# Patient Record
Sex: FEMALE | Race: WHITE | Employment: OTHER | URBAN - METROPOLITAN AREA
[De-identification: names, ages, dates, MRNs, and addresses within clinical notes are randomized per-mention and may not be internally consistent; named-entity substitution may affect disease eponyms.]

---

## 2017-01-03 ENCOUNTER — TELEPHONE (OUTPATIENT)
Dept: FAMILY MEDICINE CLINIC | Age: 62
End: 2017-01-03

## 2017-01-03 NOTE — TELEPHONE ENCOUNTER
Can we please transfer pt rx for Singlar to the [facility] on [address].

## 2017-02-02 ENCOUNTER — TELEPHONE (OUTPATIENT)
Dept: FAMILY MEDICINE CLINIC | Age: 62
End: 2017-02-02

## 2017-02-02 NOTE — TELEPHONE ENCOUNTER
Medication: Advair 250-50, dose: 1 inhalation, how often: twice daily, current number of medication days provided: 90, refill per application. Lot #: [id number], EXP 02/2018  . This medication was received and verified for the following 1. Correct Patient, 2. Correct Diagnosis, 3. Correct Drug, 4. Correct route, and no current allergy to medication    Please contact patient to come  their medications.      Maximino Regan MSN, RN, P-[facility]

## 2017-02-27 ENCOUNTER — HOSPITAL ENCOUNTER (OUTPATIENT)
Dept: LAB | Age: 62
Discharge: HOME | End: 2017-02-27

## 2017-02-27 ENCOUNTER — LAB ONLY (OUTPATIENT)
Dept: FAMILY MEDICINE CLINIC | Age: 62
End: 2017-02-27

## 2017-02-27 DIAGNOSIS — Z78.0 POSTMENOPAUSAL: ICD-10-CM

## 2017-02-27 DIAGNOSIS — I10 ESSENTIAL HYPERTENSION: ICD-10-CM

## 2017-02-27 DIAGNOSIS — R25.2 CRAMP OF BOTH LOWER EXTREMITIES: ICD-10-CM

## 2017-02-27 DIAGNOSIS — E78.5 HYPERLIPIDEMIA WITH TARGET LDL LESS THAN 100: Primary | ICD-10-CM

## 2017-02-27 LAB
25(OH)D3 SERPL-MCNC: 37.1 NG/ML (ref 30–100)
ALBUMIN SERPL BCP-MCNC: 4 G/DL (ref 3.4–5)
ALBUMIN/GLOB SERPL: 1.5 {RATIO} (ref 0.8–1.7)
ALP SERPL-CCNC: 73 U/L (ref 45–117)
ALT SERPL-CCNC: 25 U/L (ref 13–56)
ANION GAP BLD CALC-SCNC: 7 MMOL/L (ref 3–18)
AST SERPL W P-5'-P-CCNC: 21 U/L (ref 15–37)
BASOPHILS # BLD AUTO: 0.1 K/UL (ref 0–0.06)
BASOPHILS # BLD: 1 % (ref 0–2)
BILIRUB SERPL-MCNC: 0.5 MG/DL (ref 0.2–1)
BUN SERPL-MCNC: 17 MG/DL (ref 7–18)
BUN/CREAT SERPL: 22 (ref 12–20)
CALCIUM SERPL-MCNC: 10.6 MG/DL (ref 8.5–10.1)
CHLORIDE SERPL-SCNC: 103 MMOL/L (ref 100–108)
CHOLEST SERPL-MCNC: 263 MG/DL
CO2 SERPL-SCNC: 29 MMOL/L (ref 21–32)
CREAT SERPL-MCNC: 0.77 MG/DL (ref 0.6–1.3)
DIFFERENTIAL METHOD BLD: ABNORMAL
EOSINOPHIL # BLD: 0.2 K/UL (ref 0–0.4)
EOSINOPHIL NFR BLD: 3 % (ref 0–5)
ERYTHROCYTE [DISTWIDTH] IN BLOOD BY AUTOMATED COUNT: 13.7 % (ref 11.6–14.5)
GLOBULIN SER CALC-MCNC: 2.6 G/DL (ref 2–4)
GLUCOSE SERPL-MCNC: 105 MG/DL (ref 74–99)
HCT VFR BLD AUTO: 38.7 % (ref 35–45)
HDLC SERPL-MCNC: 82 MG/DL (ref 40–60)
HDLC SERPL: 3.2 {RATIO} (ref 0–5)
HGB BLD-MCNC: 12.8 G/DL (ref 12–16)
LDLC SERPL CALC-MCNC: 165.2 MG/DL (ref 0–100)
LIPID PROFILE,FLP: ABNORMAL
LYMPHOCYTES # BLD AUTO: 27 % (ref 21–52)
LYMPHOCYTES # BLD: 1.9 K/UL (ref 0.9–3.6)
MAGNESIUM SERPL-MCNC: 2.2 MG/DL (ref 1.8–2.4)
MCH RBC QN AUTO: 30.5 PG (ref 24–34)
MCHC RBC AUTO-ENTMCNC: 33.1 G/DL (ref 31–37)
MCV RBC AUTO: 92.4 FL (ref 74–97)
MONOCYTES # BLD: 0.7 K/UL (ref 0.05–1.2)
MONOCYTES NFR BLD AUTO: 9 % (ref 3–10)
NEUTS SEG # BLD: 4.2 K/UL (ref 1.8–8)
NEUTS SEG NFR BLD AUTO: 60 % (ref 40–73)
PLATELET # BLD AUTO: 363 K/UL (ref 135–420)
PMV BLD AUTO: 10.1 FL (ref 9.2–11.8)
POTASSIUM SERPL-SCNC: 4 MMOL/L (ref 3.5–5.5)
PROT SERPL-MCNC: 6.6 G/DL (ref 6.4–8.2)
RBC # BLD AUTO: 4.19 M/UL (ref 4.2–5.3)
SODIUM SERPL-SCNC: 139 MMOL/L (ref 136–145)
TRIGL SERPL-MCNC: 79 MG/DL (ref ?–150)
VLDLC SERPL CALC-MCNC: 15.8 MG/DL
WBC # BLD AUTO: 7.1 K/UL (ref 4.6–13.2)

## 2017-02-27 PROCEDURE — 85025 COMPLETE CBC W/AUTO DIFF WBC: CPT | Performed by: NURSE PRACTITIONER

## 2017-02-27 PROCEDURE — 80053 COMPREHEN METABOLIC PANEL: CPT | Performed by: NURSE PRACTITIONER

## 2017-02-27 PROCEDURE — 82306 VITAMIN D 25 HYDROXY: CPT | Performed by: NURSE PRACTITIONER

## 2017-02-27 PROCEDURE — 83735 ASSAY OF MAGNESIUM: CPT | Performed by: NURSE PRACTITIONER

## 2017-02-27 PROCEDURE — 80061 LIPID PANEL: CPT | Performed by: NURSE PRACTITIONER

## 2017-02-27 NOTE — PROGRESS NOTES
Will review results with pt at 3/6/17 appt  1. CBC ok   2. Mg ok   3. Vitamin D 37.1   4.  Lipid panel , HDL 82, .2 ASCVD RIsk < 5 % No statin therapy needed

## 2017-03-01 ENCOUNTER — TELEPHONE (OUTPATIENT)
Dept: FAMILY MEDICINE CLINIC | Age: 62
End: 2017-03-01

## 2017-03-02 NOTE — TELEPHONE ENCOUNTER
Medication: Proventil HFA, dose: 2 puffs, how often: every 6 hours as needed for wheezing, current number of medication days provided: 90, refill per application. Lot [id number], EXP 03/2018. This medication was received and verified for the following 1. Correct Patient, 2. Correct Diagnosis, 3. Correct Drug, 4. Correct route, and no current allergy to medication. Please contact patient to come  their medications.      Marylouise Duane, MSN, RN, FNP-C     [facility]

## 2017-03-06 ENCOUNTER — OFFICE VISIT (OUTPATIENT)
Dept: FAMILY MEDICINE CLINIC | Age: 62
End: 2017-03-06

## 2017-03-06 VITALS
BODY MASS INDEX: 25.41 KG/M2 | SYSTOLIC BLOOD PRESSURE: 139 MMHG | HEART RATE: 87 BPM | DIASTOLIC BLOOD PRESSURE: 86 MMHG | TEMPERATURE: 97.3 F | RESPIRATION RATE: 20 BRPM | HEIGHT: 61 IN | WEIGHT: 134.6 LBS | OXYGEN SATURATION: 97 %

## 2017-03-06 DIAGNOSIS — R39.89 SUSPECTED UTI: ICD-10-CM

## 2017-03-06 DIAGNOSIS — H73.93 TYMPANIC MEMBRANE DISORDER, BILATERAL: ICD-10-CM

## 2017-03-06 DIAGNOSIS — R09.81 SINUS CONGESTION: ICD-10-CM

## 2017-03-06 DIAGNOSIS — H91.93 HARD OF HEARING, BILATERAL: ICD-10-CM

## 2017-03-06 DIAGNOSIS — R25.2 CRAMP OF BOTH LOWER EXTREMITIES: ICD-10-CM

## 2017-03-06 DIAGNOSIS — I10 ESSENTIAL HYPERTENSION: Primary | ICD-10-CM

## 2017-03-06 DIAGNOSIS — M48.061 SPINAL STENOSIS OF LUMBAR REGION WITH RADICULOPATHY: Chronic | ICD-10-CM

## 2017-03-06 DIAGNOSIS — Z13.39 ADHD (ATTENTION DEFICIT HYPERACTIVITY DISORDER) EVALUATION: ICD-10-CM

## 2017-03-06 DIAGNOSIS — M54.16 SPINAL STENOSIS OF LUMBAR REGION WITH RADICULOPATHY: Chronic | ICD-10-CM

## 2017-03-06 DIAGNOSIS — J44.9 COPD, MILD (HCC): ICD-10-CM

## 2017-03-06 DIAGNOSIS — E78.5 HYPERLIPIDEMIA WITH TARGET LDL LESS THAN 100: ICD-10-CM

## 2017-03-06 RX ORDER — MOMETASONE FUROATE 50 UG/1
2 SPRAY, METERED NASAL DAILY
Qty: 3 CONTAINER | Refills: 3 | Status: SHIPPED | COMMUNITY
Start: 2017-03-06 | End: 2020-04-16

## 2017-03-06 RX ORDER — OLMESARTAN MEDOXOMIL AND HYDROCHLOROTHIAZIDE 20/12.5 20; 12.5 MG/1; MG/1
1 TABLET ORAL DAILY
Qty: 90 TAB | Refills: 3 | Status: SHIPPED | COMMUNITY
Start: 2017-03-06 | End: 2018-02-26 | Stop reason: ALTCHOICE

## 2017-03-06 RX ORDER — LANOLIN ALCOHOL/MO/W.PET/CERES
400 CREAM (GRAM) TOPICAL DAILY
Qty: 30 TAB | Refills: 11 | Status: SHIPPED | OUTPATIENT
Start: 2017-03-06 | End: 2018-09-06 | Stop reason: ALTCHOICE

## 2017-03-06 RX ORDER — SULFAMETHOXAZOLE AND TRIMETHOPRIM 800; 160 MG/1; MG/1
1 TABLET ORAL 2 TIMES DAILY
Qty: 6 TAB | Refills: 0 | Status: SHIPPED | OUTPATIENT
Start: 2017-03-06 | End: 2017-03-09

## 2017-03-06 RX ORDER — NAPROXEN 500 MG/1
500 TABLET ORAL 2 TIMES DAILY WITH MEALS
Qty: 90 TAB | Refills: 2 | Status: SHIPPED | OUTPATIENT
Start: 2017-03-06 | End: 2017-06-05 | Stop reason: SDUPTHER

## 2017-03-06 NOTE — PROGRESS NOTES
[facility]  [id number] [facility], [address] [place] [address]  [phone] [facility]/[phone] fax      3/6/2017    Reason for visit:   Chief Complaint   Patient presents with    Results    Spasms     leg cramps       Patient: Roopa Richard, 1955, [id number]       Primary MD: Mandy Gramajo NP    Subjective:   Roopa Richard, a 59 y.o. female, who presents for Results and Spasms (leg cramps)      Spasms   This is a recurrent problem. The problem occurs every several days. Pertinent negatives include no chest pain, no abdominal pain, no headaches and no shortness of breath. Nothing aggravates the symptoms. Nothing relieves the symptoms. Treatments tried: OTC magnesium. The treatment provided mild relief. Urinary Pain    The history is provided by the patient. This is a new problem. The current episode started more than 1 week ago (1 month ). The problem occurs every urination. The problem has been gradually improving. The quality of the pain is described as burning (Pressure and uncomfortable. ). The pain is at a severity of 4/10. The pain is mild. There has been no fever. Associated symptoms include chills and frequency. Pertinent negatives include no sweats, no nausea, no vomiting, no discharge, no hematuria, no hesitancy, no urgency, no flank pain, no vaginal discharge (Abdominal pressure), no abdominal pain and no back pain. The patient is not pregnant. Her past medical history does not include kidney stones, single kidney, urological procedure, recurrent UTIs, urinary stasis, catheterization or urinary catheter problem. Past medical history comments: Ear surgery/graft. Hearing Problem    The history is provided by the patient. This is a chronic problem. The current episode started more than 1 week ago (since 1980's). The problem occurs constantly. The problem has not changed since onset. Patient complains that both ears are affected. There has been no fever.  The pain is at a severity of 0/10. The patient is experiencing no pain. Associated symptoms include hearing loss ([place] in bilateral ears. History of chronic ear infections as a child, h/o ear surgery. [place] ). Pertinent negatives include no ear discharge, no headaches, no rhinorrhea, no sore throat, no abdominal pain, no diarrhea, no vomiting, no neck pain, no cough and no rash. Risk factors: History of recurrent ear infections. Her past medical history is significant for chronic ear infection and hearing loss. Her past medical history does not include tympanostomy tube. Past medical history comments: Ear surgery/graft. ~ The patient states that she thinks she has ADHD, Has extreme highs at time, difficulty concentrating, and has difficulty completing tasks. Reports in school she was dyslexic and had a difficult time concentrating. Past Medical History:   Diagnosis Date    Asthma 2012    +PFT for mild obstructive disease/COPD    Back pain, chronic 2012    Dr. J Carlos Kwok referred to Dr. Kellie Nelson Hard of hearing     chronic ruptured TM    HTN (hypertension) 2012    Hx of screening mammography 2016    [facility], normal, routine recs    Hyperlipidemia     Sinus congestion 2012    Spinal stenosis of lumbar region with radiculopathy 2015    Dr. Elder Gary       Past Surgical History:   Procedure Laterality Date    HX  SECTION      HX GYN         Social History     Social History    Marital status: SINGLE     Spouse name: N/A    Number of children: N/A    Years of education: N/A     Occupational History    Not on file.      Social History Main Topics    Smoking status: Current Every Day Smoker     Packs/day: 1.00     Types: Cigarettes    Smokeless tobacco: Never Used    Alcohol use No      Comment: h/o alcohol abuse in remission    Drug use: No    Sexual activity: Not on file     Other Topics Concern     Service No    Blood Transfusions No    Caffeine Concern Yes    Occupational Exposure No    Hobby Hazards No    Sleep Concern No    Stress Concern No    Weight Concern No    Special Diet No    Back Care No    Exercise Yes     walking    Bike Helmet No    Seat Belt Yes    Self-Exams No     Social History Narrative       Allergies   Allergen Reactions    Mold Extracts Runny Nose       Current Outpatient Prescriptions on File Prior to Visit   Medication Sig Dispense Refill    fluticasone-salmeterol (ADVAIR) 250-50 mcg/dose diskus inhaler Take 1 Puff by inhalation every twelve (12) hours. 3 Inhaler 3    albuterol (PROVENTIL HFA) 90 mcg/actuation inhaler Take 2 Puffs by inhalation every six (6) hours as needed. 1 Inhaler 3    montelukast (SINGULAIR) 10 mg tablet TAKE ONE TABLET BY MOUTH ONCE DAILY FOR ALLERGIC RHINITIS 30 Tab 6    aspirin delayed-release 81 mg tablet Take 81 mg by mouth daily.  Cholecalciferol, Vitamin D3, (VITAMIN D3) 2,000 unit cap capsule Take 2,000 Units by mouth daily. 30 Cap 11     No current facility-administered medications on file prior to visit. Review of Systems   Constitutional: Positive for chills. HENT: Positive for hearing loss ([place] in bilateral ears. History of chronic ear infections as a child, h/o ear surgery. Mariia Estrada ). Negative for ear discharge, ear pain, rhinorrhea, sore throat and tinnitus. Eyes: Negative. Respiratory: Negative. Negative for cough and shortness of breath. Cardiovascular: Negative. Negative for chest pain. Gastrointestinal: Negative. Negative for abdominal pain, diarrhea, nausea and vomiting. Genitourinary: Positive for frequency. Negative for flank pain, hematuria, hesitancy, urgency and vaginal discharge (Abdominal pressure). Musculoskeletal: Positive for muscle spasms. Negative for back pain and neck pain. Back Pain has been controlled since having the shot. Controlled on Naprosyn. Bilateral leg cramping. Skin: Negative for rash. Neurological: Negative. Negative for headaches. Endo/Heme/Allergies: Positive for environmental allergies. Psychiatric/Behavioral: Negative for depression, hallucinations, memory loss, substance abuse and suicidal ideas. The patient is nervous/anxious. The patient does not have insomnia. Objective:   Visit Vitals    /86    Pulse 87    Temp 97.3 °F (36.3 °C)    Resp 20    Ht 5' 1\" (1.549 m)    Wt 134 lb 9.6 oz (61.1 kg)    SpO2 97%    BMI 25.43 kg/m2      Wt Readings from Last 3 Encounters:   03/06/17 134 lb 9.6 oz (61.1 kg)   11/07/16 127 lb (57.6 kg)   02/23/16 144 lb (65.3 kg)     Lab Results   Component Value Date/Time    Glucose 105 02/27/2017 09:20 AM         Physical Exam   Constitutional: She is oriented to person, place, and time. She appears well-developed and well-nourished. HENT:   Head: Normocephalic. Right Ear: External ear and ear canal normal. No lacerations. No drainage, swelling or tenderness. No foreign bodies. No mastoid tenderness. Tympanic membrane is scarred. Tympanic membrane is not injected, not perforated, not erythematous, not retracted and not bulging. Tympanic membrane mobility is normal. No middle ear effusion. No hemotympanum. Decreased hearing is noted. Left Ear: External ear and ear canal normal. No lacerations. No drainage, swelling or tenderness. No foreign bodies. No mastoid tenderness. Tympanic membrane is perforated. Tympanic membrane is not injected, not scarred, not erythematous, not retracted and not bulging. Tympanic membrane mobility is normal.  No middle ear effusion. No hemotympanum. Decreased hearing is noted. Eyes: Pupils are equal, round, and reactive to light. Neck: Normal range of motion. Neck supple. No JVD present. Carotid bruit is not present. Cardiovascular: Normal rate, regular rhythm, normal heart sounds and intact distal pulses. No murmur heard. Pulmonary/Chest: Effort normal and breath sounds normal. No respiratory distress. Abdominal: Soft.  Bowel sounds are normal.   Musculoskeletal: Normal range of motion. Neurological: She is alert and oriented to person, place, and time. She has normal reflexes. Skin: Skin is warm and dry. Psychiatric: She has a normal mood and affect. Her behavior is normal.   Vitals reviewed. Assessment:    Michael Quiroz who has risk factors including (see above previous medical hx) and:       ICD-10-CM ICD-9-CM    1. Essential hypertension I10 401.9 olmesartan-hydroCHLOROthiazide (BENICAR HCT) 20-12.5 mg per tablet   2. Hyperlipidemia with target LDL less than 100 E78.5 272.4    3. Suspected UTI N39.0 599.0 trimethoprim-sulfamethoxazole (BACTRIM DS, SEPTRA DS) 160-800 mg per tablet   4. COPD, mild ([address] J44.9 496    5. Sinus congestion R09.81 478.19 mometasone (NASONEX) 50 mcg/actuation nasal spray   6. Spinal stenosis of lumbar region with radiculopathy, L4/5 M48.06 724.02 naproxen (NAPROSYN) 500 mg tablet    M54.16 724.4    7. Hard of hearing, bilateral H91.93 389.9 REFERRAL TO ENT-OTOLARYNGOLOGY   8. Tympanic membrane disorder, bilateral H73.93 384.9 REFERRAL TO ENT-OTOLARYNGOLOGY   9. ADHD (attention deficit hyperactivity disorder) evaluation Z13.4 V79.8    10. Cramp of both lower extremities R25.2 729.82 magnesium oxide (MAG-OX) 400 mg tablet   1. Essential hypertension  -The current medical regimen is effective;  continue present plan and medications. - olmesartan-hydroCHLOROthiazide (BENICAR HCT) 20-12.5 mg per tablet; Take 1 Tab by mouth daily. Dispense: 90 Tab; Refill: 3    2. Hyperlipidemia with target LDL less than 100  -The current medical regimen is effective;  continue present plan and medications. 3. Suspected UTI  -An infection in any part of the urinary system, the kidneys, bladder or urethra. Although a urinary tract infection can occur in any part of the urinary system, it often occurs in the bladder or urethra. Women are at greater risk.   A bladder infection can cause pain, a strong urge to urinate, a burning sensation, and blood in the urine. A kidney infection can cause back pain and fever. Common treatment is with antibiotics. - trimethoprim-sulfamethoxazole (BACTRIM DS, SEPTRA DS) 160-800 mg per tablet; Take 1 Tab by mouth two (2) times a day for 3 days. Dispense: 6 Tab; Refill: 0  -RTO if treatment not effective for further evaluation. 4. COPD, mild ([address]  -The current medical regimen is effective;  continue present plan and medications. 5. Sinus congestion  -The current medical regimen is effective;  continue present plan and medications. - mometasone (NASONEX) 50 mcg/actuation nasal spray; 2 Sprays by Both Nostrils route daily. Dispense: 3 Container; Refill: 3    6. Spinal stenosis of lumbar region with radiculopathy, L4/5  -The current medical regimen is effective;  continue present plan and medications. - naproxen (NAPROSYN) 500 mg tablet; Take 1 Tab by mouth two (2) times daily (with meals). Dispense: 90 Tab; Refill: 2    7. Hard of hearing, bilateral  - REFERRAL TO ENT-OTOLARYNGOLOGY    8. Tympanic membrane disorder, bilateral  -Spoke with Medical assistant/FSR Sheron Lunsford about referring patient to see Dr. Leslie Song and she states he is out of town this week and will return call to office   - REFERRAL TO ENT-OTOLARYNGOLOGY    9. ADHD (attention deficit hyperactivity disorder) evaluation  -The patient reports that she thinks she has ADHD. -The patient given number to [facility] to get testing for an official diagnosis. 10. Cramp of both lower extremities  - magnesium oxide (MAG-OX) 400 mg tablet; Take 1 Tab by mouth daily. Dispense: 30 Tab; Refill: 11      Written instructions followed our verbal discussion of all information discussed above, pending tests ordered and future goals/plans. Patient expressed understanding of current diagnosis, planned testing, follow up and if needed to contact the office for any questions or concerns prior to the next visit.      Plan:   Med reconciliation completed with patient. Reviewed side effects of medications with the patient. Questions were answered and patient verb understanding. Discussed lab results with patient  Will review results with pt at 3/6/17 appt  1. CBC ok   2. Mg ok   3. Vitamin D 37.1   4. Lipid panel , HDL 82, .2 ASCVD RIsk < 5 % No statin therapy needed    Orders Placed This Encounter    REFERRAL TO ENT-OTOLARYNGOLOGY     Referral Priority:   Routine     Referral Type:   Consultation     Referral Reason:   Specialty Services Required     Referred to Provider:   Aida Fregoso MD    olmesartan-hydroCHLOROthiazide (BENICAR HCT) 20-12.5 mg per tablet     Sig: Take 1 Tab by mouth daily. Dispense:  90 Tab     Refill:  3    mometasone (NASONEX) 50 mcg/actuation nasal spray     Si Sprays by Both Nostrils route daily. Dispense:  3 Container     Refill:  3    naproxen (NAPROSYN) 500 mg tablet     Sig: Take 1 Tab by mouth two (2) times daily (with meals). Dispense:  90 Tab     Refill:  2    trimethoprim-sulfamethoxazole (BACTRIM DS, SEPTRA DS) 160-800 mg per tablet     Sig: Take 1 Tab by mouth two (2) times a day for 3 days. Dispense:  6 Tab     Refill:  0    magnesium oxide (MAG-OX) 400 mg tablet     Sig: Take 1 Tab by mouth daily. Dispense:  30 Tab     Refill:  11     Current Outpatient Prescriptions   Medication Sig Dispense Refill    olmesartan-hydroCHLOROthiazide (BENICAR HCT) 20-12.5 mg per tablet Take 1 Tab by mouth daily. 90 Tab 3    mometasone (NASONEX) 50 mcg/actuation nasal spray 2 Sprays by Both Nostrils route daily. 3 Container 3    naproxen (NAPROSYN) 500 mg tablet Take 1 Tab by mouth two (2) times daily (with meals). 90 Tab 2    trimethoprim-sulfamethoxazole (BACTRIM DS, SEPTRA DS) 160-800 mg per tablet Take 1 Tab by mouth two (2) times a day for 3 days. 6 Tab 0    magnesium oxide (MAG-OX) 400 mg tablet Take 1 Tab by mouth daily.  30 Tab 11    fluticasone-salmeterol (ADVAIR) 250-50 mcg/dose diskus inhaler Take 1 Puff by inhalation every twelve (12) hours. 3 Inhaler 3    albuterol (PROVENTIL HFA) 90 mcg/actuation inhaler Take 2 Puffs by inhalation every six (6) hours as needed. 1 Inhaler 3    montelukast (SINGULAIR) 10 mg tablet TAKE ONE TABLET BY MOUTH ONCE DAILY FOR ALLERGIC RHINITIS 30 Tab 6    aspirin delayed-release 81 mg tablet Take 81 mg by mouth daily.  Cholecalciferol, Vitamin D3, (VITAMIN D3) 2,000 unit cap capsule Take 2,000 Units by mouth daily. 30 Cap 11     Medications Discontinued During This Encounter   Medication Reason    traMADol (ULTRAM) 50 mg tablet Not A Current Medication    olmesartan-hydrochlorothiazide (BENICAR HCT) 20-12.5 mg per tablet Reorder    mometasone (NASONEX) 50 mcg/actuation nasal spray Reorder    naproxen (NAPROSYN) 500 mg tablet Reorder    magnesium 250 mg tab Dose Adjustment       Follow-up Disposition:  Return in about 3 months (around 6/6/2017), or if symptoms worsen or fail to improve. Labs needed for follow-up appt    \"No Show policy was reviewed with the patient. The services affected are the nurse navigator and the provider. No show appointments include missing labs for a future scheduled appointment, Pap/pelvics, arriving to appointment more than 10 minutes late, and calling to cancel appointment less than 24 hours in advance. After the 6th No Show, the patient will be removed from the Foundation to include medications for 6 months. The patient will be referred to the [place] [id number] for their primary care needs. \"     Beulah Funk, [address] I spent 35 minutes with the patient in face-to-face consultation, of which greater than 50% was spent in counseling and coordination of care as described above.

## 2017-03-06 NOTE — MR AVS SNAPSHOT
Visit Information Date & Time Provider Department Dept. Phone Encounter #  
 3/6/2017  2:30 PM Ashkan Jarrett NP [address] [facility] [address] [id number] Follow-up Instructions Return in about 3 months (around 6/6/2017), or if symptoms worsen or fail to improve. Your Appointments 3/6/2017  2:30 PM  
Follow Up with Ashkan Jarrett NP [address] [facility] ([address]) Appt Note: 3-4 month follow up  
 [address] [facility] [id number]  
[address] [facility] [address] Upcoming Health Maintenance Date Due DTaP/Tdap/Td series (1 - Tdap) 9/8/1974 FOBT Q 1 YEAR AGE 50-75 9/8/2003 ZOSTER VACCINE AGE 60> 9/8/2013 INFLUENZA AGE 9 TO ADULT 8/1/2016 Pneumococcal 19-64 Medium Risk (1 of 1 - PPSV23) 3/6/2018* PAP AKA CERVICAL CYTOLOGY 9/2/2017 BREAST CANCER SCRN MAMMOGRAM 9/6/2018 *Topic was postponed. The date shown is not the original due date. Allergies as of 3/6/2017  Review Complete On: 3/6/2017 By: Missy Banks Severity Noted Reaction Type Reactions Mold Extracts  11/11/2014   Systemic Runny Nose Current Immunizations  Reviewed on 10/29/2015 Name Date Influenza Vaccine 10/6/2016 Influenza Vaccine Александр Mayes) 10/29/2015 Influenza Vaccine (Quad) PF 10/31/2013 Influenza Vaccine (Trivalent) 11/11/2014  3:00 PM  
  
 Not reviewed this visit You Were Diagnosed With   
  
 Codes Comments Essential hypertension    -  Primary ICD-10-CM: I10 
ICD-9-CM: 401.9 Hyperlipidemia with target LDL less than 100     ICD-10-CM: E78.5 ICD-9-CM: 272.4 Suspected UTI     ICD-10-CM: N39.0 ICD-9-CM: 599.0   
 COPD, mild ([address]     ICD-10-CM: J44.9 ICD-9-CM: 876 Sinus congestion     ICD-10-CM: R09.81 ICD-9-CM: 478.19  Spinal stenosis of lumbar region with radiculopathy     ICD-10-CM: M48.06, M54.16 
ICD-9-CM: 724.02, 724.4 Hard of hearing, bilateral     ICD-10-CM: H91.93 
ICD-9-CM: 389. 9 Tympanic membrane disorder, bilateral     ICD-10-CM: H73.93 
ICD-9-CM: 384.9 ADHD (attention deficit hyperactivity disorder) evaluation     ICD-10-CM: Z13.4 ICD-9-CM: V79.8 Vitals BP Pulse Temp Resp Height(growth percentile) Weight(growth percentile) 139/86 87 97.3 °F (36.3 °C) 20 5' 1\" (1.549 m) 134 lb 9.6 oz (61.1 kg) SpO2 BMI OB Status Smoking Status 97% 25.43 kg/m2 Hysterectomy Current Every Day Smoker BMI and BSA Data Body Mass Index Body Surface Area  
 25.43 kg/m 2 1.62 m 2 Preferred Pharmacy Pharmacy Name Phone [address], [address] [facility] [phone] Your Updated Medication List  
  
   
This list is accurate as of: 3/6/17  2:22 PM.  Always use your most recent med list.  
  
  
  
  
 albuterol 90 mcg/actuation inhaler Commonly known as:  PROVENTIL HFA Take 2 Puffs by inhalation every six (6) hours as needed. aspirin delayed-release 81 mg tablet Take 81 mg by mouth daily. Cholecalciferol (Vitamin D3) 2,000 unit Cap capsule Commonly known as:  VITAMIN D3 Take 2,000 Units by mouth daily. fluticasone-salmeterol 250-50 mcg/dose diskus inhaler Commonly known as:  ADVAIR Take 1 Puff by inhalation every twelve (12) hours. magnesium 250 mg Tab Take 1 Tab by mouth.  
  
 mometasone 50 mcg/actuation nasal spray Commonly known as:  NASONEX  
2 Sprays by Both Nostrils route daily. montelukast 10 mg tablet Commonly known as:  SINGULAIR  
TAKE ONE TABLET BY MOUTH ONCE DAILY FOR ALLERGIC RHINITIS  
  
 naproxen 500 mg tablet Commonly known as:  NAPROSYN Take 1 Tab by mouth two (2) times daily (with meals). olmesartan-hydroCHLOROthiazide 20-12.5 mg per tablet Commonly known as:  BENICAR HCT Take 1 Tab by mouth daily. trimethoprim-sulfamethoxazole 160-800 mg per tablet Commonly known as:  BACTRIM DS, SEPTRA DS Take 1 Tab by mouth two (2) times a day for 3 days. Prescriptions Printed Refills  
 olmesartan-hydroCHLOROthiazide (BENICAR HCT) 20-12.5 mg per tablet 3 Sig: Take 1 Tab by mouth daily. Class: Program  
 Route: Oral  
 mometasone (NASONEX) 50 mcg/actuation nasal spray 3 Si Sprays by Both Nostrils route daily. Class: Program  
 Route: Both Nostrils Prescriptions Sent to Mail Order Refills  
 olmesartan-hydroCHLOROthiazide (BENICAR HCT) 20-12.5 mg per tablet 3 Sig: Take 1 Tab by mouth daily. Class: Program  
 Pharmacy: [address] #: [phone] Route: Oral  
 mometasone (NASONEX) 50 mcg/actuation nasal spray 3 Si Sprays by Both Nostrils route daily. Class: Program  
 Pharmacy: [address] #: [phone] Route: Both Nostrils Prescriptions Sent to Pharmacy Refills  
 olmesartan-hydroCHLOROthiazide (BENICAR HCT) 20-12.5 mg per tablet 3 Sig: Take 1 Tab by mouth daily. Class: Program  
 Pharmacy: [address] #: [phone] Route: Oral  
 mometasone (NASONEX) 50 mcg/actuation nasal spray 3 Si Sprays by Both Nostrils route daily. Class: Program  
 Pharmacy: [address] Ph #: [phone] Route: Both Nostrils  
 naproxen (NAPROSYN) 500 mg tablet 2 Sig: Take 1 Tab by mouth two (2) times daily (with meals). Class: Normal  
 Pharmacy: [address] Ph #: [phone] Route: Oral  
 trimethoprim-sulfamethoxazole (BACTRIM DS, SEPTRA DS) 160-800 mg per tablet 0 Sig: Take 1 Tab by mouth two (2) times a day for 3 days.   
 Class: Normal  
 Pharmacy: Tasia Siegel [address] [place] [address], 550 [facility] #: [phone] Route: Oral  
  
We Performed the Following REFERRAL TO ENT-OTOLARYNGOLOGY [MDK4Dian Custom] Comments:  
 Please evaluate patient for abnormal tympanic membrane and [place]. Follow-up Instructions Return in about 3 months (around 6/6/2017), or if symptoms worsen or fail to improve. Referral Information Referral ID Referred By Referred To  
  
 [id number] Ann HUIZAR, [facility] [address] [place], [address] Phone: [phone] Fax: [phone] Visits Status Start Date End Date 1 New Request 3/6/17 3/6/18 If your referral has a status of pending review or denied, additional information will be sent to support the outcome of this decision. Patient Instructions [employer] Address: [place] [address], [address]  [phone] Urinary Tract Infection in Women: Care Instructions Your Care Instructions A urinary tract infection, or UTI, is a general term for an infection anywhere between the kidneys and the urethra (where urine comes out). Most UTIs are bladder infections. They often cause pain or burning when you urinate. UTIs are caused by bacteria and can be cured with antibiotics. Be sure to complete your treatment so that the infection goes away. Follow-up care is a key part of your treatment and safety. Be sure to make and go to all appointments, and call your doctor if you are having problems. It's also a good idea to know your test results and keep a list of the medicines you take. How can you care for yourself at home? · Take your antibiotics as directed. Do not stop taking them just because you feel better. You need to take the full course of antibiotics. · Drink extra water and other fluids for the next day or two.  This may help wash out the bacteria that are causing the infection. (If you have kidney, heart, or liver disease and have to limit fluids, talk with your doctor before you increase your fluid intake.) · Avoid drinks that are carbonated or have caffeine. They can irritate the bladder. · Urinate often. Try to empty your bladder each time. · To relieve pain, take a hot bath or lay a heating pad set on low over your lower belly or genital area. Never go to sleep with a heating pad in place. To prevent UTIs · Drink plenty of water each day. This helps you urinate often, which clears bacteria from your system. (If you have kidney, heart, or liver disease and have to limit fluids, talk with your doctor before you increase your fluid intake.) · Consider adding cranberry juice to your diet. · Urinate when you need to. · Urinate right after you have sex. · Change sanitary pads often. · Avoid douches, bubble baths, feminine hygiene sprays, and other feminine hygiene products that have deodorants. · After going to the bathroom, wipe from front to back. When should you call for help? Call your doctor now or seek immediate medical care if: · Symptoms such as fever, chills, nausea, or vomiting get worse or appear for the first time. · You have new pain in your back just below your rib cage. This is called flank pain. · There is new blood or pus in your urine. · You have any problems with your antibiotic medicine. Watch closely for changes in your health, and be sure to contact your doctor if: 
· You are not getting better after taking an antibiotic for 2 days. · Your symptoms go away but then come back. Where can you learn more? Go to http://giselle-erika.info/. Enter C469 in the search box to learn more about \"Urinary Tract Infection in Women: Care Instructions. \" Current as of: August 12, 2016 Content Version: 11.1 © 3036-2319 [employer].  Care instructions adapted under license by Leann Carson (which disclaims liability or warranty for this information). If you have questions about a medical condition or this instruction, always ask your healthcare professional. [address] any warranty or liability for your use of this information. [facility] reminders! Foundation Operating Hours: These may change without notice. Mon- Wed 7am to 5pm. Closed for lunch 12-1pm 
Thurs 7am to 12pm 
Fridays closed NO SHOW POLICY ~ If a patient has 6 no shows for an appointment with the Provider, Mental Health Provider, or the Nurse Navigator in 6 months, they will be discharged from the practice for 6 months. Medication ordering will also be suspended. If the patient is discharged from the [place], they can go to the [place] [address] where they can be seen for the primary needs plus obtain the same types of medications as they receive at the [place]. To avoid being discharged, the patient must call the office at [phone] 24 hours prior to their appointment if they need to cancel, arrive to their appointments on time and come to all scheduled appointments. If the patient is discharged from the [facility], they can apply to be re-established after 12 months. Lab work:  Unless you are instructed differently, please return to the office between the hours of 7 am and 10:30 am Monday through Thursday to have your labs drawn one week before your next scheduled PROVIDER visit. If you do not have an appointment to follow up on these results, please make one or plan to call the office if you do not hear from us to get the results. No news does not mean good news. Medications: If your medications are new or have changed, and you get your medications from the clinic pharmacy ([facility]), you MUST talk to the pharmacy staff to sign the new prescription applications. If you don't sign the applications we cannot get the medications for you.  It usually takes 6-8 weeks for your medications to arrive. The Pharmacy staff will call you when your medications are available. You will have 30 days to come in and  your medications. If you don't  your medicines within those 30 days, those medicines will be placed on the self as samples and you will have to start all over again by completing the applications and waiting the 6-8 weeks for your medicines to arrive. This is firm and there will be no exceptions! ! The [facility] or [facility] will assist you with your medications if available but not all medications are available so some may be obtained through local pharmacies such as [facility] that has a large $4 list and Yellow Pages that has many drugs for free or also for $4.  We will work hard to get the best medications for you that you can also afford. Feet Care: Local [facility] through [facility] Every second Tuesday of the month (except for holidays and election days) from 9am to 1 pm. The services provided by these ministry volunteers are free of charge with the option to donate. They will inspect your feet thoroughly, soak them for 10 minutes, cut and file your nails. They care for diabetics as well. Keep in mind this service is free and will be on a first come first serve basis. Bad teeth? Ask about the Dental Bus to get you in front of a local dentist. The bus leaves every other Wednesday for those on the list. (Ask about availability as these appointments are limited) Eye exams for Diabetics. Please let us know so we can add you to the list to see the eye doctor at [facility]. You will receive a free eye exam and free glasses if needed. Unfortunately, if you are not a diabetic, we do not have a free service for eye exams for you (yet!). We do have information on where to go to get a huge discount on eye exams and glasses. Sick visits:   If you are sick and it is not an emergency call the office to see if you can schedule an appointment. Charges and cost items from the clinic:  Most of our orders are covered by [address] but there ARE SOME CHARGES for items such as radiology interpretations and anesthesiology during procedures and surgeries. Please make sure you have contacted the Advanced Patient Advocacy (APA) group to check on your payment options: www. [email or web address]. or come in and talk to them in person: On 
Tuesdays, we have Advanced Patient Advocacy is available Mon - Fri 8-4pm at [facility] on the first floor by the information desk. Their number is [phone]. It is important that you are screened in order to qualify for assistance and to avoid huge medical charges. [facility] is not responsible for ANY charges you may accrue regardless of who ordered the medication, procedure, treatment or test. If you go to the Emergency Room, you WILL be charged! Behavior and emotional issues! It is stressful to be sick, have an illness, take medications, not have a job, not have medical insurance, have family issues or just getting older! Schedule an appointment with our mental health provider. She is in the office Mondays and Wednesdays from 8am to [facility] 7053 can also contact the following: The national suicide hotline (4-518-102-JOHF or 9-534.856.2104) [address] 
[phone] [facility] [place] [address] [facility]  
[phone] Immunizations/Vaccination Routine Immunizations are provided for infants, children, teens, and adults at the [facility]. Please bring all immunization records with you and present them at the time of registration. Phone [phone] Hours (Walk in) Monday, Tuesday, Wednesday and Friday 8:00 AM to 11:00 AM 
12:30 PM to 3:00 PM 
 
 
 Drug and Alcohol Addiction Issues! It is hard to stop a poor habit but there is help out there. Please feel free to attend any other the following support groups to help you kick the habit or go to [facility] Emergency Department to be evaluated by the psychiatric team. Never give up!! Ree meetings: Valentino kraus, [facility] MONDAY 7:30 PM JUST FOR TODAY [employer] [facility] [address] [facility] WEDNESDAY 10:30 AM [employer] [facility], ROOM [address] [place] [address]  
 
[place] WEDNESDAY 8:00  Guido Porter [facility] [address] [place] [place] THURSDAY 8:00 PM KEEP IT SIMPLE [employer] Al[employer] [facility] [address] 8:00 PM LET IT BEGIN WITH ME [employer] Al[employer]on [facility] [address] [facility] FRIDAY 6;30 PM [place] PARENTS [employer] Parents [facility] [address] [place] MONDAY 10:30  [place] [address] [facility] SATURDAY 8:00 PM [facility] SATURDAY NIGHT [employer] [place] [address] [place] MONDAY 7:00 PM MONDAY NIGHT [employer]on [place] [facility] [address]  
 
[place] WEDNESDAY 8:00 PM [employer]EN[employer] SEEK[employer] [facility] [address] [facility]! [employer] introduces [employer] patient portal. Now you can access parts of your medical record, email your doctor's office, and request medication refills online. 1. In your internet browser, go to [email or web address] 2. Click on the First Time User? Click Here link in the Sign In box. You will see the New Member Sign Up page. 3. Enter your [employer]Tac[employer] Access Code exactly as it appears below. You will not need to use this code after youve completed the sign-up process.  If you do not sign up before the expiration date, you must request a new code. · Door 6 Access Code: [email or web address] Expires: 6/4/2017  1:49 PM 
 
4. Enter the last four digits of your Social Security Number (xxxx) and Date of Birth (mm/dd/yyyy) as indicated and click Submit. You will be taken to the next sign-up page. 5. Create a Door 6 ID. This will be your Door 6 login ID and cannot be changed, so think of one that is secure and easy to remember. 6. Create a Door 6 password. You can change your password at any time. 7. Enter your Password Reset Question and Answer. This can be used at a later time if you forget your password. 8. Enter your e-mail address. You will receive e-mail notification when new information is available in [address] 9. Click Sign Up. You can now view and download portions of your medical record. 10. Click the Download Summary menu link to download a portable copy of your medical information. If you have questions, please visit the Frequently Asked Questions section of the Door 6 website. Remember, Door 6 is NOT to be used for urgent needs. For medical emergencies, dial 911. Now available from your iPhone and Android! Please provide this summary of care documentation to your next provider. Your primary care clinician is listed as Pallavi Hough. If you have any questions after today's visit, please call [phone].

## 2017-03-06 NOTE — PATIENT INSTRUCTIONS
[facility]    Address: [address], [place], [facility] [place]   [phone]     Urinary Tract Infection in Women: Care Instructions  Your Care Instructions    A urinary tract infection, or UTI, is a general term for an infection anywhere between the kidneys and the urethra (where urine comes out). Most UTIs are bladder infections. They often cause pain or burning when you urinate. UTIs are caused by bacteria and can be cured with antibiotics. Be sure to complete your treatment so that the infection goes away. Follow-up care is a key part of your treatment and safety. Be sure to make and go to all appointments, and call your doctor if you are having problems. It's also a good idea to know your test results and keep a list of the medicines you take. How can you care for yourself at home? · Take your antibiotics as directed. Do not stop taking them just because you feel better. You need to take the full course of antibiotics. · Drink extra water and other fluids for the next day or two. This may help wash out the bacteria that are causing the infection. (If you have kidney, heart, or liver disease and have to limit fluids, talk with your doctor before you increase your fluid intake.)  · Avoid drinks that are carbonated or have caffeine. They can irritate the bladder. · Urinate often. Try to empty your bladder each time. · To relieve pain, take a hot bath or lay a heating pad set on low over your lower belly or genital area. Never go to sleep with a heating pad in place. To prevent UTIs  · Drink plenty of water each day. This helps you urinate often, which clears bacteria from your system. (If you have kidney, heart, or liver disease and have to limit fluids, talk with your doctor before you increase your fluid intake.)  · Consider adding cranberry juice to your diet. · Urinate when you need to. · Urinate right after you have sex. · Change sanitary pads often.   · Avoid douches, bubble baths, feminine hygiene sprays, and other feminine hygiene products that have deodorants. · After going to the bathroom, wipe from front to back. When should you call for help? Call your doctor now or seek immediate medical care if:  · Symptoms such as fever, chills, nausea, or vomiting get worse or appear for the first time. · You have new pain in your back just below your rib cage. This is called flank pain. · There is new blood or pus in your urine. · You have any problems with your antibiotic medicine. Watch closely for changes in your health, and be sure to contact your doctor if:  · You are not getting better after taking an antibiotic for 2 days. · Your symptoms go away but then come back. Where can you learn more? Go to http://giselle-erika.info/. Enter G433 in the search box to learn more about \"Urinary Tract Infection in Women: Care Instructions. \"  Current as of: August 12, 2016  Content Version: 11.1  © 7265-2853 [employer]. Care instructions adapted under license by [employer] (which disclaims liability or warranty for this information). If you have questions about a medical condition or this instruction, always ask your healthcare professional. [place] [address] any warranty or liability for your use of this information. The [facility] [place] reminders! Foundation Operating Hours: These may change without notice. Mon- Wed 7am to 5pm. Closed for lunch 12-1pm  Thurs 7am to 12pm  Fridays closed     NO SHOW POLICY ~ If a patient has 6 no shows for an appointment with the Provider, Mental Health Provider, or the Nurse Navigator in 6 months, they will be discharged from the practice for 6 months. Medication ordering will also be suspended. If the patient is discharged from the [facility], they can go to the [place] [address] where they can be seen for the primary needs plus obtain the same types of medications as they receive at the [facility]. To avoid being discharged, the patient must call the office at [phone] 24 hours prior to their appointment if they need to cancel, arrive to their appointments on time and come to all scheduled appointments. If the patient is discharged from the practice, they can apply to be re-established after 12 months. Lab work:  Unless you are instructed differently, please return to the office between the hours of 7 am and 10:30 am Monday through Thursday to have your labs drawn one week before your next scheduled PROVIDER visit. If you do not have an appointment to follow up on these results, please make one or plan to call the office if you do not hear from us to get the results. No news does not mean good news. Medications: If your medications are new or have changed, and you get your medications from the clinic pharmacy ([facility]), you MUST talk to the pharmacy staff to sign the new prescription applications. If you don't sign the applications we cannot get the medications for you. It usually takes 6-8 weeks for your medications to arrive. The Pharmacy staff will call you when your medications are available. You will have 30 days to come in and  your medications. If you don't  your medicines within those 30 days, those medicines will be placed on the self as samples and you will have to start all over again by completing the applications and waiting the 6-8 weeks for your medicines to arrive. This is firm and there will be no exceptions! ! The [facility] Connection or [facility] will assist you with your medications if available but not all medications are available so some may be obtained through local pharmacies such as [employer] that has a large $4 list and [employer] that has many drugs for free or also for $4.  We will work hard to get the best medications for you that you can also afford.         Feet Care: Local [facility] through [facility]  Every second Tuesday of the month (except for holidays and election days) from 9am to 1 pm. The services provided by these ministry volunteers are free of charge with the option to donate. They will inspect your feet thoroughly, soak them for 10 minutes, cut and file your nails. They care for diabetics as well. Keep in mind this service is free and will be on a first come first serve basis. Bad teeth? Ask about the Dental Bus to get you in front of a local dentist. The bus leaves every other Wednesday for those on the list. (Ask about availability as these appointments are limited)    Eye exams for Diabetics. Please let us know so we can add you to the list to see the eye doctor at [facility]. You will receive a free eye exam and free glasses if needed. Unfortunately, if you are not a diabetic, we do not have a free service for eye exams for you (yet!). We do have information on where to go to get a huge discount on eye exams and glasses. Sick visits: If you are sick and it is not an emergency call the office to see if you can schedule an appointment. Charges and cost items from the clinic:  Most of our orders are covered by [employer] Tiffanie Derek but there ARE SOME CHARGES for items such as radiology interpretations and anesthesiology during procedures and surgeries. Please make sure you have contacted the Advanced Patient Advocacy (APA) group to check on your payment options: [email or web address]. or come in and talk to them in person: On  Tuesdays, we have Advanced Patient Advocacy is available Mon - Fri 8-4pm at DR. [facility] hospitals on the first floor by the information desk. Their number is [phone]. It is important that you are screened in order to qualify for assistance and to avoid huge medical charges. The [facility] is not responsible for ANY charges you may accrue regardless of who ordered the medication, procedure, treatment or test. If you go to the Emergency Room, you WILL be charged! Behavior and emotional issues!   It is stressful to be sick, have an illness, take medications, not have a job, not have medical insurance, have family issues or just getting older! Schedule an appointment with our mental health provider. She is in the office Mondays and Wednesdays from 8am to [address] [facility] can also contact the following: The national suicide hotline (4-679-366-HYIS or 9-104.979.8086)    Joaquim [address] [facility], [id number] [place] [address]  [phone]    [facility] - [address], [address] [place]  [phone]            Immunizations/Vaccination  Routine Immunizations are provided for infants, children, teens, and adults at the [facility] FOR PHYSICAL REHABILITATION. Please bring all immunization records with you and present them at the time of registration. Phone [phone] extension [phone]  Hours (Walk in)  Monday, Tuesday, Wednesday and Friday  8:00 AM to 11:00 AM  12:30 PM to 3:00 PM      Drug and Alcohol Addiction Issues! It is hard to stop a poor habit but there is help out there. Please feel free to attend any other the following support groups to help you kick the habit or go to [facility] to be evaluated by the psychiatric team. Never give up!! AlAnon meetings: Valentino kraus, [place], [place]    [place] MONDAY 7:30 PM JUST FOR TODAY [employer] Leigh [facility] [address] [facility]     [place] WEDNESDAY 10:30 AM NEW BEGINNINGS [employer] Leigh [place] [employer], ROOM [address] [place], [place] [address] WEDNES[facility] 8:00  [address] 8:00 PM KEEP IT SIMPLE AFG Leigh ARMENDARIZ [facility] 1 PERCY [place]     [facility] THURSDAY 8:00 PM LET IT BEGIN WITH ME AFG Leigh SMITHCar[facility] CenterJASMYN [facility] Tim [address] 17 6;30 PM [place] PARENTS [employer] Parents [place] 2720 [facility] [address]  [facility] 236     [place] MONDAY 10:30 AM LIFELINE [employer] Leigh [facility] 96 [facility] SATURDAY 8:00 PM [facility] SATURDAY NIGHT AFG Leigh [facility] 96 [facility] MONDAY 7:00 PM MONDAY NIGHT AFG Leigh LEACH [facility] 1589 [facility] WEDNESDAY 8:00 PM SERENITY SEEKERS [employer] Leigh [facility] 202 [facility]

## 2017-03-13 ENCOUNTER — TELEPHONE (OUTPATIENT)
Dept: FAMILY MEDICINE CLINIC | Age: 62
End: 2017-03-13

## 2017-03-22 ENCOUNTER — TELEPHONE (OUTPATIENT)
Dept: FAMILY MEDICINE CLINIC | Age: 62
End: 2017-03-22

## 2017-03-22 NOTE — TELEPHONE ENCOUNTER
Medication: Nasonex, dose: 2 sprays both nostrils, how often: qd, current number of medication days provided: 90, refill per application. Lot #: [id number], EXP 03/2018. This medication was received and verified for the following 1. Correct Patient, 2. Correct Diagnosis, 3. Correct Drug, 4. Correct route, and no current allergy to medication. Please contact patient to come  their medications.      Aleksandr Sen MSN, RN, [facility]

## 2017-05-09 ENCOUNTER — TELEPHONE (OUTPATIENT)
Dept: FAMILY MEDICINE CLINIC | Age: 62
End: 2017-05-09

## 2017-05-09 NOTE — TELEPHONE ENCOUNTER
Medication: Saslxs799/50 dose: 1 inhalation, how often: twice daily, current number of medication days provided: 90, refill per application. Lot #[id number], EXP 05/2018 . This medication was received and verified for the following 1. Correct Patient, 2. Correct Diagnosis, 3. Correct Drug, 4. Correct route, and no current allergy to medication. Medication: Benicar HCT 20/12.5mg mg, dose: 1tab, how often: daily, current number of medication days provided: 90, refill per application. Lot #[id number], EXP 05/2018    This medication was received and verified for the following 1. Correct Patient, 2. Correct Diagnosis, 3. Correct Drug, 4. Correct route, and no current allergy to medication.      Libby Medrano PharmD

## 2017-05-23 DIAGNOSIS — I10 ESSENTIAL HYPERTENSION: Primary | ICD-10-CM

## 2017-05-30 ENCOUNTER — LAB ONLY (OUTPATIENT)
Dept: FAMILY MEDICINE CLINIC | Age: 62
End: 2017-05-30

## 2017-05-30 ENCOUNTER — HOSPITAL ENCOUNTER (OUTPATIENT)
Dept: LAB | Age: 62
Discharge: HOME | End: 2017-05-30

## 2017-05-30 DIAGNOSIS — I10 ESSENTIAL HYPERTENSION: ICD-10-CM

## 2017-05-30 DIAGNOSIS — E78.5 HYPERLIPIDEMIA WITH TARGET LDL LESS THAN 100: Primary | ICD-10-CM

## 2017-05-30 LAB
ALBUMIN SERPL BCP-MCNC: 4.1 G/DL (ref 3.4–5)
ALBUMIN/GLOB SERPL: 1.5 {RATIO} (ref 0.8–1.7)
ALP SERPL-CCNC: 79 U/L (ref 45–117)
ALT SERPL-CCNC: 20 U/L (ref 13–56)
ANION GAP BLD CALC-SCNC: 6 MMOL/L (ref 3–18)
AST SERPL W P-5'-P-CCNC: 18 U/L (ref 15–37)
BILIRUB SERPL-MCNC: 0.4 MG/DL (ref 0.2–1)
BUN SERPL-MCNC: 20 MG/DL (ref 7–18)
BUN/CREAT SERPL: 25 (ref 12–20)
CALCIUM SERPL-MCNC: 10.8 MG/DL (ref 8.5–10.1)
CHLORIDE SERPL-SCNC: 106 MMOL/L (ref 100–108)
CO2 SERPL-SCNC: 27 MMOL/L (ref 21–32)
CREAT SERPL-MCNC: 0.79 MG/DL (ref 0.6–1.3)
GLOBULIN SER CALC-MCNC: 2.7 G/DL (ref 2–4)
GLUCOSE SERPL-MCNC: 87 MG/DL (ref 74–99)
MAGNESIUM SERPL-MCNC: 2.2 MG/DL (ref 1.6–2.6)
POTASSIUM SERPL-SCNC: 4.5 MMOL/L (ref 3.5–5.5)
PROT SERPL-MCNC: 6.8 G/DL (ref 6.4–8.2)
SODIUM SERPL-SCNC: 139 MMOL/L (ref 136–145)

## 2017-05-30 PROCEDURE — 80053 COMPREHEN METABOLIC PANEL: CPT | Performed by: NURSE PRACTITIONER

## 2017-05-30 PROCEDURE — 83735 ASSAY OF MAGNESIUM: CPT | Performed by: NURSE PRACTITIONER

## 2017-06-05 ENCOUNTER — OFFICE VISIT (OUTPATIENT)
Dept: FAMILY MEDICINE CLINIC | Age: 62
End: 2017-06-05

## 2017-06-05 VITALS
SYSTOLIC BLOOD PRESSURE: 114 MMHG | RESPIRATION RATE: 20 BRPM | WEIGHT: 135 LBS | HEIGHT: 61 IN | TEMPERATURE: 98.4 F | DIASTOLIC BLOOD PRESSURE: 74 MMHG | BODY MASS INDEX: 25.49 KG/M2 | OXYGEN SATURATION: 98 % | HEART RATE: 94 BPM

## 2017-06-05 DIAGNOSIS — J30.2 SEASONAL ALLERGIC RHINITIS, UNSPECIFIED ALLERGIC RHINITIS TRIGGER: ICD-10-CM

## 2017-06-05 DIAGNOSIS — Z78.9 STATIN INTOLERANCE: ICD-10-CM

## 2017-06-05 DIAGNOSIS — M48.061 SPINAL STENOSIS OF LUMBAR REGION WITH RADICULOPATHY: Chronic | ICD-10-CM

## 2017-06-05 DIAGNOSIS — Z00.00 ROUTINE HEALTH MAINTENANCE: ICD-10-CM

## 2017-06-05 DIAGNOSIS — I10 ESSENTIAL HYPERTENSION: Primary | ICD-10-CM

## 2017-06-05 DIAGNOSIS — E78.5 HYPERLIPIDEMIA WITH TARGET LDL LESS THAN 100: ICD-10-CM

## 2017-06-05 DIAGNOSIS — J44.9 COPD, MILD (HCC): ICD-10-CM

## 2017-06-05 DIAGNOSIS — M54.16 SPINAL STENOSIS OF LUMBAR REGION WITH RADICULOPATHY: Chronic | ICD-10-CM

## 2017-06-05 DIAGNOSIS — H91.93 BILATERAL HEARING LOSS, UNSPECIFIED HEARING LOSS TYPE: ICD-10-CM

## 2017-06-05 DIAGNOSIS — Z71.6 TOBACCO ABUSE COUNSELING: ICD-10-CM

## 2017-06-05 RX ORDER — MONTELUKAST SODIUM 10 MG/1
TABLET ORAL
Qty: 30 TAB | Refills: 6 | Status: ON HOLD | OUTPATIENT
Start: 2017-06-05 | End: 2017-10-24

## 2017-06-05 RX ORDER — NAPROXEN 500 MG/1
500 TABLET ORAL 2 TIMES DAILY WITH MEALS
Qty: 90 TAB | Refills: 2 | Status: SHIPPED | OUTPATIENT
Start: 2017-06-05 | End: 2017-12-04 | Stop reason: SDUPTHER

## 2017-06-05 RX ORDER — EZETIMIBE 10 MG/1
10 TABLET ORAL DAILY
Qty: 90 TAB | Refills: 0 | Status: SHIPPED | COMMUNITY
Start: 2017-06-05 | End: 2017-06-29 | Stop reason: SDUPTHER

## 2017-06-05 RX ORDER — EZETIMIBE 10 MG/1
10 TABLET ORAL DAILY
Qty: 90 TAB | Refills: 0 | Status: SHIPPED | COMMUNITY
Start: 2017-06-05 | End: 2017-09-03

## 2017-06-05 NOTE — PATIENT INSTRUCTIONS
The [facility] reminders! Foundation Operating Hours: These may change without notice. Mon- Wed 7am to 5pm. Closed for lunch 12-1pm  Thurs 7am to 12pm  Fridays closed     NO SHOW POLICY ~ If a patient has 3 no shows for an appointment with the Provider, Mental Health Provider, or the Nurse Navigator in 6 months, they will be discharged from the practice for 6 months. Medication ordering will also be suspended. If the patient is discharged from the [employer], they can go to the [place] [address] where they can be seen for the primary needs plus obtain the same types of medications as they receive at the [employer]. To avoid being discharged, the patient must call the office at [phone] 24 hours prior to their appointment if they need to cancel, arrive to their appointments on time and come to all scheduled appointments. If the patient is discharged from the practice, they can apply to be re-established after 12 months. Lab work:  Unless you are instructed differently, please return to the office between the hours of 7 am and 10:30 am Monday through Thursday to have your labs drawn one week before your next scheduled PROVIDER visit. If you do not have an appointment to follow up on these results, please make one or plan to call the office if you do not hear from us to get the results. No news does not mean good news. Medications: If your medications are new or have changed, and you get your medications from the clinic pharmacy ([facility]), you MUST talk to the pharmacy staff to sign the new prescription applications. If you don't sign the applications we cannot get the medications for you. It usually takes 6-8 weeks for your medications to arrive. The Pharmacy staff will call you when your medications are available. You will have 30 days to come in and  your medications.  If you don't  your medicines within those 30 days, those medicines will be placed on the self as samples and you will have to start all over again by completing the applications and waiting the 6-8 weeks for your medicines to arrive. This is firm and there will be no exceptions! ! The [facility] Connection or [facility] will assist you with your medications if available but not all medications are available so some may be obtained through local pharmacies such as Wal-Mart that has a large $4 list and [employer] that has many drugs for free or also for $4.  We will work hard to get the best medications for you that you can also afford. [facility] Medication pick-up times:  Monday-Tuesday 1:00 -5:00 PM  Wednesday- Thursday 8:00 -11:30 AM      Feet Care: Local [facility] through [facility]  Every second Tuesday of the month (except for holidays and election days) from 9am to 1 pm. The services provided by these ministry volunteers are free of charge with the option to donate. They will inspect your feet thoroughly, soak them for 10 minutes, cut and file your nails. They care for diabetics as well. Keep in mind this service is free and will be on a first come first serve basis. Bad teeth? Ask about the Dental Bus to get you in front of a local dentist. The bus leaves every other Wednesday for those on the list. (Ask about availability as these appointments are limited)    Eye exams for Diabetics. Please let us know so we can add you to the list to see the eye doctor at [facility] You will receive a free eye exam and free glasses if needed. Unfortunately, if you are not a diabetic, we do not have a free service for eye exams for you (yet!). We do have information on where to go to get a huge discount on eye exams and glasses. Sick visits: If you are sick and it is not an emergency call the office to see if you can schedule an appointment.      Charges and cost items from the clinic:  Most of our orders are covered by [address] Tiffanie Derek but there ARE SOME CHARGES for items such as radiology interpretations and anesthesiology during procedures and surgeries. Please make sure you have contacted the Advanced Patient Advocacy (APA) group to check on your payment options: [email or web address]. [email or web address]. or come in and talk to them in person: On  Tuesdays, we have Advanced Patient Advocacy is available Mon - Fri 8-4pm at [facility] on the first floor by the information desk. Their number is [phone]. It is important that you are screened in order to qualify for assistance and to avoid huge medical charges. The [facility] is not responsible for ANY charges you may accrue regardless of who ordered the medication, procedure, treatment or test. If you go to the Emergency Room, you WILL be charged! Behavior and emotional issues! It is stressful to be sick, have an illness, take medications, not have a job, not have medical insurance, have family issues or just getting older! Schedule an appointment with our mental health provider. She is in the office Mondays and Wednesdays from 8am to [address] [facility] can also contact the following: The national suicide hotline (0-394-528-AYYD or 9-978.641.7074)    [address] [facility], [address] [place] [address]    Walk- in hours Monday -Wednesday   8:30 am -11:15 AM  2:15pm -4:15 pm    [employer] (CSB) - Elías Harrington  [address], [address] [place]  [phone]        The [place] Department for Aging and The [facility], in collaboration with community partners, provides and advocates for resources and services to improve the employment, quality of life, security, and independence of older [address], [address] with disabilities, and their families. Department for Aging and Rehabilitative Services  [address] Location: [address]   Voice: [address] [place]: [phone]  E-mail: [email or web address]      Immunizations/Vaccination  Routine Immunizations are provided for infants, children, teens, and adults at the [facility] FOR PHYSICAL REHABILITATION. Please bring all immunization records with you and present them at the time of registration. Phone [phone] extension [phone]  Hours (Walk in)  Monday, Tuesday, Wednesday and Friday  8:00 AM to 11:00 AM  12:30 PM to 3:00 PM      Drug and Alcohol Addiction Issues! It is hard to stop a poor habit but there is help out there. Please feel free to attend any other the following support groups to help you kick the habit or go to [facility] to be evaluated by the psychiatric team. Never give up!! AlAnon meetings: [place], [place]    [facility] MONDAY 7:30 PM JUST FOR TODAY AFG Leigh [facility] [address] [facility] WEDNESDAY 10:30 AM NEW BEGINNINGS [employer] Leigh [facility], ROOM [address] [place] [address]     [facility] WEDNESDAY 8:00  Gu[address] 8:00 PM KEEP IT SIMPLE AFG Leigh [facility] 1 PERCY [place]     [facility] THURSDAY 8:00 PM LET IT BEGIN WITH ME AFG Leigh SM[facility]Gu[facility] Hospital[facility] 17 6;30 PM [place] PARENTS [employer] Parents [place] 2720 [facility] [address] [facility] 236     [place] MONDAY 10:30 AM LIFELINE AFG Leigh [facility] 96 [facility] SATURDAY 8:00 PM [facility] SATURDAY NIGHT AFG Leigh [facility] 96 [facility] MONDAY 7:00 PM MONDAY NIGHT AFG Leigh ROQUEENEZER [facility] [address]     [place] WEDNESDAY 8:00 PM SERENITY SEEKERS [employer] Leigh [facility] [address]  Ezetimibe (By mouth)   Ezetimibe (a-JMW-t-mibe)  Lowers high cholesterol levels. Brand Name(s): Zetia   There may be other brand names for this medicine. When This Medicine Should Not Be Used:    This medicine is not right for everyone. Do not use it if you had an allergic reaction to ezetimibe. How to Use This Medicine:   Tablet  · Take your medicine as directed. Your dose may need to be changed several times to find what works best for you. · If you also use cholestyramine, take it at least 2 hours after or 4 hours before you take ezetimibe. · Missed dose: Take a dose as soon as you remember. If it is almost time for your next dose, wait until then and take a regular dose. Do not take extra medicine to make up for a missed dose. · Store the medicine in a closed container at room temperature, away from heat, moisture, and direct light. Drugs and Foods to Avoid:   Ask your doctor or pharmacist before using any other medicine, including over-the-counter medicines, vitamins, and herbal products. · Do not use this medicine together with a statin medicine if you are pregnant or breastfeeding, or if you have liver disease. · Some medicines can affect how ezetimibe works. Tell your doctor if you are using any of the following:   ¨ Cyclosporine  ¨ Cholestyramine  ¨ Fenofibrate  ¨ Gemfibrozil  ¨ A blood thinner, such as warfarin  Warnings While Using This Medicine:   · Tell your doctor if you are pregnant or breastfeeding, or if you have liver disease, kidney disease, or thyroid problems. · This medicine may cause myopathy or rhabdomyolysis, which are serious muscle problems. · Your doctor will check your progress and the effects of this medicine at regular visits. Keep all appointments. · Keep all medicine out of the reach of children. Never share your medicine with anyone.   Possible Side Effects While Using This Medicine:   Call your doctor right away if you notice any of these side effects:  · Allergic reaction: Itching or hives, swelling in your face or hands, swelling or tingling in your mouth or throat, chest tightness, trouble breathing  · Muscle pain, tenderness, or weakness  · Nausea, vomiting, loss of appetite, stomach pain, yellow skin or eyes  If you notice other side effects that you think are caused by this medicine, tell your doctor. Call your doctor for medical advice about side effects. You may report side effects to FDA at 0-675-UUI-0406  © 2017 [address] Information is for End User's use only and may not be sold, redistributed or otherwise used for commercial purposes. The above information is an  only. It is not intended as medical advice for individual conditions or treatments. Talk to your doctor, nurse or pharmacist before following any medical regimen to see if it is safe and effective for you.

## 2017-06-05 NOTE — LETTER
6/5/2017 [facility] [address] [place], [address] Dinesh Current, 1955, is picking up the following medications ordered from the [facility] Program: STOCK:  ZETIA 10 MG #90 Sara Wright Patient's Signature: _____________________________ Today's Date: 6/5/2017

## 2017-06-05 NOTE — PROGRESS NOTES
[address]  [facility], [address] [place] [address]  [phone] office/[phone] fax      6/5/2017    Reason for visit:   Chief Complaint   Patient presents with    Results       Patient: Skyla Nino, 1955, [id number]       Primary MD: Adrián Guadarrama NP    Subjective:   Skyla Nino, a 59 y.o. female, who presents for Results      HPI Comments: Patient does not tolerate statins. Breathing Problem   The history is provided by the patient. This is a chronic problem. The problem occurs rarely. The problem has been gradually improving. Pertinent negatives include no fever, no headaches, no coryza, no rhinorrhea, no sore throat, no swollen glands, no ear pain, no neck pain, no cough, no sputum production, no hemoptysis, no wheezing, no PND, no orthopnea, no chest pain, no syncope and no vomiting. She has tried inhaled steroids, beta-agonist inhalers and leukotriene antagonists for the symptoms. The treatment provided significant relief. She has had no prior hospitalizations. She has had no prior ED visits. She has had no prior ICU admissions. Associated medical issues include COPD. Associated medical issues do not include asthma, pneumonia, chronic lung disease, PE, CAD, heart failure, past MI, DVT or recent surgery. Hypertension    The history is provided by the patient. This is a chronic problem. The problem has been gradually improving. Pertinent negatives include no chest pain, no orthopnea, no PND, no anxiety, no confusion, no headaches, no tinnitus, no neck pain, no peripheral edema, no dizziness, no shortness of breath, no nausea and no vomiting. Agents associated with hypertension include NSAIDs. Risk factors include smoking/tobacco exposure, dyslipidemia and postmenopause. Cholesterol Problem   The history is provided by the patient. This is a chronic problem. The problem has not changed since onset. Pertinent negatives include no chest pain, no headaches and no shortness of breath. Nothing aggravates the symptoms. Nothing relieves the symptoms. Past Medical History:   Diagnosis Date    Asthma 2012    +PFT for mild obstructive disease/COPD    Back pain, chronic 2012    Dr. Keira Montalvo referred to Dr. Raj Sharp Hard of hearing     chronic ruptured TM    HTN (hypertension) 2012    Hx of screening mammography 2016    [facility], normal, routine recs    Hyperlipidemia     Sinus congestion 2012    Spinal stenosis of lumbar region with radiculopathy 2015    Dr. Ashley Herrmann       Past Surgical History:   Procedure Laterality Date    HX  SECTION      HX GYN         Social History     Social History    Marital status: SINGLE     Spouse name: N/A    Number of children: N/A    Years of education: N/A     Occupational History    Not on file. Social History Main Topics    Smoking status: Current Every Day Smoker     Packs/day: 1.00     Types: Cigarettes    Smokeless tobacco: Never Used    Alcohol use No      Comment: h/o alcohol abuse in remission    Drug use: No    Sexual activity: Not on file     Other Topics Concern     Service No    Blood Transfusions No    Caffeine Concern Yes    Occupational Exposure No    Hobby Hazards No    Sleep Concern No    Stress Concern No    Weight Concern No    Special Diet No    Back Care No    Exercise Yes     walking    Bike Helmet No    Seat Belt Yes    Self-Exams No     Social History Narrative       Allergies   Allergen Reactions    Mold Extracts Runny Nose       Current Outpatient Prescriptions on File Prior to Visit   Medication Sig Dispense Refill    olmesartan-hydroCHLOROthiazide (BENICAR HCT) 20-12.5 mg per tablet Take 1 Tab by mouth daily. 90 Tab 3    mometasone (NASONEX) 50 mcg/actuation nasal spray 2 Sprays by Both Nostrils route daily. 3 Container 3    magnesium oxide (MAG-OX) 400 mg tablet Take 1 Tab by mouth daily.  30 Tab 11    fluticasone-salmeterol (ADVAIR) 250-50 mcg/dose diskus inhaler Take 1 Puff by inhalation every twelve (12) hours. 3 Inhaler 3    albuterol (PROVENTIL HFA) 90 mcg/actuation inhaler Take 2 Puffs by inhalation every six (6) hours as needed. 1 Inhaler 3    aspirin delayed-release 81 mg tablet Take 81 mg by mouth daily.  Cholecalciferol, Vitamin D3, (VITAMIN D3) 2,000 unit cap capsule Take 2,000 Units by mouth daily. 30 Cap 11     No current facility-administered medications on file prior to visit. Review of Systems   Constitutional: Negative. Negative for fever. HENT: Positive for hearing loss (Since childhood per patient. ). Negative for ear pain, rhinorrhea, sore throat and tinnitus. Eyes: Negative. Respiratory: Negative. Negative for cough, hemoptysis, sputum production, shortness of breath and wheezing. Cardiovascular: Negative. Negative for chest pain, orthopnea, syncope and PND. Gastrointestinal: Negative. Negative for nausea and vomiting. Genitourinary: Negative. Musculoskeletal: Positive for myalgias (Mulscle cramps to legs ). Negative for neck pain. Skin: Negative. Neurological: Negative. Negative for dizziness and headaches. Endo/Heme/Allergies: Negative. Psychiatric/Behavioral: Negative. Negative for confusion. Objective:   Visit Vitals    /74    Pulse 94    Temp 98.4 °F (36.9 °C)    Resp 20    Ht 5' 1\" (1.549 m)    Wt 135 lb (61.2 kg)    SpO2 98%    BMI 25.51 kg/m2      Wt Readings from Last 3 Encounters:   06/05/17 135 lb (61.2 kg)   03/06/17 134 lb 9.6 oz (61.1 kg)   11/07/16 127 lb (57.6 kg)     Lab Results   Component Value Date/Time    Glucose 87 05/30/2017 08:44 AM         Physical Exam   Constitutional: She is oriented to person, place, and time. She appears well-developed and well-nourished. HENT:   Head: Normocephalic. Right Ear: Tympanic membrane is perforated. Decreased hearing is noted. Left Ear: Tympanic membrane is perforated.  Decreased hearing is noted. Eyes: Pupils are equal, round, and reactive to light. Neck: Normal range of motion. Neck supple. No JVD present. Carotid bruit is not present. Cardiovascular: Normal rate, regular rhythm, normal heart sounds and intact distal pulses. No murmur heard. Pulmonary/Chest: Effort normal and breath sounds normal. No respiratory distress. Abdominal: Soft. Bowel sounds are normal.   Musculoskeletal: Normal range of motion. Neurological: She is alert and oriented to person, place, and time. She has normal reflexes. Skin: Skin is warm and dry. Psychiatric: She has a normal mood and affect. Her behavior is normal.   Vitals reviewed. Assessment:    Yael Puentes who has risk factors including (see above previous medical hx) and:       ICD-10-CM ICD-9-CM    1. Essential hypertension I10 401.9    2. COPD, mild ([address] J44.9 496    3. Tobacco abuse counseling Z71.6 V65.42      305.1    4. Routine health maintenance Z00.00 V70.0    5. Seasonal allergic rhinitis, unspecified allergic rhinitis trigger J30.2 477.9 montelukast (SINGULAIR) 10 mg tablet   6. Spinal stenosis of lumbar region with radiculopathy, L4/5 M48.06 724.02 naproxen (NAPROSYN) 500 mg tablet    M54.16 724.4    7. Sinus congestion R09.81 478.19    8. Bilateral hearing loss, unspecified hearing loss type H91.93 389.9    9. Hyperlipidemia with target LDL less than 100 E78.5 272.4 ezetimibe (ZETIA) 10 mg tablet      ezetimibe (ZETIA) 10 mg tablet   10. Statin intolerance Z78.9 995.27 ezetimibe (ZETIA) 10 mg tablet      ezetimibe (ZETIA) 10 mg tablet     1. Essential hypertension  -The current medical regimen is effective;  continue present plan and medications. 2. COPD, mild ([address]  -The current medical regimen is effective;  continue present plan and medications. 3. Tobacco abuse counseling  -Counseled patient on the dangers of tobacco use, and was advised to quit.   Reviewed strategies to maximize success, including the use of Chantix. Discussed the risks of continued tobacco use such as elevated blood pressure, vascular irritation with increased incidence of CVD with stroke or MI and PVD causing claudication, lung damage that could lead to COPD, cancer and death. Encouraged an approach to find a few healthy habits, write them down then plan to decrease their cigarette use by one each week till they are gone all together and to plan for stress that may cause them to want to restart and how to prevent it by having new coping mechanisms in place. 1-800-QUIT-NOW provided for counseling     4. Routine health maintenance   -Patient notified to schedule pap/pelvic exam    5. Seasonal allergic rhinitis, unspecified allergic rhinitis trigger  - montelukast (SINGULAIR) 10 mg tablet; TAKE ONE TABLET BY MOUTH ONCE DAILY FOR ALLERGIC RHINITIS  Dispense: 30 Tab; Refill: 6    6. Spinal stenosis of lumbar region with radiculopathy, L4/5  -the patient can follow up with spine specialist PRN   - naproxen (NAPROSYN) 500 mg tablet; Take 1 Tab by mouth two (2) times daily (with meals). Indications: Pain  Dispense: 90 Tab; Refill: 2      7. Bilateral hearing loss, unspecified hearing loss type  -The patient has not followed with ENT yet     8. Hyperlipidemia with target LDL less than 100  -Will order Zetia via [facility] as sample and as a program medication. Pt needsto sign [facility] paperwork and to  samples if available. - ezetimibe (ZETIA) 10 mg tablet; Take 1 Tab by mouth daily for 90 days. Dispense: 90 Tab; Refill: 0  - ezetimibe (ZETIA) 10 mg tablet; Take 1 Tab by mouth daily for 90 days. Dispense: 90 Tab; Refill: 0    9. Statin intolerance  -Pt does not tolerate statin   - ezetimibe (ZETIA) 10 mg tablet; Take 1 Tab by mouth daily for 90 days. Dispense: 90 Tab; Refill: 0  - ezetimibe (ZETIA) 10 mg tablet; Take 1 Tab by mouth daily for 90 days. Dispense: 90 Tab;  Refill: 0      Written instructions followed our verbal discussion of all information discussed above, pending tests ordered and future goals/plans. Patient expressed understanding of current diagnosis, planned testing, follow up and if needed to contact the office for any questions or concerns prior to the next visit. Plan:   Med reconciliation completed with patient. Reviewed side effects of medications with the patient. Questions were answered and patient verb understanding. Discussed lab results with patient      Orders Placed This Encounter    montelukast (SINGULAIR) 10 mg tablet     Sig: TAKE ONE TABLET BY MOUTH ONCE DAILY FOR ALLERGIC RHINITIS     Dispense:  30 Tab     Refill:  6    naproxen (NAPROSYN) 500 mg tablet     Sig: Take 1 Tab by mouth two (2) times daily (with meals). Indications: Pain     Dispense:  90 Tab     Refill:  2    ezetimibe (ZETIA) 10 mg tablet     Sig: Take 1 Tab by mouth daily for 90 days. Dispense:  90 Tab     Refill:  0    ezetimibe (ZETIA) 10 mg tablet     Sig: Take 1 Tab by mouth daily for 90 days. Dispense:  90 Tab     Refill:  0     Order Specific Question:   Expiration Date     Answer:   4/7/2018     Order Specific Question:   Lot#     Answer:   [id number]     Order Specific Question:        Answer:   MERCK     Current Outpatient Prescriptions   Medication Sig Dispense Refill    montelukast (SINGULAIR) 10 mg tablet TAKE ONE TABLET BY MOUTH ONCE DAILY FOR ALLERGIC RHINITIS 30 Tab 6    naproxen (NAPROSYN) 500 mg tablet Take 1 Tab by mouth two (2) times daily (with meals). Indications: Pain 90 Tab 2    ezetimibe (ZETIA) 10 mg tablet Take 1 Tab by mouth daily for 90 days. 90 Tab 0    ezetimibe (ZETIA) 10 mg tablet Take 1 Tab by mouth daily for 90 days. 90 Tab 0    olmesartan-hydroCHLOROthiazide (BENICAR HCT) 20-12.5 mg per tablet Take 1 Tab by mouth daily. 90 Tab 3    mometasone (NASONEX) 50 mcg/actuation nasal spray 2 Sprays by Both Nostrils route daily.  3 Container 3    magnesium oxide (MAG-OX) 400 mg tablet Take 1 Tab by mouth daily. 30 Tab 11    fluticasone-salmeterol (ADVAIR) 250-50 mcg/dose diskus inhaler Take 1 Puff by inhalation every twelve (12) hours. 3 Inhaler 3    albuterol (PROVENTIL HFA) 90 mcg/actuation inhaler Take 2 Puffs by inhalation every six (6) hours as needed. 1 Inhaler 3    aspirin delayed-release 81 mg tablet Take 81 mg by mouth daily.  Cholecalciferol, Vitamin D3, (VITAMIN D3) 2,000 unit cap capsule Take 2,000 Units by mouth daily. 30 Cap 11     Medications Discontinued During This Encounter   Medication Reason    montelukast (SINGULAIR) 10 mg tablet Reorder    naproxen (NAPROSYN) 500 mg tablet Reorder       Follow-up Disposition:  Return in about 6 months (around 12/5/2017), or if symptoms worsen or fail to improve. Labs needed for follow-up appt    \"No Show policy was reviewed with the patient. The services affected are the nurse navigator and the provider. No show appointments include missing labs for a future scheduled appointment, Pap/pelvics, arriving to appointment more than 10 minutes late, and calling to cancel appointment less than 24 hours in advance. After the 3rd No Show, the patient will be removed from the [facility] to include medications for 6 months. The patient will be referred to the [place] [id number] for their primary care needs. \"     Maretta Coe D. Hulen Cockayne, [address] I spent 35 minutes with the patient in face-to-face consultation, of which greater than 50% was spent in counseling and coordination of care as described above.

## 2017-06-05 NOTE — MR AVS SNAPSHOT
Visit Information Date & Time Provider Department Dept. Phone Encounter #  
 6/5/2017  1:30 PM Mike Layton NP [address] [facility] [id number] Follow-up Instructions Return in about 6 months (around 12/5/2017), or if symptoms worsen or fail to improve. Upcoming Health Maintenance Date Due DTaP/Tdap/Td series (1 - Tdap) 9/8/1974 FOBT Q 1 YEAR AGE 50-75 9/8/2003 ZOSTER VACCINE AGE 60> 9/8/2013 PAP AKA CERVICAL CYTOLOGY 9/2/2017 Pneumococcal 19-64 Medium Risk (1 of 1 - PPSV23) 3/6/2018* INFLUENZA AGE 9 TO ADULT 8/1/2017 BREAST CANCER SCRN MAMMOGRAM 9/6/2018 *Topic was postponed. The date shown is not the original due date. Allergies as of 6/5/2017  Review Complete On: 6/5/2017 By: Kenyon Beard Severity Noted Reaction Type Reactions Mold Extracts  11/11/2014   Systemic Runny Nose Current Immunizations  Reviewed on 10/29/2015 Name Date Influenza Vaccine 10/6/2016 Influenza Vaccine Meza Mathews) 10/29/2015 Influenza Vaccine (Quad) PF 10/31/2013 Influenza Vaccine (Trivalent) 11/11/2014  3:00 PM  
  
 Not reviewed this visit You Were Diagnosed With   
  
 Codes Comments Essential hypertension    -  Primary ICD-10-CM: I10 
ICD-9-CM: 401.9 COPD, mild ([facility] [address]     ICD-10-CM: J44.9 ICD-9-CM: 966 Tobacco abuse counseling     ICD-10-CM: Z71.6 ICD-9-CM: V65.42, 305.1 Routine health maintenance     ICD-10-CM: Z00.00 ICD-9-CM: V70.0 Seasonal allergic rhinitis, unspecified allergic rhinitis trigger     ICD-10-CM: J30.2 ICD-9-CM: 477.9 Spinal stenosis of lumbar region with radiculopathy     ICD-10-CM: M48.06, M54.16 
ICD-9-CM: 724.02, 724.4 Sinus congestion     ICD-10-CM: R09.81 ICD-9-CM: 478.19 Bilateral hearing loss, unspecified hearing loss type     ICD-10-CM: H91.93 
ICD-9-CM: 389.9 Hyperlipidemia with target LDL less than 100     ICD-10-CM: E78.5 ICD-9-CM: 272.4 Statin intolerance     ICD-10-CM: Z78.9 ICD-9-CM: 995.27 Vitals BP Pulse Temp Resp Height(growth percentile) Weight(growth percentile) 114/74 94 98.4 °F (36.9 °C) 20 5' 1\" (1.549 m) 135 lb (61.2 kg) SpO2 BMI OB Status Smoking Status 98% 25.51 kg/m2 Hysterectomy Current Every Day Smoker BMI and BSA Data Body Mass Index Body Surface Area 25.51 kg/m 2 1.62 m 2 Preferred Pharmacy Pharmacy Name Phone [facility] [address] [phone] Your Updated Medication List  
  
   
This list is accurate as of: 6/5/17  2:10 PM.  Always use your most recent med list.  
  
  
  
  
 albuterol 90 mcg/actuation inhaler Commonly known as:  PROVENTIL HFA Take 2 Puffs by inhalation every six (6) hours as needed. aspirin delayed-release 81 mg tablet Take 81 mg by mouth daily. Cholecalciferol (Vitamin D3) 2,000 unit Cap capsule Commonly known as:  VITAMIN D3 Take 2,000 Units by mouth daily. * ezetimibe 10 mg tablet Commonly known as:  Jackiedall Sandra Take 1 Tab by mouth daily for 90 days. * ezetimibe 10 mg tablet Commonly known as:  Lyndall Sandra Take 1 Tab by mouth daily for 90 days. fluticasone-salmeterol 250-50 mcg/dose diskus inhaler Commonly known as:  ADVAIR Take 1 Puff by inhalation every twelve (12) hours. magnesium oxide 400 mg tablet Commonly known as:  MAG-OX Take 1 Tab by mouth daily. mometasone 50 mcg/actuation nasal spray Commonly known as:  NASONEX  
2 Sprays by Both Nostrils route daily. montelukast 10 mg tablet Commonly known as:  SINGULAIR  
TAKE ONE TABLET BY MOUTH ONCE DAILY FOR ALLERGIC RHINITIS  
  
 naproxen 500 mg tablet Commonly known as:  NAPROSYN Take 1 Tab by mouth two (2) times daily (with meals). Indications: Pain  
  
 olmesartan-hydroCHLOROthiazide 20-12.5 mg per tablet Commonly known as:  BENICAR HCT  
 Take 1 Tab by mouth daily. * Notice: This list has 2 medication(s) that are the same as other medications prescribed for you. Read the directions carefully, and ask your doctor or other care provider to review them with you. Prescriptions Printed Refills  
 ezetimibe (ZETIA) 10 mg tablet 0 Sig: Take 1 Tab by mouth daily for 90 days. Class: Program  
 Route: Oral  
  
Prescriptions Sent to Mail Order Refills  
 ezetimibe (ZETIA) 10 mg tablet 0 Sig: Take 1 Tab by mouth daily for 90 days. Class: Program  
 Pharmacy: [facility] [address], [address] [facility]. Ph #: [phone] Route: Oral  
  
Prescriptions Sent to Pharmacy Refills  
 montelukast (SINGULAIR) 10 mg tablet 6 Sig: TAKE ONE TABLET BY MOUTH ONCE DAILY FOR ALLERGIC RHINITIS Class: Normal  
 Pharmacy: [facility] [address] [place] [address] Ph #: [phone]  
 naproxen (NAPROSYN) 500 mg tablet 2 Sig: Take 1 Tab by mouth two (2) times daily (with meals). Indications: Pain Class: Normal  
 Pharmacy: [id number] [facility] [address], [address]. Ph #: [phone] Route: Oral  
 ezetimibe (ZETIA) 10 mg tablet 0 Sig: Take 1 Tab by mouth daily for 90 days. Class: Program  
 Pharmacy: [id number] [facility] [address], [address] [facility]. Ph #: [phone] Route: Oral  
  
Follow-up Instructions Return in about 6 months (around 12/5/2017), or if symptoms worsen or fail to improve. Patient Instructions [facility] reminders! Foundation Operating Hours: These may change without notice. Mon- Wed 7am to 5pm. Closed for lunch 12-1pm 
Thurs 7am to 12pm 
Fridays closed NO SHOW POLICY ~ If a patient has 3 no shows for an appointment with the Provider, Mental Health Provider, or the Nurse Navigator in 6 months, they will be discharged from the practice for 6 months.  Medication ordering will also be suspended. If the patient is discharged from the [place], they can go to the [place] [address] where they can be seen for the primary needs plus obtain the same types of medications as they receive at the [place]. To avoid being discharged, the patient must call the office at [phone] 24 hours prior to their appointment if they need to cancel, arrive to their appointments on time and come to all scheduled appointments. If the patient is discharged from the practice, they can apply to be re-established after 12 months. Lab work:  Unless you are instructed differently, please return to the office between the hours of 7 am and 10:30 am Monday through Thursday to have your labs drawn one week before your next scheduled PROVIDER visit. If you do not have an appointment to follow up on these results, please make one or plan to call the office if you do not hear from us to get the results. No news does not mean good news. Medications: If your medications are new or have changed, and you get your medications from the clinic pharmacy ([facility]), you MUST talk to the pharmacy staff to sign the new prescription applications. If you don't sign the applications we cannot get the medications for you. It usually takes 6-8 weeks for your medications to arrive. The Pharmacy staff will call you when your medications are available. You will have 30 days to come in and  your medications. If you don't  your medicines within those 30 days, those medicines will be placed on the self as samples and you will have to start all over again by completing the applications and waiting the 6-8 weeks for your medicines to arrive. This is firm and there will be no exceptions! ! The [facility] or [facility] will assist you with your medications if available but not all medications are available so some may be obtained through local pharmacies such as [facility] that has a large $4 list and [facility] that has many drugs for free or also for $4.  We will work hard to get the best medications for you that you can also afford. [facility] Medication pick-up times: 
Monday-Tuesday 1:00 -5:00 PM 
Wednesday- Thursday 8:00 -11:30 AM 
 
 
Feet Care: Local [facility] through [facility] Every second Tuesday of the month (except for holidays and election days) from 9am to 1 pm. The services provided by these ministry volunteers are free of charge with the option to donate. They will inspect your feet thoroughly, soak them for 10 minutes, cut and file your nails. They care for diabetics as well. Keep in mind this service is free and will be on a first come first serve basis. Bad teeth? Ask about the Dental Bus to get you in front of a local dentist. The bus leaves every other Wednesday for those on the list. (Ask about availability as these appointments are limited) Eye exams for Diabetics. Please let us know so we can add you to the list to see the eye doctor at [facility]. You will receive a free eye exam and free glasses if needed. Unfortunately, if you are not a diabetic, we do not have a free service for eye exams for you (yet!). We do have information on where to go to get a huge discount on eye exams and glasses. Sick visits: If you are sick and it is not an emergency call the office to see if you can schedule an appointment. Charges and cost items from the clinic:  Most of our orders are covered by [address] but there ARE SOME CHARGES for items such as radiology interpretations and anesthesiology during procedures and surgeries. Please make sure you have contacted the Advanced Patient Advocacy (APA) group to check on your payment options: [email or web address]. or come in and talk to them in person: On 
Tuesdays, we have Advanced Patient Advocacy is available Mon - Fri 8-4pm at DR. [facility] on the first floor by the information desk. Their number is [phone]. It is important that you are screened in order to qualify for assistance and to avoid huge medical charges. The [facility] is not responsible for ANY charges you may accrue regardless of who ordered the medication, procedure, treatment or test. If you go to the Emergency Room, you WILL be charged! Behavior and emotional issues! It is stressful to be sick, have an illness, take medications, not have a job, not have medical insurance, have family issues or just getting older! Schedule an appointment with our mental health provider. She is in the office Mondays and Wednesdays from 8am to [place] [address]S[place] can also contact the following: The national suicide hotline (6-974-109-YMIZ or 9-326.132.7007) [facility] [address] [facility], [address] [facility] [address]- in hours Monday -Wednesday 8:30 am -11:15 AM 
2:15pm -4:15 pm 
 
[employer] ([employer]) - [facility] [address] [facility], [address] [facility]  
[phone] The [address] [facility] Aging and The [facility], in collaboration with community partners, provides and advocates for resources and services to improve the employment, quality of life, security, and independence of older [address], [address] with disabilities, and their families. Department for Aging and Rehabilitative Services Street Location: [address] [place] [address] [place] Voice: [phone] Toll Free Number:[phone] Toll Free TTY: [phone] E-mail: [email or web address] Immunizations/Vaccination Routine Immunizations are provided for infants, children, teens, and adults at the [facility]. Please bring all immunization records with you and present them at the time of registration. Phone [phone] Hours (Walk in) Monday, Tuesday, Wednesday and Friday 8:00 AM to 11:00 AM 
12:30 PM to 3:00 PM 
 
 
 Drug and Alcohol Addiction Issues! It is hard to stop a poor habit but there is help out there. Please feel free to attend any other the following support groups to help you kick the habit or go to [facility] Emergency Department to be evaluated by the psychiatric team. Never give up!! Ree meetings: [facility], [place] [facility] MONDAY 7:30 PM JUST FOR TODAY [employer] [facility] [address] [facility] WEDNESDAY 10:30 AM NEW BEGINNINGS [employer] [facility], ROOM [address] [place] [address]  
 
[place] WEDNESDAY 8:00  Guido Porter [place] [address] [facility] THURSDAY 8:00 PM KEEP IT SIMPLE [employer] Leigh [facility] [address] 8:00 PM LET IT BEGIN WITH ME [employer] Leigh [facility] [address] [facility] FRIDAY 6;30 PM Alex PARENTS [employer] Parents [facility] [address]  [place] MONDAY 10:30  [address] [facility] SATURDAY 8:00 PM [facility] SATURDAY NIGHT [employer] [place] [address] [place] MONDAY 7:00 PM MONDAY NIGHT [employer] Leigh [facility] [address]  
 
[place] WEDNESDAY 8:00 PM SERENITY SEEKERS [employer] Al[employer] [facility] [address] [facility] 
Ezetimibe (By mouth) Ezetimibe (i-UFN-q-mibe) Lowers high cholesterol levels. Brand Name(s): Zetia There may be other brand names for this medicine. When This Medicine Should Not Be Used: This medicine is not right for everyone. Do not use it if you had an allergic reaction to ezetimibe. How to Use This Medicine:  
Tablet · Take your medicine as directed. Your dose may need to be changed several times to find what works best for you.  
· If you also use cholestyramine, take it at least 2 hours after or 4 hours before you take ezetimibe. · Missed dose: Take a dose as soon as you remember. If it is almost time for your next dose, wait until then and take a regular dose. Do not take extra medicine to make up for a missed dose. · Store the medicine in a closed container at room temperature, away from heat, moisture, and direct light. Drugs and Foods to Avoid: Ask your doctor or pharmacist before using any other medicine, including over-the-counter medicines, vitamins, and herbal products. · Do not use this medicine together with a statin medicine if you are pregnant or breastfeeding, or if you have liver disease. · Some medicines can affect how ezetimibe works. Tell your doctor if you are using any of the following: ¨ Cyclosporine ¨ Cholestyramine ¨ Fenofibrate ¨ Gemfibrozil ¨ A blood thinner, such as warfarin Warnings While Using This Medicine: · Tell your doctor if you are pregnant or breastfeeding, or if you have liver disease, kidney disease, or thyroid problems. · This medicine may cause myopathy or rhabdomyolysis, which are serious muscle problems. · Your doctor will check your progress and the effects of this medicine at regular visits. Keep all appointments. · Keep all medicine out of the reach of children. Never share your medicine with anyone. Possible Side Effects While Using This Medicine:  
Call your doctor right away if you notice any of these side effects: · Allergic reaction: Itching or hives, swelling in your face or hands, swelling or tingling in your mouth or throat, chest tightness, trouble breathing · Muscle pain, tenderness, or weakness · Nausea, vomiting, loss of appetite, stomach pain, yellow skin or eyes If you notice other side effects that you think are caused by this medicine, tell your doctor. Call your doctor for medical advice about side effects. You may report side effects to FDA at 7-052-FDA-6019 © 2017 [address]  Information is for End User's use only and may not be sold, redistributed or otherwise used for commercial purposes. The above information is an  only. It is not intended as medical advice for individual conditions or treatments. Talk to your doctor, nurse or pharmacist before following any medical regimen to see if it is safe and effective for you. Introducing [facility]! Miladis Cao introduces D-Share patient portal. Now you can access parts of your medical record, email your doctor's office, and request medication refills online. 1. In your internet browser, go to [email or web address] 2. Click on the First Time User? Click Here link in the Sign In box. You will see the New Member Sign Up page. 3. Enter your D-Share Access Code exactly as it appears below. You will not need to use this code after youve completed the sign-up process. If you do not sign up before the expiration date, you must request a new code. · D-Share Access Code: [email or web address] Expires: 9/3/2017  1:24 PM 
 
4. Enter the last four digits of your Social Security Number (xxxx) and Date of Birth (mm/dd/yyyy) as indicated and click Submit. You will be taken to the next sign-up page. 5. Create a D-Share ID. This will be your D-Share login ID and cannot be changed, so think of one that is secure and easy to remember. 6. Create a D-Share password. You can change your password at any time. 7. Enter your Password Reset Question and Answer. This can be used at a later time if you forget your password. 8. Enter your e-mail address. You will receive e-mail notification when new information is available in [address] 9. Click Sign Up. You can now view and download portions of your medical record. 10. Click the Download Summary menu link to download a portable copy of your medical information. If you have questions, please visit the Frequently Asked Questions section of the [email or web address] website. Remember, [employer] is NOT to be used for urgent needs. For medical emergencies, dial 911. Now available from your iPhone and Android! Please provide this summary of care documentation to your next provider. Your primary care clinician is listed as Kelley Lama. If you have any questions after today's visit, please call [phone].

## 2017-06-28 ENCOUNTER — TELEPHONE (OUTPATIENT)
Dept: FAMILY MEDICINE CLINIC | Age: 62
End: 2017-06-28

## 2017-06-29 NOTE — TELEPHONE ENCOUNTER
Medication: Zetia 10mg , dose: 1 tab, how often: qd , current number of medication days provided: 90, refill per application. Lot #: [id number], EXP 06/2018. This medication was received and verified for the following 1. Correct Patient, 2. Correct Diagnosis, 3. Correct Drug, 4. Correct route, and no current allergy to medication    Medication: Nasonex, dose: 2 sprays both nostrils, how often: qd, current number of medication days provided: 90, refill per application. Lot #: [id number], EXP 06/2018. This medication was received and verified for the following 1. Correct Patient, 2. Correct Diagnosis, 3. Correct Drug, 4. Correct route, and no current allergy to medication. Please contact patient to come  their medications.      Poncho Toledo, MSN, RN, FNP-C     [facility]

## 2017-08-01 ENCOUNTER — TELEPHONE (OUTPATIENT)
Dept: FAMILY MEDICINE CLINIC | Age: 62
End: 2017-08-01

## 2017-08-02 NOTE — TELEPHONE ENCOUNTER
Medication: Advair 250/50, dose: 1 inhalation, how often: twice daily, current number of medication days provided: 90, refill per application. Lot #: [id number], EXP 08/2018  . This medication was received and verified for the following 1. Correct Patient, 2. Correct Diagnosis, 3. Correct Drug, 4. Correct route, and no current allergy to medication    Please contact patient to come  their medications.      Hilton Felix MSN, RN, [facility]

## 2017-08-09 ENCOUNTER — TELEPHONE (OUTPATIENT)
Dept: FAMILY MEDICINE CLINIC | Age: 62
End: 2017-08-09

## 2017-08-10 NOTE — TELEPHONE ENCOUNTER
Medication: Benicar HCT 20-12.5 mg, dose: 1 tab, how often: daily, current number of medication days provided: 90, refill per application. Lot # [id number], EXP 05/2019  . This medication was received and verified for the following 1. Correct Patient, 2. Correct Diagnosis, 3. Correct Drug, 4. Correct route, and no current allergy to medication. Please contact patient to come  their medications.      Maria Elena Yo MSN, RN, [facility]

## 2017-10-04 ENCOUNTER — OFFICE VISIT (OUTPATIENT)
Dept: ORTHOPEDIC SURGERY | Age: 62
End: 2017-10-04

## 2017-10-04 VITALS
DIASTOLIC BLOOD PRESSURE: 70 MMHG | WEIGHT: 133.4 LBS | TEMPERATURE: 98.2 F | HEIGHT: 61 IN | OXYGEN SATURATION: 96 % | HEART RATE: 89 BPM | RESPIRATION RATE: 18 BRPM | BODY MASS INDEX: 25.19 KG/M2 | SYSTOLIC BLOOD PRESSURE: 126 MMHG

## 2017-10-04 DIAGNOSIS — M48.061 SPINAL STENOSIS AT L4-L5 LEVEL: Primary | Chronic | ICD-10-CM

## 2017-10-04 DIAGNOSIS — R29.2 GENERALIZED HYPERREFLEXIA: ICD-10-CM

## 2017-10-04 DIAGNOSIS — M51.36 DDD (DEGENERATIVE DISC DISEASE), LUMBAR: Chronic | ICD-10-CM

## 2017-10-04 NOTE — PATIENT INSTRUCTIONS
Lumbar Spinal Stenosis: Care Instructions  Your Care Instructions    Stenosis in the spine is a narrowing of the canal that is around the spinal cord and nerve roots in your back. It can happen as part of aging. Sometimes bone and other tissue grow into this canal and press on the nerves that branch out from the spinal cord. This can cause pain, numbness, and weakness. When it happens in the lower part of your back, it is called lumbar spinal stenosis. It can cause problems in the legs, feet, and rear end (buttocks). You may be able to get relief from the symptoms of spinal stenosis by taking pain medicine. Your doctor may suggest physical therapy and exercises to keep your spine strong and flexible. Some people try steroid shots to reduce swelling. If pain and numbness in your legs are still so bad that you cannot do your normal activities, you may need surgery. Follow-up care is a key part of your treatment and safety. Be sure to make and go to all appointments, and call your doctor if you are having problems. It's also a good idea to know your test results and keep a list of the medicines you take. How can you care for yourself at home? · Take an over-the-counter pain medicine, such as acetaminophen (Tylenol), ibuprofen (Advil, Motrin), or naproxen (Aleve). Be safe with medicines. Read and follow all instructions on the label. · Do not take two or more pain medicines at the same time unless the doctor told you to. Many pain medicines have acetaminophen, which is Tylenol. Too much acetaminophen (Tylenol) can be harmful. · Stay at a healthy weight. Being overweight puts extra strain on your spine. · Change positions often when you sit or stand. This can ease pain. It may also reduce pressure on the spinal cord and its nerves. · Avoid doing things that make your symptoms worse. Walking downhill and standing for a long time may cause pain.   · Stretch and strengthen your back muscles as your doctor or physical therapist recommends. If your doctor says it is okay to do them, these exercises may help. ¨ Lie on your back with your knees bent. Gently pull one bent knee to your chest. Put that foot back on the floor, and then pull the other knee to your chest.  ¨ Do pelvic tilts. Lie on your back with your knees bent. Tighten your stomach muscles. Pull your belly button (navel) in and up toward your ribs. You should feel like your back is pressing to the floor and your hips and pelvis are slightly lifting off the floor. Hold for 6 seconds while breathing smoothly. ¨ Stand with your back flat against a wall. Slowly slide down until your knees are slightly bent. Hold for 10 seconds, then slide back up the wall. · Remove or change anything in your house that may cause you to fall. Keep walkways clear of clutter, electrical cords, and throw rugs. When should you call for help? Call 911 anytime you think you may need emergency care. For example, call if:  · You are unable to move a leg at all. Call your doctor now or seek immediate medical care if:  · You have new or worse symptoms in your legs, belly, or buttocks. Symptoms may include:  ¨ Numbness or tingling. ¨ Weakness. ¨ Pain. · You lose bladder or bowel control. Watch closely for changes in your health, and be sure to contact your doctor if:  · You are not getting better as expected. Where can you learn more? Go to http://giselle-erika.info/. Osmany Lin in the search box to learn more about \"Lumbar Spinal Stenosis: Care Instructions. \"  Current as of: March 21, 2017  Content Version: 11.3  © 4451-6358 L3. Care instructions adapted under license by [employer] (which disclaims liability or warranty for this information).  If you have questions about a medical condition or this instruction, always ask your healthcare professional. [address] any warranty or liability for your use of this information.

## 2017-10-04 NOTE — MR AVS SNAPSHOT
Visit Information Date & Time Provider Department Dept. Phone Encounter #  
 10/4/2017  2:15 PM Andrew Cooper MD [facility] MAST [phone] [id number] Follow-up Instructions Return if symptoms worsen or fail to improve. Your Appointments 11/13/2017 10:45 AM  
Follow Up with Andrew Cooper MD  
[facility] ([address]) Appt Note: block fu  
 [address] [facility] [address]  
[address]  
  
    
 12/4/2017  1:00 PM  
Follow Up with Hilda Garcia NP [facility] ([address]) Appt Note: 6 mth follow up  
 [address] [facility] [id number]  
[address] [facility] [id number] Upcoming Health Maintenance Date Due DTaP/Tdap/Td series (1 - Tdap) 9/8/1974 FOBT Q 1 YEAR AGE 50-75 9/8/2003 ZOSTER VACCINE AGE 60> 7/8/2013 INFLUENZA AGE 9 TO ADULT 8/1/2017 PAP AKA CERVICAL CYTOLOGY 9/2/2017 Pneumococcal 19-64 Medium Risk (1 of 1 - PPSV23) 3/6/2018* BREAST CANCER SCRN MAMMOGRAM 9/6/2018 *Topic was postponed. The date shown is not the original due date. Allergies as of 10/4/2017  Review Complete On: 10/4/2017 By: Andrew Cooper MD  
  
 Severity Noted Reaction Type Reactions Lyrica [Pregabalin]  10/04/2017    Other (comments) Medication causes dry eyes, unstable balance, and lack of concentration. Mold Extracts  11/11/2014   Systemic Runny Nose Statins-hmg-coa Reductase Inhibitors  06/05/2017   Intolerance Myalgia Current Immunizations  Reviewed on 10/29/2015 Name Date Influenza Vaccine 10/6/2016 Influenza Vaccine Katelynn [place]) 10/29/2015 Influenza Vaccine (Quad) PF 10/31/2013 Influenza Vaccine (Trivalent) 11/11/2014  3:00 PM  
  
 Not reviewed this visit You Were Diagnosed With   
  
 Codes Comments Spinal stenosis at L4-L5 level    -  Primary ICD-10-CM: M48.061 
ICD-9-CM: 724.02   
 DDD (degenerative disc disease), lumbar     ICD-10-CM: M51.36 
ICD-9-CM: 722.52 Vitals BP Pulse Temp Resp Height(growth percentile) Weight(growth percentile) 126/70 89 98.2 °F (36.8 °C) (Oral) 18 5' 1\" (1.549 m) 133 lb 6.4 oz (60.5 kg) SpO2 BMI OB Status Smoking Status 96% 25.21 kg/m2 Hysterectomy Current Every Day Smoker BMI and BSA Data Body Mass Index Body Surface Area  
 25.21 kg/m 2 1.61 m 2 Preferred Pharmacy Pharmacy Name Phone [facility] [address] [phone] Your Updated Medication List  
  
   
This list is accurate as of: 10/4/17  3:22 PM.  Always use your most recent med list.  
  
  
  
  
 albuterol 90 mcg/actuation inhaler Commonly known as:  PROVENTIL HFA Take 2 Puffs by inhalation every six (6) hours as needed. aspirin delayed-release 81 mg tablet Take 81 mg by mouth daily. Cholecalciferol (Vitamin D3) 2,000 unit Cap capsule Commonly known as:  VITAMIN D3 Take 2,000 Units by mouth daily. fluticasone-salmeterol 250-50 mcg/dose diskus inhaler Commonly known as:  ADVAIR Take 1 Puff by inhalation every twelve (12) hours. magnesium oxide 400 mg tablet Commonly known as:  MAG-OX Take 1 Tab by mouth daily. mometasone 50 mcg/actuation nasal spray Commonly known as:  NASONEX  
2 Sprays by Both Nostrils route daily. montelukast 10 mg tablet Commonly known as:  SINGULAIR  
TAKE ONE TABLET BY MOUTH ONCE DAILY FOR ALLERGIC RHINITIS  
  
 naproxen 500 mg tablet Commonly known as:  NAPROSYN Take 1 Tab by mouth two (2) times daily (with meals). Indications: Pain  
  
 olmesartan-hydroCHLOROthiazide 20-12.5 mg per tablet Commonly known as:  BENICAR HCT Take 1 Tab by mouth daily. We Performed the Following AMB POC XRAY, SPINE, LUMBOSACRAL; 4+ [id number] CPT(R)] Follow-up Instructions Return if symptoms worsen or fail to improve. Patient Instructions Lumbar Spinal Stenosis: Care Instructions Your Care Instructions Stenosis in the spine is a narrowing of the canal that is around the spinal cord and nerve roots in your back. It can happen as part of aging. Sometimes bone and other tissue grow into this canal and press on the nerves that branch out from the spinal cord. This can cause pain, numbness, and weakness. When it happens in the lower part of your back, it is called lumbar spinal stenosis. It can cause problems in the legs, feet, and rear end (buttocks). You may be able to get relief from the symptoms of spinal stenosis by taking pain medicine. Your doctor may suggest physical therapy and exercises to keep your spine strong and flexible. Some people try steroid shots to reduce swelling. If pain and numbness in your legs are still so bad that you cannot do your normal activities, you may need surgery. Follow-up care is a key part of your treatment and safety. Be sure to make and go to all appointments, and call your doctor if you are having problems. It's also a good idea to know your test results and keep a list of the medicines you take. How can you care for yourself at home? · Take an over-the-counter pain medicine, such as acetaminophen (Tylenol), ibuprofen (Advil, Motrin), or naproxen (Aleve). Be safe with medicines. Read and follow all instructions on the label. · Do not take two or more pain medicines at the same time unless the doctor told you to. Many pain medicines have acetaminophen, which is Tylenol. Too much acetaminophen (Tylenol) can be harmful. · Stay at a healthy weight. Being overweight puts extra strain on your spine. · Change positions often when you sit or stand. This can ease pain.  It may license by [address] (which disclaims liability or warranty for this information). If you have questions about a medical condition or this instruction, always ask your healthcare professional. [address] any warranty or liability for your use of this information. Introducing [facility]! [facility] introduces [employer] patient portal. Now you can access parts of your medical record, email your doctor's office, and request medication refills online. 1. In your internet browser, go to [email or web address] 2. Click on the First Time User? Click Here link in the Sign In box. You will see the New Member Sign Up page. 3. Enter your [employer] Access Code exactly as it appears below. You will not need to use this code after youve completed the sign-up process. If you do not sign up before the expiration date, you must request a new code. · [employer] Access Code: [email or web address] 
Expires: 1/2/2018  3:22 PM 
 
4. Enter the last four digits of your Social Security Number (xxxx) and Date of Birth (mm/dd/yyyy) as indicated and click Submit. You will be taken to the next sign-up page. 5. Create a [employer] ID. This will be your [employer] login ID and cannot be changed, so think of one that is secure and easy to remember. 6. Create a [employer] password. You can change your password at any time. 7. Enter your Password Reset Question and Answer. This can be used at a later time if you forget your password. 8. Enter your e-mail address. You will receive e-mail notification when new information is available in [address] 9. Click Sign Up. You can now view and download portions of your medical record. 10. Click the Download Summary menu link to download a portable copy of your medical information. If you have questions, please visit the Frequently Asked Questions section of the [employer] website.  Remember, [employer] is NOT to be used for urgent needs. For medical emergencies, dial 911. Now available from your iPhone and Android! Please provide this summary of care documentation to your next provider. Your primary care clinician is listed as Doyle Salazar. If you have any questions after today's visit, please call [phone].

## 2017-10-04 NOTE — PROGRESS NOTES
Jonathanangelicaalli Willettmounika [facility] [address].  [address], [address]  [place], [facility] [address]  Phone: [phone]  Fax: [phone]        Buell Jeans  : 1955  PCP: Keshav Hernandez, TRINH    PROGRESS NOTE      ASSESSMENT AND PLAN    Diagnoses and all orders for this visit:    1. Spinal stenosis at L4-L5 level, severe by MRI '15  -     [[id number]] LS Spine 4V    2. DDD (degenerative disc disease), lumbar    Other orders  -     SCHEDULE SURGERY    1. Advised to continue HEP. 2. Urgent symptoms with pt. Cervical spine MRI  if she notices UE symptoms. She has some UMN signs but no symptoms  3. Continue PRN Naprosyn. 4. Set up for GRETCHEN at L4-5  5. Given information on stenosis. Follow-up Disposition:  Return if symptoms worsen or fail to improve. HISTORY OF PRESENT ILLNESS  Kathryn Lino is a 57 y.o. female. RHD. Pt last evaluated in 2016 and is a pt with known lumbar stenosis. She had an GRETCHEN at that time. Pt reports that she has had great benefit from her last GRETCHEN. Pt has been noticing her pain has started to return and increase in severity. Pt is unable to vacuum, yard work, and shampoo the carpet. Pt states that she has gotten an new puppy which causes her to have to vacuum and shampoo the carpet more often. She states that this puppy is 7 months old and 6 lb. She has been walking her puppy which is good exercise. She states that she has been trying to teach her puppy how to walk with a leash but pulling the leash even slightly has started to cause her back pain to increase. Pt is usually able to stand all day without increasing pain. There are other days when she has to sit down as her pain will start to increase after prolonged standing. Pt has paresthesias in her feet and legs. Pt also admits to muscle spasms in her BLE at night. She denies any neck pain. Pt states that her balance has been getting worse. She has nocturnal paresthesias that will wake her from sleep.  She states that she will sleep with her arms above her head and will have paresthesias that she is able to shake out. She has been dropping objects in her hands more often. Pt states that she will have a cold sensation in her RT hand at times. She does get a mild ache in her neck, does not bother her much. She is mainly bothered by her back pain. Pt reports pain does radiate into her LLE. Pt denies weakness in her BLE. Pt denies saddle paresthesias. Pt states she has been using Naprosyn 500 mg BID PRN with relief. She only takes this when she needs it. Pt has come off of Lyrica due to mental changes and blurred vision. Pt has been taking her old Tramadol Rx half tabs PRN (last took this over a year ago). Pt denies any dizziness, confusion, uncontrolled constipation, and cravings due to controlled substances. Denies persistent fevers, chills, weight changes, neurogenic bowel or bladder symptoms. Pt denies recent ED visits or hospitalizations. Wants another injection. Pain Assessment  10/4/2017   Location of Pain Back   Pain Location Comment -   Location Modifiers -   Severity of Pain 4   Quality of Pain Aching   Quality of Pain Comment -   Duration of Pain -   Frequency of Pain Constant   Aggravating Factors Standing   Limiting Behavior -   Relieving Factors (No Data)   Relieving Factors Comment Hot Bath, Tramadol   Result of Injury -       PAST MEDICAL HISTORY   Past Medical History:   Diagnosis Date    Asthma 2012    +PFT for mild obstructive disease/COPD    Back pain, chronic 2012    Dr. Logan Williamson referred to Dr. Sydnee Dick    Hard of hearing     chronic ruptured TM    HTN (hypertension) 2012    Hx of screening mammography 2016    EWL, normal, routine recs    Hyperlipidemia     Sinus congestion 2012    Spinal stenosis of lumbar region with radiculopathy 2015    Dr. Odell Washburn       Past Surgical History:   Procedure Laterality Date    HX  SECTION      HX GYN     . MEDICATIONS    Current Outpatient Prescriptions   Medication Sig Dispense Refill    montelukast (SINGULAIR) 10 mg tablet TAKE ONE TABLET BY MOUTH ONCE DAILY FOR ALLERGIC RHINITIS 30 Tab 6    naproxen (NAPROSYN) 500 mg tablet Take 1 Tab by mouth two (2) times daily (with meals). Indications: Pain 90 Tab 2    olmesartan-hydroCHLOROthiazide (BENICAR HCT) 20-12.5 mg per tablet Take 1 Tab by mouth daily. 90 Tab 3    mometasone (NASONEX) 50 mcg/actuation nasal spray 2 Sprays by Both Nostrils route daily. 3 Container 3    magnesium oxide (MAG-OX) 400 mg tablet Take 1 Tab by mouth daily. 30 Tab 11    fluticasone-salmeterol (ADVAIR) 250-50 mcg/dose diskus inhaler Take 1 Puff by inhalation every twelve (12) hours. 3 Inhaler 3    albuterol (PROVENTIL HFA) 90 mcg/actuation inhaler Take 2 Puffs by inhalation every six (6) hours as needed. 1 Inhaler 3    aspirin delayed-release 81 mg tablet Take 81 mg by mouth daily.  Cholecalciferol, Vitamin D3, (VITAMIN D3) 2,000 unit cap capsule Take 2,000 Units by mouth daily. 30 Cap 11        ALLERGIES  Allergies   Allergen Reactions    Lyrica [Pregabalin] Other (comments)     Medication causes dry eyes, unstable balance, and lack of concentration.  Mold Extracts Runny Nose    Statins-Hmg-Coa Reductase Inhibitors Myalgia          SOCIAL HISTORY    Social History     Social History    Marital status: SINGLE     Spouse name: N/A    Number of children: N/A    Years of education: N/A     Occupational History    Not on file.      Social History Main Topics    Smoking status: Current Every Day Smoker     Packs/day: 1.00     Types: Cigarettes    Smokeless tobacco: Never Used    Alcohol use No      Comment: h/o alcohol abuse in remission    Drug use: No    Sexual activity: Not on file     Other Topics Concern     Service No    Blood Transfusions No    Caffeine Concern Yes    Occupational Exposure No    Hobby Hazards No    Sleep Concern No    Stress Concern No    Weight Concern No    Special Diet No    Back Care No    Exercise Yes     walking    Bike Helmet No    Seat Belt Yes    Self-Exams No     Social History Narrative       FAMILY HISTORY  Family History   Problem Relation Age of Onset    Heart Disease Mother     Hypertension Mother     Cancer Mother     Coronary Artery Disease Mother 67    Heart Surgery Mother 68    Heart Disease Father     Hypertension Father     Stroke Father     Heart Surgery Father 46    Lung Disease Father     Heart Disease Brother     Heart Disease Brother        REVIEW OF SYSTEMS  Review of Systems   Constitutional: Negative for chills, fever and weight loss. Respiratory: Negative for shortness of breath. Cardiovascular: Negative for chest pain. Gastrointestinal: Negative for constipation. Negative for fecal incontinence   Genitourinary: Negative for dysuria. Negative for urinary incontinence   Musculoskeletal:        Per HPI   Skin: Negative for rash. Neurological: Positive for tingling and focal weakness. Negative for dizziness, tremors and headaches. Endo/Heme/Allergies: Does not bruise/bleed easily. Psychiatric/Behavioral: The patient does not have insomnia. PHYSICAL EXAMINATION  Visit Vitals    /70    Pulse 89    Temp 98.2 °F (36.8 °C) (Oral)    Resp 18    Ht 5' 1\" (1.549 m)    Wt 133 lb 6.4 oz (60.5 kg)    SpO2 96%    BMI 25.21 kg/m2         Accompanied by self. Constitutional:  Well developed, well nourished, in no acute distress. Psychiatric: Affect and mood are appropriate. Integumentary: No rashes or abrasions noted on exposed areas. Cardiovascular/Peripheral Vascular: Intact l pulses. No peripheral edema is noted. Lymphatic:  No evidence of lymphedema. No cervical lymphadenopathy. SPINE/MUSCULOSKELETAL EXAM    Cervical spine:  Neck is midline. Normal muscle tone. No focal atrophy is noted. Shoulder ROM intact. Negative Spurling's sign. Positive Tinel's sign RT elbow. Negative Hernandez's sign. Sensation grossly intact to light touch. Lumbar spine:  No rash, ecchymosis, or gross obliquity. No fasciculations. No focal atrophy is noted. No tenderness to palpation at the sciatic notch. SI joints non-tender. Trochanters non tender. Sensation grossly intact to light touch. MOTOR:      Biceps  Triceps Deltoids Wrist Ext Wrist Flex Hand Intrin   Right +4/5 +4/5 +4/5 +4/5 +4/5 4/5   Left +4/5 +4/5 +4/5 +4/5 +4/5 +4/5        Hip Flex  Quads Hamstrings Ankle DF EHL Ankle PF   Right +4/5 +4/5 +4/5 +4/5 +4/5 +4/5   Left +4/5 +4/5 +4/5 +4/5 +4/5 +4/5       DTRs are 3+ biceps, triceps, brachioradialis, patella, and Achilles. Straight Leg raise negative. Unable to do tandem gait. Ambulation without assistive device. FWB. RADIOGRAPHS  Lumbar spine xray films reviewed:  1) Mild scoliosis. 2) Advanced disc space narrowing at L4-5 and L5-S1  3) Grade 1 listhesis at L4-5 No instability      Written by Christen Martinez, as dictated by Sofia Perez MD.    I, Dr. Sofia Perez MD, confirm that all documentation is accurate. Ms. Jeananne Landau may have a reminder for a \"due or due soon\" health maintenance. I have asked that she contact her primary care provider for follow-up on this health maintenance.

## 2017-10-20 RX ORDER — EZETIMIBE 10 MG/1
10 TABLET ORAL
COMMUNITY
End: 2017-11-16 | Stop reason: SDUPTHER

## 2017-10-24 ENCOUNTER — TELEPHONE (OUTPATIENT)
Dept: FAMILY MEDICINE CLINIC | Age: 62
End: 2017-10-24

## 2017-10-24 ENCOUNTER — HOSPITAL ENCOUNTER (OUTPATIENT)
Age: 62
Setting detail: OUTPATIENT SURGERY
Discharge: HOME | End: 2017-10-24
Attending: PHYSICAL MEDICINE & REHABILITATION | Admitting: PHYSICAL MEDICINE & REHABILITATION
Payer: SUBSIDIZED

## 2017-10-24 ENCOUNTER — APPOINTMENT (OUTPATIENT)
Dept: GENERAL RADIOLOGY | Age: 62
End: 2017-10-24
Attending: PHYSICAL MEDICINE & REHABILITATION
Payer: SUBSIDIZED

## 2017-10-24 VITALS
HEART RATE: 82 BPM | HEIGHT: 61 IN | WEIGHT: 133 LBS | RESPIRATION RATE: 18 BRPM | TEMPERATURE: 98 F | DIASTOLIC BLOOD PRESSURE: 69 MMHG | BODY MASS INDEX: 25.11 KG/M2 | OXYGEN SATURATION: 97 % | SYSTOLIC BLOOD PRESSURE: 105 MMHG

## 2017-10-24 PROCEDURE — 74011250637 HC RX REV CODE- 250/637: Performed by: PHYSICAL MEDICINE & REHABILITATION

## 2017-10-24 PROCEDURE — 74011636320 HC RX REV CODE- 636/320: Performed by: PHYSICAL MEDICINE & REHABILITATION

## 2017-10-24 PROCEDURE — 74011636320 HC RX REV CODE- 636/320

## 2017-10-24 PROCEDURE — 77030014124 HC TY EPDRL BBMI -A: Performed by: PHYSICAL MEDICINE & REHABILITATION

## 2017-10-24 PROCEDURE — 74011000250 HC RX REV CODE- 250

## 2017-10-24 PROCEDURE — 74011000250 HC RX REV CODE- 250: Performed by: PHYSICAL MEDICINE & REHABILITATION

## 2017-10-24 PROCEDURE — 74011250636 HC RX REV CODE- 250/636

## 2017-10-24 PROCEDURE — 74011250636 HC RX REV CODE- 250/636: Performed by: PHYSICAL MEDICINE & REHABILITATION

## 2017-10-24 PROCEDURE — 76010000009 HC PAIN MGT 0 TO 30 MIN PROC: Performed by: PHYSICAL MEDICINE & REHABILITATION

## 2017-10-24 RX ORDER — LIDOCAINE HYDROCHLORIDE 10 MG/ML
INJECTION, SOLUTION EPIDURAL; INFILTRATION; INTRACAUDAL; PERINEURAL AS NEEDED
Status: DISCONTINUED | OUTPATIENT
Start: 2017-10-24 | End: 2017-10-24 | Stop reason: HOSPADM

## 2017-10-24 RX ORDER — DEXAMETHASONE SODIUM PHOSPHATE 100 MG/10ML
INJECTION INTRAMUSCULAR; INTRAVENOUS AS NEEDED
Status: DISCONTINUED | OUTPATIENT
Start: 2017-10-24 | End: 2017-10-24 | Stop reason: HOSPADM

## 2017-10-24 RX ORDER — DIAZEPAM 5 MG/1
5-20 TABLET ORAL ONCE
Status: COMPLETED | OUTPATIENT
Start: 2017-10-24 | End: 2017-10-24

## 2017-10-24 RX ADMIN — DIAZEPAM 5 MG: 5 TABLET ORAL at 12:42

## 2017-10-24 NOTE — DISCHARGE INSTRUCTIONS
[facility]   ([place])  Dr. Mateo Tinoco, Dr. Eric Rae, Dr. Loya  not drive a car, operate heavy machinery or dangerous equipment for 24 hours. * Activity as tolerated; rest for the remainder of the day. * Resume pre-block medications including those for your family doctor. * Do not drink alcoholic beverages for 24 hours. Alcohol and the medications you have received may interact and cause an adverse reaction. * You may feel better this evening and worse tomorrow, as the numbing medications wears off and the steroid has yet to begin to work. After 48 hrs the steroid should begin to release bringing you relief. * You may shower this evening and remove any bandages. * Avoid hot tubs and heating pads for 24 hours. You may use cold packs on the procedure site as tolerated for the first 24 hours. * If a headache develops, drink plenty of fluids and rest.  Take over the counter medications for headache if needed. If the headache continues longer than 24 hours, call MD at the [address] [place] [address]. [phone]    * Continue taking pain medications as needed. * You may resume your regular diet if tolerated. Otherwise, start with sips of water and advance slowly. * If Diabetic: check your blood sugar three times a day for the next 3 days. If your sugar is greater than 300 call your family doctor. If your sugar is greater than 400, have someone transport you to the nearest Emergency Room. * If you experience any of the following problems, Please Call the [address] [place] [address] at [phone].         * Shortness of Breath    * Fever of 101 or higher    * Nausea / Vomiting    * Severe Headache    * Weakness or numbness in arms or legs that is not      resolving    * Prolonged increase in pain greater than 4 days      DISCHARGE SUMMARY from Nurse      PATIENT INSTRUCTIONS:    After oral sedation, for 24 hours or while taking prescription Narcotics:  · Limit your activities  · Do not drive and operate hazardous machinery  · Do not make important personal or business decisions  · Do  not drink alcoholic beverages  · If you have not urinated within 8 hours after discharge, please contact your surgeon on call. Report the following to your surgeon:  · Excessive pain, swelling, redness or odor of or around the surgical area  · Temperature over 101  · Nausea and vomiting lasting longer than 4 hours or if unable to take medications  · Any signs of decreased circulation or nerve impairment to extremity: change in color, persistent  numbness, tingling, coldness or increase pain  · Any questions            What to do at Home:  Recommended activity: Activity as tolerated, NO DRIVING FOR 12 Hours post injection          *  Please give a list of your current medications to your Primary Care Provider. *  Please update this list whenever your medications are discontinued, doses are      changed, or new medications (including over-the-counter products) are added. *  Please carry medication information at all times in case of emergency situations. These are general instructions for a healthy lifestyle:    No smoking/ No tobacco products/ Avoid exposure to second hand smoke    Surgeon General's Warning:  Quitting smoking now greatly reduces serious risk to your health. Obesity, smoking, and sedentary lifestyle greatly increases your risk for illness    A healthy diet, regular physical exercise & weight monitoring are important for maintaining a healthy lifestyle    You may be retaining fluid if you have a history of heart failure or if you experience any of the following symptoms:  Weight gain of 3 pounds or more overnight or 5 pounds in a week, increased swelling in our hands or feet or shortness of breath while lying flat in bed.   Please call your doctor as soon as you notice any of these symptoms; do not wait until your next office visit. Recognize signs and symptoms of STROKE:    F-face looks uneven    A-arms unable to move or move unevenly    S-speech slurred or non-existent    T-time-call 911 as soon as signs and symptoms begin-DO NOT go       Back to bed or wait to see if you get better-TIME IS BRAIN.

## 2017-10-24 NOTE — INTERVAL H&P NOTE
H&P Update:  Roopa Richard was seen and briefly examined. History and physical has been reviewed.  There have been no significant clinical changes since the completion of the originally dated History and Physical.    Signed By: Yasmin Méndez MD     October 24, 2017 1:27 PM

## 2017-10-24 NOTE — H&P (VIEW-ONLY)
[facility] [address].  [address], [address]  [place], [address] [place] [address] Street  Phone: [phone]  Fax: [phone]        Ashanti Cabello  : 1955  PCP: Mary Lou Toro NP    PROGRESS NOTE      ASSESSMENT AND PLAN    Diagnoses and all orders for this visit:    1. Spinal stenosis at L4-L5 level, severe by MRI '15  -     [81042] LS Spine 4V    2. DDD (degenerative disc disease), lumbar    Other orders  -     SCHEDULE SURGERY    1. Advised to continue HEP. 2. Urgent symptoms with pt. Cervical spine MRI  if she notices UE symptoms. She has some UMN signs but no symptoms  3. Continue PRN Naprosyn. 4. Set up for GRETCHEN at L4-5  5. Given information on stenosis. Follow-up Disposition:  Return if symptoms worsen or fail to improve. HISTORY OF PRESENT ILLNESS  Carroll Huber is a 57 y.o. female. RHD. Pt last evaluated in 2016 and is a pt with known lumbar stenosis. She had an GRETCHEN at that time. Pt reports that she has had great benefit from her last GRETCHEN. Pt has been noticing her pain has started to return and increase in severity. Pt is unable to vacuum, yard work, and shampoo the carpet. Pt states that she has gotten an new puppy which causes her to have to vacuum and shampoo the carpet more often. She states that this puppy is 7 months old and 6 lb. She has been walking her puppy which is good exercise. She states that she has been trying to teach her puppy how to walk with a leash but pulling the leash even slightly has started to cause her back pain to increase. Pt is usually able to stand all day without increasing pain. There are other days when she has to sit down as her pain will start to increase after prolonged standing. Pt has paresthesias in her feet and legs. Pt also admits to muscle spasms in her BLE at night. She denies any neck pain. Pt states that her balance has been getting worse. She has nocturnal paresthesias that will wake her from sleep.  She states that she will sleep with her arms above her head and will have paresthesias that she is able to shake out. She has been dropping objects in her hands more often. Pt states that she will have a cold sensation in her RT hand at times. She does get a mild ache in her neck, does not bother her much. She is mainly bothered by her back pain. Pt reports pain does radiate into her LLE. Pt denies weakness in her BLE. Pt denies saddle paresthesias. Pt states she has been using Naprosyn 500 mg BID PRN with relief. She only takes this when she needs it. Pt has come off of Lyrica due to mental changes and blurred vision. Pt has been taking her old Tramadol Rx half tabs PRN (last took this over a year ago). Pt denies any dizziness, confusion, uncontrolled constipation, and cravings due to controlled substances. Denies persistent fevers, chills, weight changes, neurogenic bowel or bladder symptoms. Pt denies recent ED visits or hospitalizations. Wants another injection. Pain Assessment  10/4/2017   Location of Pain Back   Pain Location Comment -   Location Modifiers -   Severity of Pain 4   Quality of Pain Aching   Quality of Pain Comment -   Duration of Pain -   Frequency of Pain Constant   Aggravating Factors Standing   Limiting Behavior -   Relieving Factors (No Data)   Relieving Factors Comment Hot Bath, Tramadol   Result of Injury -       PAST MEDICAL HISTORY   Past Medical History:   Diagnosis Date    Asthma 2012    +PFT for mild obstructive disease/COPD    Back pain, chronic 2012    Dr. Pinky Suarez referred to Dr. Yvette Teresa    Hard of hearing     chronic ruptured TM    HTN (hypertension) 2012    Hx of screening mammography 2016    EWL, normal, routine recs    Hyperlipidemia     Sinus congestion 2012    Spinal stenosis of lumbar region with radiculopathy 2015    Dr. Milka Alvarenga       Past Surgical History:   Procedure Laterality Date    HX  SECTION      HX GYN     . MEDICATIONS    Current Outpatient Prescriptions   Medication Sig Dispense Refill    montelukast (SINGULAIR) 10 mg tablet TAKE ONE TABLET BY MOUTH ONCE DAILY FOR ALLERGIC RHINITIS 30 Tab 6    naproxen (NAPROSYN) 500 mg tablet Take 1 Tab by mouth two (2) times daily (with meals). Indications: Pain 90 Tab 2    olmesartan-hydroCHLOROthiazide (BENICAR HCT) 20-12.5 mg per tablet Take 1 Tab by mouth daily. 90 Tab 3    mometasone (NASONEX) 50 mcg/actuation nasal spray 2 Sprays by Both Nostrils route daily. 3 Container 3    magnesium oxide (MAG-OX) 400 mg tablet Take 1 Tab by mouth daily. 30 Tab 11    fluticasone-salmeterol (ADVAIR) 250-50 mcg/dose diskus inhaler Take 1 Puff by inhalation every twelve (12) hours. 3 Inhaler 3    albuterol (PROVENTIL HFA) 90 mcg/actuation inhaler Take 2 Puffs by inhalation every six (6) hours as needed. 1 Inhaler 3    aspirin delayed-release 81 mg tablet Take 81 mg by mouth daily.  Cholecalciferol, Vitamin D3, (VITAMIN D3) 2,000 unit cap capsule Take 2,000 Units by mouth daily. 30 Cap 11        ALLERGIES  Allergies   Allergen Reactions    Lyrica [Pregabalin] Other (comments)     Medication causes dry eyes, unstable balance, and lack of concentration.  Mold Extracts Runny Nose    Statins-Hmg-Coa Reductase Inhibitors Myalgia          SOCIAL HISTORY    Social History     Social History    Marital status: SINGLE     Spouse name: N/A    Number of children: N/A    Years of education: N/A     Occupational History    Not on file.      Social History Main Topics    Smoking status: Current Every Day Smoker     Packs/day: 1.00     Types: Cigarettes    Smokeless tobacco: Never Used    Alcohol use No      Comment: h/o alcohol abuse in remission    Drug use: No    Sexual activity: Not on file     Other Topics Concern     Service No    Blood Transfusions No    Caffeine Concern Yes    Occupational Exposure No    Hobby Hazards No    Sleep Concern No    Stress Concern No    Weight Concern No    Special Diet No    Back Care No    Exercise Yes     walking    Bike Helmet No    Seat Belt Yes    Self-Exams No     Social History Narrative       FAMILY HISTORY  Family History   Problem Relation Age of Onset    Heart Disease Mother     Hypertension Mother     Cancer Mother     Coronary Artery Disease Mother 67    Heart Surgery Mother 68    Heart Disease Father     Hypertension Father     Stroke Father     Heart Surgery Father 46    Lung Disease Father     Heart Disease Brother     Heart Disease Brother        REVIEW OF SYSTEMS  Review of Systems   Constitutional: Negative for chills, fever and weight loss. Respiratory: Negative for shortness of breath. Cardiovascular: Negative for chest pain. Gastrointestinal: Negative for constipation. Negative for fecal incontinence   Genitourinary: Negative for dysuria. Negative for urinary incontinence   Musculoskeletal:        Per HPI   Skin: Negative for rash. Neurological: Positive for tingling and focal weakness. Negative for dizziness, tremors and headaches. Endo/Heme/Allergies: Does not bruise/bleed easily. Psychiatric/Behavioral: The patient does not have insomnia. PHYSICAL EXAMINATION  Visit Vitals    /70    Pulse 89    Temp 98.2 °F (36.8 °C) (Oral)    Resp 18    Ht 5' 1\" (1.549 m)    Wt 133 lb 6.4 oz (60.5 kg)    SpO2 96%    BMI 25.21 kg/m2         Accompanied by self. Constitutional:  Well developed, well nourished, in no acute distress. Psychiatric: Affect and mood are appropriate. Integumentary: No rashes or abrasions noted on exposed areas. Cardiovascular/Peripheral Vascular: Intact l pulses. No peripheral edema is noted. Lymphatic:  No evidence of lymphedema. No cervical lymphadenopathy. SPINE/MUSCULOSKELETAL EXAM    Cervical spine:  Neck is midline. Normal muscle tone. No focal atrophy is noted. Shoulder ROM intact. Negative Spurling's sign. Positive Tinel's sign RT elbow. Negative Hernandez's sign. Sensation grossly intact to light touch. Lumbar spine:  No rash, ecchymosis, or gross obliquity. No fasciculations. No focal atrophy is noted. No tenderness to palpation at the sciatic notch. SI joints non-tender. Trochanters non tender. Sensation grossly intact to light touch. MOTOR:      Biceps  Triceps Deltoids Wrist Ext Wrist Flex Hand Intrin   Right +4/5 +4/5 +4/5 +4/5 +4/5 4/5   Left +4/5 +4/5 +4/5 +4/5 +4/5 +4/5        Hip Flex  Quads Hamstrings Ankle DF EHL Ankle PF   Right +4/5 +4/5 +4/5 +4/5 +4/5 +4/5   Left +4/5 +4/5 +4/5 +4/5 +4/5 +4/5       DTRs are 3+ biceps, triceps, brachioradialis, patella, and Achilles. Straight Leg raise negative. Unable to do tandem gait. Ambulation without assistive device. FWB. RADIOGRAPHS  Lumbar spine xray films reviewed:  1) Mild scoliosis. 2) Advanced disc space narrowing at L4-5 and L5-S1  3) Grade 1 listhesis at L4-5 No instability      Written by Josefina Coats, as dictated by Karyle Pringle, MD.    I, Dr. Karyle Pringle, MD, confirm that all documentation is accurate. Ms. Harvey Tobin may have a reminder for a \"due or due soon\" health maintenance. I have asked that she contact her primary care provider for follow-up on this health maintenance.

## 2017-10-24 NOTE — PROCEDURES
Intralaminar Epidural Steroid Procedure Note        Patient Name   Bk Oconnell  Date of Procedure: October 24, 2017  Preoperative Diagnosis: Bilateral stenosis of lateral recess of lumbar spine [M48.061]  Postoperative Diagnosis: Same  Location [facility], [address]      Procedure:  Epidural Steroid Injection    Consent:  Informed consent was obtained prior to the procedure. In addition to the potential risks associated with the procedure itself, the patient was informed both verbally and in writing of the potential side effects of the use of glucocorticoid. The patient appeared to comprehend the informed consent and desired to have the procedure performed. Procedure in Detail:  The patient was taken to the procedure suite and placed in the prone position on the operating table on appropriate padding. The posterior lumbar region was prepped and draped in the usual sterile fashion. Intraoperative fluoroscopy was used to localize the L5-S1 interspace. The skin was infiltrated with 1% lidocaine. An 18-gauge Tuohy needle was advanced into the epidural space at L5-S1 under fluoroscopic guidance using the loss of resistance technique. No cerebrospinal fluid was seen throughout the procedure. Yes  A small amount of Isovue was injected into the epidural space, confirming appropriated needle placement on fluoroscopy. No vascular uptake was identified. Next, 2ml of 1% Lidocaine and 30mg of preservative free Dexamethasone were injected via the Tuohy needle. The needle was removed from the patient. The patient tolerated the procedure well and was discharged home with designated  and care instructions.       Signed By: Марина Addison MD                        October 24, 2017

## 2017-10-24 NOTE — TELEPHONE ENCOUNTER
Medication: Benicar HCT 20-12.5  mg, dose: 1 tab, how often: daily, current number of medication days provided: 90, refill per application. Lot # [id number], EXP 10/2019. This medication was received and verified for the following 1. Correct Patient, 2. Correct Diagnosis, 3. Correct Drug, 4. Correct route, and no current allergy to medication. Please contact patient to come  their medications.      Shagufta Cotto, MSN, RN, FNP-C     [facility]

## 2017-11-13 ENCOUNTER — OFFICE VISIT (OUTPATIENT)
Dept: ORTHOPEDIC SURGERY | Age: 62
End: 2017-11-13

## 2017-11-13 VITALS
TEMPERATURE: 97.8 F | WEIGHT: 133 LBS | DIASTOLIC BLOOD PRESSURE: 61 MMHG | SYSTOLIC BLOOD PRESSURE: 121 MMHG | HEIGHT: 61 IN | RESPIRATION RATE: 18 BRPM | OXYGEN SATURATION: 98 % | HEART RATE: 80 BPM | BODY MASS INDEX: 25.11 KG/M2

## 2017-11-13 DIAGNOSIS — M54.12 CERVICAL RADICULAR PAIN: ICD-10-CM

## 2017-11-13 DIAGNOSIS — M54.2 NECK PAIN: Primary | ICD-10-CM

## 2017-11-13 RX ORDER — TOPIRAMATE 25 MG/1
TABLET ORAL
Qty: 60 TAB | Refills: 0 | Status: SHIPPED | OUTPATIENT
Start: 2017-11-13 | End: 2018-09-06 | Stop reason: ALTCHOICE

## 2017-11-13 NOTE — PROGRESS NOTES
[facility] [address].  [address], [address]  [place], [address] [place] [address] Street  Phone: [phone]  Fax: [phone]        Valentino Kung  : 1955  PCP: Nelly Ornelas NP    PROGRESS NOTE      ASSESSMENT AND PLAN    Diagnoses and all orders for this visit:    1. Neck pain  -     [65281] C Spine 2-3V    2. Cervical radicular pain  -     Generic Supply Order  -     MRI CERV SPINE WO CONT; Future  -     topiramate (TOPAMAX) 25 mg tablet; Take 1 tab po QHS for one week, then increase to 2 tabs po QHS    1. Advised to continue HEP. 2. Cervical spine MRI. 3. Rx for soft cervical collar. 4. Trial of Topamax. Follow-up Disposition:  Return for MRI/CT f/u. HISTORY OF PRESENT ILLNESS  Julee Hickey is a 57 y.o. female. Pt presents to the office for a f/u visit for neck and back pain. She underwent a L5-S1 GRETCHEN about 3 weeks ago (10/24/17). She has had improvement from her injection. She states that she had a headache and fatigue x 4 days post injection. Pt took Naprosyn with some relief, has resolved. Pt continues to have some back pain. Pt reports pain has not radiated into her LLE as much. Does still have pain across her back into her L hip with prolonged standing and walking. She has a short standing and walking tolerance. She will sit which does relieve her pain. She states that there are times when her head feels too heavy for her neck. Has not used soft cervical collar. She has nocturnal paresthesias that will wake her from sleep. She states that she will sleep with her arms above her head and will have paresthesias that she is able to shake out. She has been dropping objects in her hands more often. Pt states that she will have a cold sensation in her RT hand at times. She has been having some balance issues as well. Pt denies saddle paresthesias. Pt states she has been using Naprosyn 500 mg BID with some relief.  Has failed Lyrica due to negative side effects. Denies persistent fevers, chills, weight changes, neurogenic bowel or bladder symptoms. Pt denies recent ED visits or hospitalizations. Denies hx of CTS. Pain Assessment  2017   Location of Pain Back;Neck   Pain Location Comment -   Location Modifiers -   Severity of Pain 2   Quality of Pain Aching   Quality of Pain Comment -   Duration of Pain -   Frequency of Pain Constant   Aggravating Factors (No Data)   Aggravating Factors Comment pain is just there   Limiting Behavior -   Relieving Factors Rest;Heat   Relieving Factors Comment -   Result of Injury -       PAST MEDICAL HISTORY   Past Medical History:   Diagnosis Date    Asthma 2012    +PFT for mild obstructive disease/COPD    Back pain, chronic 2012    Dr. Satish Escalera referred to Dr. Anthony Powers    [facility]     H/O mammogram 10/03/2017    No evidence of malignancy    Hard of hearing     chronic ruptured TM    HTN (hypertension) 2012    Hx of screening mammography 2016    EWL, normal, routine recs    Hyperlipidemia     Sinus congestion 2012    Spinal stenosis of lumbar region with radiculopathy 2015    Dr. Cristiano Bee       Past Surgical History:   Procedure Laterality Date    HX  SECTION      HX TUBAL LIGATION     . MEDICATIONS      Current Outpatient Prescriptions   Medication Sig Dispense Refill    topiramate (TOPAMAX) 25 mg tablet Take 1 tab po QHS for one week, then increase to 2 tabs po QHS 60 Tab 0    ezetimibe (ZETIA) 10 mg tablet Take 10 mg by mouth Every Mon, Wed & Sun.      naproxen (NAPROSYN) 500 mg tablet Take 1 Tab by mouth two (2) times daily (with meals). Indications: Pain 90 Tab 2    olmesartan-hydroCHLOROthiazide (BENICAR HCT) 20-12.5 mg per tablet Take 1 Tab by mouth daily. 90 Tab 3    mometasone (NASONEX) 50 mcg/actuation nasal spray 2 Sprays by Both Nostrils route daily.  3 Container 3    magnesium oxide (MAG-OX) 400 mg tablet Take 1 Tab by mouth daily. 30 Tab 11    fluticasone-salmeterol (ADVAIR) 250-50 mcg/dose diskus inhaler Take 1 Puff by inhalation every twelve (12) hours. 3 Inhaler 3    albuterol (PROVENTIL HFA) 90 mcg/actuation inhaler Take 2 Puffs by inhalation every six (6) hours as needed. 1 Inhaler 3    aspirin delayed-release 81 mg tablet Take 81 mg by mouth daily.  Cholecalciferol, Vitamin D3, (VITAMIN D3) 2,000 unit cap capsule Take 2,000 Units by mouth daily. 30 Cap 11        ALLERGIES    Allergies   Allergen Reactions    Lyrica [Pregabalin] Other (comments)     Medication causes dry eyes, unstable balance, and lack of concentration.  Mold Extracts Runny Nose    Statins-Hmg-Coa Reductase Inhibitors Myalgia          SOCIAL HISTORY    Social History     Social History    Marital status: SINGLE     Spouse name: N/A    Number of children: N/A    Years of education: N/A     Occupational History    Not on file.      Social History Main Topics    Smoking status: Current Every Day Smoker     Packs/day: 1.00     Types: Cigarettes    Smokeless tobacco: Never Used    Alcohol use No      Comment: h/o alcohol abuse in remission    Drug use: No    Sexual activity: Not on file     Other Topics Concern     Service No    Blood Transfusions No    Caffeine Concern Yes    Occupational Exposure No    Hobby Hazards No    Sleep Concern No    Stress Concern No    Weight Concern No    Special Diet No    Back Care No    Exercise Yes     walking    Bike Helmet No    Seat Belt Yes    Self-Exams No     Social History Narrative       FAMILY HISTORY    Family History   Problem Relation Age of Onset    Heart Disease Mother     Hypertension Mother     Cancer Mother     Coronary Artery Disease Mother 67    Heart Surgery Mother 68    Heart Disease Father     Hypertension Father     Stroke Father     Heart Surgery Father 46    Lung Disease Father     Heart Disease Brother     Heart Disease Brother        REVIEW OF SYSTEMS  Review of Systems   Constitutional: Negative for chills, fever and weight loss. Respiratory: Negative for shortness of breath. Cardiovascular: Negative for chest pain. Gastrointestinal: Negative for constipation. Negative for fecal incontinence   Genitourinary: Negative for dysuria. Negative for urinary incontinence   Musculoskeletal:        Per HPI   Skin: Negative for rash. Neurological: Positive for tingling and focal weakness. Negative for dizziness, tremors and headaches. Endo/Heme/Allergies: Does not bruise/bleed easily. Psychiatric/Behavioral: The patient does not have insomnia. PHYSICAL EXAMINATION  Visit Vitals    /61    Pulse 80    Temp 97.8 °F (36.6 °C) (Oral)    Resp 18    Ht 5' 1\" (1.549 m)    Wt 133 lb (60.3 kg)    SpO2 98%    BMI 25.13 kg/m2         Accompanied by self. Constitutional:  Well developed, well nourished, in no acute distress. Psychiatric: Affect and mood are appropriate. Integumentary: No rashes or abrasions noted on exposed areas. Cardiovascular/Peripheral Vascular: Intact l pulses. No peripheral edema is noted. Lymphatic:  No evidence of lymphedema. No cervical lymphadenopathy. SPINE/MUSCULOSKELETAL EXAM    Cervical spine:  Neck is midline. Normal muscle tone. No focal atrophy is noted. Shoulder ROM intact. Negative Spurling's sign. Positive  Tinel's sign bilateral wrists. Negative Hernandez's sign. Lumbar spine:  No rash, ecchymosis, or gross obliquity. No fasciculations. No focal atrophy is noted. No tenderness to palpation at the sciatic notch. SI joints non-tender. Trochanters non tender.             MOTOR:      Biceps  Triceps Deltoids Wrist Ext Wrist Flex Hand Intrin   Right +4/5 +4/5 +4/5 +4/5 +4/5 4/5   Left +4/5 +4/5 +4/5 +4/5 +4/5 4/5        Hip Flex  Quads Hamstrings Ankle DF EHL Ankle PF   Right +4/5 +4/5 +4/5 +4/5 +4/5 +4/5   Left +4/5 +4/5 +4/5 +4/5 +4/5 +4/5       Straight Leg raise negative. Ambulation without assistive device. FWB. RADIOGRAPHS  Cervical spine xray films reviewed:  1) Diffuse Degenerative changes C3-4 and C4-5    Written by Leanna Moyer, as dictated by Anjana Townsend MD.    I, Dr. Anjana Townsend MD, confirm that all documentation is accurate. Ms. Yennifer Rosado may have a reminder for a \"due or due soon\" health maintenance. I have asked that she contact her primary care provider for follow-up on this health maintenance.

## 2017-11-13 NOTE — MR AVS SNAPSHOT
Visit Information Date & Time Provider Department Dept. Phone Encounter #  
 11/13/2017 10:45 AM Andrew Cooper MD [facility] [phone] [id number] Follow-up Instructions Return for MRI/CT f/u. Your Appointments 12/4/2017  1:00 PM  
Follow Up with Pallavi Hough NP [address] [facility] ([facility]) Appt Note: 6 mth follow up  
 [address] [facility] [id number]  
[address] [facility] [address] Upcoming Health Maintenance Date Due DTaP/Tdap/Td series (1 - Tdap) 9/8/1974 FOBT Q 1 YEAR AGE 50-75 9/8/2003 ZOSTER VACCINE AGE 60> 7/8/2013 Influenza Age 5 to Adult 8/1/2017 PAP AKA CERVICAL CYTOLOGY 9/2/2017 Pneumococcal 19-64 Medium Risk (1 of 1 - PPSV23) 3/6/2018* BREAST CANCER SCRN MAMMOGRAM 9/6/2018 *Topic was postponed. The date shown is not the original due date. Allergies as of 11/13/2017  Review Complete On: 11/13/2017 By: Rhea Cervantes Severity Noted Reaction Type Reactions Lyrica [Pregabalin]  10/04/2017    Other (comments) Medication causes dry eyes, unstable balance, and lack of concentration. Mold Extracts  11/11/2014   Systemic Runny Nose Statins-hmg-coa Reductase Inhibitors  06/05/2017   Intolerance Myalgia Current Immunizations  Reviewed on 10/29/2015 Name Date Influenza Vaccine 10/6/2016 Influenza Vaccine [place] Going) 10/29/2015 Influenza Vaccine (Quad) PF 10/31/2013 Influenza Vaccine (Trivalent) 11/11/2014  3:00 PM  
  
 Not reviewed this visit You Were Diagnosed With   
  
 Codes Comments Neck pain    -  Primary ICD-10-CM: M54.2 ICD-9-CM: 723.1 Vitals BP Pulse Temp Resp Height(growth percentile) Weight(growth percentile) 121/61 80 97.8 °F (36.6 °C) (Oral) 18 5' 1\" (1.549 m) 133 lb (60.3 kg) SpO2 BMI OB Status Smoking Status 98% 25.13 kg/m2 Hysterectomy Current Every Day Smoker BMI and BSA Data Body Mass Index Body Surface Area  
 25.13 kg/m 2 1.61 m 2 Preferred Pharmacy Pharmacy Name Phone [facility] [address] [phone] Your Updated Medication List  
  
   
This list is accurate as of: 11/13/17 12:25 PM.  Always use your most recent med list.  
  
  
  
  
 albuterol 90 mcg/actuation inhaler Commonly known as:  PROVENTIL HFA Take 2 Puffs by inhalation every six (6) hours as needed. aspirin delayed-release 81 mg tablet Take 81 mg by mouth daily. Cholecalciferol (Vitamin D3) 2,000 unit Cap capsule Commonly known as:  VITAMIN D3 Take 2,000 Units by mouth daily. fluticasone-salmeterol 250-50 mcg/dose diskus inhaler Commonly known as:  ADVAIR Take 1 Puff by inhalation every twelve (12) hours. magnesium oxide 400 mg tablet Commonly known as:  MAG-OX Take 1 Tab by mouth daily. mometasone 50 mcg/actuation nasal spray Commonly known as:  NASONEX  
2 Sprays by Both Nostrils route daily. naproxen 500 mg tablet Commonly known as:  NAPROSYN Take 1 Tab by mouth two (2) times daily (with meals). Indications: Pain  
  
 olmesartan-hydroCHLOROthiazide 20-12.5 mg per tablet Commonly known as:  BENICAR HCT Take 1 Tab by mouth daily. topiramate 25 mg tablet Commonly known as:  TOPAMAX Take 1 tab po QHS for one week, then increase to 2 tabs po QHS  
  
 ZETIA 10 mg tablet Generic drug:  ezetimibe Take 10 mg by mouth Every Mon, Wed & Sun.  
  
  
  
  
Prescriptions Sent to Pharmacy Refills  
 topiramate (TOPAMAX) 25 mg tablet 0 Sig: Take 1 tab po QHS for one week, then increase to 2 tabs po QHS Class: Normal  
 Pharmacy: [address]  #: [phone] We Performed the Following AMB POC XRAY, SPINE, CERVICAL; 2 OR 3 [27813 CPT(R)] AMB SUPPLY ORDER [[id number] Custom] Comments:  
 Cervical collar Follow-up Instructions Return for MRI/CT f/u. To-Do List   
 11/13/2017 Imaging:  MRI CERV SPINE WO CONT   
  
 11/27/2017 4:00 PM  
  Appointment with [facility] MRI RM 1 at [facility] RAD MRI ([phone]) GENERAL INSTRUCTIONS  Bring information (ID card) if you have any medically implanted devices. You will be required to lie still while the procedure is being performed. Remove any jewelry (including body piercing, hairpins) prior to MRI. If you have had a creatinine level drawn within the past 30 days, please bring most recent results to your appt. Bring any films, CD's, and reports related to your study with you on the day of your exam.  This only includes studies done outside of [address] [place] [address], [facility], [place], and [facility]. Bring a complete list of all medications you are currently taking to include prescriptions, over-the-counter meds, herbals, vitamins & any dietary supplements. If you were given medications for claustrophobia or anxiety, please arrange to have someone drive you to your appointment. QUESTIONS  Notify the MRI Department if you have any questions concerning your study. [place] - [phone] [address] [phone] Introducing [facility] [place] SERVICES! Rosina Ignacio introduces [employer] patient portal. Now you can access parts of your medical record, email your doctor's office, and request medication refills online. 1. In your internet browser, go to [email or web address] 2. Click on the First Time User? Click Here link in the Sign In box. You will see the New Member Sign Up page. 3. Enter your [employer] Access Code exactly as it appears below. You will not need to use this code after youve completed the sign-up process.  If you do not sign up before the expiration date, you must request a new code. · [employer] Access Code: [email or web address] 
Expires: 1/2/2018  2:22 PM 
 
4. Enter the last four digits of your Social Security Number (xxxx) and Date of Birth (mm/dd/yyyy) as indicated and click Submit. You will be taken to the next sign-up page. 5. Create a [employer] ID. This will be your [employer] login ID and cannot be changed, so think of one that is secure and easy to remember. 6. Create a [employer] password. You can change your password at any time. 7. Enter your Password Reset Question and Answer. This can be used at a later time if you forget your password. 8. Enter your e-mail address. You will receive e-mail notification when new information is available in [address] 9. Click Sign Up. You can now view and download portions of your medical record. 10. Click the Download Summary menu link to download a portable copy of your medical information. If you have questions, please visit the Frequently Asked Questions section of the [employer] website. Remember, [employer] is NOT to be used for urgent needs. For medical emergencies, dial 911. Now available from your iPhone and Android! Please provide this summary of care documentation to your next provider. Your primary care clinician is listed as Justino Parisi. If you have any questions after today's visit, please call [phone].

## 2017-11-13 NOTE — PROGRESS NOTES
Verbal order entered per Dr. Elena Baldwin as documented on blue sheet:MRI C-spine increased neck pain, N/T UEs. Generic supply cervical collar. Topamax 25mg take 1 tab po QHS x 1 week, then increase to 2 tabs po QHS.  Disp 60

## 2017-11-15 ENCOUNTER — TELEPHONE (OUTPATIENT)
Dept: FAMILY MEDICINE CLINIC | Age: 62
End: 2017-11-15

## 2017-11-16 RX ORDER — EZETIMIBE 10 MG/1
10 TABLET ORAL DAILY
Qty: 90 TAB | Refills: 3 | Status: SHIPPED | COMMUNITY
Start: 2017-11-16 | End: 2019-01-08 | Stop reason: SDUPTHER

## 2017-11-16 NOTE — TELEPHONE ENCOUNTER
Medication: Zetia 10mg , dose: 1 tab, how often: qd , current number of medication days provided: 90, refill per application. Lot #:    This medication was received and verified for the following 1. Correct Patient, 2. Correct Diagnosis, 3. Correct Drug, 4. Correct route, and no current allergy to medication. Please contact patient to come  their medications.      Shannen Mcelroy MSN, RN, [facility]

## 2017-11-27 ENCOUNTER — LAB ONLY (OUTPATIENT)
Dept: FAMILY MEDICINE CLINIC | Age: 62
End: 2017-11-27

## 2017-11-27 ENCOUNTER — HOSPITAL ENCOUNTER (OUTPATIENT)
Dept: LAB | Age: 62
Discharge: HOME | End: 2017-11-27

## 2017-11-27 DIAGNOSIS — E78.5 HYPERLIPIDEMIA WITH TARGET LDL LESS THAN 100: ICD-10-CM

## 2017-11-27 DIAGNOSIS — I10 ESSENTIAL HYPERTENSION: ICD-10-CM

## 2017-11-27 DIAGNOSIS — I10 ESSENTIAL HYPERTENSION: Primary | ICD-10-CM

## 2017-11-27 LAB
ALBUMIN SERPL-MCNC: 4.2 G/DL (ref 3.4–5)
ALBUMIN/GLOB SERPL: 1.3 {RATIO} (ref 0.8–1.7)
ALP SERPL-CCNC: 74 U/L (ref 45–117)
ALT SERPL-CCNC: 21 U/L (ref 13–56)
ANION GAP SERPL CALC-SCNC: 8 MMOL/L (ref 3–18)
AST SERPL-CCNC: 19 U/L (ref 15–37)
BASOPHILS # BLD: 0.1 K/UL (ref 0–0.06)
BASOPHILS NFR BLD: 1 % (ref 0–2)
BILIRUB SERPL-MCNC: 0.5 MG/DL (ref 0.2–1)
BUN SERPL-MCNC: 17 MG/DL (ref 7–18)
BUN/CREAT SERPL: 20 (ref 12–20)
CALCIUM SERPL-MCNC: 11.2 MG/DL (ref 8.5–10.1)
CHLORIDE SERPL-SCNC: 103 MMOL/L (ref 100–108)
CHOLEST SERPL-MCNC: 247 MG/DL
CO2 SERPL-SCNC: 27 MMOL/L (ref 21–32)
CREAT SERPL-MCNC: 0.84 MG/DL (ref 0.6–1.3)
DIFFERENTIAL METHOD BLD: ABNORMAL
EOSINOPHIL # BLD: 0.2 K/UL (ref 0–0.4)
EOSINOPHIL NFR BLD: 3 % (ref 0–5)
ERYTHROCYTE [DISTWIDTH] IN BLOOD BY AUTOMATED COUNT: 13.7 % (ref 11.6–14.5)
GLOBULIN SER CALC-MCNC: 3.2 G/DL (ref 2–4)
GLUCOSE SERPL-MCNC: 80 MG/DL (ref 74–99)
HCT VFR BLD AUTO: 43 % (ref 35–45)
HDLC SERPL-MCNC: 83 MG/DL (ref 40–60)
HDLC SERPL: 3 {RATIO} (ref 0–5)
HGB BLD-MCNC: 14.7 G/DL (ref 12–16)
LDLC SERPL CALC-MCNC: 143.4 MG/DL (ref 0–100)
LIPID PROFILE,FLP: ABNORMAL
LYMPHOCYTES # BLD: 1.9 K/UL (ref 0.9–3.6)
LYMPHOCYTES NFR BLD: 26 % (ref 21–52)
MAGNESIUM SERPL-MCNC: 2 MG/DL (ref 1.6–2.6)
MCH RBC QN AUTO: 31.1 PG (ref 24–34)
MCHC RBC AUTO-ENTMCNC: 34.2 G/DL (ref 31–37)
MCV RBC AUTO: 91.1 FL (ref 74–97)
MONOCYTES # BLD: 0.7 K/UL (ref 0.05–1.2)
MONOCYTES NFR BLD: 9 % (ref 3–10)
NEUTS SEG # BLD: 4.3 K/UL (ref 1.8–8)
NEUTS SEG NFR BLD: 61 % (ref 40–73)
PLATELET # BLD AUTO: 350 K/UL (ref 135–420)
PMV BLD AUTO: 10.5 FL (ref 9.2–11.8)
POTASSIUM SERPL-SCNC: 4.3 MMOL/L (ref 3.5–5.5)
PROT SERPL-MCNC: 7.4 G/DL (ref 6.4–8.2)
RBC # BLD AUTO: 4.72 M/UL (ref 4.2–5.3)
SODIUM SERPL-SCNC: 138 MMOL/L (ref 136–145)
TRIGL SERPL-MCNC: 103 MG/DL (ref ?–150)
VLDLC SERPL CALC-MCNC: 20.6 MG/DL
WBC # BLD AUTO: 7.2 K/UL (ref 4.6–13.2)

## 2017-11-27 PROCEDURE — 80053 COMPREHEN METABOLIC PANEL: CPT | Performed by: NURSE PRACTITIONER

## 2017-11-27 PROCEDURE — 80061 LIPID PANEL: CPT | Performed by: NURSE PRACTITIONER

## 2017-11-27 PROCEDURE — 85025 COMPLETE CBC W/AUTO DIFF WBC: CPT | Performed by: NURSE PRACTITIONER

## 2017-11-27 PROCEDURE — 83735 ASSAY OF MAGNESIUM: CPT | Performed by: NURSE PRACTITIONER

## 2017-11-28 NOTE — PROGRESS NOTES
Will review results with pt at 12/4/17 appt  1. LDL and cholesterol not within goal. Pt on Zetia discuss possible addition of niacin, fibrates, or bile acid sequestrants.  Also, Consider every other day dosing of statin

## 2017-12-04 ENCOUNTER — HOSPITAL ENCOUNTER (OUTPATIENT)
Dept: MRI IMAGING | Age: 62
Discharge: HOME | End: 2017-12-04
Attending: PHYSICAL MEDICINE & REHABILITATION
Payer: SUBSIDIZED

## 2017-12-04 ENCOUNTER — OFFICE VISIT (OUTPATIENT)
Dept: FAMILY MEDICINE CLINIC | Age: 62
End: 2017-12-04

## 2017-12-04 VITALS
SYSTOLIC BLOOD PRESSURE: 113 MMHG | RESPIRATION RATE: 16 BRPM | HEART RATE: 100 BPM | HEIGHT: 61 IN | DIASTOLIC BLOOD PRESSURE: 62 MMHG | TEMPERATURE: 98.3 F | WEIGHT: 130 LBS | OXYGEN SATURATION: 99 % | BODY MASS INDEX: 24.55 KG/M2

## 2017-12-04 DIAGNOSIS — M54.16 SPINAL STENOSIS OF LUMBAR REGION WITH RADICULOPATHY: Chronic | ICD-10-CM

## 2017-12-04 DIAGNOSIS — J44.9 COPD, MILD (HCC): Primary | ICD-10-CM

## 2017-12-04 DIAGNOSIS — I10 ESSENTIAL HYPERTENSION: ICD-10-CM

## 2017-12-04 DIAGNOSIS — E78.5 HYPERLIPIDEMIA WITH TARGET LDL LESS THAN 100: ICD-10-CM

## 2017-12-04 DIAGNOSIS — M54.12 CERVICAL RADICULAR PAIN: ICD-10-CM

## 2017-12-04 DIAGNOSIS — E55.9 VITAMIN D DEFICIENCY: ICD-10-CM

## 2017-12-04 DIAGNOSIS — M48.061 SPINAL STENOSIS OF LUMBAR REGION WITH RADICULOPATHY: Chronic | ICD-10-CM

## 2017-12-04 PROCEDURE — 72141 MRI NECK SPINE W/O DYE: CPT

## 2017-12-04 RX ORDER — NAPROXEN 500 MG/1
TABLET ORAL
Qty: 90 TAB | Refills: 2 | Status: SHIPPED | OUTPATIENT
Start: 2017-12-04 | End: 2018-03-01

## 2017-12-04 RX ORDER — MONTELUKAST SODIUM 10 MG/1
10 TABLET ORAL DAILY
Qty: 30 TAB | Refills: 6 | Status: SHIPPED | OUTPATIENT
Start: 2017-12-04 | End: 2018-06-04 | Stop reason: SDUPTHER

## 2017-12-04 NOTE — PATIENT INSTRUCTIONS
Hyperlipidemia: After Your Visit  Your Care Instructions  Hyperlipidemia is too much fat in your blood. The body has several kinds of fat, including cholesterol and triglycerides. Your body needs fat for many things, such as making new cells. But too much fat in your blood increases your chances of having a heart attack or stroke. You may be able to lower your cholesterol and triglycerides with a heart-healthy diet, exercise, and if needed, medicine. Your doctor may want you to try lifestyle changes first to see whether they lower the fat in your blood. You may need to take medicine if lifestyle changes do not lower the fat in your blood enough. Follow-up care is a key part of your treatment and safety. Be sure to make and go to all appointments, and call your doctor if you are having problems. Its also a good idea to know your test results and keep a list of the medicines you take. How can you care for yourself at home? Take your medicines  · Take your medicines exactly as prescribed. Call your doctor if you think you are having a problem with your medicine. · If you take medicine to lower your cholesterol, go to follow-up visits. You will need to have blood tests. · Do not take large doses of niacin, which is a B vitamin, while taking medicine called statins. It may increase the chance of muscle pain and liver problems. · Talk to your doctor about avoiding grapefruit juice if you are taking statins. Grapefruit juice can raise the level of this medicine in your blood. This could increase side effects. Eat more fruits, vegetables, and fiber  · Fruits and vegetables have lots of nutrients that help protect against heart disease, and they have little--if any--fat. Try to eat at least five servings a day. Dark green, deep orange, or yellow fruits and vegetables are healthy choices. · Keep carrots, celery, and other veggies handy for snacks.  Buy fruit that is in season and store it where you can see it so that you will be tempted to eat it. Cook dishes that have a lot of veggies in them, such as stir-fries and soups. · Foods high in fiber may reduce your cholesterol and provide important vitamins and minerals. High-fiber foods include whole-grain cereals and breads, oatmeal, beans, brown rice, citrus fruits, and apples. · Buy whole-grain breads and cereals instead of white bread and pastries. Limit saturated fat  · Read food labels and try to avoid saturated fat and trans fat. They increase your risk of heart disease. · Use olive or canola oil when you cook. Try cholesterol-lowering spreads, such as Benecol or Take Control. · Bake, broil, grill, or steam foods instead of frying them. · Limit the amount of high-fat meats you eat, including hot dogs and sausages. Cut out all visible fat when you prepare meat. · Eat fish, skinless poultry, and soy products such as tofu instead of high-fat meats. Soybeans may be especially good for your heart. Eat at least two servings of fish a week. Certain fish, such as salmon, contain omega-3 fatty acids, which may help reduce your risk of heart attack. · Choose low-fat or fat-free milk and dairy products. Get exercise, limit alcohol, and quit smoking  · Get more exercise. Work with your doctor to set up an exercise program. Even if you can do only a small amount, exercise will help you get stronger, have more energy, and manage your weight and your stress. Walking is an easy way to get exercise. Gradually increase the amount you walk every day. Aim for at least 30 minutes on most days of the week. You also may want to swim, bike, or do other activities. · Limit alcohol to no more than 2 drinks a day for men and 1 drink a day for women. · Do not smoke. If you need help quitting, talk to your doctor about stop-smoking programs and medicines. These can increase your chances of quitting for good. When should you call for help?   Call 911 anytime you think you may need emergency care. For example, call if:  · You have symptoms of a heart attack. These may include:  ¨ Chest pain or pressure, or a strange feeling in the chest.  ¨ Sweating. ¨ Shortness of breath. ¨ Nausea or vomiting. ¨ Pain, pressure, or a strange feeling in the back, neck, jaw, or upper belly or in one or both shoulders or arms. ¨ Lightheadedness or sudden weakness. ¨ A fast or irregular heartbeat. After you call 911, the  may tell you to chew 1 adult-strength or 2 to 4 low-dose aspirin. Wait for an ambulance. Do not try to drive yourself. · You have signs of a stroke. These may include:  ¨ Sudden numbness, paralysis, or weakness in your face, arm, or leg, especially on only one side of your body. ¨ New problems with walking or balance. ¨ Sudden vision changes. ¨ Drooling or slurred speech. ¨ New problems speaking or understanding simple statements, or feeling confused. ¨ A sudden, severe headache that is different from past headaches. · You passed out (lost consciousness). Call your doctor now or seek immediate medical care if:  · You have muscle pain or weakness. Watch closely for changes in your health, and be sure to contact your doctor if:  · You are very tired. · You have an upset stomach, gas, constipation, or belly pain or cramps. Where can you learn more? Go to [employer].be  Enter C406 in the search box to learn more about \"Hyperlipidemia: After Your Visit. \"   © 6189-4542 Healthwise, Incorporated. Care instructions adapted under license by [facility] (which disclaims liability or warranty for this information). This care instruction is for use with your licensed healthcare professional. If you have questions about a medical condition or this instruction, always ask your healthcare professional. [place] [address] any warranty or liability for your use of this information.   Content Version: 9.7.298280; Last Revised: October 13, 2011

## 2017-12-04 NOTE — PROGRESS NOTES
12/04/17    PCP: Bronson Polanco NP    Chief Complaint   Patient presents with    Follow Up Chronic Condition    Immunization/Injection     Recieved Flu shot in October at [facility]  Luz Elena Romeo  is a 57 y.o. female whom presents for Follow Up Chronic Condition and Immunization/Injection (Recieved Flu shot in October at [address])      HPI  Cardiovascular Review:  The patient has hypertension and hyperlipidemia. Diet and Lifestyle: not attempting to follow a low fat, low cholesterol diet  Home BP Monitoring: is not measured at home. Pertinent ROS: not taking medications regularly as instructed, no medication side effects noted, no TIA's, no chest pain on exertion, no dyspnea on exertion, no swelling of ankles. Current Outpatient Prescriptions   Medication Sig Dispense Refill    montelukast (SINGULAIR) 10 mg tablet Take 1 Tab by mouth daily. Indications: Allergic Rhinitis 30 Tab 6    ezetimibe (ZETIA) 10 mg tablet Take 1 Tab by mouth daily. 90 Tab 3    olmesartan-hydroCHLOROthiazide (BENICAR HCT) 20-12.5 mg per tablet Take 1 Tab by mouth daily. 90 Tab 3    mometasone (NASONEX) 50 mcg/actuation nasal spray 2 Sprays by Both Nostrils route daily. 3 Container 3    magnesium oxide (MAG-OX) 400 mg tablet Take 1 Tab by mouth daily. 30 Tab 11    fluticasone-salmeterol (ADVAIR) 250-50 mcg/dose diskus inhaler Take 1 Puff by inhalation every twelve (12) hours. 3 Inhaler 3    albuterol (PROVENTIL HFA) 90 mcg/actuation inhaler Take 2 Puffs by inhalation every six (6) hours as needed. 1 Inhaler 3    aspirin delayed-release 81 mg tablet Take 81 mg by mouth daily.  Cholecalciferol, Vitamin D3, (VITAMIN D3) 2,000 unit cap capsule Take 2,000 Units by mouth daily.  30 Cap 11    naproxen (NAPROSYN) 500 mg tablet TAKE ONE TABLET BY MOUTH TWICE DAILY WITH MEALS FOR PAIN 90 Tab 2    topiramate (TOPAMAX) 25 mg tablet Take 1 tab po QHS for one week, then increase to 2 tabs po QHS 60 Tab 0 Allergies   Allergen Reactions    Lyrica [Pregabalin] Other (comments)     Medication causes dry eyes, unstable balance, and lack of concentration.      Mold Extracts Runny Nose    Statins-Hmg-Coa Reductase Inhibitors Myalgia     Past Medical History:   Diagnosis Date    Asthma 2012    +PFT for mild obstructive disease/COPD    Back pain, chronic 2012    Dr. Jerome Kaufman referred to Dr. Arlet Schmidt    Chronic obstructive pulmonary disease ([facility] [address]     H/O mammogram 10/03/2017    No evidence of malignancy    Hard of hearing     chronic ruptured TM    HTN (hypertension) 2012    Hx of screening mammography 2016    [facility], normal, routine recs    Hyperlipidemia     Sinus congestion 2012    Spinal stenosis of lumbar region with radiculopathy 2015    Dr. Carlton Zuñiga     Past Surgical History:   Procedure Laterality Date    HX  SECTION      HX TUBAL LIGATION       Family History   Problem Relation Age of Onset    Heart Disease Mother     Hypertension Mother     Cancer Mother     Coronary Artery Disease Mother 67    Heart Surgery Mother 68    Heart Disease Father     Hypertension Father     Stroke Father     Heart Surgery Father 46    Lung Disease Father     Heart Disease Brother     Heart Disease Brother      Social History   Substance Use Topics    Smoking status: Current Every Day Smoker     Packs/day: 1.00     Types: Cigarettes    Smokeless tobacco: Never Used    Alcohol use No      Comment: h/o alcohol abuse in remission      Lab Results  Component Value Date/Time   WBC 7.2 2017 08:50 AM   HGB 14.7 2017 08:50 AM   HCT 43.0 2017 08:50 AM   PLATELET 054  08:50 AM   MCV 91.1 2017 08:50 AM     Lab Results  Component Value Date/Time   Hemoglobin A1c 5.9 2015 09:31 AM   Hemoglobin A1c 5.8 2014 08:30 AM   Glucose 80 2017 08:50 AM   Microalbumin/Creat ratio (mg/g creat) 39 2014 08:30 AM   Microalbumin,urine random 1.40 11/05/2014 08:30 AM   LDL, calculated 143.4 11/27/2017 08:50 AM   Creatinine 0.84 11/27/2017 08:50 AM      Lab Results  Component Value Date/Time   Cholesterol, total 247 11/27/2017 08:50 AM   HDL Cholesterol 83 11/27/2017 08:50 AM   LDL, calculated 143.4 11/27/2017 08:50 AM   Triglyceride 103 11/27/2017 08:50 AM   CHOL/HDL Ratio 3.0 11/27/2017 08:50 AM     Lab Results   Component Value Date/Time    Sodium 138 11/27/2017 08:50 AM    Potassium 4.3 11/27/2017 08:50 AM    Chloride 103 11/27/2017 08:50 AM    CO2 27 11/27/2017 08:50 AM    Anion gap 8 11/27/2017 08:50 AM    Glucose 80 11/27/2017 08:50 AM    BUN 17 11/27/2017 08:50 AM    Creatinine 0.84 11/27/2017 08:50 AM    BUN/Creatinine ratio 20 11/27/2017 08:50 AM    GFR est AA >60 11/27/2017 08:50 AM    GFR est non-AA >60 11/27/2017 08:50 AM    Calcium 11.2 11/27/2017 08:50 AM    Bilirubin, total 0.5 11/27/2017 08:50 AM    ALT (SGPT) 21 11/27/2017 08:50 AM    AST (SGOT) 19 11/27/2017 08:50 AM    Alk. phosphatase 74 11/27/2017 08:50 AM    Protein, total 7.4 11/27/2017 08:50 AM    Albumin 4.2 11/27/2017 08:50 AM    Globulin 3.2 11/27/2017 08:50 AM    A-G Ratio 1.3 11/27/2017 08:50 AM               Visit Vitals    /62 (BP 1 Location: Left arm, BP Patient Position: Sitting)    Pulse 100    Temp 98.3 °F (36.8 °C) (Oral)    Resp 16    Ht 5' 1\" (1.549 m)    Wt 130 lb (59 kg)    SpO2 99%    BMI 24.56 kg/m2       Pain Scale: 3/10    Pain Location: Generalized    Review of Systems   Constitutional: Negative. HENT: Negative. Eyes: Negative. Respiratory: Negative. Cardiovascular: Negative. Gastrointestinal: Negative. Genitourinary: Negative. Musculoskeletal: Negative. Skin: Negative. Neurological: Negative. Endo/Heme/Allergies: Positive for environmental allergies. Psychiatric/Behavioral: Negative.            Physical Exam   Constitutional: She is oriented to person, place, and time and well-developed, well-nourished, and in no distress. HENT:   Head: Normocephalic. Eyes: Pupils are equal, round, and reactive to light. Neck: Normal range of motion. Neck supple. Cardiovascular: Normal rate, regular rhythm and normal heart sounds. Pulmonary/Chest: Effort normal and breath sounds normal.   Abdominal: Soft. Bowel sounds are normal.   Neurological: She is alert and oriented to person, place, and time. Skin: Skin is warm and dry. Psychiatric: Mood and affect normal.   Vitals reviewed. Radiology reports:     ASSESSMENT and PLAN    ICD-10-CM ICD-9-CM    1. COPD, mild ([address] J44.9 496    2. Essential hypertension I10 401.9    3. Vitamin D deficiency E55.9 268.9    4. Hyperlipidemia with target LDL less than 100 E78.5 272.4      Orders Placed This Encounter    montelukast (SINGULAIR) 10 mg tablet     Diagnoses and all orders for this visit:    1. COPD, mild ([address]    2. Essential hypertension    3. Vitamin D deficiency    4. Hyperlipidemia with target LDL less than 100  - Pt has not been taking Zetia daily. Does not wish to start niacin or welchol reports she will be taking medications daily  -Reviewed current value and goal related to prevention level, discussed dietary changes such as low saturated fats and low simple carbohydrates and choosing lean proteins such grilled/broiled/baked fish, chicken or turkey. Medications reviewed with the correct way to take them and side effects that should be reported such as muscle aches, pain, rash or shortness of breath. Other orders  -     montelukast (SINGULAIR) 10 mg tablet; Take 1 Tab by mouth daily. Indications: Allergic Rhinitis      Follow-up Disposition:  Return in about 6 months (around 6/4/2018), or if symptoms worsen or fail to improve. reviewed diet, exercise and weight control  cardiovascular risk and specific lipid/LDL goals reviewed  reviewed medications and side effects in detail      There are no discontinued medications.     Written instructions followed our verbal discussion of all information discussed above, pending tests ordered and future goals/plans. Patient expressed understanding of current diagnosis, planned testing, follow up and if needed to contact the office for any questions or concerns prior to the next visit.     Call [facility] for Financial Assistance

## 2018-01-08 ENCOUNTER — OFFICE VISIT (OUTPATIENT)
Dept: ORTHOPEDIC SURGERY | Age: 63
End: 2018-01-08

## 2018-01-08 VITALS
RESPIRATION RATE: 17 BRPM | TEMPERATURE: 97.6 F | SYSTOLIC BLOOD PRESSURE: 126 MMHG | HEART RATE: 83 BPM | HEIGHT: 61 IN | WEIGHT: 132 LBS | DIASTOLIC BLOOD PRESSURE: 71 MMHG | BODY MASS INDEX: 24.92 KG/M2 | OXYGEN SATURATION: 97 %

## 2018-01-08 DIAGNOSIS — M48.02 CERVICAL SPINAL STENOSIS: Primary | ICD-10-CM

## 2018-01-08 DIAGNOSIS — M48.8X2 OSSIFICATION OF POSTERIOR LONGITUDINAL LIGAMENT IN CERVICAL REGION (HCC): ICD-10-CM

## 2018-01-08 RX ORDER — TRAMADOL HYDROCHLORIDE 50 MG/1
50 TABLET ORAL
Qty: 28 TAB | Refills: 0 | Status: SHIPPED | OUTPATIENT
Start: 2018-01-08 | End: 2018-03-01

## 2018-01-08 NOTE — PROGRESS NOTES
[address]  [address], [address]  [place]  Phone: [phone]  Fax: [phone]        Aaliyah Zepeda  : 1955  PCP: Colleen Noe NP    PROGRESS NOTE      ASSESSMENT AND PLAN    Diagnoses and all orders for this visit:    1. Cervical spinal stenosis  -     traMADol (ULTRAM) 50 mg tablet; Take 1 Tab by mouth every six (6) hours as needed for Pain (severe pain). Max Daily Amount: 200 mg.    2. Ossification of posterior longitudinal ligament in cervical region (HCC)  -     traMADol (ULTRAM) 50 mg tablet; Take 1 Tab by mouth every six (6) hours as needed for Pain (severe pain). Max Daily Amount: 200 mg.    1. Advised to continue HEP. 2. One week supply of Tramadol. 3. Referral to Dr. Darin Meza for surgical eval for progressive symptoms of myelopathy. 4. Given information on cervical fusion. Follow-up Disposition:  Return for with Dr. Darin Meza for surgical eval.      HISTORY OF PRESENT ILLNESS  Martha Ojeda is a 57 y.o. female. Pt presents to the office for a f/u visit for neck pain. Last visit pt was sent to have a cervical spine MRI. Images reviewed with the pt. She was given a trial of Topamax last OV. She states that she was unable to tolerate Topamax due to dizziness. Pt states that she had a similar side effect from Lyrica. She is unsure if she has tried Gabapentin. Pt continues to have neck pain. Pt reports pain does radiate into UE with paresthesias. Pt admits to weakness in in her UE as well. She has been unable to do her daily ADLs due to UE symptoms. She has started to notice loss of dexterity. She states that she will sleep with her arms above her head and will have paresthesias that she is able to shake out. She has been dropping objects in her hands more often. Pt states that she will have a cold sensation in her RT hand at times. She has been having some balance issues as well.    Pt states she has used Tramadol with relief. Denies persistent fevers, chills, weight changes, neurogenic bowel or bladder symptoms. Pt denies recent ED visits or hospitalizations. Pt lives alone and works as a . Will be going on Medicare.  reviewed. Last Rx for Norco 11/2016. Pain Assessment  1/8/2018   Location of Pain Neck   Pain Location Comment -   Location Modifiers -   Severity of Pain 8   Quality of Pain Aching   Quality of Pain Comment -   Duration of Pain -   Frequency of Pain Constant   Aggravating Factors (No Data)   Aggravating Factors Comment awkward positioning   Limiting Behavior -   Relieving Factors Elevation   Relieving Factors Comment Repositioning   Result of Injury -           MRI Results (most recent):    Results from Hospital Encounter encounter on 12/04/17   MRI CERV SPINE WO CONT   Narrative PROCEDURE: MRI of the cervical spine without contrast.    CPT CODE: 16659    INDICATIONS:  Cervical radicular pain. Patient reports upper extremity  paresthesias. COMPARISONS: No prior cervical spine MRI, CT, or radiographs. TECHNIQUE: T1 weighted, T2 FSE, FSE inversion recovery sagittal images are  supplemented by T2 weighted axial images. FINDINGS:    Sagittal images reveal normal vertebral body morphology. No fractures noted. Trace retrolisthesis of C4 on C5, C5 on C6, and C6 on C7. Straightening of the  cervical spine. Loss of disc height in the mid to lower cervical spine, C4/5 through C6/7 disc  levels. Some associated mild degenerative endplate changes mostly about the C4/5 and  C5/6 disc levels. Minimal marrow edema in the anterior inferior endplate of C3  and anterior inferior endplate of C4. Likely degenerative in origin. Visualized prevertebral soft tissues are unremarkable. Atlanto-dens interval normal.    No Chiari I malformation. Significant central spinal canal narrowing at the C5/C6 disc level.  There is  some subtle increased T2 signal suggested in the central cord at this level on  sagittal STIR series 5 image 7. Otherwise normal signal in the cervical spinal  cord. Correlation of axial and sagittal images reveals the following: At C2-C3: Tiny T2 hyperintense annular tear at the posterior disc margin but no  central canal stenosis. Left facet DJD with encroachment on the left foramen,  mild stenosis. At C3-C4: Posterior central disc protrusion. Indentation of the ventral CSF  space. AP diameter of the central canal is 8 mm, mild stenosis. Bilateral  uncovertebral spurring with moderate left worse than right foraminal narrowing. Beginning at the lower L3 level and extending to the mid C7 level there is  prominence of the posterior longitudinal ligament. May be thickening or  ossification of the ligament. At the mid L4 level the ligament thickening causes  mild/minimal central canal narrowing separate from the disc space. At C4-C5: Trace retrolisthesis. Posterior disc and osteophyte complex with  probable posterior central disc component. Indentation of the CSF space and  flattening of the ventral cord (axial T2 image 82) moderate central canal  stenosis with 6-7 mm residual AP diameter. Severe bilateral foraminal stenosis. Mid C5: Thickening of the posterior longitudinal ligament causes mild central  canal narrowing at the mid C5 level. At C5-C6: Posterior central disc protrusion. T2 hypointense signal in the  ventral epidural space above and below the disc level that may be posterior  longitudinal ligament thickening and/or disc extrusion. At the disc level  compression of the cervical cord with residual AP diameter of the central canal  5 mm. Severe bilateral foraminal stenosis (axial T2 image 61). C6: Thickening of the posterior longitudinal ligament with mild central canal  narrowing. At C6-C7: Posterior disc protrusion or disc and osteophyte complex with  flattening of the ventral cord. AP diameter of the central canal is 7 mm,  moderate stenosis.  Severe left and mild-to-moderate right foraminal stenosis  (axial T2 image 43). At C7-T1: Posterior disc protrusion, indentation of the ventral CSF space, mild  central canal narrowing. Mild left foraminal narrowing. At T1-T2: Posterior central disc protrusion causes mild indentation of the  central canal, AP diameter of the residual canal is adequate at 9 mm. Impression IMPRESSION:    Abnormal thickening of the posterior longitudinal ligament between the disc  levels that could be ossification of the posterior longitudinal ligament. Thickening at the mid C4, mid C5, and mid C6 levels result in mild central canal  narrowing between the disc spaces. Superimposed multilevel degenerative disc disease.  -Most pronounced at C5/C6 with severe central spinal canal stenosis, severe  bilateral foraminal stenosis. Abnormal increased T2 signal in the central cord  at this level is suspected mild myelomalacia secondary to chronic central  stenosis. -Additional moderate central canal stenosis at C4/5 and C6/7. Severe bilateral  foraminal stenosis C4/5, severe left foraminal stenosis C6/C7.  -Other degenerative changes as described in the body of the report. PAST MEDICAL HISTORY   Past Medical History:   Diagnosis Date    Asthma 2012    +PFT for mild obstructive disease/COPD    Back pain, chronic 2012    Dr. Jaqui Tan referred to Dr. Doan Smoke    [facility]     H/O mammogram 10/03/2017    No evidence of malignancy    Hard of hearing     chronic ruptured TM    HTN (hypertension) 2012    Hx of screening mammography 2016    [facility], normal, routine recs    Hyperlipidemia     Sinus congestion 2012    Spinal stenosis of lumbar region with radiculopathy 2015    Dr. Savage Brito       Past Surgical History:   Procedure Laterality Date    HX  SECTION      HX TUBAL LIGATION     .       MEDICATIONS    Current Outpatient Prescriptions Medication Sig Dispense Refill    naproxen (NAPROSYN) 500 mg tablet TAKE ONE TABLET BY MOUTH TWICE DAILY WITH MEALS FOR PAIN 90 Tab 2    montelukast (SINGULAIR) 10 mg tablet Take 1 Tab by mouth daily. Indications: Allergic Rhinitis 30 Tab 6    ezetimibe (ZETIA) 10 mg tablet Take 1 Tab by mouth daily. 90 Tab 3    topiramate (TOPAMAX) 25 mg tablet Take 1 tab po QHS for one week, then increase to 2 tabs po QHS 60 Tab 0    olmesartan-hydroCHLOROthiazide (BENICAR HCT) 20-12.5 mg per tablet Take 1 Tab by mouth daily. 90 Tab 3    mometasone (NASONEX) 50 mcg/actuation nasal spray 2 Sprays by Both Nostrils route daily. 3 Container 3    magnesium oxide (MAG-OX) 400 mg tablet Take 1 Tab by mouth daily. 30 Tab 11    fluticasone-salmeterol (ADVAIR) 250-50 mcg/dose diskus inhaler Take 1 Puff by inhalation every twelve (12) hours. 3 Inhaler 3    albuterol (PROVENTIL HFA) 90 mcg/actuation inhaler Take 2 Puffs by inhalation every six (6) hours as needed. 1 Inhaler 3    aspirin delayed-release 81 mg tablet Take 81 mg by mouth daily.  Cholecalciferol, Vitamin D3, (VITAMIN D3) 2,000 unit cap capsule Take 2,000 Units by mouth daily. 30 Cap 11        ALLERGIES  Allergies   Allergen Reactions    Lyrica [Pregabalin] Other (comments)     Medication causes dry eyes, unstable balance, and lack of concentration.  Mold Extracts Runny Nose    Statins-Hmg-Coa Reductase Inhibitors Myalgia          SOCIAL HISTORY    Social History     Social History    Marital status: SINGLE     Spouse name: N/A    Number of children: N/A    Years of education: N/A     Occupational History    Not on file.      Social History Main Topics    Smoking status: Current Every Day Smoker     Packs/day: 1.00     Types: Cigarettes    Smokeless tobacco: Never Used    Alcohol use No      Comment: h/o alcohol abuse in remission    Drug use: No    Sexual activity: Not on file     Other Topics Concern     Service No    Blood Transfusions No    Caffeine Concern Yes    Occupational Exposure No    Hobby Hazards No    Sleep Concern No    Stress Concern No    Weight Concern No    Special Diet No    Back Care No    Exercise Yes     walking    Bike Helmet No    Seat Belt Yes    Self-Exams No     Social History Narrative       FAMILY HISTORY  Family History   Problem Relation Age of Onset    Heart Disease Mother     Hypertension Mother     Cancer Mother     Coronary Artery Disease Mother 67    Heart Surgery Mother 68    Heart Disease Father     Hypertension Father     Stroke Father     Heart Surgery Father 46    Lung Disease Father     Heart Disease Brother     Heart Disease Brother        REVIEW OF SYSTEMS  Review of Systems   Constitutional: Negative for chills, fever and weight loss. Respiratory: Negative for shortness of breath. Cardiovascular: Negative for chest pain. Gastrointestinal: Negative for constipation. Negative for fecal incontinence   Genitourinary: Negative for dysuria. Negative for urinary incontinence   Musculoskeletal:        Per HPI   Skin: Negative for rash. Neurological: Positive for tingling and focal weakness. Negative for dizziness, tremors and headaches. Endo/Heme/Allergies: Does not bruise/bleed easily. Psychiatric/Behavioral: The patient does not have insomnia. PHYSICAL EXAMINATION  Visit Vitals    /71    Pulse 83    Temp 97.6 °F (36.4 °C) (Oral)    Resp 17    Ht 5' 1\" (1.549 m)    Wt 132 lb (59.9 kg)    SpO2 97%    BMI 24.94 kg/m2         Accompanied by self. Constitutional:  Well developed, well nourished, in no acute distress. Psychiatric: Affect and mood are appropriate. Integumentary: No rashes or abrasions noted on exposed areas. Cardiovascular/Peripheral Vascular: Intact l pulses. No peripheral edema is noted. Lymphatic:  No evidence of lymphedema. No cervical lymphadenopathy.      SPINE/MUSCULOSKELETAL EXAM    Cervical spine:  Neck is midline. Normal muscle tone. No focal atrophy is noted. Shoulder ROM intact. Negative Spurling's sign. Negative Tinel's sign. Negative Hernandez's sign. Sensation grossly intact to light touch. MOTOR:      Biceps  Triceps Deltoids Wrist Ext Wrist Flex Hand Intrin   Right +4/5 +4/5 +4/5 +4/5 +4/5 4/5   Left +4/5 +4/5 +4/5 +4/5 +4/5 4/5         DTRs are 3+ RUE  DTRs are 2+ LUE  DTRs are 3+ patella, and Achilles. Mild difficulty with tandem gait. Ambulation without assistive device. FWB. Written by Bhupinder Voss, as dictated by Trish Archer MD.    I, Dr. Trish Archer MD, confirm that all documentation is accurate. Ms. Alexis Greene may have a reminder for a \"due or due soon\" health maintenance. I have asked that she contact her primary care provider for follow-up on this health maintenance.

## 2018-01-08 NOTE — PATIENT INSTRUCTIONS
Cervical Spinal Fusion: What to Expect at Home  Your Recovery    After surgery, you can expect your neck to feel stiff and sore. This should improve in the weeks after surgery. You may have trouble sitting or standing in one position for very long and may need pain medicine in the weeks after your surgery. You may need to wear a neck brace for a while. It may take 4 to 6 weeks to get back to your usual activities, but it may depend on what kind of surgery you had. Your doctor may advise you to work with a physical therapist to strengthen the muscles around your neck and back. This care sheet gives you a general idea about how long it will take for you to recover. But each person recovers at a different pace. Follow the steps below to get better as quickly as possible. How can you care for yourself at home? Activity  ? · Rest when you feel tired. Getting enough sleep will help you recover. ? · Try to walk each day. Start by walking a little more than you did the day before. Bit by bit, increase the amount you walk. Walking boosts blood flow and helps prevent pneumonia and constipation. Walking may also decrease your muscle soreness after surgery. ? · Follow your doctor's directions about not lifting anything that would strain your neck and back. This may include a child, heavy grocery bags and milk containers, a heavy briefcase or backpack, cat litter or dog food bags, or a vacuum . ? · Avoid strenuous activities, such as bicycle riding, jogging, weightlifting, or aerobic exercise, until your doctor says it is okay. ? · Do not drive for 2 to 4 weeks after your surgery or until your doctor says it is okay. ? · Avoid taking long car trips for 2 to 4 weeks after surgery. Your neck may become tired and painful from sitting too long in one position. ? · You will probably need to take 4 to 6 weeks off from work. It depends on the type of work you do and how you feel.    ? · You may have sex as soon as you feel able, but avoid positions that put stress on your neck or cause pain. Diet  ? · You can eat your normal diet. If your stomach is upset, try bland, low-fat foods like plain rice, broiled chicken, toast, and yogurt. ? · Drink plenty of fluids. If you have kidney, heart, or liver disease and have to limit fluids, talk with your doctor before you increase the amount of fluids you drink. ? · You may notice that your bowel movements are not regular right after your surgery. This is common. Try to avoid constipation and straining with bowel movements. You may want to take a fiber supplement every day. If you have not had a bowel movement after a couple of days, ask your doctor about taking a mild laxative. Medicines  ? · Your doctor will tell you if and when you can restart your medicines. He or she will also give you instructions about taking any new medicines. ? · If you take blood thinners, such as warfarin (Coumadin), clopidogrel (Plavix), or aspirin, be sure to talk to your doctor. He or she will tell you if and when to start taking those medicines again. Make sure that you understand exactly what your doctor wants you to do. ? · Be safe with medicines. Take pain medicines exactly as directed. ¨ If the doctor gave you a prescription medicine for pain, take it as prescribed. ¨ If you are not taking a prescription pain medicine, ask your doctor if you can take an over-the-counter medicine. ? · If your doctor prescribed antibiotics, take them as directed. Do not stop taking them just because you feel better. You need to take the full course of antibiotics. ? · If you think your pain pill is making you sick to your stomach:  ¨ Take your pills after meals (unless your doctor has told you not to). ¨ Ask your doctor for a different pain pill. Incision care  ? · You will be given specific instructions about how to care for the cut (incision) the doctor made.  The instructions will depend on the type of materials used to close the cut. Exercise  ? · Do exercises as instructed by your doctor or physical therapist to improve your strength and flexibility. Other instructions  ? · To reduce stiffness and help sore muscles, use a warm water bottle, a heating pad set on low, or a warm cloth on your neck. Do not put heat right over the incision. Do not go to sleep with a heating pad on your skin. Follow-up care is a key part of your treatment and safety. Be sure to make and go to all appointments, and call your doctor if you are having problems. It's also a good idea to know your test results and keep a list of the medicines you take. When should you call for help? Call 911 anytime you think you may need emergency care. For example, call if:  ? · You passed out (lost consciousness). ? · You have chest pain, are short of breath, or cough up blood. ? · You are unable to move an arm or a leg at all. ?Call your doctor now or seek immediate medical care if:  ? · You have pain that does not get better after you take pain medicine. ? · You have loose stitches, or your incision comes open. ? · Bright red blood has soaked through the bandage over your incision. ? · You have signs of a blood clot in your leg (called a deep vein thrombosis), such as:  ¨ Pain in your calf, back of the knee, thigh, or groin. ¨ Redness or swelling in your leg. ? · You have signs of infection, such as:  ¨ Increased pain, swelling, warmth, or redness. ¨ Red streaks leading from the incision. ¨ Pus draining from the incision. ¨ A fever. ? · You have new or worse symptoms in your arms, legs, chest, belly, or buttocks. Symptoms may include:  ¨ Numbness or tingling. ¨ Weakness. ¨ Pain. ? · You lose bladder or bowel control. ? Watch closely for any changes in your health, and be sure to contact your doctor if:  ? · You do not get better as expected. Where can you learn more?   Go to http://giselle-erika.info/. Enter E994 in the search box to learn more about \"Cervical Spinal Fusion: What to Expect at Home. \"  Current as of: March 21, 2017  Content Version: 11.4  © 6254-3295 [employer], [employer]. Care instructions adapted under license by [employer] (which disclaims liability or warranty for this information). If you have questions about a medical condition or this instruction, always ask your healthcare professional. [address] any warranty or liability for your use of this information.

## 2018-01-08 NOTE — MR AVS SNAPSHOT
Visit Information Date & Time Provider Department Dept. Phone Encounter #  
 1/8/2018  1:15 PM Andrew Cooper  [address], [address] [facility] [phone] [id number] Follow-up Instructions Return for with Dr. Samaria Nation for surgical eval.  
  
Your Appointments 6/4/2018  1:00 PM  
Follow Up with Naman Duran NP [address] [facility] ([address]) Appt Note: 6 mth follow up  
 [address] [facility] [address]  
[address] [facility] [address] Upcoming Health Maintenance Date Due DTaP/Tdap/Td series (1 - Tdap) 9/8/1974 FOBT Q 1 YEAR AGE 50-75 9/8/2003 ZOSTER VACCINE AGE 60> 7/8/2013 PAP AKA CERVICAL CYTOLOGY 9/2/2017 Pneumococcal 19-64 Medium Risk (1 of 1 - PPSV23) 3/6/2018* BREAST CANCER SCRN MAMMOGRAM 10/4/2019 *Topic was postponed. The date shown is not the original due date. Allergies as of 1/8/2018  Review Complete On: 1/8/2018 By: Angel Rocha LPN Severity Noted Reaction Type Reactions Lyrica [Pregabalin]  10/04/2017    Other (comments) Medication causes dry eyes, unstable balance, and lack of concentration. Mold Extracts  11/11/2014   Systemic Runny Nose Statins-hmg-coa Reductase Inhibitors  06/05/2017   Intolerance Myalgia Current Immunizations  Reviewed on 10/29/2015 Name Date Influenza Vaccine 10/1/2017, 10/6/2016 Influenza Vaccine Conor Upland) 10/29/2015 Influenza Vaccine (Quad) PF 10/31/2013 Influenza Vaccine (Trivalent) 11/11/2014  3:00 PM  
  
 Not reviewed this visit You Were Diagnosed With   
  
 Codes Comments Cervical spinal stenosis    -  Primary ICD-10-CM: M48.02 
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 [phone] Your Updated Medication List  
  
   
This list is accurate as of: 1/8/18  1:35 PM.  Always use your most recent med list.  
  
  
  
  
 albuterol 90 mcg/actuation inhaler Commonly known as:  PROVENTIL HFA Take 2 Puffs by inhalation every six (6) hours as needed. aspirin delayed-release 81 mg tablet Take 81 mg by mouth daily. Cholecalciferol (Vitamin D3) 2,000 unit Cap capsule Commonly known as:  VITAMIN D3 Take 2,000 Units by mouth daily. ezetimibe 10 mg tablet Commonly known as:  Kimberly Getting Take 1 Tab by mouth daily. fluticasone-salmeterol 250-50 mcg/dose diskus inhaler Commonly known as:  ADVAIR Take 1 Puff by inhalation every twelve (12) hours. magnesium oxide 400 mg tablet Commonly known as:  MAG-OX Take 1 Tab by mouth daily. mometasone 50 mcg/actuation nasal spray Commonly known as:  NASONEX  
2 Sprays by Both Nostrils route daily. montelukast 10 mg tablet Commonly known as:  SINGULAIR Take 1 Tab by mouth daily. Indications: Allergic Rhinitis  
  
 naproxen 500 mg tablet Commonly known as:  NAPROSYN  
TAKE ONE TABLET BY MOUTH TWICE DAILY WITH MEALS FOR PAIN  
  
 olmesartan-hydroCHLOROthiazide 20-12.5 mg per tablet Commonly known as:  BENICAR HCT Take 1 Tab by mouth daily. topiramate 25 mg tablet Commonly known as:  TOPAMAX Take 1 tab po QHS for one week, then increase to 2 tabs po QHS  
  
 traMADol 50 mg tablet Commonly known as:  ULTRAM  
Take 1 Tab by mouth every six (6) hours as needed for Pain (severe pain). Max Daily Amount: 200 mg. Prescriptions Printed Refills  
 traMADol (ULTRAM) 50 mg tablet 0 Sig: Take 1 Tab by mouth every six (6) hours as needed for Pain (severe pain). Max Daily Amount: 200 mg. Class: Print Route: Oral  
  
Follow-up Instructions Return for with Dr. Rogerio Zabala for surgical eval.  
  
  
Patient Instructions Cervical Spinal Fusion: What to Expect at [facility] Your Recovery After surgery, you can expect your neck to feel stiff and sore. This should improve in the weeks after surgery. You may have trouble sitting or standing in one position for very long and may need pain medicine in the weeks after your surgery. You may need to wear a neck brace for a while. It may take 4 to 6 weeks to get back to your usual activities, but it may depend on what kind of surgery you had. Your doctor may advise you to work with a physical therapist to strengthen the muscles around your neck and back. This care sheet gives you a general idea about how long it will take for you to recover. But each person recovers at a different pace. Follow the steps below to get better as quickly as possible. How can you care for yourself at home? Activity ? · Rest when you feel tired. Getting enough sleep will help you recover. ? · Try to walk each day. Start by walking a little more than you did the day before. Bit by bit, increase the amount you walk. Walking boosts blood flow and helps prevent pneumonia and constipation. Walking may also decrease your muscle soreness after surgery. ? · Follow your doctor's directions about not lifting anything that would strain your neck and back. This may include a child, heavy grocery bags and milk containers, a heavy briefcase or backpack, cat litter or dog food bags, or a vacuum . ? · Avoid strenuous activities, such as bicycle riding, jogging, weightlifting, or aerobic exercise, until your doctor says it is okay. ? · Do not drive for 2 to 4 weeks after your surgery or until your doctor says it is okay. ? · Avoid taking long car trips for 2 to 4 weeks after surgery. Your neck may become tired and painful from sitting too long in one position. ? · You will probably need to take 4 to 6 weeks off from work. It depends on the type of work you do and how you feel. ? · You may have sex as soon as you feel able, but avoid positions that put stress on your neck or cause pain. Diet ? · You can eat your normal diet. If your stomach is upset, try bland, low-fat foods like plain rice, broiled chicken, toast, and yogurt. ? · Drink plenty of fluids. If you have kidney, heart, or liver disease and have to limit fluids, talk with your doctor before you increase the amount of fluids you drink. ? · You may notice that your bowel movements are not regular right after your surgery. This is common. Try to avoid constipation and straining with bowel movements. You may want to take a fiber supplement every day. If you have not had a bowel movement after a couple of days, ask your doctor about taking a mild laxative. Medicines ? · Your doctor will tell you if and when you can restart your medicines. He or she will also give you instructions about taking any new medicines. ? · If you take blood thinners, such as warfarin (Coumadin), clopidogrel (Plavix), or aspirin, be sure to talk to your doctor. He or she will tell you if and when to start taking those medicines again. Make sure that you understand exactly what your doctor wants you to do. ? · Be safe with medicines. Take pain medicines exactly as directed. ¨ If the doctor gave you a prescription medicine for pain, take it as prescribed. ¨ If you are not taking a prescription pain medicine, ask your doctor if you can take an over-the-counter medicine. ? · If your doctor prescribed antibiotics, take them as directed.  Do not stop taking them just because you feel better. You need to take the full course of antibiotics. ? · If you think your pain pill is making you sick to your stomach: 
¨ Take your pills after meals (unless your doctor has told you not to). ¨ Ask your doctor for a different pain pill. Incision care ? · You will be given specific instructions about how to care for the cut (incision) the doctor made. The instructions will depend on the type of materials used to close the cut. Exercise ? · Do exercises as instructed by your doctor or physical therapist to improve your strength and flexibility. Other instructions ? · To reduce stiffness and help sore muscles, use a warm water bottle, a heating pad set on low, or a warm cloth on your neck. Do not put heat right over the incision. Do not go to sleep with a heating pad on your skin. Follow-up care is a key part of your treatment and safety. Be sure to make and go to all appointments, and call your doctor if you are having problems. It's also a good idea to know your test results and keep a list of the medicines you take. When should you call for help? Call 911 anytime you think you may need emergency care. For example, call if: 
? · You passed out (lost consciousness). ? · You have chest pain, are short of breath, or cough up blood. ? · You are unable to move an arm or a leg at all. ?Call your doctor now or seek immediate medical care if: 
? · You have pain that does not get better after you take pain medicine. ? · You have loose stitches, or your incision comes open. ? · Bright red blood has soaked through the bandage over your incision. ? · You have signs of a blood clot in your leg (called a deep vein thrombosis), such as: 
¨ Pain in your calf, back of the knee, thigh, or groin. ¨ Redness or swelling in your leg. ? · You have signs of infection, such as: 
¨ Increased pain, swelling, warmth, or redness. ¨ Red streaks leading from the incision. ¨ Pus draining from the incision. ¨ A fever. ? · You have new or worse symptoms in your arms, legs, chest, belly, or buttocks. Symptoms may include: ¨ Numbness or tingling. ¨ Weakness. ¨ Pain. ? · You lose bladder or bowel control. ? Watch closely for any changes in your health, and be sure to contact your doctor if: 
? · You do not get better as expected. Where can you learn more? Go to http://giselle-erika.info/. Enter I830 in the search box to learn more about \"Cervical Spinal Fusion: What to Expect at Home. \" Current as of: March 21, 2017 Content Version: 11.4 © 1657-7508 [employer]wise, [employer]. Care instructions adapted under license by [employer] (which disclaims liability or warranty for this information). If you have questions about a medical condition or this instruction, always ask your healthcare professional. [address] any warranty or liability for your use of this information. Please provide this summary of care documentation to your next provider. Your primary care clinician is listed as Kyle Barnett. If you have any questions after today's visit, please call [phone].

## 2018-01-08 NOTE — PROGRESS NOTES
Verbal order entered per Dr. Magdaleno Husbands as documented on blue sheet:Tramadol 50mg take 1 tab po q6 prn severe pain.  Disp 28 no refills

## 2018-01-22 ENCOUNTER — TELEPHONE (OUTPATIENT)
Dept: FAMILY MEDICINE CLINIC | Age: 63
End: 2018-01-22

## 2018-01-22 NOTE — TELEPHONE ENCOUNTER
Medication: Advair 250/50 mcg, dose: 1 inhalation, how often: twice daily, current number of medication days provided: 90, refill per application. Lot #: [id number], EXP 01/2019. This medication was received and verified for the following 1. Correct Patient, 2. Correct Diagnosis, 3. Correct Drug, 4. Correct route, and no current allergy to medication. Please contact patient to come  their medications.      Miladis Cruz, MSN, RN, P-C     [facility]

## 2018-01-23 ENCOUNTER — OFFICE VISIT (OUTPATIENT)
Dept: ORTHOPEDIC SURGERY | Age: 63
End: 2018-01-23

## 2018-01-23 VITALS
SYSTOLIC BLOOD PRESSURE: 122 MMHG | TEMPERATURE: 97.2 F | OXYGEN SATURATION: 97 % | BODY MASS INDEX: 24.55 KG/M2 | WEIGHT: 130 LBS | RESPIRATION RATE: 18 BRPM | HEIGHT: 61 IN | DIASTOLIC BLOOD PRESSURE: 76 MMHG | HEART RATE: 97 BPM

## 2018-01-23 DIAGNOSIS — M48.02 CERVICAL SPINAL STENOSIS: Primary | ICD-10-CM

## 2018-01-23 DIAGNOSIS — M48.8X2 OSSIFICATION OF POSTERIOR LONGITUDINAL LIGAMENT IN CERVICAL REGION (HCC): ICD-10-CM

## 2018-01-23 RX ORDER — TRAMADOL HYDROCHLORIDE 50 MG/1
50 TABLET ORAL
Qty: 28 TAB | Refills: 0 | Status: CANCELLED | OUTPATIENT
Start: 2018-01-23

## 2018-01-23 NOTE — MR AVS SNAPSHOT
[address] [facility] [address] [facility] [address] 
[phone] Patient: Martha Ojeda MRN: [id number] GIB:0/4/2075 Visit Information Date & Time Provider Department Dept. Phone Encounter #  
 1/23/2018  1:00 PM Brissa Short MD [facility] [phone] [id number] Follow-up Instructions Return in about 4 weeks (around 2/20/2018) for 3-4 weeks with judy Bernardo to add on. Your Appointments 6/4/2018  1:00 PM  
Follow Up with Colleen Noe NP [address] [facility] ([address]) Appt Note: 6 mth follow up  
 [address] [facility] [address]  
12[address] [facility] [address] Upcoming Health Maintenance Date Due DTaP/Tdap/Td series (1 - Tdap) 9/8/1974 FOBT Q 1 YEAR AGE 50-75 9/8/2003 ZOSTER VACCINE AGE 60> 7/8/2013 PAP AKA CERVICAL CYTOLOGY 9/2/2017 Pneumococcal 19-64 Medium Risk (1 of 1 - PPSV23) 3/6/2018* BREAST CANCER SCRN MAMMOGRAM 10/4/2019 *Topic was postponed. The date shown is not the original due date. Allergies as of 1/23/2018  Review Complete On: 1/23/2018 By: Brissa Short MD  
  
 Severity Noted Reaction Type Reactions Lyrica [Pregabalin]  10/04/2017    Other (comments) Medication causes dry eyes, unstable balance, and lack of concentration. Mold Extracts  11/11/2014   Systemic Runny Nose Statins-hmg-coa Reductase Inhibitors  06/05/2017   Intolerance Myalgia Topamax [Topiramate]  01/08/2018    Vertigo Current Immunizations  Reviewed on 10/29/2015 Name Date Influenza Vaccine 10/1/2017, 10/6/2016 Influenza Vaccine Bekah Nose) 10/29/2015 Influenza Vaccine (Quad) PF 10/31/2013 Influenza Vaccine (Trivalent) 11/11/2014  3:00 PM  
  
 Not reviewed this visit You Were Diagnosed With   
  
 Codes Comments Cervical spinal stenosis    -  Primary ICD-10-CM: M48.02 
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  
  
   
This list is accurate as of: 1/23/18  1:50 PM.  Always use your most recent med list.  
  
  
  
  
 albuterol 90 mcg/actuation inhaler Commonly known as:  PROVENTIL HFA Take 2 Puffs by inhalation every six (6) hours as needed. aspirin delayed-release 81 mg tablet Take 81 mg by mouth daily. Cholecalciferol (Vitamin D3) 2,000 unit Cap capsule Commonly known as:  VITAMIN D3 Take 2,000 Units by mouth daily. ezetimibe 10 mg tablet Commonly known as:  Yury Theo Take 1 Tab by mouth daily. fluticasone-salmeterol 250-50 mcg/dose diskus inhaler Commonly known as:  ADVAIR Take 1 Puff by inhalation every twelve (12) hours. magnesium oxide 400 mg tablet Commonly known as:  MAG-OX Take 1 Tab by mouth daily. mometasone 50 mcg/actuation nasal spray Commonly known as:  NASONEX  
2 Sprays by Both Nostrils route daily. montelukast 10 mg tablet Commonly known as:  SINGULAIR Take 1 Tab by mouth daily. Indications: Allergic Rhinitis  
  
 naproxen 500 mg tablet Commonly known as:  NAPROSYN  
TAKE ONE TABLET BY MOUTH TWICE DAILY WITH MEALS FOR PAIN  
  
 olmesartan-hydroCHLOROthiazide 20-12.5 mg per tablet Commonly known as:  BENICAR HCT  
 Take 1 Tab by mouth daily. topiramate 25 mg tablet Commonly known as:  TOPAMAX Take 1 tab po QHS for one week, then increase to 2 tabs po QHS  
  
 traMADol 50 mg tablet Commonly known as:  ULTRAM  
Take 1 Tab by mouth every six (6) hours as needed for Pain (severe pain). Max Daily Amount: 200 mg. Follow-up Instructions Return in about 4 weeks (around 2/20/2018) for 3-4 weeks with judy Zuñiga to add on. Please provide this summary of care documentation to your next provider. Your primary care clinician is listed as Maite Magdaleno. If you have any questions after today's visit, please call [phone].

## 2018-01-23 NOTE — PROGRESS NOTES
[address]  [address]  [address]  Phone: [phone]  Fax: [phone]  INITIAL CONSULTATION  Patient: Mono Reyes                MRN: [id number]       SSN: [id number]  YOB: 1955        AGE: 57 y.o. SEX: female  Body mass index is 24.56 kg/(m^2). PCP: Carlos Najera NP  01/23/18    Chief Complaint   Patient presents with    Neck Pain     neck pain eval    Referral / Consult         HISTORY OF PRESENT ILLNESS, RADIOGRAPHS, and PLAN:         HISTORY OF PRESENT ILLNESS:  Ms. Magaly Bertrand is seen today at the request of nurse practitioner Parker Cedeno and Dr. Cornelius Xiao. Ms. Magaly Bertrand is a pleasant, 77-year-old female with a history of COPD and a one-pack-a-day smoker. She has a long history of chronic low back pain with spinal stenosis. Dr. Magdaleno Husbands when on evaluating her for spinal stenosis, noted myelopathic signs and symptoms. She obtained an MRI of her cervical spine and has referred her to me. She has been having progressive balance issues, dexterity issues, and numbness and tingling in her upper extremity. She is hyperreflexic. MRI demonstrates cervical spinal cord compression at C4-5, C5-6, C6-7, as well as C3-4 with the most severe stenosis at C5-6 with what appears to be posterior longitudinal ligament thickening, possible opacification, and significant cord compression across these segments. The patient noted these changes. She works as a hairdresser. She has less ability to cut hair for any length of time with ongoing intermittent neck pain and loss of dexterity and balance. PHYSICAL EXAMINATION:  Her exam demonstrates hyperreflexia of the biceps, triceps, Achilles, and patella, and mildly altered gait. There is no clonus or Babinski. ASSESSMENT/PLAN: I discussed the matter at length with her.   Given her spondylitic changes, the severity of her stenosis over four segments, her significant smoking history, which she admits she will be unable to quit, I would recommend a posterior cervical decompressive procedure. She already has a very stiff, spondylitic spine. I would be concerned that she would require a multilevel corpectomy, a destabilizing procedure that would be at risk in someone who is a smoker for an unstable pseudarthrosis. In a posterior decompression and fusion, we would get decompression likely inherent stability at a high likelihood, and have a safe and stable construct at the outset. In addition, with her COPD, it would not create any airway or other issues. So, likely, it would be a C3 to C7 decompression and fusion posteriorly. I discussed the matter at length with her and the risks, benefits, complications, and alternatives to surgery. I tried to answer as many questions as possible for her. She wishes to consider the matter. We will see her back in several weeks and talk further.      cc: Anais Farris NP           Past Medical History:   Diagnosis Date    Asthma 8/25/2012    +PFT for mild obstructive disease/COPD    Back pain, chronic 8/25/2012    Dr. Veras Favors referred to Dr. Geno Cutler    [facility]     H/O mammogram 10/03/2017    No evidence of malignancy    Hard of hearing     chronic ruptured TM    HTN (hypertension) 8/25/2012    Hx of screening mammography 09/06/2016    [facility], normal, routine recs    Hyperlipidemia     Sinus congestion 8/25/2012    Spinal stenosis of lumbar region with radiculopathy 9/2015    Dr. Olga Gonzalez       Family History   Problem Relation Age of Onset    Heart Disease Mother     Hypertension Mother     Cancer Mother     Coronary Artery Disease Mother 67    Heart Surgery Mother 68    Heart Disease Father     Hypertension Father     Stroke Father     Heart Surgery Father 46    Lung Disease Father     Heart Disease Brother     Heart Disease Brother        Current Outpatient Prescriptions   Medication Sig Dispense Refill    traMADol (ULTRAM) 50 mg tablet Take 1 Tab by mouth every six (6) hours as needed for Pain (severe pain). Max Daily Amount: 200 mg. 28 Tab 0    naproxen (NAPROSYN) 500 mg tablet TAKE ONE TABLET BY MOUTH TWICE DAILY WITH MEALS FOR PAIN 90 Tab 2    montelukast (SINGULAIR) 10 mg tablet Take 1 Tab by mouth daily. Indications: Allergic Rhinitis 30 Tab 6    ezetimibe (ZETIA) 10 mg tablet Take 1 Tab by mouth daily. 90 Tab 3    topiramate (TOPAMAX) 25 mg tablet Take 1 tab po QHS for one week, then increase to 2 tabs po QHS 60 Tab 0    olmesartan-hydroCHLOROthiazide (BENICAR HCT) 20-12.5 mg per tablet Take 1 Tab by mouth daily. 90 Tab 3    mometasone (NASONEX) 50 mcg/actuation nasal spray 2 Sprays by Both Nostrils route daily. 3 Container 3    magnesium oxide (MAG-OX) 400 mg tablet Take 1 Tab by mouth daily. 30 Tab 11    fluticasone-salmeterol (ADVAIR) 250-50 mcg/dose diskus inhaler Take 1 Puff by inhalation every twelve (12) hours. 3 Inhaler 3    albuterol (PROVENTIL HFA) 90 mcg/actuation inhaler Take 2 Puffs by inhalation every six (6) hours as needed. 1 Inhaler 3    aspirin delayed-release 81 mg tablet Take 81 mg by mouth daily.  Cholecalciferol, Vitamin D3, (VITAMIN D3) 2,000 unit cap capsule Take 2,000 Units by mouth daily. 30 Cap 11       Allergies   Allergen Reactions    Lyrica [Pregabalin] Other (comments)     Medication causes dry eyes, unstable balance, and lack of concentration.      Mold Extracts Runny Nose    Statins-Hmg-Coa Reductase Inhibitors Myalgia    Topamax [Topiramate] Vertigo       Past Surgical History:   Procedure Laterality Date    HX  SECTION      HX TUBAL LIGATION         Past Medical History:   Diagnosis Date    Asthma 2012    +PFT for mild obstructive disease/COPD    Back pain, chronic 2012    Dr. Josephus Peabody referred to Dr. Saima Silva    [facility]     H/O mammogram 10/03/2017 No evidence of malignancy    Hard of hearing     chronic ruptured TM    HTN (hypertension) 8/25/2012    Hx of screening mammography 09/06/2016    E[facility], normal, routine recs    Hyperlipidemia     Sinus congestion 8/25/2012    Spinal stenosis of lumbar region with radiculopathy 9/2015    Dr. Margaret Muñiz, [place]       Social History     Social History    Marital status: SINGLE     Spouse name: N/A    Number of children: N/A    Years of education: N/A     Occupational History    Not on file. Social History Main Topics    Smoking status: Current Every Day Smoker     Packs/day: 1.00     Types: Cigarettes    Smokeless tobacco: Never Used    Alcohol use No      Comment: h/o alcohol abuse in remission    Drug use: No    Sexual activity: Not on file     Other Topics Concern     Service No    Blood Transfusions No    Caffeine Concern Yes    Occupational Exposure No    Hobby Hazards No    Sleep Concern No    Stress Concern No    Weight Concern No    Special Diet No    Back Care No    Exercise Yes     walking    Bike Helmet No    Seat Belt Yes    Self-Exams No     Social History Narrative           REVIEW OF SYSTEMS:   CONSTITUTIONAL SYMPTOMS:  Negative. EYES:  Negative. EARS, NOSE, THROAT AND MOUTH:  Negative. CARDIOVASCULAR:  Negative. RESPIRATORY:  Negative. GENITOURINARY: Negative. GASTROINTESTINAL:  Negative. INTEGUMENTARY (SKIN AND/OR BREAST):  Negative. MUSCULOSKELETAL: Per HPI.   ENDOCRINE/RHEUMATOLOGIC:  Negative. NEUROLOGICAL:  Per HPI. HEMATOLOGIC/LYMPHATIC:  Negative. ALLERGIC/IMMUNOLOGIC:  Negative. PSYCHIATRIC:  Negative. PHYSICAL EXAMINATION:   Visit Vitals    /76    Pulse 97    Temp 97.2 °F (36.2 °C) (Oral)    Resp 18    Ht 5' 1\" (1.549 m)    Wt 130 lb (59 kg)    SpO2 97%    BMI 24.56 kg/m2    PAIN SCALE: 4/10    CONSTITUTIONAL: The patient is in no apparent distress and is alert and oriented x 3. HEENT: Normocephalic.  Hearing grossly intact. NECK: Supple and symmetric. no tenderness, or masses were felt. RESPIRATORY: No labored breathing. CARDIOVASCULAR: The carotid pulses were normal. Peripheral pulses were 2+. CHEST: Normal AP diameter and normal contour without any kyphoscoliosis. LYMPHATIC: No lymphadenopathy was appreciated in the neck, axillae or groin. SKIN:  Negative for scars, rashes, lesions, or ulcers on the right upper, right lower, left upper, left lower and trunk. NEUROLOGICAL: Alert and oriented x 3. Imbalance. Hyperreflexic. Ambulation without assistive device. FWB. EXTREMITIES:  See musculoskeletal.  MUSCULOSKELETAL:   Head and Neck: Neck pain with radiating BUE pain. Negative for misalignment, asymmetry, crepitation, defects, tenderness masses or effusions.  Left Upper Extremity: Radiating pain. Weakness. Paresthesias. Loss of dexterity. Inspection, percussion and palpation preformed. Hernandezs sign is negative.  Right Upper Extremity: Radiating pain. Weakness. Paresthesias. Loss of dexterity. Inspection, percussion and palpation preformed. Hernandezs sign is negative.  Spine, Ribs and Pelvis: Inspection, percussion and palpation preformed. Negative for misalignment, asymmetry, crepitation, defects, tenderness masses or effusions.  Left Lower Extremity: Inspection, percussion and palpation preformed. Negative straight leg raise.  Right Lower Extremity: Inspection, percussion and palpation preformed. Negative straight leg raise. SPINE EXAM:     Cervical spine: Neck is midline. Normal muscle tone. No focal atrophy is noted. ASSESSMENT    ICD-10-CM ICD-9-CM    1. Cervical spinal stenosis M48.02 723.0    2. 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) M48.8X2 723.7        Written by Audie Pool, as dictated by Severa Stanley, MD.    I, Dr. Severa Stanley, MD, confirm that all documentation is accurate.

## 2018-01-29 ENCOUNTER — HOSPITAL ENCOUNTER (OUTPATIENT)
Dept: LAB | Age: 63
Discharge: HOME | End: 2018-01-29

## 2018-01-29 ENCOUNTER — OFFICE VISIT (OUTPATIENT)
Dept: FAMILY MEDICINE CLINIC | Age: 63
End: 2018-01-29

## 2018-01-29 VITALS
OXYGEN SATURATION: 98 % | TEMPERATURE: 97.5 F | WEIGHT: 126.8 LBS | HEIGHT: 61 IN | RESPIRATION RATE: 16 BRPM | SYSTOLIC BLOOD PRESSURE: 118 MMHG | DIASTOLIC BLOOD PRESSURE: 78 MMHG | BODY MASS INDEX: 23.94 KG/M2 | HEART RATE: 75 BPM

## 2018-01-29 DIAGNOSIS — K21.9 GASTROESOPHAGEAL REFLUX DISEASE, ESOPHAGITIS PRESENCE NOT SPECIFIED: Primary | ICD-10-CM

## 2018-01-29 DIAGNOSIS — K21.9 GASTROESOPHAGEAL REFLUX DISEASE, ESOPHAGITIS PRESENCE NOT SPECIFIED: ICD-10-CM

## 2018-01-29 PROCEDURE — 86677 HELICOBACTER PYLORI ANTIBODY: CPT | Performed by: NURSE PRACTITIONER

## 2018-01-29 RX ORDER — OMEPRAZOLE 20 MG/1
20 TABLET, DELAYED RELEASE ORAL DAILY
Qty: 28 TAB | Refills: 0 | Status: SHIPPED | OUTPATIENT
Start: 2018-01-29 | End: 2018-02-26 | Stop reason: ALTCHOICE

## 2018-01-29 RX ORDER — DEXLANSOPRAZOLE 30 MG/1
30 CAPSULE, DELAYED RELEASE ORAL DAILY
Qty: 90 CAP | Refills: 3 | Status: SHIPPED | COMMUNITY
Start: 2018-01-29 | End: 2019-01-08

## 2018-01-29 NOTE — PATIENT INSTRUCTIONS

## 2018-01-29 NOTE — PROGRESS NOTES
Patient name, date of birth and orders verified before specimen collection. Left  arm is dry and intact. 23g butterfly  was utilized to collect specimen. Pt tolerated procedure well.

## 2018-01-29 NOTE — PROGRESS NOTES
Subjective:      Belinda Taylor is an 57 y.o. female who presents for evaluation of gastroesophageal reflux with bilious reflux, heartburn and nausea. She denies dysphagia. She  has not lost weight. She denies melena, hematochezia, hematemesis, and coffee ground emesis. She denies chest pain, choking on food, dysphagia, hematemesis and melena. Medical therapy in the past has included MALOX, reports history of H pylori with treatment and eradication. Medical History:     Past Medical History:   Diagnosis Date    Asthma 8/25/2012    +PFT for mild obstructive disease/COPD    Back pain, chronic 8/25/2012    Dr. Cheryl Cochran referred to Dr. Yazmin Weeks    [facility]     H/O mammogram 10/03/2017    No evidence of malignancy    Hard of hearing     chronic ruptured TM    HTN (hypertension) 8/25/2012    Hx of screening mammography 09/06/2016    [facility], normal, routine recs    Hyperlipidemia     Sinus congestion 8/25/2012    Spinal stenosis of lumbar region with radiculopathy 9/2015    Dr. Jae Rosado, [place]     Allergies: Allergies   Allergen Reactions    Lyrica [Pregabalin] Other (comments)     Medication causes dry eyes, unstable balance, and lack of concentration.  Mold Extracts Runny Nose    Statins-Hmg-Coa Reductase Inhibitors Myalgia    Topamax [Topiramate] Vertigo      Medications:     Current Outpatient Prescriptions   Medication Sig    omeprazole (PRILOSEC OTC) 20 mg tablet Take 20 mg by mouth daily. Indications: gastroesophageal reflux disease    dexlansoprazole (DEXILANT) 30 mg capsule Take 1 Cap by mouth daily. Indications: gastroesophageal reflux disease    traMADol (ULTRAM) 50 mg tablet Take 1 Tab by mouth every six (6) hours as needed for Pain (severe pain). Max Daily Amount: 200 mg.    naproxen (NAPROSYN) 500 mg tablet TAKE ONE TABLET BY MOUTH TWICE DAILY WITH MEALS FOR PAIN    montelukast (SINGULAIR) 10 mg tablet Take 1 Tab by mouth daily.  Indications: Allergic Rhinitis    ezetimibe (ZETIA) 10 mg tablet Take 1 Tab by mouth daily.  olmesartan-hydroCHLOROthiazide (BENICAR HCT) 20-12.5 mg per tablet Take 1 Tab by mouth daily.  mometasone (NASONEX) 50 mcg/actuation nasal spray 2 Sprays by Both Nostrils route daily.  fluticasone-salmeterol (ADVAIR) 250-50 mcg/dose diskus inhaler Take 1 Puff by inhalation every twelve (12) hours.  albuterol (PROVENTIL HFA) 90 mcg/actuation inhaler Take 2 Puffs by inhalation every six (6) hours as needed.  aspirin delayed-release 81 mg tablet Take 81 mg by mouth daily.  Cholecalciferol, Vitamin D3, (VITAMIN D3) 2,000 unit cap capsule Take 2,000 Units by mouth daily.  topiramate (TOPAMAX) 25 mg tablet Take 1 tab po QHS for one week, then increase to 2 tabs po QHS    magnesium oxide (MAG-OX) 400 mg tablet Take 1 Tab by mouth daily. No current facility-administered medications for this visit.       Surgical History:     Past Surgical History:   Procedure Laterality Date    HX  SECTION      HX TUBAL LIGATION       Social History:     Social History     Social History    Marital status: SINGLE     Spouse name: N/A    Number of children: N/A    Years of education: N/A     Social History Main Topics    Smoking status: Current Every Day Smoker     Packs/day: 1.00     Types: Cigarettes    Smokeless tobacco: Never Used    Alcohol use No      Comment: h/o alcohol abuse in remission    Drug use: No    Sexual activity: Not Asked     Other Topics Concern     Service No    Blood Transfusions No    Caffeine Concern Yes    Occupational Exposure No    Hobby Hazards No    Sleep Concern No    Stress Concern No    Weight Concern No    Special Diet No    Back Care No    Exercise Yes     walking    Bike Helmet No    Seat Belt Yes    Self-Exams No     Social History Narrative       Review of Systems  Gastrointestinal: positive for reflux symptoms and nausea, negative for dysphagia, change in bowel habits, melena, diarrhea, constipation, abdominal pain and jaundice     Objective:      Visit Vitals    /78 (BP 1 Location: Left arm, BP Patient Position: Sitting)    Pulse 75    Temp 97.5 °F (36.4 °C) (Oral)    Resp 16    Ht 5' 1\" (1.549 m)    Wt 126 lb 12.8 oz (57.5 kg)    SpO2 98%    BMI 23.96 kg/m2       General:  alert, cooperative, no distress, appears stated age   Skin:  Normal. and no rash or abnormalities   Lungs:  clear to auscultation bilaterally   Heart:  regular rate and rhythm, S1, S2 normal, no murmur, click, rub or gallop   Abdomen: soft, non-tender. Bowel sounds normal. No masses,  no organomegaly   Extremities:  extremities normal, atraumatic, no cyanosis or edema     Assessment/ Plan:     Nonpharmacologic treatments were discussed including: eating smaller meals, elevation of the head of bed at night, avoidance of caffeine, chocolate, nicotine and peppermint, avoiding tight fitting clothing. ICD-10-CM ICD-9-CM    1. Gastroesophageal reflux disease, esophagitis presence not specified K21.9 530.81 H PYLORI AB, IGG, QT      omeprazole (PRILOSEC OTC) 20 mg tablet      dexlansoprazole (DEXILANT) 30 mg capsule     Diagnoses and all orders for this visit:    1. Gastroesophageal reflux disease, esophagitis presence not specified  -     H PYLORI AB, IGG, QT; Future  -     omeprazole (PRILOSEC OTC) 20 mg tablet; Take 20 mg by mouth daily. Indications: gastroesophageal reflux disease  -     dexlansoprazole (DEXILANT) 30 mg capsule; Take 1 Cap by mouth daily. Indications: gastroesophageal reflux disease  - Pt to purchase OTC PPI until Antione Ford comes in, Will recheck H pYlori as patient states symptoms are similar to when she was diagnosed 2 years ago. Follow-up Disposition:  Return if symptoms worsen or fail to improve. reviewed medications and side effects in detail.

## 2018-01-30 LAB — H PYLORI IGG SER IA-ACNC: <0.8 INDEX VALUE (ref 0–0.79)

## 2018-01-30 NOTE — PROGRESS NOTES
Please inform the patient that her H Pylori came back negative.  She does not have a stomach infection

## 2018-02-13 ENCOUNTER — OFFICE VISIT (OUTPATIENT)
Dept: ORTHOPEDIC SURGERY | Age: 63
End: 2018-02-13

## 2018-02-13 VITALS
HEART RATE: 83 BPM | SYSTOLIC BLOOD PRESSURE: 118 MMHG | TEMPERATURE: 97.9 F | WEIGHT: 129.8 LBS | OXYGEN SATURATION: 99 % | HEIGHT: 61 IN | BODY MASS INDEX: 24.51 KG/M2 | DIASTOLIC BLOOD PRESSURE: 67 MMHG | RESPIRATION RATE: 18 BRPM

## 2018-02-13 DIAGNOSIS — M48.8X2 OSSIFICATION OF POSTERIOR LONGITUDINAL LIGAMENT IN CERVICAL REGION (HCC): ICD-10-CM

## 2018-02-13 DIAGNOSIS — M48.02 CERVICAL SPINAL STENOSIS: Primary | ICD-10-CM

## 2018-02-13 NOTE — PROGRESS NOTES
[address] [facility]   Pre-Surgical Worksheet    Patient: Elizabeth Mercer                         MRN: [id number]     Age:  57 y.o.,      Sex: female    YOB: 1955           CHEPE: February 13, 2018  PCP: Cristofer Alonzo NP    Allergies   Allergen Reactions    Lyrica [Pregabalin] Other (comments)     Medication causes dry eyes, unstable balance, and lack of concentration.  Mold Extracts Runny Nose    Statins-Hmg-Coa Reductase Inhibitors Myalgia    Topamax [Topiramate] Vertigo       No diagnosis found. Surgery: C3/C7 Lami Fusion. Pain Assessment   Pain Assessment  2/13/2018   Location of Pain Neck   Pain Location Comment -   Location Modifiers -   Severity of Pain 1   Quality of Pain Aching   Quality of Pain Comment -   Duration of Pain Persistent   Frequency of Pain Intermittent   Aggravating Factors Other (Comment); Bending   Aggravating Factors Comment -   Limiting Behavior -   Relieving Factors Other (Comment); Rest   Relieving Factors Comment meds   Result of Injury No       Visit Vitals    /67    Pulse 83    Temp 97.9 °F (36.6 °C) (Oral)    Resp 18    Ht 5' 1\" (1.549 m)    Wt 129 lb 12.8 oz (58.9 kg)    SpO2 99%    BMI 24.53 kg/m2       ADL Limits: Bathing and Dressingpain interferes with dressing and bathing    Spine Surgery?: No:      Spinal Injections?: Yes  When 10/2017. Where [facility]. Physical Therapy?: No    NSAID's?: Yes    Pain Medications?: Yes  Type: Tramadol. In Pain Management: NO    Current Outpatient Prescriptions   Medication Sig    omeprazole (PRILOSEC OTC) 20 mg tablet Take 20 mg by mouth daily. Indications: gastroesophageal reflux disease    dexlansoprazole (DEXILANT) 30 mg capsule Take 1 Cap by mouth daily. Indications: gastroesophageal reflux disease    traMADol (ULTRAM) 50 mg tablet Take 1 Tab by mouth every six (6) hours as needed for Pain (severe pain).  Max Daily Amount: 200 mg.    naproxen (NAPROSYN) 500 mg tablet TAKE ONE TABLET BY MOUTH TWICE DAILY WITH MEALS FOR PAIN    montelukast (SINGULAIR) 10 mg tablet Take 1 Tab by mouth daily. Indications: Allergic Rhinitis    ezetimibe (ZETIA) 10 mg tablet Take 1 Tab by mouth daily.  topiramate (TOPAMAX) 25 mg tablet Take 1 tab po QHS for one week, then increase to 2 tabs po QHS    olmesartan-hydroCHLOROthiazide (BENICAR HCT) 20-12.5 mg per tablet Take 1 Tab by mouth daily.  mometasone (NASONEX) 50 mcg/actuation nasal spray 2 Sprays by Both Nostrils route daily.  magnesium oxide (MAG-OX) 400 mg tablet Take 1 Tab by mouth daily.  fluticasone-salmeterol (ADVAIR) 250-50 mcg/dose diskus inhaler Take 1 Puff by inhalation every twelve (12) hours.  albuterol (PROVENTIL HFA) 90 mcg/actuation inhaler Take 2 Puffs by inhalation every six (6) hours as needed.  aspirin delayed-release 81 mg tablet Take 81 mg by mouth daily.  Cholecalciferol, Vitamin D3, (VITAMIN D3) 2,000 unit cap capsule Take 2,000 Units by mouth daily. No current facility-administered medications for this visit.         Past Medical History:   Diagnosis Date    Asthma 2012    +PFT for mild obstructive disease/COPD    Back pain, chronic 2012    Dr. Guzman Sosa referred to Dr. Julia Gatica    [facility]     H/O mammogram 10/03/2017    No evidence of malignancy    Hard of hearing     chronic ruptured TM    HTN (hypertension) 2012    Hx of screening mammography 2016    [facility], normal, routine recs    Hyperlipidemia     Sinus congestion 2012    Spinal stenosis of lumbar region with radiculopathy 2015    Dr. Mickie Le       Past Surgical History:   Procedure Laterality Date    HX  SECTION      HX TUBAL LIGATION         Social History     Social History    Marital status: SINGLE     Spouse name: N/A    Number of children: N/A    Years of education: N/A     Social History Main Topics    Smoking status: Current Every Day Smoker Packs/day: 1.00     Types: Cigarettes    Smokeless tobacco: Never Used    Alcohol use No      Comment: h/o alcohol abuse in remission    Drug use: No    Sexual activity: Not Asked     Other Topics Concern     Service No    Blood Transfusions No    Caffeine Concern Yes    Occupational Exposure No    Hobby Hazards No    Sleep Concern No    Stress Concern No    Weight Concern No    Special Diet No    Back Care No    Exercise Yes     walking    Bike Helmet No    Seat Belt Yes    Self-Exams No     Social History Narrative

## 2018-02-13 NOTE — PROGRESS NOTES
[facility] [address].  [address], [address]  [place], [facility] [address]  Phone: [phone]  Fax: [phone]  PROGRESS NOTE  Patient: Niharika Otero                MRN: [id number]       SSN: [id number]  YOB: 1955        AGE: 57 y.o. SEX: female  Body mass index is 24.53 kg/(m^2). PCP: Wilman Valerio NP  02/13/18    Chief Complaint   Patient presents with    Neck Pain     1 month follow up       HISTORY OF PRESENT ILLNESS, RADIOGRAPHS, and PLAN:     HISTORY:  Ms. Sydnee Jones returns today. She is continuing to have neck pain, weakness, hyperreflexia, and dexterity and gait issues. Her MRI demonstrates cervical stenosis. She has considered the matter and wishes to proceed with surgery. ASSESSMENT/PLAN: We spent todays meeting reviewing the issues involved. We discussed the risks, benefits, complications, and alternatives to surgery. It will be a multilevel posterior cervical decompression and fusion. She understands the importance of quitting smoking, and she says she will do the best she can. We will proceed with surgery once the appropriate approvals and clearances take place.     cc: Wilman Valerio NP         Past Medical History:   Diagnosis Date    Asthma 8/25/2012    +PFT for mild obstructive disease/COPD    Back pain, chronic 8/25/2012    Dr. Cheryl Dee referred to Dr. Carey Payer    Chronic obstructive pulmonary disease ([facility]     H/O mammogram 10/03/2017    No evidence of malignancy    Hard of hearing     chronic ruptured TM    HTN (hypertension) 8/25/2012    Hx of screening mammography 09/06/2016    EWL, normal, routine recs    Hyperlipidemia     Sinus congestion 8/25/2012    Spinal stenosis of lumbar region with radiculopathy 9/2015    Dr. Ruiz Thorpe       Family History   Problem Relation Age of Onset    Heart Disease Mother     Hypertension Mother     Cancer Mother     Coronary Artery Disease Mother 67    Heart Surgery Mother 68  Heart Disease Father     Hypertension Father     Stroke Father     Heart Surgery Father 46    Lung Disease Father     Heart Disease Brother     Heart Disease Brother        Current Outpatient Prescriptions   Medication Sig Dispense Refill    omeprazole (PRILOSEC OTC) 20 mg tablet Take 20 mg by mouth daily. Indications: gastroesophageal reflux disease 28 Tab 0    dexlansoprazole (DEXILANT) 30 mg capsule Take 1 Cap by mouth daily. Indications: gastroesophageal reflux disease 90 Cap 3    traMADol (ULTRAM) 50 mg tablet Take 1 Tab by mouth every six (6) hours as needed for Pain (severe pain). Max Daily Amount: 200 mg. 28 Tab 0    naproxen (NAPROSYN) 500 mg tablet TAKE ONE TABLET BY MOUTH TWICE DAILY WITH MEALS FOR PAIN 90 Tab 2    montelukast (SINGULAIR) 10 mg tablet Take 1 Tab by mouth daily. Indications: Allergic Rhinitis 30 Tab 6    ezetimibe (ZETIA) 10 mg tablet Take 1 Tab by mouth daily. 90 Tab 3    topiramate (TOPAMAX) 25 mg tablet Take 1 tab po QHS for one week, then increase to 2 tabs po QHS 60 Tab 0    olmesartan-hydroCHLOROthiazide (BENICAR HCT) 20-12.5 mg per tablet Take 1 Tab by mouth daily. 90 Tab 3    mometasone (NASONEX) 50 mcg/actuation nasal spray 2 Sprays by Both Nostrils route daily. 3 Container 3    magnesium oxide (MAG-OX) 400 mg tablet Take 1 Tab by mouth daily. 30 Tab 11    fluticasone-salmeterol (ADVAIR) 250-50 mcg/dose diskus inhaler Take 1 Puff by inhalation every twelve (12) hours. 3 Inhaler 3    albuterol (PROVENTIL HFA) 90 mcg/actuation inhaler Take 2 Puffs by inhalation every six (6) hours as needed. 1 Inhaler 3    aspirin delayed-release 81 mg tablet Take 81 mg by mouth daily.  Cholecalciferol, Vitamin D3, (VITAMIN D3) 2,000 unit cap capsule Take 2,000 Units by mouth daily. 30 Cap 11       Allergies   Allergen Reactions    Lyrica [Pregabalin] Other (comments)     Medication causes dry eyes, unstable balance, and lack of concentration.      Mold Extracts Runny Nose    Statins-Hmg-Coa Reductase Inhibitors Myalgia    Topamax [Topiramate] Vertigo       Past Surgical History:   Procedure Laterality Date    HX  SECTION      HX TUBAL LIGATION         Past Medical History:   Diagnosis Date    Asthma 2012    +PFT for mild obstructive disease/COPD    Back pain, chronic 2012    Dr. Otoniel Maloney referred to Dr. Daril Bloch    Chronic obstructive pulmonary disease ([facility]     H/O mammogram 10/03/2017    No evidence of malignancy    Hard of hearing     chronic ruptured TM    HTN (hypertension) 2012    Hx of screening mammography 2016    EWL, normal, routine recs    Hyperlipidemia     Sinus congestion 2012    Spinal stenosis of lumbar region with radiculopathy 2015    Dr. Nika Siegel, [place]       Social History     Social History    Marital status: SINGLE     Spouse name: N/A    Number of children: N/A    Years of education: N/A     Occupational History    Not on file. Social History Main Topics    Smoking status: Current Every Day Smoker     Packs/day: 1.00     Types: Cigarettes    Smokeless tobacco: Never Used    Alcohol use No      Comment: h/o alcohol abuse in remission    Drug use: No    Sexual activity: Not on file     Other Topics Concern     Service No    Blood Transfusions No    Caffeine Concern Yes    Occupational Exposure No    Hobby Hazards No    Sleep Concern No    Stress Concern No    Weight Concern No    Special Diet No    Back Care No    Exercise Yes     walking    Bike Helmet No    Seat Belt Yes    Self-Exams No     Social History Narrative         REVIEW OF SYSTEMS:   CONSTITUTIONAL SYMPTOMS:  Negative. EYES:  Negative. EARS, NOSE, THROAT AND MOUTH:  Negative. CARDIOVASCULAR:  Negative. RESPIRATORY:  Negative. GENITOURINARY: Per HPI. GASTROINTESTINAL:  Per HPI. INTEGUMENTARY (SKIN AND/OR BREAST):  Negative.    MUSCULOSKELETAL: Per HPI.   ENDOCRINE/RHEUMATOLOGIC: Negative. NEUROLOGICAL:  Per HPI. HEMATOLOGIC/LYMPHATIC:  Negative. ALLERGIC/IMMUNOLOGIC:  Negative. PSYCHIATRIC:  Negative. PHYSICAL EXAMINATION:   Visit Vitals    /67    Pulse 83    Temp 97.9 °F (36.6 °C) (Oral)    Resp 18    Ht 5' 1\" (1.549 m)    Wt 129 lb 12.8 oz (58.9 kg)    SpO2 99%    BMI 24.53 kg/m2    PAIN SCALE: 1/10    CONSTITUTIONAL: The patient is in no apparent distress and is alert and oriented x 3. HEENT: Normocephalic. Hearing grossly intact. NECK: Supple and symmetric. no tenderness, or masses were felt. RESPIRATORY: No labored breathing. CARDIOVASCULAR: The carotid pulses were normal. Peripheral pulses were 2+. CHEST: Normal AP diameter and normal contour without any kyphoscoliosis. LYMPHATIC: No lymphadenopathy was appreciated in the neck, axillae or groin. SKIN:  Negative for scars, rashes, lesions, or ulcers on the right upper, right lower, left upper, left lower and trunk. NEUROLOGICAL: Alert and oriented x 3. Ambulation without assistive device. FWB. EXTREMITIES: See musculoskeletal.  MUSCULOSKELETAL:   Head and Neck: Neck pain with radiating BUE pain. Negative for misalignment, asymmetry, crepitation, defects, tenderness masses or effusions.  Left Upper Extremity: Radiating pain. Weakness. Paresthesias. Loss of dexterity. Inspection, percussion and palpation preformed. Hernandezs sign is negative.  Right Upper Extremity: Radiating pain. Weakness. Paresthesias. Loss of dexterity. Inspection, percussion and palpation preformed. Hernandezs sign is negative.  Spine, Ribs and Pelvis: Inspection, percussion and palpation preformed. Negative for misalignment, asymmetry, crepitation, defects, tenderness masses or effusions.  Left Lower Extremity: Inspection, percussion and palpation preformed. Negative straight leg raise.  Right Lower Extremity: Inspection, percussion and palpation preformed. Negative straight leg raise.         SPINE EXAM: Cervical spine: Neck is midline. Normal muscle tone. No focal atrophy is noted. ASSESSMENT    ICD-10-CM ICD-9-CM    1. Cervical spinal stenosis M48.02 723.0    2. Ossification of posterior longitudinal ligament in cervical region [facility]) M48.8X2 723.7        Written by Tomás Sampson, as dictated by Richard Boogie MD.    I, Dr. Richard Boogie MD, confirm that all documentation is accurate.

## 2018-02-22 ENCOUNTER — HOSPITAL ENCOUNTER (OUTPATIENT)
Dept: PREADMISSION TESTING | Age: 63
Discharge: HOME | End: 2018-02-22
Payer: SUBSIDIZED

## 2018-02-22 DIAGNOSIS — M48.02 CERVICAL SPINAL STENOSIS: ICD-10-CM

## 2018-02-22 DIAGNOSIS — M48.8X2 OSSIFICATION OF POSTERIOR LONGITUDINAL LIGAMENT IN CERVICAL REGION (HCC): ICD-10-CM

## 2018-02-22 LAB
ALBUMIN SERPL-MCNC: 4.1 G/DL (ref 3.4–5)
ALBUMIN/GLOB SERPL: 1.5 {RATIO} (ref 0.8–1.7)
ALP SERPL-CCNC: 67 U/L (ref 45–117)
ALT SERPL-CCNC: 16 U/L (ref 13–56)
ANION GAP SERPL CALC-SCNC: 6 MMOL/L (ref 3–18)
AST SERPL-CCNC: 16 U/L (ref 15–37)
BILIRUB SERPL-MCNC: 0.5 MG/DL (ref 0.2–1)
BUN SERPL-MCNC: 16 MG/DL (ref 7–18)
BUN/CREAT SERPL: 20 (ref 12–20)
CALCIUM SERPL-MCNC: 10.8 MG/DL (ref 8.5–10.1)
CHLORIDE SERPL-SCNC: 106 MMOL/L (ref 100–108)
CO2 SERPL-SCNC: 28 MMOL/L (ref 21–32)
CREAT SERPL-MCNC: 0.79 MG/DL (ref 0.6–1.3)
ERYTHROCYTE [DISTWIDTH] IN BLOOD BY AUTOMATED COUNT: 13.6 % (ref 11.6–14.5)
GLOBULIN SER CALC-MCNC: 2.7 G/DL (ref 2–4)
GLUCOSE SERPL-MCNC: 95 MG/DL (ref 74–99)
HCT VFR BLD AUTO: 37.3 % (ref 35–45)
HGB BLD-MCNC: 12.9 G/DL (ref 12–16)
MCH RBC QN AUTO: 30.7 PG (ref 24–34)
MCHC RBC AUTO-ENTMCNC: 34.6 G/DL (ref 31–37)
MCV RBC AUTO: 88.8 FL (ref 74–97)
PLATELET # BLD AUTO: 339 K/UL (ref 135–420)
PMV BLD AUTO: 9.7 FL (ref 9.2–11.8)
POTASSIUM SERPL-SCNC: 4.6 MMOL/L (ref 3.5–5.5)
PROT SERPL-MCNC: 6.8 G/DL (ref 6.4–8.2)
RBC # BLD AUTO: 4.2 M/UL (ref 4.2–5.3)
SODIUM SERPL-SCNC: 140 MMOL/L (ref 136–145)
WBC # BLD AUTO: 7.3 K/UL (ref 4.6–13.2)

## 2018-02-22 PROCEDURE — 85027 COMPLETE CBC AUTOMATED: CPT | Performed by: ORTHOPAEDIC SURGERY

## 2018-02-22 PROCEDURE — 80053 COMPREHEN METABOLIC PANEL: CPT | Performed by: ORTHOPAEDIC SURGERY

## 2018-02-22 PROCEDURE — 93005 ELECTROCARDIOGRAM TRACING: CPT

## 2018-02-22 PROCEDURE — 36415 COLL VENOUS BLD VENIPUNCTURE: CPT | Performed by: ORTHOPAEDIC SURGERY

## 2018-02-22 NOTE — PROGRESS NOTES
Ms Sushila Marcial attended the Cervical/Spine Surgery Mandatory Pre-Operative class on  02/22/2018. Topics discussed included surgery preparation, what to expect the day of surgery, medications, physical and occupational therapy, and discharge planning. It was discussed that this is considered an elective surgery and that prior to the surgery she needs to make decisions such as arranging for help at home once she is discharged. Ms Sushila Marcial was given a Cervical Spine mandatory patient education notebook to take home. Opportunity was given to ask questions and phone number of the Orthopaedic   was given for any questions or concerns that may arise later. She did not  identify a .

## 2018-02-22 NOTE — H&P
Pre-Admission History and Physical    Patient: Hellen Arango   MRN: [id number]   SSN: [id number]   YOB: 1955   Age: 57 y.o. Sex: female     Patient scheduled for: C3-7 Lami/Fusion. Date of surgery: 2/28/18. [facility] Surgeon: Dorothy Santoro MD    HPI:  Hellen Arango is a 57 y.o. female with a history of COPD and a one-pack-a-day smoker. She has a long history of chronic low back pain with spinal stenosis. She has been having progressive balance issues, dexterity issues, and numbness and tingling in her upper extremity. She is hyperreflexic. The patient noted these changes. She works as a hairdresser. She has less ability to cut hair for any length of time with ongoing intermittent neck pain and loss of dexterity and balance. She reports a pain level of 8/10. MRI demonstrates cervical spinal cord compression at C4-5, C5-6, C6-7, as well as C3-4 with the most severe stenosis at C5-6 with what appears to be posterior longitudinal ligament thickening, possible opacification, and significant cord compression across these segments. This patient has failed the presurgical conservative treatments  including medications. Pain has impacted the patient's functional ability to dress, cut hair, and perform ADLs and she is being admitted for surgical intervention.          Past Medical History:   Diagnosis Date    Asthma 8/25/2012    +PFT for mild obstructive disease/COPD    Back pain, chronic 8/25/2012    Dr. Dann Mc referred to Dr. Jorge Denton    [facility]     GERD (gastroesophageal reflux disease)     H/O mammogram 10/03/2017    No evidence of malignancy    Hard of hearing     chronic ruptured TM    HTN (hypertension) 8/25/2012    Hx of screening mammography 09/06/2016    EWL, normal, routine recs    Hyperlipidemia     Nausea & vomiting     Sinus congestion 8/25/2012    Spinal stenosis of lumbar region with radiculopathy 9/2015 Dr. Navin Estes, [place]     Social History     Social History    Marital status: SINGLE     Spouse name: N/A    Number of children: N/A    Years of education: N/A     Social History Main Topics    Smoking status: Current Every Day Smoker     Packs/day: 1.00     Types: Cigarettes    Smokeless tobacco: Never Used    Alcohol use No      Comment: h/o alcohol abuse in remission    Drug use: No    Sexual activity: Not Asked     Other Topics Concern     Service No    Blood Transfusions No    Caffeine Concern Yes    Occupational Exposure No    Hobby Hazards No    Sleep Concern No    Stress Concern No    Weight Concern No    Special Diet No    Back Care No    Exercise Yes     walking    Bike Helmet No    Seat Belt Yes    Self-Exams No     Social History Narrative     Past Surgical History:   Procedure Laterality Date    HX  SECTION      HX TUBAL LIGATION       Family History   Problem Relation Age of Onset    Heart Disease Mother     Hypertension Mother     Cancer Mother     Coronary Artery Disease Mother 67    Heart Surgery Mother 68    Heart Disease Father     Hypertension Father     Stroke Father     Heart Surgery Father 46    Lung Disease Father     Heart Disease Brother     Heart Disease Brother      Allergies   Allergen Reactions    Lyrica [Pregabalin] Other (comments)     Medication causes dry eyes, unstable balance, and lack of concentration.  Mold Extracts Runny Nose    Pollen Extracts Itching    Statins-Hmg-Coa Reductase Inhibitors Myalgia    Topamax [Topiramate] Vertigo     Current Outpatient Prescriptions   Medication Sig Dispense Refill    omeprazole (PRILOSEC OTC) 20 mg tablet Take 20 mg by mouth daily. Indications: gastroesophageal reflux disease 28 Tab 0    dexlansoprazole (DEXILANT) 30 mg capsule Take 1 Cap by mouth daily.  Indications: gastroesophageal reflux disease 90 Cap 3    traMADol (ULTRAM) 50 mg tablet Take 1 Tab by mouth every six (6) hours as needed for Pain (severe pain). Max Daily Amount: 200 mg. 28 Tab 0    naproxen (NAPROSYN) 500 mg tablet TAKE ONE TABLET BY MOUTH TWICE DAILY WITH MEALS FOR PAIN 90 Tab 2    montelukast (SINGULAIR) 10 mg tablet Take 1 Tab by mouth daily. Indications: Allergic Rhinitis 30 Tab 6    ezetimibe (ZETIA) 10 mg tablet Take 1 Tab by mouth daily. 90 Tab 3    topiramate (TOPAMAX) 25 mg tablet Take 1 tab po QHS for one week, then increase to 2 tabs po QHS 60 Tab 0    olmesartan-hydroCHLOROthiazide (BENICAR HCT) 20-12.5 mg per tablet Take 1 Tab by mouth daily. 90 Tab 3    mometasone (NASONEX) 50 mcg/actuation nasal spray 2 Sprays by Both Nostrils route daily. 3 Container 3    magnesium oxide (MAG-OX) 400 mg tablet Take 1 Tab by mouth daily. 30 Tab 11    fluticasone-salmeterol (ADVAIR) 250-50 mcg/dose diskus inhaler Take 1 Puff by inhalation every twelve (12) hours. 3 Inhaler 3    albuterol (PROVENTIL HFA) 90 mcg/actuation inhaler Take 2 Puffs by inhalation every six (6) hours as needed. 1 Inhaler 3    aspirin delayed-release 81 mg tablet Take 81 mg by mouth daily.  Cholecalciferol, Vitamin D3, (VITAMIN D3) 2,000 unit cap capsule Take 2,000 Units by mouth daily. 30 Cap 11       ROS:  Denies chills, fever,night sweats,  bowel or bladder dysfunction, unexplained weight loss/weight gain, chest pain, sob or anxiety. Physical Examination    Gen: Well developed, well nourished 57 y.o. female   Visit Vitals    /67    Pulse 83    Temp 97.9 °F (36.6 °C) (Oral)    Resp 18    Ht 5' 1\" (1.549 m)    Wt 129 lb 12.8 oz (58.9 kg)    SpO2 99%    BMI 24.53 kg/m2    PAIN SCALE: 1/10     CONSTITUTIONAL: The patient is in no apparent distress and is alert and oriented x 3. HEENT: Normocephalic. Hearing grossly intact. NECK: Supple and symmetric. no tenderness, or masses were felt. RESPIRATORY: No labored breathing. CARDIOVASCULAR: The carotid pulses were normal. Peripheral pulses were 2+.   CHEST: Normal AP diameter and normal contour without any kyphoscoliosis. LYMPHATIC: No lymphadenopathy was appreciated in the neck, axillae or groin. SKIN:  Negative for scars, rashes, lesions, or ulcers on the right upper, right lower, left upper, left lower and trunk. NEUROLOGICAL: Alert and oriented x 3. Ambulation without assistive device. FWB. EXTREMITIES: See musculoskeletal.  MUSCULOSKELETAL:  · Head and Neck: Neck pain with radiating BUE pain. Negative for misalignment, asymmetry, crepitation, defects, tenderness masses or effusions. · Left Upper Extremity: Radiating pain. Weakness. Paresthesias. Loss of dexterity. Inspection, percussion and palpation preformed. Hernandezs sign is negative. · Right Upper Extremity: Radiating pain. Weakness. Paresthesias. Loss of dexterity. Inspection, percussion and palpation preformed. Hernandezs sign is negative. · Spine, Ribs and Pelvis: Inspection, percussion and palpation preformed. Negative for misalignment, asymmetry, crepitation, defects, tenderness masses or effusions. · Left Lower Extremity: Inspection, percussion and palpation preformed. Negative straight leg raise. · Right Lower Extremity: Inspection, percussion and palpation preformed. Negative straight leg raise.           SPINE EXAM:      Cervical spine: Neck is midline. Normal muscle tone. No focal atrophy is noted.            Assessment and Plan    Due to the pt's persistent symptoms unrelieved by conservative measure Alvin Berg is being admitted to [facility] to undergo surgical intervention. The post-operative plan of care consists of physical therapy, home health and a 2 week f/u office visit. We are pending medical clearance by Dr. Min Kumar. The risks, benefits, complications and alternatives to surgery have been discussed in detail with the patient. The patient understands and agrees to proceed.      April Zamudio NP-BC dictating for Elba Villa MD

## 2018-02-23 LAB
ATRIAL RATE: 77 BPM
CALCULATED P AXIS, ECG09: 66 DEGREES
CALCULATED R AXIS, ECG10: 80 DEGREES
CALCULATED T AXIS, ECG11: 58 DEGREES
DIAGNOSIS, 93000: NORMAL
P-R INTERVAL, ECG05: 178 MS
Q-T INTERVAL, ECG07: 362 MS
QRS DURATION, ECG06: 88 MS
QTC CALCULATION (BEZET), ECG08: 409 MS
VENTRICULAR RATE, ECG03: 77 BPM

## 2018-02-26 ENCOUNTER — TELEPHONE (OUTPATIENT)
Dept: FAMILY MEDICINE CLINIC | Age: 63
End: 2018-02-26

## 2018-02-26 ENCOUNTER — OFFICE VISIT (OUTPATIENT)
Dept: FAMILY MEDICINE CLINIC | Age: 63
End: 2018-02-26

## 2018-02-26 ENCOUNTER — HOSPITAL ENCOUNTER (OUTPATIENT)
Dept: GENERAL RADIOLOGY | Age: 63
Discharge: HOME | End: 2018-02-26
Payer: SUBSIDIZED

## 2018-02-26 VITALS
TEMPERATURE: 97.3 F | DIASTOLIC BLOOD PRESSURE: 77 MMHG | OXYGEN SATURATION: 99 % | SYSTOLIC BLOOD PRESSURE: 119 MMHG | HEART RATE: 101 BPM | HEIGHT: 61 IN | WEIGHT: 126.4 LBS | RESPIRATION RATE: 16 BRPM | BODY MASS INDEX: 23.86 KG/M2

## 2018-02-26 DIAGNOSIS — M48.02 STENOSIS, CERVICAL SPINE: ICD-10-CM

## 2018-02-26 DIAGNOSIS — Z01.818 PRE-OP EXAM: Primary | ICD-10-CM

## 2018-02-26 DIAGNOSIS — I10 ESSENTIAL HYPERTENSION: ICD-10-CM

## 2018-02-26 DIAGNOSIS — Z01.818 PRE-OP EXAM: ICD-10-CM

## 2018-02-26 DIAGNOSIS — M48.8X2 OSSIFICATION OF POSTERIOR LONGITUDINAL LIGAMENT IN CERVICAL REGION (HCC): ICD-10-CM

## 2018-02-26 PROCEDURE — 71045 X-RAY EXAM CHEST 1 VIEW: CPT

## 2018-02-26 RX ORDER — LISINOPRIL AND HYDROCHLOROTHIAZIDE 12.5; 2 MG/1; MG/1
1 TABLET ORAL DAILY
Qty: 30 TAB | Refills: 3 | Status: SHIPPED | OUTPATIENT
Start: 2018-02-26 | End: 2018-04-12 | Stop reason: SINTOL

## 2018-02-26 NOTE — TELEPHONE ENCOUNTER
Medication: Dexilant 30mg  , dose: 1 tab, how often: every day as needed , current number of medication days provided: 90, refill per application. Lot #: [id number], EXP 02/2019. This medication was received and verified for the following 1. Correct Patient, 2. Correct Diagnosis, 3. Correct Drug, 4. Correct route, and no current allergy to medication. Medication: Zetia 10mg , dose: 1 tab, how often: qd , current number of medication days provided: 90, refill per application. Lot #: [id number], EXP 02/2019  . This medication was received and verified for the following 1. Correct Patient, 2. Correct Diagnosis, 3. Correct Drug, 4. Correct route, and no current allergy to medication. Please contact patient to come  their medications.      James Reyes, MSN, RN, [facility]

## 2018-02-26 NOTE — PROGRESS NOTES
Called patient and informed her that xray is normal and she is cleared for surgery. Patient voiced understanding.

## 2018-02-26 NOTE — LETTER
2/26/2018 [facility] [address] [facility] [place], [address] [place] Reyes, 1955, is picking up the following medications ordered from the [facility]: 
 
Ernie Mediate 10 MG #90 AND DEXILANT 30 MG #90. Greg Edward Patient's Signature: _____________________________ Today's Date: 2/26/2018

## 2018-02-26 NOTE — PROGRESS NOTES
Subjective:   I am seeing Narda Ramirez, a 57 y.o. female, for preop exam.  Planned surgery: C3-7 Lami/Fusion. PMH: COPD, Smoker, Cervical stenosis  No history of diabetes, heart disease, stroke, cancers, kidney or liver diseases or DVT. The patient denies a history of prior anesthesia complications or abnormal bleeding. No latex allergy. ROS: No recent infections, has been feeling well other than presenting surgical complaint. No CNS, cardiorespiratory or GI symptoms otherwise. Current Outpatient Prescriptions   Medication Sig Dispense Refill    lisinopril-hydroCHLOROthiazide (PRINZIDE, ZESTORETIC) 20-12.5 mg per tablet Take 1 Tab by mouth daily. Indications: hypertension 30 Tab 3    dexlansoprazole (DEXILANT) 30 mg capsule Take 1 Cap by mouth daily. Indications: gastroesophageal reflux disease 90 Cap 3    traMADol (ULTRAM) 50 mg tablet Take 1 Tab by mouth every six (6) hours as needed for Pain (severe pain). Max Daily Amount: 200 mg. 28 Tab 0    montelukast (SINGULAIR) 10 mg tablet Take 1 Tab by mouth daily. Indications: Allergic Rhinitis 30 Tab 6    mometasone (NASONEX) 50 mcg/actuation nasal spray 2 Sprays by Both Nostrils route daily. 3 Container 3    fluticasone-salmeterol (ADVAIR) 250-50 mcg/dose diskus inhaler Take 1 Puff by inhalation every twelve (12) hours. 3 Inhaler 3    albuterol (PROVENTIL HFA) 90 mcg/actuation inhaler Take 2 Puffs by inhalation every six (6) hours as needed. 1 Inhaler 3    Cholecalciferol, Vitamin D3, (VITAMIN D3) 2,000 unit cap capsule Take 2,000 Units by mouth daily. 30 Cap 11    naproxen (NAPROSYN) 500 mg tablet TAKE ONE TABLET BY MOUTH TWICE DAILY WITH MEALS FOR PAIN 90 Tab 2    ezetimibe (ZETIA) 10 mg tablet Take 1 Tab by mouth daily. 90 Tab 3    topiramate (TOPAMAX) 25 mg tablet Take 1 tab po QHS for one week, then increase to 2 tabs po QHS 60 Tab 0    magnesium oxide (MAG-OX) 400 mg tablet Take 1 Tab by mouth daily.  30 Tab 11    aspirin delayed-release 81 mg tablet Take 81 mg by mouth daily. Allergies   Allergen Reactions    Lyrica [Pregabalin] Other (comments)     Medication causes dry eyes, unstable balance, and lack of concentration.      Mold Extracts Runny Nose    Pollen Extracts Itching    Statins-Hmg-Coa Reductase Inhibitors Myalgia    Topamax [Topiramate] Vertigo     Past Medical History:   Diagnosis Date    Asthma 2012    +PFT for mild obstructive disease/COPD    Back pain, chronic 2012    Dr. Roxanna Wade referred to Dr. Cortés Level    [facility]     GERD (gastroesophageal reflux disease)     H/O mammogram 10/03/2017    No evidence of malignancy    Hard of hearing     chronic ruptured TM    HTN (hypertension) 2012    Hx of screening mammography 2016    [facility], normal, routine recs    Hyperlipidemia     Nausea & vomiting     Sinus congestion 2012    Spinal stenosis of lumbar region with radiculopathy 2015    Dr. Bran Fagan     Past Surgical History:   Procedure Laterality Date    HX  SECTION      HX TUBAL LIGATION       Family History   Problem Relation Age of Onset    Heart Disease Mother     Hypertension Mother     Cancer Mother     Coronary Artery Disease Mother 67    Heart Surgery Mother 68    Heart Disease Father     Hypertension Father     Stroke Father     Heart Surgery Father 46    Lung Disease Father     Heart Disease Brother     Heart Disease Brother      Social History   Substance Use Topics    Smoking status: Current Every Day Smoker     Packs/day: 1.00     Types: Cigarettes    Smokeless tobacco: Never Used    Alcohol use No      Comment: h/o alcohol abuse in remission         Objective:     Visit Vitals    /77 (BP 1 Location: Left arm, BP Patient Position: Sitting)    Pulse (!) 101    Temp 97.3 °F (36.3 °C) (Oral)    Resp 16    Ht 5' 1\" (1.549 m)    Wt 126 lb 6.4 oz (57.3 kg)    SpO2 99%    BMI 23.88 kg/m2      The physical exam is unremarkable. She appears well, alert and oriented, pleasant and cooperative. Vitals as noted. Lungs are clear to auscultation. Heart sounds are normal. Abdomen is soft, no tenderness, masses or organomegaly. Preop studies were normal; labs and EKG. CXR not performed     Assessment/Plan:   1. Pre op exam normal  2. Need CXR preoperatively, if normal patient cleared for surgery. - 2/27/18- Chest Xray normal- Patient is cleared for surgery   3. HTN Benicar no longer available via patient assistance, comparable med sent to pharmacy. Orders Placed This Encounter    XR CHEST SNGL V     Standing Status:   Future     Number of Occurrences:   1     Standing Expiration Date:   3/26/2019     Order Specific Question:   Reason for Exam     Answer:   pre op exam    lisinopril-hydroCHLOROthiazide (PRINZIDE, ZESTORETIC) 20-12.5 mg per tablet     Sig: Take 1 Tab by mouth daily.  Indications: hypertension     Dispense:  30 Tab     Refill:  3

## 2018-02-27 ENCOUNTER — ANESTHESIA EVENT (OUTPATIENT)
Dept: SURGERY | Age: 63
End: 2018-02-27
Payer: SUBSIDIZED

## 2018-02-28 ENCOUNTER — ANESTHESIA (OUTPATIENT)
Dept: SURGERY | Age: 63
End: 2018-02-28
Payer: SUBSIDIZED

## 2018-02-28 ENCOUNTER — HOSPITAL ENCOUNTER (OUTPATIENT)
Age: 63
Setting detail: OBSERVATION
LOS: 1 days | Discharge: HOME | End: 2018-03-01
Attending: ORTHOPAEDIC SURGERY | Admitting: ORTHOPAEDIC SURGERY
Payer: SUBSIDIZED

## 2018-02-28 ENCOUNTER — APPOINTMENT (OUTPATIENT)
Dept: GENERAL RADIOLOGY | Age: 63
End: 2018-02-28
Attending: ORTHOPAEDIC SURGERY
Payer: SUBSIDIZED

## 2018-02-28 DIAGNOSIS — Z98.1 S/P CERVICAL SPINAL FUSION: Primary | ICD-10-CM

## 2018-02-28 PROBLEM — M47.12 CERVICAL SPONDYLOSIS WITH MYELOPATHY: Status: ACTIVE | Noted: 2018-02-28

## 2018-02-28 PROBLEM — M48.02 CERVICAL STENOSIS OF SPINE: Status: ACTIVE | Noted: 2018-02-28

## 2018-02-28 PROCEDURE — 74011250637 HC RX REV CODE- 250/637: Performed by: ORTHOPAEDIC SURGERY

## 2018-02-28 PROCEDURE — 74011250636 HC RX REV CODE- 250/636: Performed by: ORTHOPAEDIC SURGERY

## 2018-02-28 PROCEDURE — 77030012893: Performed by: ORTHOPAEDIC SURGERY

## 2018-02-28 PROCEDURE — 77030013079 HC BLNKT BAIR HGGR 3M -A: Performed by: ANESTHESIOLOGY

## 2018-02-28 PROCEDURE — 74011000250 HC RX REV CODE- 250: Performed by: ORTHOPAEDIC SURGERY

## 2018-02-28 PROCEDURE — 76210000017 HC OR PH I REC 1.5 TO 2 HR: Performed by: ORTHOPAEDIC SURGERY

## 2018-02-28 PROCEDURE — 77030012406 HC DRN WND PENRS BARD -A: Performed by: ORTHOPAEDIC SURGERY

## 2018-02-28 PROCEDURE — 77030027138 HC INCENT SPIROMETER -A

## 2018-02-28 PROCEDURE — 74011250636 HC RX REV CODE- 250/636

## 2018-02-28 PROCEDURE — L0120 CERV FLEX N/ADJ FOAM PRE OTS: HCPCS | Performed by: ORTHOPAEDIC SURGERY

## 2018-02-28 PROCEDURE — 77030018836 HC SOL IRR NACL ICUM -A: Performed by: ORTHOPAEDIC SURGERY

## 2018-02-28 PROCEDURE — 94640 AIRWAY INHALATION TREATMENT: CPT

## 2018-02-28 PROCEDURE — 77010033678 HC OXYGEN DAILY: Performed by: ORTHOPAEDIC SURGERY

## 2018-02-28 PROCEDURE — 77030035236 HC SUT PDS STRATFX BARB J&J -B: Performed by: ORTHOPAEDIC SURGERY

## 2018-02-28 PROCEDURE — 77030034850: Performed by: ORTHOPAEDIC SURGERY

## 2018-02-28 PROCEDURE — C1713 ANCHOR/SCREW BN/BN,TIS/BN: HCPCS | Performed by: ORTHOPAEDIC SURGERY

## 2018-02-28 PROCEDURE — 76060000035 HC ANESTHESIA 2 TO 2.5 HR: Performed by: ORTHOPAEDIC SURGERY

## 2018-02-28 PROCEDURE — 74011250636 HC RX REV CODE- 250/636: Performed by: NURSE ANESTHETIST, CERTIFIED REGISTERED

## 2018-02-28 PROCEDURE — 77030030163 HC BN WAX J&J -A: Performed by: ORTHOPAEDIC SURGERY

## 2018-02-28 PROCEDURE — 77030019938 HC TBNG IV PCA ICUM -A

## 2018-02-28 PROCEDURE — 74011000250 HC RX REV CODE- 250

## 2018-02-28 PROCEDURE — 77030004402 HC BUR NEUR STRY -C: Performed by: ORTHOPAEDIC SURGERY

## 2018-02-28 PROCEDURE — 77030032490 HC SLV COMPR SCD KNE COVD -B: Performed by: ORTHOPAEDIC SURGERY

## 2018-02-28 PROCEDURE — 76010000131 HC OR TIME 2 TO 2.5 HR: Performed by: ORTHOPAEDIC SURGERY

## 2018-02-28 PROCEDURE — 74011000272 HC RX REV CODE- 272: Performed by: ORTHOPAEDIC SURGERY

## 2018-02-28 PROCEDURE — 97161 PT EVAL LOW COMPLEX 20 MIN: CPT

## 2018-02-28 PROCEDURE — 77030011640 HC PAD GRND REM COVD -A: Performed by: ORTHOPAEDIC SURGERY

## 2018-02-28 PROCEDURE — 99218 HC RM OBSERVATION: CPT

## 2018-02-28 PROCEDURE — 77030019908 HC STETH ESOPH SIMS -A: Performed by: ANESTHESIOLOGY

## 2018-02-28 PROCEDURE — 77030034968 HC GRFT DBM PLS PST ALLOFUS 5CC ALLO- G: Performed by: ORTHOPAEDIC SURGERY

## 2018-02-28 PROCEDURE — 77030020782 HC GWN BAIR PAWS FLX 3M -B: Performed by: ORTHOPAEDIC SURGERY

## 2018-02-28 PROCEDURE — 74011250637 HC RX REV CODE- 250/637: Performed by: NURSE ANESTHETIST, CERTIFIED REGISTERED

## 2018-02-28 PROCEDURE — 97116 GAIT TRAINING THERAPY: CPT

## 2018-02-28 PROCEDURE — 74011250636 HC RX REV CODE- 250/636: Performed by: NURSE PRACTITIONER

## 2018-02-28 PROCEDURE — 77030020268 HC MISC GENERAL SUPPLY: Performed by: ORTHOPAEDIC SURGERY

## 2018-02-28 PROCEDURE — 77030026438 HC STYL ET INTUB CARD -A: Performed by: ANESTHESIOLOGY

## 2018-02-28 PROCEDURE — 77030013567 HC DRN WND RESERV BARD -A: Performed by: ORTHOPAEDIC SURGERY

## 2018-02-28 PROCEDURE — 77030008683 HC TU ET CUF COVD -A: Performed by: ANESTHESIOLOGY

## 2018-02-28 PROCEDURE — 97530 THERAPEUTIC ACTIVITIES: CPT

## 2018-02-28 PROCEDURE — 77030002933 HC SUT MCRYL J&J -A: Performed by: ORTHOPAEDIC SURGERY

## 2018-02-28 PROCEDURE — 74011250637 HC RX REV CODE- 250/637: Performed by: NURSE PRACTITIONER

## 2018-02-28 PROCEDURE — 77030034475 HC MISC IMPL SPN: Performed by: ORTHOPAEDIC SURGERY

## 2018-02-28 DEVICE — GRAFT BONE PASTE DEMINERLIZED BONE MTRX 8CC ALLOFUSE +: Type: IMPLANTABLE DEVICE | Site: SPINE CERVICAL | Status: FUNCTIONAL

## 2018-02-28 RX ORDER — ONDANSETRON 2 MG/ML
4 INJECTION INTRAMUSCULAR; INTRAVENOUS
Status: DISCONTINUED | OUTPATIENT
Start: 2018-02-28 | End: 2018-03-01 | Stop reason: HOSPADM

## 2018-02-28 RX ORDER — PHENYLEPHRINE HCL IN 0.9% NACL 1 MG/10 ML
SYRINGE (ML) INTRAVENOUS AS NEEDED
Status: DISCONTINUED | OUTPATIENT
Start: 2018-02-28 | End: 2018-02-28 | Stop reason: HOSPADM

## 2018-02-28 RX ORDER — DIPHENHYDRAMINE HYDROCHLORIDE 50 MG/ML
12.5 INJECTION, SOLUTION INTRAMUSCULAR; INTRAVENOUS
Status: DISCONTINUED | OUTPATIENT
Start: 2018-02-28 | End: 2018-02-28 | Stop reason: HOSPADM

## 2018-02-28 RX ORDER — OXYCODONE HYDROCHLORIDE 5 MG/1
5-10 TABLET ORAL
Status: DISCONTINUED | OUTPATIENT
Start: 2018-02-28 | End: 2018-03-01 | Stop reason: HOSPADM

## 2018-02-28 RX ORDER — DIPHENHYDRAMINE HCL 25 MG
25 CAPSULE ORAL
Status: DISCONTINUED | OUTPATIENT
Start: 2018-02-28 | End: 2018-03-01 | Stop reason: SDUPTHER

## 2018-02-28 RX ORDER — SODIUM CHLORIDE 0.9 % (FLUSH) 0.9 %
5-10 SYRINGE (ML) INJECTION AS NEEDED
Status: DISCONTINUED | OUTPATIENT
Start: 2018-02-28 | End: 2018-02-28 | Stop reason: HOSPADM

## 2018-02-28 RX ORDER — CELECOXIB 400 MG/1
400 CAPSULE ORAL
Status: DISCONTINUED | OUTPATIENT
Start: 2018-02-28 | End: 2018-02-28 | Stop reason: HOSPADM

## 2018-02-28 RX ORDER — ONDANSETRON 2 MG/ML
INJECTION INTRAMUSCULAR; INTRAVENOUS AS NEEDED
Status: DISCONTINUED | OUTPATIENT
Start: 2018-02-28 | End: 2018-02-28 | Stop reason: HOSPADM

## 2018-02-28 RX ORDER — SCOLOPAMINE TRANSDERMAL SYSTEM 1 MG/1
1 PATCH, EXTENDED RELEASE TRANSDERMAL
Status: DISCONTINUED | OUTPATIENT
Start: 2018-02-28 | End: 2018-03-01 | Stop reason: HOSPADM

## 2018-02-28 RX ORDER — DEXAMETHASONE SODIUM PHOSPHATE 4 MG/ML
INJECTION, SOLUTION INTRA-ARTICULAR; INTRALESIONAL; INTRAMUSCULAR; INTRAVENOUS; SOFT TISSUE AS NEEDED
Status: DISCONTINUED | OUTPATIENT
Start: 2018-02-28 | End: 2018-02-28 | Stop reason: HOSPADM

## 2018-02-28 RX ORDER — PROMETHAZINE HYDROCHLORIDE 25 MG/ML
12.5 INJECTION, SOLUTION INTRAMUSCULAR; INTRAVENOUS ONCE
Status: ACTIVE | OUTPATIENT
Start: 2018-02-28 | End: 2018-02-28

## 2018-02-28 RX ORDER — SODIUM CHLORIDE 0.9 % (FLUSH) 0.9 %
5-10 SYRINGE (ML) INJECTION EVERY 8 HOURS
Status: DISCONTINUED | OUTPATIENT
Start: 2018-02-28 | End: 2018-02-28 | Stop reason: HOSPADM

## 2018-02-28 RX ORDER — LORAZEPAM 2 MG/ML
1 INJECTION INTRAMUSCULAR
Status: DISCONTINUED | OUTPATIENT
Start: 2018-02-28 | End: 2018-03-01 | Stop reason: HOSPADM

## 2018-02-28 RX ORDER — SODIUM CHLORIDE 9 MG/ML
100 INJECTION, SOLUTION INTRAVENOUS CONTINUOUS
Status: DISCONTINUED | OUTPATIENT
Start: 2018-02-28 | End: 2018-03-01 | Stop reason: HOSPADM

## 2018-02-28 RX ORDER — METOCLOPRAMIDE HYDROCHLORIDE 5 MG/ML
5 INJECTION INTRAMUSCULAR; INTRAVENOUS
Status: DISCONTINUED | OUTPATIENT
Start: 2018-02-28 | End: 2018-03-01 | Stop reason: HOSPADM

## 2018-02-28 RX ORDER — NEOSTIGMINE METHYLSULFATE 1 MG/ML
INJECTION INTRAVENOUS AS NEEDED
Status: DISCONTINUED | OUTPATIENT
Start: 2018-02-28 | End: 2018-02-28 | Stop reason: HOSPADM

## 2018-02-28 RX ORDER — SODIUM CHLORIDE, SODIUM LACTATE, POTASSIUM CHLORIDE, CALCIUM CHLORIDE 600; 310; 30; 20 MG/100ML; MG/100ML; MG/100ML; MG/100ML
75 INJECTION, SOLUTION INTRAVENOUS CONTINUOUS
Status: DISCONTINUED | OUTPATIENT
Start: 2018-02-28 | End: 2018-02-28 | Stop reason: HOSPADM

## 2018-02-28 RX ORDER — LIDOCAINE HYDROCHLORIDE 20 MG/ML
INJECTION, SOLUTION EPIDURAL; INFILTRATION; INTRACAUDAL; PERINEURAL AS NEEDED
Status: DISCONTINUED | OUTPATIENT
Start: 2018-02-28 | End: 2018-02-28 | Stop reason: HOSPADM

## 2018-02-28 RX ORDER — MIDAZOLAM HYDROCHLORIDE 1 MG/ML
INJECTION, SOLUTION INTRAMUSCULAR; INTRAVENOUS AS NEEDED
Status: DISCONTINUED | OUTPATIENT
Start: 2018-02-28 | End: 2018-02-28 | Stop reason: HOSPADM

## 2018-02-28 RX ORDER — DIPHENHYDRAMINE HYDROCHLORIDE 50 MG/ML
12.5 INJECTION, SOLUTION INTRAMUSCULAR; INTRAVENOUS
Status: DISCONTINUED | OUTPATIENT
Start: 2018-02-28 | End: 2018-02-28 | Stop reason: SDUPTHER

## 2018-02-28 RX ORDER — NALOXONE HYDROCHLORIDE 0.4 MG/ML
0.1 INJECTION, SOLUTION INTRAMUSCULAR; INTRAVENOUS; SUBCUTANEOUS AS NEEDED
Status: DISCONTINUED | OUTPATIENT
Start: 2018-02-28 | End: 2018-02-28 | Stop reason: SDUPTHER

## 2018-02-28 RX ORDER — ACETAMINOPHEN 500 MG
1000 TABLET ORAL EVERY 6 HOURS
Status: DISCONTINUED | OUTPATIENT
Start: 2018-02-28 | End: 2018-03-01 | Stop reason: HOSPADM

## 2018-02-28 RX ORDER — SODIUM CHLORIDE 0.9 % (FLUSH) 0.9 %
5-10 SYRINGE (ML) INJECTION EVERY 8 HOURS
Status: DISCONTINUED | OUTPATIENT
Start: 2018-02-28 | End: 2018-03-01 | Stop reason: HOSPADM

## 2018-02-28 RX ORDER — HYDROMORPHONE HYDROCHLORIDE 1 MG/ML
1 INJECTION, SOLUTION INTRAMUSCULAR; INTRAVENOUS; SUBCUTANEOUS
Status: DISCONTINUED | OUTPATIENT
Start: 2018-02-28 | End: 2018-03-01 | Stop reason: HOSPADM

## 2018-02-28 RX ORDER — SODIUM CHLORIDE 0.9 % (FLUSH) 0.9 %
5-10 SYRINGE (ML) INJECTION AS NEEDED
Status: DISCONTINUED | OUTPATIENT
Start: 2018-02-28 | End: 2018-02-28 | Stop reason: SDUPTHER

## 2018-02-28 RX ORDER — ONDANSETRON 2 MG/ML
4 INJECTION INTRAMUSCULAR; INTRAVENOUS ONCE
Status: COMPLETED | OUTPATIENT
Start: 2018-02-28 | End: 2018-02-28

## 2018-02-28 RX ORDER — BUDESONIDE AND FORMOTEROL FUMARATE DIHYDRATE 80; 4.5 UG/1; UG/1
2 AEROSOL RESPIRATORY (INHALATION)
Status: DISCONTINUED | OUTPATIENT
Start: 2018-02-28 | End: 2018-03-01 | Stop reason: HOSPADM

## 2018-02-28 RX ORDER — PROPOFOL 10 MG/ML
INJECTION, EMULSION INTRAVENOUS AS NEEDED
Status: DISCONTINUED | OUTPATIENT
Start: 2018-02-28 | End: 2018-02-28 | Stop reason: HOSPADM

## 2018-02-28 RX ORDER — ALBUTEROL SULFATE 90 UG/1
2 AEROSOL, METERED RESPIRATORY (INHALATION)
Status: DISCONTINUED | OUTPATIENT
Start: 2018-02-28 | End: 2018-02-28 | Stop reason: CLARIF

## 2018-02-28 RX ORDER — SODIUM CHLORIDE 0.9 % (FLUSH) 0.9 %
5-10 SYRINGE (ML) INJECTION AS NEEDED
Status: DISCONTINUED | OUTPATIENT
Start: 2018-02-28 | End: 2018-03-01 | Stop reason: HOSPADM

## 2018-02-28 RX ORDER — PANTOPRAZOLE SODIUM 40 MG/1
40 TABLET, DELAYED RELEASE ORAL
Status: DISCONTINUED | OUTPATIENT
Start: 2018-03-01 | End: 2018-03-01 | Stop reason: HOSPADM

## 2018-02-28 RX ORDER — LORAZEPAM 2 MG/ML
1 INJECTION INTRAMUSCULAR AS NEEDED
Status: DISCONTINUED | OUTPATIENT
Start: 2018-02-28 | End: 2018-02-28 | Stop reason: SDUPTHER

## 2018-02-28 RX ORDER — MONTELUKAST SODIUM 10 MG/1
10 TABLET ORAL DAILY
Status: DISCONTINUED | OUTPATIENT
Start: 2018-02-28 | End: 2018-03-01 | Stop reason: HOSPADM

## 2018-02-28 RX ORDER — DEXTROSE, SODIUM CHLORIDE, AND POTASSIUM CHLORIDE 5; .45; .15 G/100ML; G/100ML; G/100ML
75 INJECTION INTRAVENOUS CONTINUOUS
Status: DISCONTINUED | OUTPATIENT
Start: 2018-02-28 | End: 2018-03-01 | Stop reason: HOSPADM

## 2018-02-28 RX ORDER — ROCURONIUM BROMIDE 10 MG/ML
INJECTION, SOLUTION INTRAVENOUS AS NEEDED
Status: DISCONTINUED | OUTPATIENT
Start: 2018-02-28 | End: 2018-02-28 | Stop reason: HOSPADM

## 2018-02-28 RX ORDER — HYDROMORPHONE HCL IN 0.9% NACL 50 MG/50ML
PLASTIC BAG, INJECTION (ML) INJECTION AS NEEDED
Status: DISCONTINUED | OUTPATIENT
Start: 2018-02-28 | End: 2018-02-28 | Stop reason: HOSPADM

## 2018-02-28 RX ORDER — GLYCOPYRROLATE 0.2 MG/ML
INJECTION INTRAMUSCULAR; INTRAVENOUS AS NEEDED
Status: DISCONTINUED | OUTPATIENT
Start: 2018-02-28 | End: 2018-02-28 | Stop reason: HOSPADM

## 2018-02-28 RX ORDER — ALBUTEROL SULFATE 0.83 MG/ML
2.5 SOLUTION RESPIRATORY (INHALATION)
Status: DISCONTINUED | OUTPATIENT
Start: 2018-02-28 | End: 2018-03-01 | Stop reason: HOSPADM

## 2018-02-28 RX ORDER — VANCOMYCIN HYDROCHLORIDE 1 G/20ML
INJECTION, POWDER, LYOPHILIZED, FOR SOLUTION INTRAVENOUS AS NEEDED
Status: DISCONTINUED | OUTPATIENT
Start: 2018-02-28 | End: 2018-02-28 | Stop reason: HOSPADM

## 2018-02-28 RX ORDER — SODIUM CHLORIDE 0.9 % (FLUSH) 0.9 %
5-10 SYRINGE (ML) INJECTION EVERY 8 HOURS
Status: DISCONTINUED | OUTPATIENT
Start: 2018-02-28 | End: 2018-02-28 | Stop reason: SDUPTHER

## 2018-02-28 RX ORDER — FENTANYL CITRATE 50 UG/ML
INJECTION, SOLUTION INTRAMUSCULAR; INTRAVENOUS AS NEEDED
Status: DISCONTINUED | OUTPATIENT
Start: 2018-02-28 | End: 2018-02-28 | Stop reason: HOSPADM

## 2018-02-28 RX ORDER — SUCCINYLCHOLINE CHLORIDE 20 MG/ML
INJECTION INTRAMUSCULAR; INTRAVENOUS AS NEEDED
Status: DISCONTINUED | OUTPATIENT
Start: 2018-02-28 | End: 2018-02-28 | Stop reason: HOSPADM

## 2018-02-28 RX ORDER — DIAZEPAM 5 MG/1
5 TABLET ORAL
Status: DISCONTINUED | OUTPATIENT
Start: 2018-02-28 | End: 2018-03-01 | Stop reason: HOSPADM

## 2018-02-28 RX ORDER — NALOXONE HYDROCHLORIDE 0.4 MG/ML
0.4 INJECTION, SOLUTION INTRAMUSCULAR; INTRAVENOUS; SUBCUTANEOUS AS NEEDED
Status: DISCONTINUED | OUTPATIENT
Start: 2018-02-28 | End: 2018-03-01 | Stop reason: HOSPADM

## 2018-02-28 RX ORDER — ALBUTEROL SULFATE 0.83 MG/ML
2.5 SOLUTION RESPIRATORY (INHALATION) AS NEEDED
Status: DISCONTINUED | OUTPATIENT
Start: 2018-02-28 | End: 2018-02-28 | Stop reason: HOSPADM

## 2018-02-28 RX ORDER — FLUTICASONE PROPIONATE 50 MCG
2 SPRAY, SUSPENSION (ML) NASAL DAILY
Status: DISCONTINUED | OUTPATIENT
Start: 2018-02-28 | End: 2018-03-01 | Stop reason: HOSPADM

## 2018-02-28 RX ORDER — HYDROMORPHONE HYDROCHLORIDE 1 MG/ML
0.5 INJECTION, SOLUTION INTRAMUSCULAR; INTRAVENOUS; SUBCUTANEOUS
Status: COMPLETED | OUTPATIENT
Start: 2018-02-28 | End: 2018-02-28

## 2018-02-28 RX ORDER — DIPHENHYDRAMINE HYDROCHLORIDE 50 MG/ML
12.5 INJECTION, SOLUTION INTRAMUSCULAR; INTRAVENOUS
Status: DISCONTINUED | OUTPATIENT
Start: 2018-02-28 | End: 2018-03-01 | Stop reason: HOSPADM

## 2018-02-28 RX ORDER — FAMOTIDINE 20 MG/1
20 TABLET, FILM COATED ORAL ONCE
Status: COMPLETED | OUTPATIENT
Start: 2018-02-28 | End: 2018-02-28

## 2018-02-28 RX ORDER — LISINOPRIL AND HYDROCHLOROTHIAZIDE 12.5; 2 MG/1; MG/1
1 TABLET ORAL DAILY
Status: DISCONTINUED | OUTPATIENT
Start: 2018-02-28 | End: 2018-03-01 | Stop reason: HOSPADM

## 2018-02-28 RX ORDER — EZETIMIBE 10 MG/1
10 TABLET ORAL DAILY
Status: DISCONTINUED | OUTPATIENT
Start: 2018-02-28 | End: 2018-03-01 | Stop reason: HOSPADM

## 2018-02-28 RX ORDER — PROMETHAZINE HYDROCHLORIDE 25 MG/ML
INJECTION, SOLUTION INTRAMUSCULAR; INTRAVENOUS
Status: DISPENSED
Start: 2018-02-28 | End: 2018-02-28

## 2018-02-28 RX ORDER — HYDROMORPHONE HYDROCHLORIDE 2 MG/ML
0.5 INJECTION, SOLUTION INTRAMUSCULAR; INTRAVENOUS; SUBCUTANEOUS
Status: DISCONTINUED | OUTPATIENT
Start: 2018-02-28 | End: 2018-02-28

## 2018-02-28 RX ORDER — SODIUM CHLORIDE 9 MG/ML
INJECTION, SOLUTION INTRAVENOUS
Status: DISCONTINUED | OUTPATIENT
Start: 2018-02-28 | End: 2018-02-28 | Stop reason: HOSPADM

## 2018-02-28 RX ORDER — SODIUM CHLORIDE, SODIUM LACTATE, POTASSIUM CHLORIDE, CALCIUM CHLORIDE 600; 310; 30; 20 MG/100ML; MG/100ML; MG/100ML; MG/100ML
50 INJECTION, SOLUTION INTRAVENOUS CONTINUOUS
Status: DISCONTINUED | OUTPATIENT
Start: 2018-02-28 | End: 2018-02-28 | Stop reason: HOSPADM

## 2018-02-28 RX ORDER — BISACODYL 5 MG
10 TABLET, DELAYED RELEASE (ENTERIC COATED) ORAL DAILY
Status: DISCONTINUED | OUTPATIENT
Start: 2018-02-28 | End: 2018-03-01 | Stop reason: HOSPADM

## 2018-02-28 RX ORDER — CEFAZOLIN SODIUM 2 G/50ML
2 SOLUTION INTRAVENOUS ONCE
Status: COMPLETED | OUTPATIENT
Start: 2018-02-28 | End: 2018-02-28

## 2018-02-28 RX ORDER — NALOXONE HYDROCHLORIDE 0.4 MG/ML
0.04 INJECTION, SOLUTION INTRAMUSCULAR; INTRAVENOUS; SUBCUTANEOUS AS NEEDED
Status: DISCONTINUED | OUTPATIENT
Start: 2018-02-28 | End: 2018-02-28 | Stop reason: HOSPADM

## 2018-02-28 RX ORDER — CEFAZOLIN SODIUM 2 G/50ML
2 SOLUTION INTRAVENOUS EVERY 8 HOURS
Status: DISCONTINUED | OUTPATIENT
Start: 2018-02-28 | End: 2018-03-01 | Stop reason: HOSPADM

## 2018-02-28 RX ORDER — ONDANSETRON 2 MG/ML
4 INJECTION INTRAMUSCULAR; INTRAVENOUS AS NEEDED
Status: DISCONTINUED | OUTPATIENT
Start: 2018-02-28 | End: 2018-02-28 | Stop reason: SDUPTHER

## 2018-02-28 RX ADMIN — ONDANSETRON 4 MG: 2 INJECTION INTRAMUSCULAR; INTRAVENOUS at 07:20

## 2018-02-28 RX ADMIN — DEXAMETHASONE SODIUM PHOSPHATE 10 MG: 4 INJECTION, SOLUTION INTRA-ARTICULAR; INTRALESIONAL; INTRAMUSCULAR; INTRAVENOUS; SOFT TISSUE at 07:28

## 2018-02-28 RX ADMIN — ROCURONIUM BROMIDE 10 MG: 10 INJECTION, SOLUTION INTRAVENOUS at 08:19

## 2018-02-28 RX ADMIN — METOCLOPRAMIDE 5 MG: 5 INJECTION, SOLUTION INTRAMUSCULAR; INTRAVENOUS at 18:43

## 2018-02-28 RX ADMIN — ROCURONIUM BROMIDE 5 MG: 10 INJECTION, SOLUTION INTRAVENOUS at 07:28

## 2018-02-28 RX ADMIN — CEFAZOLIN SODIUM 2 G: 2 SOLUTION INTRAVENOUS at 21:52

## 2018-02-28 RX ADMIN — Medication 1 MG: at 09:30

## 2018-02-28 RX ADMIN — NEOSTIGMINE METHYLSULFATE 4 MG: 1 INJECTION INTRAVENOUS at 09:29

## 2018-02-28 RX ADMIN — PROPOFOL 160 MG: 10 INJECTION, EMULSION INTRAVENOUS at 07:28

## 2018-02-28 RX ADMIN — Medication 100 MCG: at 07:50

## 2018-02-28 RX ADMIN — CEFAZOLIN SODIUM 2 G: 2 SOLUTION INTRAVENOUS at 07:20

## 2018-02-28 RX ADMIN — GLYCOPYRROLATE 0.6 MG: 0.2 INJECTION INTRAMUSCULAR; INTRAVENOUS at 09:29

## 2018-02-28 RX ADMIN — SODIUM CHLORIDE 100 ML/HR: 900 INJECTION, SOLUTION INTRAVENOUS at 12:57

## 2018-02-28 RX ADMIN — Medication 100 MCG: at 07:55

## 2018-02-28 RX ADMIN — SODIUM CHLORIDE, SODIUM LACTATE, POTASSIUM CHLORIDE, AND CALCIUM CHLORIDE: 600; 310; 30; 20 INJECTION, SOLUTION INTRAVENOUS at 08:19

## 2018-02-28 RX ADMIN — HYDROMORPHONE HYDROCHLORIDE 0.5 MG: 1 INJECTION, SOLUTION INTRAMUSCULAR; INTRAVENOUS; SUBCUTANEOUS at 09:54

## 2018-02-28 RX ADMIN — FENTANYL CITRATE 100 MCG: 50 INJECTION, SOLUTION INTRAMUSCULAR; INTRAVENOUS at 09:05

## 2018-02-28 RX ADMIN — HYDROMORPHONE HYDROCHLORIDE: 10 INJECTION, SOLUTION INTRAMUSCULAR; INTRAVENOUS; SUBCUTANEOUS at 12:57

## 2018-02-28 RX ADMIN — SODIUM CHLORIDE: 9 INJECTION, SOLUTION INTRAVENOUS at 07:22

## 2018-02-28 RX ADMIN — Medication 1 MG: at 09:44

## 2018-02-28 RX ADMIN — SODIUM CHLORIDE 100 ML/HR: 900 INJECTION, SOLUTION INTRAVENOUS at 21:52

## 2018-02-28 RX ADMIN — BISACODYL 10 MG: 5 TABLET, COATED ORAL at 15:49

## 2018-02-28 RX ADMIN — LIDOCAINE HYDROCHLORIDE 80 MG: 20 INJECTION, SOLUTION EPIDURAL; INFILTRATION; INTRACAUDAL; PERINEURAL at 07:28

## 2018-02-28 RX ADMIN — ACETAMINOPHEN 1000 MG: 500 TABLET ORAL at 15:48

## 2018-02-28 RX ADMIN — SUCCINYLCHOLINE CHLORIDE 140 MG: 20 INJECTION INTRAMUSCULAR; INTRAVENOUS at 07:28

## 2018-02-28 RX ADMIN — ONDANSETRON 4 MG: 2 INJECTION INTRAMUSCULAR; INTRAVENOUS at 09:29

## 2018-02-28 RX ADMIN — FENTANYL CITRATE 100 MCG: 50 INJECTION, SOLUTION INTRAMUSCULAR; INTRAVENOUS at 07:28

## 2018-02-28 RX ADMIN — SODIUM CHLORIDE 100 ML/HR: 900 INJECTION, SOLUTION INTRAVENOUS at 12:00

## 2018-02-28 RX ADMIN — Medication 10 ML: at 22:00

## 2018-02-28 RX ADMIN — LORAZEPAM 1 MG: 2 INJECTION INTRAMUSCULAR; INTRAVENOUS at 10:49

## 2018-02-28 RX ADMIN — SODIUM CHLORIDE, SODIUM LACTATE, POTASSIUM CHLORIDE, AND CALCIUM CHLORIDE: 600; 310; 30; 20 INJECTION, SOLUTION INTRAVENOUS at 07:20

## 2018-02-28 RX ADMIN — Medication 100 MCG: at 08:20

## 2018-02-28 RX ADMIN — LISINOPRIL AND HYDROCHLOROTHIAZIDE 1 TABLET: 12.5; 2 TABLET ORAL at 15:49

## 2018-02-28 RX ADMIN — HYDROMORPHONE HYDROCHLORIDE 0.5 MG: 1 INJECTION, SOLUTION INTRAMUSCULAR; INTRAVENOUS; SUBCUTANEOUS at 10:09

## 2018-02-28 RX ADMIN — ONDANSETRON HYDROCHLORIDE 4 MG: 2 INJECTION, SOLUTION INTRAMUSCULAR; INTRAVENOUS at 09:56

## 2018-02-28 RX ADMIN — MIDAZOLAM HYDROCHLORIDE 2 MG: 1 INJECTION, SOLUTION INTRAMUSCULAR; INTRAVENOUS at 07:20

## 2018-02-28 RX ADMIN — CEFAZOLIN SODIUM 2 G: 2 SOLUTION INTRAVENOUS at 15:48

## 2018-02-28 RX ADMIN — BUDESONIDE AND FORMOTEROL FUMARATE DIHYDRATE 2 PUFF: 80; 4.5 AEROSOL RESPIRATORY (INHALATION) at 16:19

## 2018-02-28 RX ADMIN — DIPHENHYDRAMINE HYDROCHLORIDE 12.5 MG: 50 INJECTION, SOLUTION INTRAMUSCULAR; INTRAVENOUS at 09:58

## 2018-02-28 RX ADMIN — Medication 100 MCG: at 08:15

## 2018-02-28 RX ADMIN — HYDROMORPHONE HYDROCHLORIDE 0.5 MG: 1 INJECTION, SOLUTION INTRAMUSCULAR; INTRAVENOUS; SUBCUTANEOUS at 10:23

## 2018-02-28 RX ADMIN — FAMOTIDINE 20 MG: 20 TABLET, FILM COATED ORAL at 06:52

## 2018-02-28 RX ADMIN — ONDANSETRON HYDROCHLORIDE 4 MG: 2 INJECTION, SOLUTION INTRAMUSCULAR; INTRAVENOUS at 16:14

## 2018-02-28 RX ADMIN — HYDROMORPHONE HYDROCHLORIDE 0.5 MG: 1 INJECTION, SOLUTION INTRAMUSCULAR; INTRAVENOUS; SUBCUTANEOUS at 10:45

## 2018-02-28 RX ADMIN — SODIUM CHLORIDE, SODIUM LACTATE, POTASSIUM CHLORIDE, AND CALCIUM CHLORIDE 75 ML/HR: 600; 310; 30; 20 INJECTION, SOLUTION INTRAVENOUS at 06:51

## 2018-02-28 RX ADMIN — ROCURONIUM BROMIDE 25 MG: 10 INJECTION, SOLUTION INTRAVENOUS at 07:33

## 2018-02-28 RX ADMIN — GLYCOPYRROLATE 0.2 MG: 0.2 INJECTION INTRAMUSCULAR; INTRAVENOUS at 07:20

## 2018-02-28 RX ADMIN — SODIUM CHLORIDE, SODIUM LACTATE, POTASSIUM CHLORIDE, AND CALCIUM CHLORIDE 50 ML/HR: 600; 310; 30; 20 INJECTION, SOLUTION INTRAVENOUS at 10:04

## 2018-02-28 NOTE — PERIOP NOTES
TRANSFER - OUT REPORT:    Verbal report given to Arnold Ewing RN(name) on Ann Davis  being transferred to [address] [place] [address](unit) for routine post - op       Report consisted of patients Situation, Background, Assessment and   Recommendations(SBAR). Information from the following report(s) SBAR, Kardex, OR Summary, Procedure Summary, Intake/Output, MAR, Recent Results and Med Rec Status was reviewed with the receiving nurse. Lines:   Peripheral IV 02/28/18 Left Wrist (Active)   Site Assessment Clean, dry, & intact 2/28/2018  9:48 AM   Phlebitis Assessment 0 2/28/2018  9:48 AM   Infiltration Assessment 0 2/28/2018  9:48 AM   Dressing Status Clean, dry, & intact 2/28/2018  9:48 AM   Dressing Type Tape;Transparent 2/28/2018  9:48 AM   Hub Color/Line Status Pink; Infusing 2/28/2018  9:48 AM        Opportunity for questions and clarification was provided.       Patient transported with:   O2 @ 2 liters  Tech

## 2018-02-28 NOTE — OP NOTES
[address] [place] [address] Dr  OPERATIVE REPORT    Susanne Alvraado  MR#: [id number]  : 1955  ACCOUNT #: [de-identified]   DATE OF SERVICE: 2018    SURGEON:  Claude Hails, MD.     ASSISTANT:      PREOPERATIVE DIAGNOSIS:  Cervical spondylitic myelopathy. POSTOPERATIVE DIAGNOSIS:  Cervical spondylitic myelopathy. PROCEDURES PERFORMED:  Cervical laminectomy C3, C4, C5, C6, subtotal laminectomy C7, posterior cervical fusion C3, C4, C5, C6, C7, lateral mass instrumentation C3, C4, C5, C6, C7.     FINDINGS:  The patient's stenosis across the segments is consistent with the patient's MRI. Bone quality was fair to good. OPERATION:  Following induction of endotracheal anesthesia, the patient in prone position on a spinal frame. The patient was prepped and draped in usual fashion. The patient's cervical spine was kept in neutral position. Midline incision was made. Subperiosteal dissection was done off the spinous processes of 3, 4, 5 and 6. Care was taken to keep the ligamentous attachments to the inferior aspect of C7 and the superior aspect of C2. C-arm image verified our surgical level. Hemostasis was scrupulously maintained. The spine was prepared and delineated with bony landmarks defined. The lateral mass instrumentation was placed at C3, 4, 5, 6 and 7. Starting points were marked with high speed bur. Lateral mass screw paths made with the drill, palpated with a ball-tipped probe to confirm the integrity of the bony path. A trough was made at the lamina facet margin with high speed bur to a thin cortical shell and en bloc laminectomy was done of 3 through 6 with 1 mm Sella punch. This was first done on the left and then on the right and the intraspinous ligament was taken with a towel clip, lobster shell completion of the laminectomy was done removing 6, 5, 4 and 3 en bloc without any downward pressure on the cord.   The cord was inspected and palpated in the lateral recess without any findings of further stenosis. There was continuing some stenosis in the superior aspect of the C7 lamina as evidenced on MRI, but I did not feel it was necessary to remove the entire C7 wanting to maintain ligament integrity between 7 and 1. With a high speed bur, I thinned the superior aspect of C7 and was able to resect the superior aspect of C7, first in the lateral margin, then across the spinal canal and remove that en bloc with the invaginating ligamentous material completing the decompression. Now that the decompression had been completed and the lateral mass instrumentation had been placed over the screws, rods have been placed in the screw heads with fixation caps and final tightening and torquing done. Facets had been decorticated prior to removal of the lobster shell and demineralized bone matrix had been placed. Once the lobster shell had been removed, bone in autograft form had been minced and placed in addition over the facets to have additional autograft bone for fusion. The spine was rigidly stabilized, grafted and decompressed at this point. Some Gelfoam was placed over the laminectomy site and a deep drain sited as well as vancomycin powder instilled for infection prophylaxis. The cervical dorsal fascia was closed with #1 Vicryl, subcutaneous tissues closed with 2-0 Vicryl and the skin closed with a 4-0 Monocryl subcuticular suture and Dermabond. Sterile occlusive dressing was placed upon the wound. All counts were correct. ESTIMATED BLOOD LOSS:  Was no more than 100 mL. COMPLICATIONS:  There were no complications. SPECIMENS REMOVED:  No specimens. IMPLANTS: Instrumentation was the K2M lateral mass instrumentation and demineralized bone matrix as well as the autograft recycled bone from the decompression.      ANESTHESIA:  General endotracheal.      Meredith Max MD       [address] / VANDANA  D: 02/28/2018 09:21     T: 02/28/2018 13:43  JOB #: [id number]

## 2018-02-28 NOTE — BRIEF OP NOTE
BRIEF OPERATIVE NOTE    Date of Procedure: 2/28/2018   Preoperative Diagnosis: M48.02 CERVICAL STENOSIS  Postoperative Diagnosis: M48.02 CERVICAL STENOSIS    Procedure(s):  C3-C7 LAMINECTOMY FUSION  Surgeon(s) and Role:     * Kerri Tomlinson MD - Primary         Assistant Staff: None      Surgical Staff:  Circ-1: Kaela Johnston RN  Radiology Technician: Angelia Hernandez  Scrub Tech-1: Ashley Benson  Surg Asst-1: Purvis Can  Event Time In   Incision Start 3320   Incision Close      Anesthesia: General   Estimated Blood Loss: 100  Specimens: * No specimens in log *   Findings: severe stenosis   Complications: 0  Implants:   Implant Name Type Inv.  Item Serial No.  Lot No. LRB No. Used Action   GRAFT DBM PLUS PASTE 8ML -- ALLOFUSE - [id number]  GRAFT DBM PLUS PASTE 8ML -- ALLOFUSE  [employer] [id number] N/A 1 Implanted   [employer]ukon  3.5 x 12 mm screw Screw   [employer] NA N/A 10 Implanted   Yukon Set Screw  Screw   [employer]  N/A 10 Implanted   Yukon 3.5 x 75 mm Andrew Andrew     [employer] NA N/A 2 Implanted

## 2018-02-28 NOTE — PROGRESS NOTES
conducted a pre-surgery visit with Amada Le, who is a 57 y.o.,female. The  provided the following Interventions:  Initiated a relationship of care and support. Offered prayer and assurance of continued prayers on patient's behalf. Plan:  Chaplains will continue to follow and will provide pastoral care on an as needed/requested basis.  recommends bedside caregivers page  on duty if patient shows signs of acute spiritual or emotional distress.     [address] [facility]   Board Certified [facility]   [phone]

## 2018-02-28 NOTE — INTERVAL H&P NOTE
H&P Update:  Ana Echavarria was seen and examined. History and physical has been reviewed. The patient has been examined.  There have been no significant clinical changes since the completion of the originally dated History and Physical.    Signed By: Mayela Valerio MD     February 28, 2018 7:03 AM

## 2018-02-28 NOTE — PROGRESS NOTES
OR Today-Cervical laminectomy C3, C4, C5, C6, subtotal laminectomy C7, posterior cervical fusion C3, C4, C5, C6, C7, lateral mass instrumentation C3, C4, C5, C6, C7. Hx: COPD, smoker    VSS, LS clear, Apical pulse regular  Dressing clean, dry and intact-posterior, visible. Dry and intact. Soft Collar in place  MANDEEP:30cc, plus about another 20 cc of bloody drainage in there now  PCA: Dilaudid  UA: Ro, clear yellow. 150cc in PACU, about another 75cc now. PT:In with pt now  Neuro Active nausea/vomiting. Scopolamine patch and PRN Phenergan now. If still nauseated Low-Dose PRN Reglan ordered. Otherwise intact/at baseline. She had myelopathy w/gait disturbance, balance issues, dexterity weakness and hyper-reflexia. Reports that her BUE pain is better. Having increased neck pain from surgery. Was tolerating water without difficulty prior to Nausea/vomiting episode. Moving all extremities. 4/5 BUE .     Julio Cesar Rust, NP

## 2018-02-28 NOTE — ANESTHESIA PREPROCEDURE EVALUATION
Anesthetic History     PONV          Review of Systems / Medical History  Patient summary reviewed and pertinent labs reviewed    Pulmonary    COPD: moderate        Asthma        Neuro/Psych   Within defined limits           Cardiovascular    Hypertension                   GI/Hepatic/Renal     GERD           Endo/Other        Arthritis     Other Findings   Comments: Documentation of current medication  Current medications obtained, documented and obtained? YES      Risk Factors for Postoperative nausea/vomiting:       History of postoperative nausea/vomiting? YES       Female? YES       Motion sickness? NO       Intended opioid administration for postoperative analgesia? YES      Smoking Abstinence:  Current Smoker? YES  Elective Surgery? YES  Seen preoperatively by anesthesiologist or proxy prior to day of surgery? YES  Pt abstained from smoking 24 hours prior to anesthesia?  NO    Preventive care/screening for High Blood Pressure:  Aged 18 years and older: YES  Screened for high blood pressure: YES  Patients with high blood pressure referred to primary care provider   for BP management: YES                 Physical Exam    Airway  Mallampati: II  TM Distance: 4 - 6 cm  Neck ROM: decreased range of motion   Mouth opening: Diminished (comment)     Cardiovascular    Rhythm: regular  Rate: normal         Dental    Dentition: Poor dentition     Pulmonary  Breath sounds clear to auscultation               Abdominal  GI exam deferred       Other Findings            Anesthetic Plan    ASA: 3  Anesthesia type: general          Induction: Intravenous  Anesthetic plan and risks discussed with: Patient

## 2018-02-28 NOTE — IP AVS SNAPSHOT
[address] Patient: Cabrera Hurtado MRN: [id number] HZM:1/1/[birth year]80 About your hospitalization You were admitted on:  February 28, 2018 You last received care in the:  [facility] [address] You were discharged on:  March 1, 2018 Why you were hospitalized Your primary diagnosis was:  Not on File Your diagnoses also included:  Cervical Spondylosis With Myelopathy, Cervical Stenosis Of Spine Follow-up Information Follow up With Details Comments Contact Info Danyell Leung NP On 3/14/2018 Appointment at 10:50 am [address] [facility] [address] [phone] Jon Sharp NP On 3/1/2018 Left a message for [facility] to call patient with appointment. [address] [facility] 
[phone] Danyell Leung NP On 3/2/2018 Appointment at 8:30 am [address] [facility] [address] [phone] Your Scheduled Appointments Friday March 02, 2018  8:30 AM EST Follow Up with Danyell Leung NP  
[address] [facility], [address] [facility] ([address] [place] [address]) [address] [facility] [address]  
[phone] Tuesday March 27, 2018  3:00 PM EDT Follow Up with Mitul Foster MD  
[address] [facility] [facility] [address]) [address] [facility] [address] [phone] Discharge Orders None A check kali indicates which time of day the medication should be taken. My Medications START taking these medications Instructions Each Dose to Equal  
 Morning Noon Evening Bedtime  
 oxyCODONE IR 10 mg Tab immediate release tablet Commonly known as:  Jessica Render Your last dose was: Your next dose is: Take 1 Tab by mouth every six (6) hours as needed.  Max Daily Amount: 40 mg. Indications: post op pain 10 mg  
    
   
   
   
  
 promethazine 25 mg tablet Commonly known as:  PHENERGAN Your last dose was: Your next dose is: Take 1 Tab by mouth every eight (8) hours as needed for Nausea. Indications: POST-OPERATIVE NAUSEA AND VOMITING  
 25 mg CONTINUE taking these medications Instructions Each Dose to Equal  
 Morning Noon Evening Bedtime  
 albuterol 90 mcg/actuation inhaler Commonly known as:  PROVENTIL HFA Your last dose was: Your next dose is: Take 2 Puffs by inhalation every six (6) hours as needed. 2 Puff Cholecalciferol (Vitamin D3) 2,000 unit Cap capsule Commonly known as:  VITAMIN D3 Your last dose was: Your next dose is: Take 2,000 Units by mouth daily. 2000 Units  
    
   
   
   
  
 dexlansoprazole 30 mg capsule Commonly known as:  DEXILANT Your last dose was: Your next dose is: Take 1 Cap by mouth daily. Indications: gastroesophageal reflux disease 30 mg  
    
   
   
   
  
 ezetimibe 10 mg tablet Commonly known as:  Verner Aid Your last dose was: Your next dose is: Take 1 Tab by mouth daily. 10 mg  
    
   
   
   
  
 fluticasone-salmeterol 250-50 mcg/dose diskus inhaler Commonly known as:  ADVAIR Your last dose was: Your next dose is: Take 1 Puff by inhalation every twelve (12) hours. 1 Puff  
    
   
   
   
  
 lisinopril-hydroCHLOROthiazide 20-12.5 mg per tablet Commonly known as:  Rocky Dow Your last dose was: Your next dose is: Take 1 Tab by mouth daily. Indications: hypertension 1 Tab  
    
   
   
   
  
 magnesium oxide 400 mg tablet Commonly known as:  MAG-OX Your last dose was: Your next dose is: Take 1 Tab by mouth daily.   
 400 mg  
    
   
 mometasone 50 mcg/actuation nasal spray Commonly known as:  Ariadna Roche Your last dose was: Your next dose is: 2 Sprays by Both Nostrils route daily. 2 Spray  
    
   
   
   
  
 montelukast 10 mg tablet Commonly known as:  SINGULAIR Your last dose was: Your next dose is: Take 1 Tab by mouth daily. Indications: Allergic Rhinitis 10 mg  
    
   
   
   
  
 topiramate 25 mg tablet Commonly known as:  TOPAMAX Your last dose was: Your next dose is: Take 1 tab po QHS for one week, then increase to 2 tabs po QHS  
     
   
   
   
  
  
STOP taking these medications   
 aspirin delayed-release 81 mg tablet  
   
  
 naproxen 500 mg tablet Commonly known as:  NAPROSYN  
   
  
 traMADol 50 mg tablet Commonly known as:  ULTRAM  
   
  
  
  
Where to Get Your Medications Information on where to get these meds will be given to you by the nurse or doctor. ! Ask your nurse or doctor about these medications  
  oxyCODONE IR 10 mg Tab immediate release tablet  
 promethazine 25 mg tablet Discharge Instructions PATIENT DISCHARGE INSTRUCTIONS PATIENT DISCHARGE INSTRUCTIONS Connor Garcia / [id number] : 1955 Admitted 2018 Discharged: 3/1/2018 · It is important that you take the medication exactly as they are prescribed. · Keep your medication in the bottles provided by the pharmacist and keep a list of the medication names, dosages, and times to be taken in your wallet. · Do not take other medications without consulting your doctor. What to do at [facility] Recommended Diet: Clear liquids, advance as tolerated Recommended Activity: No driving, lifting, bending, twisting or strenuous activity for 2 weeks until your follow up appointment.   
 
If you experience any of the following symptoms  fever greater than 101.5, wound drainage, redness at incision site, increased pain, new onset of extremity numbness/tingling/weakness or gait disturbance, bladder or bowel dysfunction, please follow up with The [address] Signed By: Heather Lee NP 2018 [id number] Activation Thank you for requesting access to TheWrap. Please follow the instructions below to securely access and download your online medical record. TheWrap allows you to send messages to your doctor, view your test results, renew your prescriptions, schedule appointments, and more. How Do I Sign Up? 1. In your internet browser, go to [email or web address] 
2. Click on the First Time User? Click Here link in the Sign In box. You will be redirect to the New Member Sign Up page. 3. Enter your TheWrap Access Code exactly as it appears below. You will not need to use this code after youve completed the sign-up process. If you do not sign up before the expiration date, you must request a new code. TheWrap Access Code: Activation code not generated Current TheWrap Status: Patient Declined (This is the date your TheWrap access code will ) 4. Enter the last four digits of your Social Security Number (xxxx) and Date of Birth (mm/dd/yyyy) as indicated and click Submit. You will be taken to the next sign-up page. 5. Create a TheWrap ID. This will be your TheWrap login ID and cannot be changed, so think of one that is secure and easy to remember. 6. Create a TheWrap password. You can change your password at any time. 7. Enter your Password Reset Question and Answer. This can be used at a later time if you forget your password. 8. Enter your e-mail address. You will receive e-mail notification when new information is available in [address] 9. Click Sign Up. You can now view and download portions of your medical record. 10. Click the Download Summary menu link to download a portable copy of your medical information. Additional Information If you have questions, please visit the Frequently Asked Questions section of the [employer] website at [email or web address] Remember, Echolocationhart is NOT to be used for urgent needs. For medical emergencies, dial 911. Patient armband removed and shredded MyChart Announcement We are excited to announce that we are making your provider's discharge notes available to you in Echolocation[employer]. You will see these notes when they are completed and signed by the physician that discharged you from your recent hospital stay. If you have any questions or concerns about any information you see in Echolocationhart, please call the Health Information Department where you were seen or reach out to your Primary Care Provider for more information about your plan of care. Unresulted Labs-Please follow up with your PCP about these lab tests Order Current Status [facility] TECHNOLOGIST SERVICE In process Providers Seen During Your Hospitalization Provider Specialty Primary office phone Giuseppe Guzman MD Orthopedic Surgery [phone] Your Primary Care Physician (PCP) Primary Care Physician Office Phone Office Fax Zena Mercedes [phone] You are allergic to the following Allergen Reactions Lyrica (Pregabalin) Other (comments) Medication causes dry eyes, unstable balance, and lack of concentration. Mold Extracts Runny Nose Pollen Extracts Itching Statins-Hmg-Coa Reductase Inhibitors Myalgia Topamax (Topiramate) Vertigo Recent Documentation Height Weight Breastfeeding? BMI OB Status Smoking Status 1.549 m 58.1 kg No 24.19 kg/m2 Postmenopausal Current Every Day Smoker Emergency Contacts Name Discharge Info Relation Home Work Mobile Lester Lugo DISCHARGE CAREGIVER [3] Child [2] [phone]  Gisela Greenfield DISCHARGE CAREGIVER [3] Other Relative [6] [phone]  [phone] Patient Belongings The following personal items are in your possession at time of discharge: 
  Dental Appliances: None  Visual Aid: Glasses      Home Medications: None   Jewelry: None  Clothing: None    Other Valuables: None  Personal Items Sent to Safe: n/a Please provide this summary of care documentation to your next provider. Signatures-by signing, you are acknowledging that this After Visit Summary has been reviewed with you and you have received a copy. Patient Signature:  ____________________________________________________________ Date:  ____________________________________________________________  
  
[facility] Current Provider Signature:  ____________________________________________________________ Date:  ____________________________________________________________

## 2018-02-28 NOTE — ANESTHESIA POSTPROCEDURE EVALUATION
Post-Anesthesia Evaluation and Assessment    Patient: Dallas Smalls MRN: [id number]  SSN: [id number]    YOB: 1955  Age: 57 y.o. Sex: female     VS from flow sheet    Cardiovascular Function/Vital Signs  Visit Vitals    /66 (BP 1 Location: Right arm, BP Patient Position: Supine)    Pulse 81    Temp 36.2 °C (97.1 °F)    Resp 19    Ht 5' 1\" (1.549 m)    Wt 58.1 kg (128 lb)    SpO2 97%    Breastfeeding No    BMI 24.19 kg/m2       Patient is status post general anesthesia for Procedure(s):  C3-C7 LAMINECTOMY FUSION. Nausea/Vomiting: None    Postoperative hydration reviewed and adequate. Pain:  Pain Scale 1: Visual (02/28/18 1211)  Pain Intensity 1: 0 (02/28/18 1211)   Managed    Neurological Status:   Neuro (WDL): Within Defined Limits (02/28/18 1044)  Neuro  LUE Motor Response: Purposeful (02/28/18 1213)  LLE Motor Response: Purposeful (02/28/18 1213)  RUE Motor Response: Purposeful (02/28/18 1213)  RLE Motor Response: Purposeful (02/28/18 1213)   At baseline    Mental Status and Level of Consciousness: Arousable    Pulmonary Status:   O2 Device: Nasal cannula (02/28/18 1119)   Adequate oxygenation and airway patent    Complications related to anesthesia: None    Post-anesthesia assessment completed.  No concerns    Signed By: Nato Lacy MD     February 28, 2018

## 2018-02-28 NOTE — PROGRESS NOTES
Problem: Mobility Impaired (Adult and Pediatric)  Goal: *Acute Goals and Plan of Care (Insert Text)  Physical Therapy Goals  Initiated 2/28/2018 and to be accomplished within 7 day(s)  1. Patient will move from supine to sit and sit to supine , scoot up and down and roll side to side in bed with supervision/set-up. 2.  Patient will transfer from bed to chair and chair to bed with supervision/set-up using the least restrictive device. 3.  Patient will perform sit to stand with supervision/set-up. 4.  Patient will ambulate with supervision/set-up for >150 feet with the least restrictive device. 5.  Patient will ascend/descend 4 stairs with 1 handrail(s) with supervision/set-up. physical Therapy EVALUATION    Patient: Narda Ramirez (4 y.o. female)  Date: 2/28/2018  Primary Diagnosis: M48.02 CERVICAL STENOSIS  Cervical spondylosis with myelopathy  Cervical stenosis of spine  Procedure(s) (LRB):  C3-C7 LAMINECTOMY FUSION (N/A) Day of Surgery   Precautions: Fall        ASSESSMENT :  Patient is 56 yo F admitted to hospital for ACDF and presents today alert and agreeable to therapy. Patient was educated on cervical precautions and demonstrated fair compliance with precautions throughout session. Patient was given demo with instruction on sit <> stand transfer and gait training and transferred to standing with Mahi and ambulated 5ft with poor balance and further mobility deferred 2/2 lethargy and nausea. Patient vomited and given new emesis bag. Friend in room observed pressing patient's PCA button while patient eyes closed. Educated patient's friend that patient is to be the only person pressing button as patient needs to be alert enough to press button; friend acknowledged understanding. At conclusion of session patient transferred to sitting in bed and was left resting with call bell by the side and SCDs donned.  Patient instructed to call for assistance if they needed to get up for any reason and denied need for further assistance. Patient demonstrates decreased strength, mobility, and endurance and will benefit from skilled intervention to address the above impairments. Patients rehabilitation potential is considered to be Good  Factors which may influence rehabilitation potential include:   []         None noted  [x]         Mental ability/status  [x]         Medical condition  [x]         Home/family situation and support systems  [x]         Safety awareness  [x]         Pain tolerance/management  []         Other:      PLAN :  Recommendations and Planned Interventions:  [x]           Bed Mobility Training             [x]    Neuromuscular Re-Education  [x]           Transfer Training                   []    Orthotic/Prosthetic Training  [x]           Gait Training                          []    Modalities  [x]           Therapeutic Exercises          []    Edema Management/Control  [x]           Therapeutic Activities            [x]    Patient and Family Training/Education  []           Other (comment):    Frequency/Duration: Patient will be followed by physical therapy 1-2 times per day/4-7 days per week to address goals. Discharge Recommendations: Home Health  Further Equipment Recommendations for Discharge: rolling walker     G-CODES     Mobility  Current  CJ= 20-39%   Goal  CI= 1-19%. The severity rating is based on the Level of Assistance required for Functional Mobility and ADLs.        G-CODES     Eval Complexity: History: MEDIUM  Complexity : 1-2 comorbidities / personal factors will impact the outcome/ POC Exam:LOW Complexity : 1-2 Standardized tests and measures addressing body structure, function, activity limitation and / or participation in recreation  Presentation: LOW Complexity : Stable, uncomplicated  Clinical Decision Making:Low Complexity   Overall Complexity:LOW     SUBJECTIVE:   Patient stated I'm so sorry. I can't do much. I have to pee.  Patient reminded of catheter in place. OBJECTIVE DATA SUMMARY:     Past Medical History:   Diagnosis Date    Asthma 2012    +PFT for mild obstructive disease/COPD    Back pain, chronic 2012    Dr. Gearl Lanes referred to Dr. Ella Kehr    [facility]     GERD (gastroesophageal reflux disease)     H/O mammogram 10/03/2017    No evidence of malignancy    Hard of hearing     chronic ruptured TM    HTN (hypertension) 2012    Hx of screening mammography 2016    [facility], normal, routine recs    Hyperlipidemia     Nausea & vomiting     Sinus congestion 2012    Spinal stenosis of lumbar region with radiculopathy 2015    Dr. Kelsea Silva     Past Surgical History:   Procedure Laterality Date    HX  SECTION      HX TUBAL LIGATION       Barriers to Learning/Limitations: sedation s/p surgery  Compensate with: Visual Cues, Verbal Cues and Tactile Cues  Prior Level of Function/Home Situation: Patient lives in 1 story home with 4STE with HR. Patient has friend who will be assisting her after surgery. Patient reports she was independent with mobility and I/ADL's PTA and that she has RW available to her at home. Home Situation  Home Environment: Private residence  # Steps to Enter: 0  One/Two Story Residence: One story  Living Alone: No  Support Systems: Family member(s)  Patient Expects to be Discharged to[de-identified] Private residence  Current DME Used/Available at Home: None  Critical Behavior:   A&Ox4   Strength:     BLE generally decreased functional    Tone & Sensation:   Tone: Normal (BLE)   Sensation: Intact (BLE)   Range Of Motion:  AROM: Within functional limits (BLE)   Functional Mobility:  Bed Mobility:   Supine to Sit: Minimum assistance  Sit to Supine: Moderate assistance (2/2 patient falling asleep; not from strength deficits)   Transfers:  Sit to Stand: Contact guard assistance;Minimum assistance  Stand to Sit: Contact guard assistance    Balance:   Sitting: Impaired; With support  Sitting - Static: Fair (occasional)  Sitting - Dynamic: Fair (occasional)  Standing: Impaired; With support  Standing - Static: Fair;Poor  Standing - Dynamic : Poor  Ambulation/Gait Training:  Distance (ft): 5 Feet (ft)  Assistive Device: Walker, rolling  Ambulation - Level of Assistance: Minimal assistance   Base of Support: Narrowed  Speed/Judith: Shuffled; Slow  Interventions: Safety awareness training; Tactile cues; Verbal cues; Visual/Demos  Pain:  Pt reports 3/10 pain or discomfort prior to treatment.    Pt reports 3/10 pain or discomfort post treatment. Activity Tolerance:   Patient demonstrated decreased tolerance to activity 2/2 nausea and lethargy. Patient falling asleep during subjective, brought to sitting EOB in attempts to increased stimulation. Patient limited from further mobility as patient unsafe for further ambulation at this time as patient closing eyes during mobility and gait unsteady. Please refer to the flowsheet for vital signs taken during this treatment. After treatment:   []         Patient left in no apparent distress sitting up in chair  [x]         Patient left in no apparent distress in bed  [x]         Call bell left within reach  []         Nursing notified  [x]         Caregiver present  [x]         Bed alarm activated  [x]         SCDs in place to B LE     COMMUNICATION/EDUCATION:   [x]         Fall prevention education was provided and the patient/caregiver indicated understanding. [x]         Patient/family have participated as able in goal setting and plan of care. [x]         Patient/family agree to work toward stated goals and plan of care. []         Patient understands intent and goals of therapy, but is neutral about his/her participation. []         Patient is unable to participate in goal setting and plan of care.     Thank you for this referral.  Susi Beard, PT   Time Calculation: 35 mins

## 2018-03-01 VITALS
SYSTOLIC BLOOD PRESSURE: 111 MMHG | HEIGHT: 61 IN | DIASTOLIC BLOOD PRESSURE: 68 MMHG | BODY MASS INDEX: 24.17 KG/M2 | OXYGEN SATURATION: 95 % | HEART RATE: 75 BPM | WEIGHT: 128 LBS | TEMPERATURE: 98.4 F | RESPIRATION RATE: 18 BRPM

## 2018-03-01 PROCEDURE — 94640 AIRWAY INHALATION TREATMENT: CPT

## 2018-03-01 PROCEDURE — 74011250636 HC RX REV CODE- 250/636: Performed by: NURSE PRACTITIONER

## 2018-03-01 PROCEDURE — 97535 SELF CARE MNGMENT TRAINING: CPT

## 2018-03-01 PROCEDURE — 97165 OT EVAL LOW COMPLEX 30 MIN: CPT

## 2018-03-01 PROCEDURE — 99218 HC RM OBSERVATION: CPT

## 2018-03-01 PROCEDURE — 74011250636 HC RX REV CODE- 250/636: Performed by: ORTHOPAEDIC SURGERY

## 2018-03-01 PROCEDURE — 97116 GAIT TRAINING THERAPY: CPT

## 2018-03-01 PROCEDURE — 74011250637 HC RX REV CODE- 250/637: Performed by: ORTHOPAEDIC SURGERY

## 2018-03-01 RX ORDER — DIPHENHYDRAMINE HCL 25 MG
25 CAPSULE ORAL
Status: DISCONTINUED | OUTPATIENT
Start: 2018-03-01 | End: 2018-03-01 | Stop reason: HOSPADM

## 2018-03-01 RX ORDER — OXYCODONE HYDROCHLORIDE 10 MG/1
10 TABLET ORAL
Qty: 30 TAB | Refills: 0 | Status: SHIPPED | OUTPATIENT
Start: 2018-03-01 | End: 2018-03-14 | Stop reason: ALTCHOICE

## 2018-03-01 RX ORDER — PROMETHAZINE HYDROCHLORIDE 25 MG/1
25 TABLET ORAL
Qty: 20 TAB | Refills: 0 | Status: SHIPPED | OUTPATIENT
Start: 2018-03-01 | End: 2018-03-23 | Stop reason: SDUPTHER

## 2018-03-01 RX ADMIN — CEFAZOLIN SODIUM 2 G: 2 SOLUTION INTRAVENOUS at 05:47

## 2018-03-01 RX ADMIN — MONTELUKAST SODIUM 10 MG: 10 TABLET, COATED ORAL at 08:21

## 2018-03-01 RX ADMIN — ACETAMINOPHEN 1000 MG: 500 TABLET ORAL at 12:33

## 2018-03-01 RX ADMIN — LISINOPRIL AND HYDROCHLOROTHIAZIDE 1 TABLET: 12.5; 2 TABLET ORAL at 08:21

## 2018-03-01 RX ADMIN — ACETAMINOPHEN 1000 MG: 500 TABLET ORAL at 01:06

## 2018-03-01 RX ADMIN — OXYCODONE HYDROCHLORIDE 10 MG: 5 TABLET ORAL at 15:01

## 2018-03-01 RX ADMIN — OXYCODONE HYDROCHLORIDE 10 MG: 5 TABLET ORAL at 08:21

## 2018-03-01 RX ADMIN — METOCLOPRAMIDE 5 MG: 5 INJECTION, SOLUTION INTRAMUSCULAR; INTRAVENOUS at 10:48

## 2018-03-01 RX ADMIN — EZETIMIBE 10 MG: 10 TABLET ORAL at 08:29

## 2018-03-01 RX ADMIN — CEFAZOLIN SODIUM 2 G: 2 SOLUTION INTRAVENOUS at 15:05

## 2018-03-01 RX ADMIN — ACETAMINOPHEN 1000 MG: 500 TABLET ORAL at 05:47

## 2018-03-01 RX ADMIN — BUDESONIDE AND FORMOTEROL FUMARATE DIHYDRATE 2 PUFF: 80; 4.5 AEROSOL RESPIRATORY (INHALATION) at 09:42

## 2018-03-01 RX ADMIN — PANTOPRAZOLE SODIUM 40 MG: 40 TABLET, DELAYED RELEASE ORAL at 08:21

## 2018-03-01 RX ADMIN — Medication 10 ML: at 14:00

## 2018-03-01 RX ADMIN — Medication 10 ML: at 05:47

## 2018-03-01 NOTE — DISCHARGE SUMMARY
Discharge  Summary     Patient: Mono Reyes MRN: [id number]  SSN: [id number]    YOB: 1955  Age: 57 y.o. Sex: female       Admit Date: 2/28/2018    Discharge Date: 3/1/2018      Admission Diagnoses: M48.02 CERVICAL STENOSIS  Cervical spondylosis with myelopathy  Cervical stenosis of spine    Discharge Diagnoses:   Problem List as of 3/1/2018  Date Reviewed: 2/28/2018          Codes Class Noted - Resolved    Cervical spondylosis with myelopathy ICD-10-CM: M47.12  ICD-9-CM: 721.1  2/28/2018 - Present        Cervical stenosis of spine ICD-10-CM: M48.02  ICD-9-CM: 723.0  2/28/2018 - Present        Ossification of posterior longitudinal ligament in cervical region [facility]) ICD-10-CM: M48.8X2  ICD-9-CM: 723.7  1/8/2018 - Present        Cervical spinal stenosis ICD-10-CM: M48.02  ICD-9-CM: 723.0  1/8/2018 - Present        COPD, mild ([facility] ICD-10-CM: J44.9  ICD-9-CM: 125  10/21/2015 - Present        Vitamin D deficiency ICD-10-CM: E55.9  ICD-9-CM: 268.9  11/11/2014 - Present        Hyperlipidemia with target LDL less than 100 ICD-10-CM: E78.5  ICD-9-CM: 272.4  11/11/2014 - Present        Dermatitis ICD-10-CM: L30.9  ICD-9-CM: 692.9  2/25/2013 - Present        HTN (hypertension) ICD-10-CM: I10  ICD-9-CM: 401.9 Chronic 8/25/2012 - Present        Asthma ICD-10-CM: J45.909  ICD-9-CM: 493.90 Chronic 8/25/2012 - Present        Back pain, chronic ICD-10-CM: M54.9, G89.29  ICD-9-CM: 724.5, 338.29 Chronic 8/25/2012 - Present        Sinus congestion ICD-10-CM: R09.81  ICD-9-CM: 478.19 Temporary 8/25/2012 - Present               Discharge Condition: Good    Procedure:  ACDF C3-7    Hospital Course: Normal hospital course for this procedure. Tolerated surgical intervention well. VSS Throughout. Neuro intact. Incision dry and intact, tolerating PO intake, voiding adequately. Ambulatory. She will DC home with MANDEEP in place. She will be coming into the office tomorrow morning at 0830 to have this removed. Disposition: home    Discharge Medications:   Current Discharge Medication List      START taking these medications    Details   oxyCODONE IR (ROXICODONE) 10 mg tab immediate release tablet Take 1 Tab by mouth every six (6) hours as needed. Max Daily Amount: 40 mg. Indications: post op pain  Qty: 30 Tab, Refills: 0    Associated Diagnoses: S/P cervical spinal fusion      promethazine (PHENERGAN) 25 mg tablet Take 1 Tab by mouth every eight (8) hours as needed for Nausea. Indications: POST-OPERATIVE NAUSEA AND VOMITING  Qty: 20 Tab, Refills: 0         CONTINUE these medications which have NOT CHANGED    Details   dexlansoprazole (DEXILANT) 30 mg capsule Take 1 Cap by mouth daily. Indications: gastroesophageal reflux disease  Qty: 90 Cap, Refills: 3    Associated Diagnoses: Gastroesophageal reflux disease, esophagitis presence not specified      montelukast (SINGULAIR) 10 mg tablet Take 1 Tab by mouth daily. Indications: Allergic Rhinitis  Qty: 30 Tab, Refills: 6      mometasone (NASONEX) 50 mcg/actuation nasal spray 2 Sprays by Both Nostrils route daily. Qty: 3 Container, Refills: 3    Associated Diagnoses: Sinus congestion      fluticasone-salmeterol (ADVAIR) 250-50 mcg/dose diskus inhaler Take 1 Puff by inhalation every twelve (12) hours. Qty: 3 Inhaler, Refills: 3    Associated Diagnoses: COPD, mild (HCC)      albuterol (PROVENTIL HFA) 90 mcg/actuation inhaler Take 2 Puffs by inhalation every six (6) hours as needed. Qty: 1 Inhaler, Refills: 3    Associated Diagnoses: COPD, mild (HCC)      Cholecalciferol, Vitamin D3, (VITAMIN D3) 2,000 unit cap capsule Take 2,000 Units by mouth daily. Qty: 30 Cap, Refills: 11    Associated Diagnoses: Vitamin D deficiency      lisinopril-hydroCHLOROthiazide (PRINZIDE, ZESTORETIC) 20-12.5 mg per tablet Take 1 Tab by mouth daily.  Indications: hypertension  Qty: 30 Tab, Refills: 3    Associated Diagnoses: Essential hypertension      ezetimibe (ZETIA) 10 mg tablet Take 1 Tab by mouth daily. Qty: 90 Tab, Refills: 3      topiramate (TOPAMAX) 25 mg tablet Take 1 tab po QHS for one week, then increase to 2 tabs po QHS  Qty: 60 Tab, Refills: 0    Associated Diagnoses: Cervical radicular pain      magnesium oxide (MAG-OX) 400 mg tablet Take 1 Tab by mouth daily. Qty: 30 Tab, Refills: 11    Associated Diagnoses: Cramp of both lower extremities         STOP taking these medications       traMADol (ULTRAM) 50 mg tablet Comments:   Reason for Stopping:         naproxen (NAPROSYN) 500 mg tablet Comments:   Reason for Stopping:         aspirin delayed-release 81 mg tablet Comments:   Reason for Stopping: Follow-up Appointments   Procedures    FOLLOW UP VISIT Appointment in: Other (Specify) Tomorrow, 0830, for MANDEEP removal and then 2 weeks     Tomorrow, 0830, for MANDEEP removal and then 2 weeks     Standing Status:   Standing     Number of Occurrences:   1     Order Specific Question:   Appointment in     Answer:    Other (Specify)       Signed By: Faiza Hutson NP     March 1, 2018

## 2018-03-01 NOTE — ROUTINE PROCESS
Mobility Intervention:       [x] Pt dangled at edge of bed    [] Pt assisted OOB to bedside commode    [] Pt assisted OOB to chair    [x] Pt ambulated to bathroom    [x] Patient was ambulated in room/hallway    Assistive Device Utilized:       [] Rolling walker   [] Crutches   [] Straight Cane   [] Knee immobilizer   [x] IV pole    After Mobilization:     [x] Patient left in no apparent distress sitting up in chair  [x] Patient left in no apparent distress in bed  [x] Call bell left within reach  [x] SCDs on & machine turned on  [] Ice applied  [] RN notified  [] Caregiver present  [] Bed alarm activated    Reason patient not mobilized:      [] Patient refused   [] Nausea/vomiting   [] Low blood pressure   [] Drowsy/lethargic    Pain Rating:     [x] 0  [] 1  Assistive Device:        [] 2  [] 3  [] 4  [] 5  [] 6  Assistive Device:        [] 7  [] 8  [] 9  [] 10    CommentsFoley and now voiding.  Able to ambulate to bathroom with assist.

## 2018-03-01 NOTE — PROGRESS NOTES
Problem: Self Care Deficits Care Plan (Adult)  Goal: *Acute Goals and Plan of Care (Insert Text)  Outcome: Resolved/Met Date Met: 03/01/18  Occupational Therapy EVALUATION/discharge    Patient: Jaqui Rowe (44 y.o. female)  Date: 3/1/2018  Primary Diagnosis: M48.02 CERVICAL STENOSIS  Cervical spondylosis with myelopathy  Cervical stenosis of spine  Procedure(s) (LRB):  C3-C7 LAMINECTOMY FUSION (N/A) 1 Day Post-Op   Precautions:   Fall (cervical)    ASSESSMENT AND RECOMMENDATIONS:  Based on the objective data described below, the patient needed supervision with functional mobility toilet transfer with no AD and needed supervision with self care tasks with adaptive strategies. Patient had decreased, but functional BUE AROM, secondary to cervical precautions, and WFL  strength. Educated patient on cervical precautions as it relates to function; patient needed min verbal cues to recall and to implement them during functional tasks. Patient needed cues for safety. Educated patient on adaptive strategies during UE/LE dressing while adhering to cervical precautions; patient reported/demonstrated understanding with supervision. Patient left comfortable in no distress in bed. Skilled acute care occupational therapy is not indicated at this time. Discharge Recommendations: Home Health with supervision  Further Equipment Recommendations for Discharge: shower chair      Barriers to Learning/Limitations: None    COMPLEXITY     Eval Complexity: History: LOW Complexity : Brief history review ; Examination: LOW Complexity : 1-3 performance deficits relating to physical, cognitive , or psychosocial skils that result in activity limitations and / or participation restrictions ;  Decision Making:LOW Complexity : No comorbidities that affect functional and no verbal or physical assistance needed to complete eval tasks  Assessment: Low Complexity        G-CODES:     Self Care  Current  CI= 1-19%   Goal  CI= 1-19%   D/C  CI= 1-19%. The severity rating is based on the Level of Assistance required for Functional Mobility and ADLs. SUBJECTIVE:   Patient stated \"I'm just so itchy.     OBJECTIVE DATA SUMMARY:     Past Medical History:   Diagnosis Date    Asthma 2012    +PFT for mild obstructive disease/COPD    Back pain, chronic 2012    Dr. Thien Sanz referred to Dr. Rubina Rea    [facility]     GERD (gastroesophageal reflux disease)     H/O mammogram 10/03/2017    No evidence of malignancy    Hard of hearing     chronic ruptured TM    HTN (hypertension) 2012    Hx of screening mammography 2016    [facility], normal, routine recs    Hyperlipidemia     Nausea & vomiting     Sinus congestion 2012    Spinal stenosis of lumbar region with radiculopathy 2015    Dr. Frank Arnold     Past Surgical History:   Procedure Laterality Date    HX  SECTION      HX TUBAL LIGATION       Prior Level of Function/Home Situation: Patient reported she was independent in basic self care tasks and functional mobility PTA. Home Situation  Home Environment: Private residence  # Steps to Enter: 3  One/Two Story Residence: One story  Living Alone: No  Support Systems: Family member(s)  Patient Expects to be Discharged to[de-identified] Private residence  Current DME Used/Available at Home: None  Tub or Shower Type: Tub/Shower combination  [x]     Right hand dominant   []     Left hand dominant  Cognitive/Behavioral Status:  Neurologic State: Alert  Orientation Level: Oriented X4  Cognition: Follows commands;Poor safety awareness    Skin: No skin changes noted    Edema: No edema noted    Vision/Perceptual:       Acuity: Within Defined Limits      Coordination:  Coordination: Within functional limits (BUEs)       Balance:  Sitting: Intact  Standing: Intact; Without support    Strength:  Strength: Generally decreased, functional ( strength)     Tone & Sensation:  Tone: Normal (BUEs)  Sensation: Intact (BUEs)      Range of Motion:  AROM: Generally decreased, functional (BUEs within cervical precautions)     Functional Mobility and Transfers for ADLs:  Bed Mobility:  Supine to Sit: Modified independent  Sit to Supine: Modified independent  Scooting: Modified independent  Transfers:  Sit to Stand: Modified independent    Toilet Transfer : Supervision      ADL Assessment:(clinical judgement)  Feeding: Independent    Oral Facial Hygiene/Grooming: Modified Independent    Bathing: Supervision    Upper Body Dressing: Supervision (with adaptive strategies)    Lower Body Dressing: Supervision (with adaptive strategies)    Toileting: Modified independent       Pain:  Pt reports 7/10 pain or discomfort prior to treatment.    Pt reports 7/10 pain or discomfort post treatment. Activity Tolerance:   Good    Please refer to the flowsheet for vital signs taken during this treatment. After treatment:   []  Patient left in no apparent distress sitting up in chair  [x]  Patient left in no apparent distress in bed  [x]  Call bell left within reach  [x]  Nursing notified  [x]  Caregiver present  []  Bed alarm activated    COMMUNICATION/EDUCATION:   Communication/Collaboration:  []      Home safety education was provided and the patient/caregiver indicated understanding. [x]      Patient/family have participated as able and agree with findings and recommendations. []      Patient is unable to participate in plan of care at this time.     Felecia Nicole, OTR/L  Time Calculation: 31 mins

## 2018-03-01 NOTE — PROGRESS NOTES
Date of Surgery: ACDF C3-7, PO Day #1  VSS  Wound: Dressing clean, dry and intact  MANDEEP: in place to suction, 60 mL out today. PT: 325, independent   Swallowing ok. GI:  Patient is tolerating a clear diet/ crackers now. She has had active nausea/ vommiting over the past 24 hours. She states she has this problem intermittently at home. The nausea has decreased today. She has a scopolamine patch on. Neuro intact. Moving all extremities. 4/5 strength to BUE. Pre op pain: myelopathy w/gait disturbance, balance issues, dexterity weakness and hyper-reflexia. Post op pain: Improved BUE pain. Increased posterior neck pain from surgery. Plan: DC home with MANDEEP in place. Soft C collar in place. Patient to FU tomorrow morning at 0830 for MANDEEP removal. She is being discharged home with PO phenergan and Roxicodone.      Kalli Xavier NP

## 2018-03-01 NOTE — PROGRESS NOTES
Rounded on patient post cervical surgery. Activity: Reinforced importance of getting OOB for all meals, going to bathroom to help prevent blood clots. Educated to turn head slowly from side to side for ROM. Also reminded to sleep with HOB elevated for the next 3 nights to prevent swelling in neck. VTE prophylaxis: Instructed patient to use their SCD's when not up and walking. To use while in bed and in the chair. Educated re: ankle pumps to assist with circulation when in hospital and at home. Medications: Reviewed pain medications patient is taking and the importance of keeping pain under control to help with getting OOB and therapy. Reminded the patient to always eat a snack with their pain medication to help to prevent nausea. Encouraged patient to monitor for constipation and to take a stool softner/laxative while recovering and on pain medication. Incentive Spirometry: Reinforced use of incentive spirometer with return demonstration by patient. Wound Care: Dressing to surgical site dry with dried blood on 1/5 of the dressing so no need to change. . Patient instructed not to take dressing off at home. Patient given CHG wash to use in hospital and at home. Stressed importance of using a clean towel and washcloth daily. Reminded to put on clean clothes and night clothes daily. Patient Safety: Call light in reach. Patient  reminded to call for help toget OOB or when leaving bathroom for safety. Phone and other items also within reach per patient's request.  Soft collar in place for comfort. Patient educated to report to staff or surgeon any trouble swallowing. Diet:  Educated patient on the importance of eating three well balanced meals a day with protein to promote bone/muscle healing. Patient educated about following a soft diet, twice with each bite of food. Reminded to advance diet after a few days if able to swallow with no trouble but instructed to eat small bites.  Reminded patient to drink lots of fluids to protect kidneys from all the medications being taken currently with recovery. Soft collar on  MANDEEP drain intact attached to soft collar. Patient given post care educational material to remind them what to do at home for a successful recovery and to prevent post op complications. Patient  verbalized understand of all information and education discussed. Patient  given the opportunity for asking questions. Patient c/o nausea so given some plain crackers and gingerale along with a blanket.    Mobility Intervention:       [] Pt dangled at edge of bed    [] Pt assisted OOB to bedside commode    [] Pt assisted OOB to chair    [x] Pt ambulated to bathroom    [] Patient was ambulated in room/hallway    Assistive Device Utilized:       [] Rolling walker   [] Crutches   [] Straight Cane   [] Knee immobilizer   [] IV pole    After Mobilization:     [] Patient left in no apparent distress sitting up in chair  [x] Patient left in no apparent distress in bed  [x] Call bell left within reach  [x] SCDs on & machine turned on  [] Ice applied  [] RN notified  [] Caregiver present  [] Bed alarm activated    Reason patient not mobilized:      [] Patient refused   [x] Nausea/vomiting   [] Low blood pressure   [] Drowsy/lethargic    Pain Rating:     [] 0  [] 1  Assistive Device:        [] 2  [x] 3  [] 4  [] 5  [] 6  Assistive Device:        [] 7  [] 8  [] 9  [] 10    Comments:

## 2018-03-01 NOTE — DISCHARGE INSTRUCTIONS
PATIENT DISCHARGE INSTRUCTIONS      PATIENT DISCHARGE INSTRUCTIONS    Connor Garcia / [id number] : 1955    Admitted 2018 Discharged: 3/1/2018       · It is important that you take the medication exactly as they are prescribed. · Keep your medication in the bottles provided by the pharmacist and keep a list of the medication names, dosages, and times to be taken in your wallet. · Do not take other medications without consulting your doctor. What to do at Home    Recommended Diet: Clear liquids, advance as tolerated    Recommended Activity: No driving, lifting, bending, twisting or strenuous activity for 2 weeks until your follow up appointment. If you experience any of the following symptoms  fever greater than 101.5, wound drainage, redness at incision site, increased pain, new onset of extremity numbness/tingling/weakness or gait disturbance, bladder or bowel dysfunction, please follow up with [facility]. Signed By: Deny Olea NP     2018         [employer] Activation    Thank you for requesting access to [employer]. Please follow the instructions below to securely access and download your online medical record. [employer] allows you to send messages to your doctor, view your test results, renew your prescriptions, schedule appointments, and more. How Do I Sign Up? 1. In your internet browser, go to [email or web address]  2. Click on the First Time User? Click Here link in the Sign In box. You will be redirect to the New Member Sign Up page. 3. Enter your [employer] Access Code exactly as it appears below. You will not need to use this code after youve completed the sign-up process. If you do not sign up before the expiration date, you must request a new code. [employer] Access Code: Activation code not generated  Current [employer] Status: Patient Declined (This is the date your [employer] access code will )    4.  Enter the last four digits of your Social Security Number (xxxx) and Date of Birth (mm/dd/yyyy) as indicated and click Submit. You will be taken to the next sign-up page. 5. Create a [employer] ID. This will be your [employer] login ID and cannot be changed, so think of one that is secure and easy to remember. 6. Create a [employer] password. You can change your password at any time. 7. Enter your Password Reset Question and Answer. This can be used at a later time if you forget your password. 8. Enter your e-mail address. You will receive e-mail notification when new information is available in [address] 9. Click Sign Up. You can now view and download portions of your medical record. 10. Click the Download Summary menu link to download a portable copy of your medical information. Additional Information    If you have questions, please visit the Frequently Asked Questions section of the [employer] website at [email or web address] Remember, [employer] is NOT to be used for urgent needs. For medical emergencies, dial 911.     Patient armband removed and shredded

## 2018-03-01 NOTE — ROUTINE PROCESS
0210; Pt stable. Ornelas out and pt was able to void in the bathroom  once the ornelas was out. Weak but ambulating well with one person assist. On PCA pump for pain management. Has not complained of nausea since the beginning of my shift. Will continue to monitor the pt.

## 2018-03-01 NOTE — ROUTINE PROCESS
Pt given discharge instructions and  Pt verbalized understanding. Pt d/cd to home Pt stable. Pt and friend educated on how to empty drain and record. Pt and friend verbalized understanding.

## 2018-03-01 NOTE — ROUTINE PROCESS
Bedside and Verbal shift change report given to Paige-Chandana (oncoming nurse) by Linda Walsh RN (offgoing nurse). Report included the following information SBAR, Kardex, MAR and Recent Results.     SITUATION:    Code Status: Prior   Reason for Admission: M48.02 CERVICAL STENOSIS   Cervical spondylosis with myelopathy   Cervical stenosis of spine    [facility] day: 1   Problem List:       Hospital Problems  Date Reviewed: 2/28/2018          Codes Class Noted POA    Cervical spondylosis with myelopathy ICD-10-CM: M47.12  ICD-9-CM: 721.1  2/28/2018 Unknown        Cervical stenosis of spine ICD-10-CM: M48.02  ICD-9-CM: 723.0  2/28/2018 Unknown              BACKGROUND:    Past Medical History:   Past Medical History:   Diagnosis Date    Asthma 8/25/2012    +PFT for mild obstructive disease/COPD    Back pain, chronic 8/25/2012    Dr. Jairo Holland referred to Dr. Hope Palacio    [facility]     GERD (gastroesophageal reflux disease)     H/O mammogram 10/03/2017    No evidence of malignancy    Hard of hearing     chronic ruptured TM    HTN (hypertension) 8/25/2012    Hx of screening mammography 09/06/2016    [facility], normal, routine recs    Hyperlipidemia     Nausea & vomiting     Sinus congestion 8/25/2012    Spinal stenosis of lumbar region with radiculopathy 9/2015    Dr. Jose Raman, [address] [facility]         Patient taking anticoagulants no     ASSESSMENT:    Changes in Assessment Throughout Shift: No     Patient has Central Line: no Reasons if yes: No   Patient has Ro Cath: no Reasons if yes: No      Last Vitals:     Vitals:    02/28/18 1210 02/28/18 1513 02/28/18 2000 03/01/18 0029   BP: 137/73 121/66 114/72 135/75   Pulse: 63 81 81 91   Resp: 16 19 18 20   Temp: 97.1 °F (36.2 °C) 97.1 °F (36.2 °C) 97.2 °F (36.2 °C) 98 °F (36.7 °C)   SpO2: 97% 97% 100% 98%   Weight:       Height:            IV and DRAINS (will only show if present)   Peripheral IV 02/28/18 Left Wrist-Site Assessment: Clean, dry, & intact  [REMOVED] Peripheral IV 02/28/18 Right Hand-Site Assessment: Clean, dry, & intact  Ata-Self Drain 02/28/18 Posterior; Lower Neck-Site Assessment: Clean, dry, & intact     WOUND (if present)   Wound Type:  none   Dressing present     Wound Concerns/Notes:  none     PAIN    Pain Assessment    Pain Intensity 1: 0 (03/01/18 0029)    Pain Location 1: Incisional    Pain Intervention(s) 1: Medication (see MAR)    Patient Stated Pain Goal: 0  o Interventions for Pain:  none  o Intervention effective: no  o Time of last intervention: 0700   o Reassessment Completed: no      Last 3 Weights:  Last 3 Recorded Weights in this Encounter    02/22/18 0812 02/28/18 0648   Weight: 59 kg (130 lb) 58.1 kg (128 lb)     Weight change:      INTAKE/OUPUT    Current Shift: 02/28 1901 - 03/01 0700  In: -   Out: 550 [Urine:550]    Last three shifts: 02/27 0701 - 02/28 1900  In: 2000 [I.V.:2000]  Out: 1305 [Urine:1050; Drains:105]     LAB RESULTS   No results for input(s): WBC, HGB, HCT, PLT, HGBEXT, HCTEXT, PLTEXT in the last 72 hours. No results for input(s): NA, K, GLU, BUN, CREA, CA, MG, INR in the last 72 hours. No lab exists for component: PT, PTT, INREXT    RECOMMENDATIONS AND DISCHARGE PLANNING     1. Pending tests/procedures/ Plan of Care or Other Needs: TBD     2. Discharge plan for patient and Needs/Barriers: TBD    3. Estimated Discharge Date: TBD Posted on Whiteboard in Patients Room: no      4. The patient's care plan was reviewed with the oncoming nurse. \"HEALS\" SAFETY CHECK      Fall Risk    Total Score: 2    Safety Measures: Safety Measures: Bed/Chair alarm on, Bed/Chair-Wheels locked, Bed in low position, Call light within reach, Fall prevention (comment)    A safety check occurred in the patient's room between off going nurse and oncoming nurse listed above.     The safety check included the below items  Area Items   H  High Alert Medications - Verify all high alert medication drips (heparin, PCA, etc.)   E  Equipment - Suction is set up for ALL patients (with shannen)  - Red plugs utilized for all equipment (IV pumps, etc.)  - WOWs wiped down at end of shift.  - Room stocked with oxygen, suction, and other unit-specific supplies   A  Alarms - Bed alarm is set for fall risk patients  - Ensure chair alarm is in place and activated if patient is up in a chair   L  Lines - Check IV for any infiltration  - Ro bag is empty if patient has a Ro   - Tubing and IV bags are labeled   S  Safety   - Room is clean, patient is clean, and equipment is clean. - Hallways are clear from equipment besides carts. - Fall bracelet on for fall risk patients  - Ensure room is clear and free of clutter  - Suction is set up for ALL patients (with shannen)  - Hallways are clear from equipment besides carts.    - Isolation precautions followed, supplies available outside room, sign posted     Chino Dillard RN

## 2018-03-01 NOTE — PROGRESS NOTES
completed follow up visit with patient and offered Pastoral care, see flow sheets for interventions. Patient said she attends a [employer] and has good support. She spoke quite a bit, had a dry mouth, drank water then began coughing. Staff came and assisted her. Chaplains will continue to follow and will provide pastoral care  as needed or requested.    Cote Goldie, [address]  Board Certified [address] [place] [address] Road  Office [phone]

## 2018-03-01 NOTE — ROUTINE PROCESS
0757 : PCA pump dilaudid wasted medication was 26.6 cc . Judi ESQUIVEL [address] was the witness.

## 2018-03-01 NOTE — PROGRESS NOTES
Problem: Mobility Impaired (Adult and Pediatric)  Goal: *Acute Goals and Plan of Care (Insert Text)  Physical Therapy Goals  Initiated 2/28/2018 and to be accomplished within 7 day(s)  1. Patient will move from supine to sit and sit to supine , scoot up and down and roll side to side in bed with supervision/set-up. 2.  Patient will transfer from bed to chair and chair to bed with supervision/set-up using the least restrictive device. 3.  Patient will perform sit to stand with supervision/set-up. 4.  Patient will ambulate with supervision/set-up for >150 feet with the least restrictive device. 5.  Patient will ascend/descend 4 stairs with 1 handrail(s) with supervision/set-up. Outcome: Resolved/Met Date Met: 03/01/18  physical Therapy TREATMENT/DISCHARGE    Patient: Oleg Patterson (5 y.o. female)  Date: 3/1/2018  Diagnosis: M48.02 CERVICAL STENOSIS  Cervical spondylosis with myelopathy  Cervical stenosis of spine <principal problem not specified>  Procedure(s) (LRB):  C3-C7 LAMINECTOMY FUSION (N/A) 1 Day Post-Op  Precautions:  Spinal  Chart, physical therapy assessment, plan of care and goals were reviewed. ASSESSMENT:  Patient presents today alert and agreeable to therapy and was semi-reclined in bed. Patient transferred to EOB and sat to don new gown as patient vomited on gown earlier. Patient then stood and ambulated 325ft with no AD and negotiated 4 steps with HR at supervision. Patient tolerated activity well and reviewed spinal precautions with patient. Patient tolerated activity well and was left resting with call bell by her side and daughter in room. Patient has met all goals and would recommend home health with supervision. Patient required increased education and demonstration for compliance with cervical precautions and demonstrated good compliance. Patient denied need for further assistance at this time.    Progression toward goals:  [x]      Goals met  []      Improving appropriately and progressing toward goals  []      Improving slowly and progressing toward goals  []      Not making progress toward goals and plan of care will be adjusted     PLAN:  Patient will be discharged from physical therapy at this time. Rationale for discharge:  [x] Goals Achieved  [] [address]  [] Patient not participating in therapy  [] Other:  Discharge Recommendations:  Home Health with supervision (patient reports friend is going home with her)  Further Equipment Recommendations for Discharge:  N/A     G-CODES:   Mobility  Current  CI= 1-19%   Goal  CI= 1-19%  D/C  CI= 1-19%. The severity rating is based on the Level of Assistance required for Functional Mobility and ADLs. SUBJECTIVE:   Patient stated I'm still sick and throwing up this morning.     OBJECTIVE DATA SUMMARY:   Critical Behavior:    A&Ox4  Functional Mobility Training:  Bed Mobility:   Supine to Sit: Modified independent  Sit to Supine: Modified independent  Scooting: Modified independent   Transfers:  Sit to Stand: Modified independent  Stand to Sit: Modified independent   Balance:  Sitting: Intact  Standing: Intact  Ambulation/Gait Training:  Distance (ft): 325 Feet (ft)   Ambulation - Level of Assistance: Independent;Supervision (supervision inital 100ft, then independent with final 225ft)   Gait Abnormalities: Decreased step clearance; Path deviations    Speed/Judith: Slow   Interventions: Verbal cues   Stairs:  Number of Stairs Trained: 4  Stairs - Level of Assistance: Supervision   Rail Use: Left     Pain:  Pt reports 8/10 pain or discomfort prior to treatment.    Pt reports 8/10 pain or discomfort post treatment. Activity Tolerance:   Patient tolerated activity well and performed all mobility at supervision/modified independent. Goals met and will D/C patient at this time. Please refer to the flowsheet for vital signs taken during this treatment.   After treatment:   [] Patient left in no apparent distress sitting up in chair  [x] Patient left in no apparent distress in bed  [x] Call bell left within reach  [] Nursing notified  [x] Caregiver present  [] Bed alarm activated  Nataliya Vargas, PT   Time Calculation: 15 mins

## 2018-03-02 ENCOUNTER — OFFICE VISIT (OUTPATIENT)
Dept: ORTHOPEDIC SURGERY | Age: 63
End: 2018-03-02

## 2018-03-02 VITALS
HEART RATE: 98 BPM | DIASTOLIC BLOOD PRESSURE: 72 MMHG | WEIGHT: 128 LBS | RESPIRATION RATE: 15 BRPM | TEMPERATURE: 96.8 F | BODY MASS INDEX: 24.17 KG/M2 | OXYGEN SATURATION: 97 % | SYSTOLIC BLOOD PRESSURE: 132 MMHG | HEIGHT: 61 IN

## 2018-03-02 DIAGNOSIS — Z98.1 S/P CERVICAL SPINAL FUSION: ICD-10-CM

## 2018-03-02 DIAGNOSIS — M47.12 CERVICAL SPONDYLOSIS WITH MYELOPATHY: Primary | ICD-10-CM

## 2018-03-02 NOTE — MR AVS SNAPSHOT
[address] [facility] 
 
 
 [address] [facility] [address] 
[phone] Patient: Carlos Gramajo MRN: [id number] DEY:[birth year] Visit Information Date & Time Provider Department Dept. Phone Encounter #  
 3/2/2018  8:30 AM Vick Hernandez NP [facility] [phone] [id number] Your Appointments 3/27/2018  3:00 PM  
Follow Up with Patrick Smith MD  
[facility]) Appt Note: 6 wk fu sx 2-28; 6 wk fu sx 2-28  
 [address] [facility] [address]  
[address] [facility] [address] [address]  
  
    
 6/4/2018  1:00 PM  
Follow Up with Chapincito Otero NP [facility]) Appt Note: 6 mth follow up  
 [address] [facility] [address]  
[address] [facility] [address] Upcoming Health Maintenance Date Due DTaP/Tdap/Td series (1 - Tdap) 9/8/1974 FOBT Q 1 YEAR AGE 50-75 9/8/2003 ZOSTER VACCINE AGE 60> 7/8/2013 PAP AKA CERVICAL CYTOLOGY 9/2/2017 Pneumococcal 19-64 Medium Risk (1 of 1 - PPSV23) 3/6/2018* BREAST CANCER SCRN MAMMOGRAM 10/4/2019 *Topic was postponed. The date shown is not the original due date. Allergies as of 3/2/2018  Review Complete On: 3/2/2018 By: Ward Riddle Severity Noted Reaction Type Reactions Lyrica [Pregabalin]  10/04/2017    Other (comments) Medication causes dry eyes, unstable balance, and lack of concentration. Mold Extracts  11/11/2014   Systemic Runny Nose Pollen Extracts  02/22/2018    Itching Statins-hmg-coa Reductase Inhibitors  06/05/2017   Intolerance Myalgia Topamax [Topiramate]  01/08/2018    Vertigo Current Immunizations  Reviewed on 10/29/2015 Name Date Influenza Vaccine 10/1/2017, 10/6/2016 Influenza Vaccine Jay Darby) 10/29/2015 Influenza Vaccine (Quad) PF 10/31/2013 Influenza Vaccine (Trivalent) 11/11/2014  3:00 PM  
  
 Not reviewed this visit You Were Diagnosed With   
  
 Codes Comments Cervical spondylosis with myelopathy    -  Primary ICD-10-CM: M47.12 
ICD-9-CM: 721.1 Vitals BP Pulse Temp Resp Height(growth percentile) Weight(growth percentile) 132/72 98 96.8 °F (36 °C) (Oral) 15 5' 1\" (1.549 m) 128 lb (58.1 kg) SpO2 BMI OB Status Smoking Status 97% 24.19 kg/m2 Postmenopausal Current Every Day Smoker Vitals History BMI and BSA Data Body Mass Index Body Surface Area  
 24.19 kg/m 2 1.58 m 2 Preferred Pharmacy Pharmacy Name Phone Laina Palmer,[address] [facility] [phone] Your Updated Medication List  
  
   
This list is accurate as of 3/2/18  9:36 AM.  Always use your most recent med list.  
  
  
  
  
 albuterol 90 mcg/actuation inhaler Commonly known as:  PROVENTIL HFA Take 2 Puffs by inhalation every six (6) hours as needed. Cholecalciferol (Vitamin D3) 2,000 unit Cap capsule Commonly known as:  VITAMIN D3 Take 2,000 Units by mouth daily. dexlansoprazole 30 mg capsule Commonly known as:  DEXILANT Take 1 Cap by mouth daily. Indications: gastroesophageal reflux disease  
  
 ezetimibe 10 mg tablet Commonly known as:  Kimberly Getting Take 1 Tab by mouth daily. fluticasone-salmeterol 250-50 mcg/dose diskus inhaler Commonly known as:  ADVAIR Take 1 Puff by inhalation every twelve (12) hours. lisinopril-hydroCHLOROthiazide 20-12.5 mg per tablet Commonly known as:  Paralee Mass Take 1 Tab by mouth daily. Indications: hypertension  
  
 magnesium oxide 400 mg tablet Commonly known as:  MAG-OX Take 1 Tab by mouth daily. mometasone 50 mcg/actuation nasal spray Commonly known as:  NASONEX  
2 Sprays by Both Nostrils route daily. montelukast 10 mg tablet Commonly known as:  SINGULAIR Take 1 Tab by mouth daily. Indications: Allergic Rhinitis  
  
 oxyCODONE IR 10 mg Tab immediate release tablet Commonly known as:  Jailene Sanchez Take 1 Tab by mouth every six (6) hours as needed. Max Daily Amount: 40 mg. Indications: post op pain  
  
 promethazine 25 mg tablet Commonly known as:  PHENERGAN Take 1 Tab by mouth every eight (8) hours as needed for Nausea. Indications: POST-OPERATIVE NAUSEA AND VOMITING  
  
 topiramate 25 mg tablet Commonly known as:  TOPAMAX Take 1 tab po QHS for one week, then increase to 2 tabs po QHS Please provide this summary of care documentation to your next provider. Your primary care clinician is listed as Colleen Noe. If you have any questions after today's visit, please call [phone].

## 2018-03-02 NOTE — PATIENT INSTRUCTIONS
Cervical Spinal Fusion: What to Expect at Home  Your Recovery    After surgery, you can expect your neck to feel stiff and sore. This should improve in the weeks after surgery. You may have trouble sitting or standing in one position for very long and may need pain medicine in the weeks after your surgery. You may need to wear a neck brace for a while. It may take 4 to 6 weeks to get back to your usual activities, but it may depend on what kind of surgery you had. Your doctor may advise you to work with a physical therapist to strengthen the muscles around your neck and back. This care sheet gives you a general idea about how long it will take for you to recover. But each person recovers at a different pace. Follow the steps below to get better as quickly as possible. How can you care for yourself at home? Activity  ? · Rest when you feel tired. Getting enough sleep will help you recover. ? · Try to walk each day. Start by walking a little more than you did the day before. Bit by bit, increase the amount you walk. Walking boosts blood flow and helps prevent pneumonia and constipation. Walking may also decrease your muscle soreness after surgery. ? · Follow your doctor's directions about not lifting anything that would strain your neck and back. This may include a child, heavy grocery bags and milk containers, a heavy briefcase or backpack, cat litter or dog food bags, or a vacuum . ? · Avoid strenuous activities, such as bicycle riding, jogging, weightlifting, or aerobic exercise, until your doctor says it is okay. ? · Do not drive for 2 to 4 weeks after your surgery or until your doctor says it is okay. ? · Avoid taking long car trips for 2 to 4 weeks after surgery. Your neck may become tired and painful from sitting too long in one position. ? · You will probably need to take 4 to 6 weeks off from work. It depends on the type of work you do and how you feel.    ? · You may have sex as soon as you feel able, but avoid positions that put stress on your neck or cause pain. Diet  ? · You can eat your normal diet. If your stomach is upset, try bland, low-fat foods like plain rice, broiled chicken, toast, and yogurt. ? · Drink plenty of fluids. If you have kidney, heart, or liver disease and have to limit fluids, talk with your doctor before you increase the amount of fluids you drink. ? · You may notice that your bowel movements are not regular right after your surgery. This is common. Try to avoid constipation and straining with bowel movements. You may want to take a fiber supplement every day. If you have not had a bowel movement after a couple of days, ask your doctor about taking a mild laxative. Medicines  ? · Your doctor will tell you if and when you can restart your medicines. He or she will also give you instructions about taking any new medicines. ? · If you take blood thinners, such as warfarin (Coumadin), clopidogrel (Plavix), or aspirin, be sure to talk to your doctor. He or she will tell you if and when to start taking those medicines again. Make sure that you understand exactly what your doctor wants you to do. ? · Be safe with medicines. Take pain medicines exactly as directed. ¨ If the doctor gave you a prescription medicine for pain, take it as prescribed. ¨ If you are not taking a prescription pain medicine, ask your doctor if you can take an over-the-counter medicine. ? · If your doctor prescribed antibiotics, take them as directed. Do not stop taking them just because you feel better. You need to take the full course of antibiotics. ? · If you think your pain pill is making you sick to your stomach:  ¨ Take your pills after meals (unless your doctor has told you not to). ¨ Ask your doctor for a different pain pill. Incision care  ? · You will be given specific instructions about how to care for the cut (incision) the doctor made.  The instructions will depend on the type of materials used to close the cut. Exercise  ? · Do exercises as instructed by your doctor or physical therapist to improve your strength and flexibility. Other instructions  ? · To reduce stiffness and help sore muscles, use a warm water bottle, a heating pad set on low, or a warm cloth on your neck. Do not put heat right over the incision. Do not go to sleep with a heating pad on your skin. Follow-up care is a key part of your treatment and safety. Be sure to make and go to all appointments, and call your doctor if you are having problems. It's also a good idea to know your test results and keep a list of the medicines you take. When should you call for help? Call 911 anytime you think you may need emergency care. For example, call if:  ? · You passed out (lost consciousness). ? · You have chest pain, are short of breath, or cough up blood. ? · You are unable to move an arm or a leg at all. ?Call your doctor now or seek immediate medical care if:  ? · You have pain that does not get better after you take pain medicine. ? · You have loose stitches, or your incision comes open. ? · Bright red blood has soaked through the bandage over your incision. ? · You have signs of a blood clot in your leg (called a deep vein thrombosis), such as:  ¨ Pain in your calf, back of the knee, thigh, or groin. ¨ Redness or swelling in your leg. ? · You have signs of infection, such as:  ¨ Increased pain, swelling, warmth, or redness. ¨ Red streaks leading from the incision. ¨ Pus draining from the incision. ¨ A fever. ? · You have new or worse symptoms in your arms, legs, chest, belly, or buttocks. Symptoms may include:  ¨ Numbness or tingling. ¨ Weakness. ¨ Pain. ? · You lose bladder or bowel control. ? Watch closely for any changes in your health, and be sure to contact your doctor if:  ? · You do not get better as expected. Where can you learn more?   Go to http://giselle-erika.info/. Enter C756 in the search box to learn more about \"Cervical Spinal Fusion: What to Expect at Home. \"  Current as of: March 21, 2017  Content Version: 11.4  © 3387-1194 [employer], [employer]. Care instructions adapted under license by [employer] (which disclaims liability or warranty for this information). If you have questions about a medical condition or this instruction, always ask your healthcare professional. [address] any warranty or liability for your use of this information.

## 2018-03-02 NOTE — PROGRESS NOTES
[address]  [address], [address]  [place], [address] [place] [address] Street  Phone: [phone]  Fax: [phone]  PROGRESS NOTE-Post-op  Patient: Michelle Amanda                MRN: [id number]       SSN: [id number]  YOB: 1955        AGE: 57 y.o. SEX: female  Body mass index is 24.19 kg/(m^2). PCP: Sachi Blevins NP  03/02/18    Chief Complaint   Patient presents with    Surgical Follow-up    Neck Pain       HISTORY OF PRESENT ILLNESS:  Michelle Amanda is a 57 y.o. female with history of neck pain, arm pain, numbness of the arm(s), loss of strength of the arm(s) and balance and dexterity issues who had an ACDF C3-7 surgery 2 days ago for Cervical Myelopathy. .  Pain prior to surgery was in the C5-7 distribution from neck to hand. Pain is described as aching, burning, numbing, tingling and radiating. Pain is worse with looking up, looking down, pushing, pulling and affects sleep, work and recreational activities. Pain is better with relaxation, pain medication and ice. arm pain has improved since surgery. Her pain is now mainly incisional neck and bilateral shoulder pain. She comes in today to get her MANDEEP drain removed. She has had 25cc of serosanguinous drainage in 4 hours, decreased from 40cc last night. It is decreasing in amount and it is more clear. I spoke with Dr Neisha Caro, he would like it removed. I Removed MANDEEP drain without difficulty. Pt tolerated procedure well. Applied pressure to site for three minutes. Applied sterile guaze and tape to site. Reviewed proper wound care, pt verbalized understanding. States she has been using Roxicodone and PRN Phenergan for nausea with moderate, relief. Patient denies any fevers, wound drainage, bladder/bowel dysfunction, new onset weakness, saddle paresthesia, new onset radiculopathy or nerve pain, or other neurological deficits. Reports that she is swallowing without difficulty.  Pt desires to continue with evaluation of neck and arm pain and postoperative care. ASSESSMENT   Diagnoses and all orders for this visit:    1. Cervical spondylosis with myelopathy    2. S/P cervical spinal fusion       IMPRESSION AND PLAN:  This is a pt who had a ACDF C3-7 surgery 2 days ago. She is doing well with improved arm pain, she still has weakness in her RUE and some balance issues. She came in today for MANDEEP removal, she tolerated procedure well. She reports itching to the site, there are no signs of allergy, no redness or swelling, no rash. Her incision is looking good, it is visible through the clear dressing. Ronel Knox 1) Pt was given information on cervical fusion post-operative and wound care. 2) Reviewed activity and to avoid NSAIDs x 3 months. 3) OOW  4) Ms. Tess Gipson has a reminder for a \"due or due soon\" health maintenance. I have asked that she contact her primary care provider, Alma Mark NP, for follow-up on this health maintenance. 5) We have informed the patient to notify us for immediate appointment if he has any worsening neurogical symptoms or if an emergency situation presents, then call 911  6)  demonstrated consistency with prescribing. 7) Pt will follow-up in 2 wks w/me. SUBJECTIVE    Smoking Status Smoker  Work Hair Dressor, OOW     Pain Scale: 8/10    Pain Assessment  2/13/2018   Location of Pain Neck   Pain Location Comment -   Location Modifiers -   Severity of Pain 1   Quality of Pain Aching   Quality of Pain Comment -   Duration of Pain Persistent   Frequency of Pain Intermittent   Aggravating Factors Other (Comment); Bending   Aggravating Factors Comment -   Limiting Behavior -   Relieving Factors Other (Comment); Rest   Relieving Factors Comment meds   Result of Injury No           REVIEW OF SYSTEMS  Constitutional: Negative for fever, chills, or weight change. Respiratory: Negative for cough or shortness of breath. Cardiovascular: Negative for chest pain or palpitations.   Gastrointestinal: Negative for incontinence, acid reflux, change in bowel habits, or constipation. Genitourinary: Negative for incontinence, dysuria and flank pain. Musculoskeletal: Positive for Neck pain. See HPI. Skin: Negative for rash. Neurological:no radiculopathy. See HPI. Endo/Heme/Allergies: Negative. Psychiatric/Behavioral: Negative. PHYSICAL EXAMINATION  Visit Vitals    /72    Pulse 98    Temp 96.8 °F (36 °C) (Oral)    Resp 15    Ht 5' 1\" (1.549 m)    Wt 128 lb (58.1 kg)    SpO2 97%    BMI 24.19 kg/m2         Accompanied by friend. Constitutional:  Well developed, well nourished, in no acute distress. Psychiatric: Affect and mood are appropriate. Integumentary: No rashes or abrasions noted on exposed areas. Wound: Posterior Neck: Incision healing well, no drainage, no erythema, no hernia, no seroma, no swelling, no dehiscence, incision well approximated. Cardiovascular/Peripheral Vascular: +2 radial & pedal pulses. No peripheral edema is noted. Lymphatic:  No evidence of lymphedema. No cervical lymphadenopathy. SPINE/MUSCULOSKELETAL EXAM    Cervical spine:    Removed and re-applied Soft Collar  Neck is midline. Normal muscle tone. No focal atrophy is noted. Shoulder ROM intact. Neck ROM not teste. Tenderness to palpation bilateral trapezius. Negative Hernandez's sign. Sensation grossly intact to light touch. MOTOR:     Biceps Triceps Deltoids Wrist Ext Wrist Flex Hand Intrin   Right 5/5 5/5 5/5 4/5 4/5 4/5   Left 5/5 5/5 5/5 5/5 5/5 5/5       Antalgic gait    Ambulation without assistive device. FWB.     IMAGING: due 6 wks post-op    PAST MEDICAL HISTORY   Past Medical History:   Diagnosis Date    Asthma 8/25/2012    +PFT for mild obstructive disease/COPD    Back pain, chronic 8/25/2012    Dr. Humble Elizalde referred to Dr. Patricio Lopez    [facility]     GERD (gastroesophageal reflux disease)     H/O mammogram 10/03/2017    No evidence of malignancy    Hard of hearing     chronic ruptured TM    HTN (hypertension) 2012    Hx of screening mammography 2016    [facility], normal, routine recs    Hyperlipidemia     Nausea & vomiting     Sinus congestion 2012    Spinal stenosis of lumbar region with radiculopathy 2015    Dr. Tai Hughes       Past Surgical History:   Procedure Laterality Date    HX  SECTION      HX TUBAL LIGATION     . MEDICATIONS      Current Outpatient Prescriptions   Medication Sig Dispense Refill    promethazine (PHENERGAN) 25 mg tablet Take 1 Tab by mouth every eight (8) hours as needed for Nausea. Indications: POST-OPERATIVE NAUSEA AND VOMITING 20 Tab 0    lisinopril-hydroCHLOROthiazide (PRINZIDE, ZESTORETIC) 20-12.5 mg per tablet Take 1 Tab by mouth daily. Indications: hypertension 30 Tab 3    fluticasone-salmeterol (ADVAIR) 250-50 mcg/dose diskus inhaler Take 1 Puff by inhalation every twelve (12) hours. 3 Inhaler 3    albuterol (PROVENTIL HFA) 90 mcg/actuation inhaler Take 2 Puffs by inhalation every six (6) hours as needed. 1 Inhaler 3    oxyCODONE IR (ROXICODONE) 10 mg tab immediate release tablet Take 1 Tab by mouth every six (6) hours as needed. Max Daily Amount: 40 mg. Indications: post op pain 30 Tab 0    dexlansoprazole (DEXILANT) 30 mg capsule Take 1 Cap by mouth daily. Indications: gastroesophageal reflux disease 90 Cap 3    montelukast (SINGULAIR) 10 mg tablet Take 1 Tab by mouth daily. Indications: Allergic Rhinitis 30 Tab 6    ezetimibe (ZETIA) 10 mg tablet Take 1 Tab by mouth daily. 90 Tab 3    topiramate (TOPAMAX) 25 mg tablet Take 1 tab po QHS for one week, then increase to 2 tabs po QHS 60 Tab 0    mometasone (NASONEX) 50 mcg/actuation nasal spray 2 Sprays by Both Nostrils route daily. 3 Container 3    magnesium oxide (MAG-OX) 400 mg tablet Take 1 Tab by mouth daily.  30 Tab 11    Cholecalciferol, Vitamin D3, (VITAMIN D3) 2,000 unit cap capsule Take 2,000 Units by mouth daily. 30 Cap 11        ALLERGIES    Allergies   Allergen Reactions    Lyrica [Pregabalin] Other (comments)     Medication causes dry eyes, unstable balance, and lack of concentration.  Mold Extracts Runny Nose    Pollen Extracts Itching    Statins-Hmg-Coa Reductase Inhibitors Myalgia    Topamax [Topiramate] Vertigo          SOCIAL HISTORY    Social History     Social History    Marital status: SINGLE     Spouse name: N/A    Number of children: N/A    Years of education: N/A     Occupational History    Not on file.      Social History Main Topics    Smoking status: Current Every Day Smoker     Packs/day: 1.00     Types: Cigarettes    Smokeless tobacco: Never Used    Alcohol use No      Comment: h/o alcohol abuse in remission    Drug use: No    Sexual activity: Not on file     Other Topics Concern     Service No    Blood Transfusions No    Caffeine Concern Yes    Occupational Exposure No    Hobby Hazards No    Sleep Concern No    Stress Concern No    Weight Concern No    Special Diet No    Back Care No    Exercise Yes     walking    Bike Helmet No    Seat Belt Yes    Self-Exams No     Social History Narrative       FAMILY HISTORY    Family History   Problem Relation Age of Onset    Heart Disease Mother     Hypertension Mother     Cancer Mother     Coronary Artery Disease Mother 67    Heart Surgery Mother 68    Heart Disease Father     Hypertension Father     Stroke Father     Heart Surgery Father 46    Lung Disease Father     Heart Disease Brother     Heart Disease Brother          Sabrina Kwok NP

## 2018-03-14 ENCOUNTER — OFFICE VISIT (OUTPATIENT)
Dept: ORTHOPEDIC SURGERY | Age: 63
End: 2018-03-14

## 2018-03-14 VITALS
RESPIRATION RATE: 16 BRPM | HEART RATE: 102 BPM | HEIGHT: 61 IN | TEMPERATURE: 97.3 F | DIASTOLIC BLOOD PRESSURE: 84 MMHG | BODY MASS INDEX: 23.07 KG/M2 | WEIGHT: 122.2 LBS | SYSTOLIC BLOOD PRESSURE: 156 MMHG | OXYGEN SATURATION: 99 %

## 2018-03-14 DIAGNOSIS — Z98.1 S/P CERVICAL SPINAL FUSION: ICD-10-CM

## 2018-03-14 DIAGNOSIS — Z79.891 USE OF OPIATES FOR THERAPEUTIC PURPOSES: ICD-10-CM

## 2018-03-14 DIAGNOSIS — M47.12 CERVICAL SPONDYLOSIS WITH MYELOPATHY: Primary | ICD-10-CM

## 2018-03-14 RX ORDER — TRAMADOL HYDROCHLORIDE 50 MG/1
50 TABLET ORAL
Qty: 50 TAB | Refills: 0 | Status: SHIPPED | OUTPATIENT
Start: 2018-03-14 | End: 2018-09-06 | Stop reason: ALTCHOICE

## 2018-03-14 NOTE — PROGRESS NOTES
[address]  [address]  [place], [address] [place] [address] Street  Phone: [phone]  Fax: [phone]  PROGRESS NOTE-Post-op  Patient: Darryl Aguirre                MRN: [id number]       SSN: [id number]  YOB: 1955        AGE: 57 y.o. SEX: female  Body mass index is 23.09 kg/(m^2). PCP: Nallely Mcintyre NP  03/14/18    Chief Complaint   Patient presents with    Neck Pain     post op       HISTORY OF PRESENT ILLNESS:  Darryl Aguirre is a 57 y.o. female with history of neck pain, arm pain, numbness of the arm(s), loss of strength of the arm(s) and balance and dexterity issues and hyper-reflexia in RUE who had an Posterior Cervical Fusion C3-7 surgery 2 weeks ago for Cervical Myelopathy. .  Pain prior to surgery was in the C5-7 distribution from neck to hand. Pain is described as aching, burning, numbing, tingling and radiating. Pain is worse with looking up, looking down, pushing, pulling and affects sleep, work and recreational activities. Pain is better with relaxation, pain medication and ice. arm pain has improved since surgery. Her pain is now mainly incisional neck and bilateral shoulder pain. States she has been using Tramadol and PRN Phenergan for nausea with moderate, relief. She didn't like the way the Roxicodone make her feel, it made her hallucinate. She disposed of them. She had an old Rx for Tramadol from Dr. Jose Raman. She has been using that without any SEs. She has been needing it every 6 hrs. Patient denies any fevers, wound drainage, bladder/bowel dysfunction, new onset weakness, saddle paresthesia, new onset radiculopathy or nerve pain, or other neurological deficits. Reports that she is swallowing without difficulty. Pt desires to continue with evaluation of neck and arm pain and postoperative care. ASSESSMENT   Diagnoses and all orders for this visit:    1.  Cervical spondylosis with myelopathy  -     traMADol (ULTRAM) 50 mg tablet; Take 1 Tab by mouth every six (6) hours as needed for Pain (s/p cervical fusion). Max Daily Amount: 200 mg.    2. S/P cervical spinal fusion  -     DRUG SCREEN UR - W/ CONFIRM; Future  -     traMADol (ULTRAM) 50 mg tablet; Take 1 Tab by mouth every six (6) hours as needed for Pain (s/p cervical fusion). Max Daily Amount: 200 mg.    3. Use of opiates for therapeutic purposes  -     DRUG SCREEN UR - W/ CONFIRM; Future       IMPRESSION AND PLAN:  This is a pt who had a posterior cervical fusion surgery 2 weeks ago. She is doing well with improved RUE radicular pain, improved strength and balance, her hyper-reflexia has improved as well. She had a major surgery and is now on Tramadol 50mg 1 tab Q6hrs PRN. I've instructed her to slowly wean down to Q8hrs, then BID, then daily PRN. I am going to refill the Tramadol, hopefully this will be her last Rx. She has had some intermittent nausea, it is improving. She has used PRN Phenergan for it. She thinks that she can wean off that now. .  Her incision is looking good, well approximated with some normal expected post-op swelling. Melanie Garsia 1) Pt was given information on cervical fusion post-operative and wound care. 2) Reviewed activity and to avoid NSAIDs x 3 months. 3) UDS  4) Refill of Tramadol, hopefully last Rx  5) May refill Phenergan if pt calls  4) Ms. Cayla Elizabeth has a reminder for a \"due or due soon\" health maintenance. I have asked that she contact her primary care provider, Bipin Alfaro NP, for follow-up on this health maintenance. 5) We have informed the patient to notify us for immediate appointment if he has any worsening neurogical symptoms or if an emergency situation presents, then call 911  6)  demonstrated consistency with prescribing. 7) Pt will follow-up in 4 wks w/ Dr Lloyd Wong. 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 Respiratory: Negative for cough or shortness of breath. Cardiovascular: Negative for chest pain or palpitations. Gastrointestinal: Negative for incontinence, acid reflux, change in bowel habits, or constipation. Genitourinary: Negative for incontinence, dysuria and flank pain. Musculoskeletal: Positive for Neck, bilateral trapezius pain. See HPI. Skin: Negative for rash. Neurological:no radiculopathy. See HPI. Endo/Heme/Allergies: Negative. Psychiatric/Behavioral: Negative. PHYSICAL EXAMINATION  Visit Vitals    /84    Pulse (!) 102    Temp 97.3 °F (36.3 °C) (Oral)    Resp 16    Ht 5' 1\" (1.549 m)    Wt 122 lb 3.2 oz (55.4 kg)    SpO2 99%    BMI 23.09 kg/m2         Accompanied by self, friend in waiting room. Constitutional:  Well developed, well nourished, in no acute distress. Psychiatric: Affect and mood are appropriate. Integumentary: No rashes or abrasions noted on exposed areas. Wound: Posterior neck Incision healing well, no drainage, no erythema, no hernia, no seroma, no swelling, no dehiscence, incision well approximated. Cardiovascular/Peripheral Vascular: +2 radial & pedal pulses. No peripheral edema is noted. Lymphatic:  No evidence of lymphedema. No cervical lymphadenopathy. SPINE/MUSCULOSKELETAL EXAM    Cervical spine:    Removed and re-applied Soft Collar  Neck is midline. Normal muscle tone. No focal atrophy is noted. Shoulder ROM intact. Neck ROM not tested with. Tenderness to palpation bilateral trapezius. Negative Hernandez's sign. Sensation grossly intact to light touch. MOTOR:        Biceps  Triceps Deltoids Wrist Ext Wrist Flex Hand Intrin   Right +4/5 +4/5 +4/5 +4/5 +4/5 +4/5   Left +4/5 +4/5 +4/5 +4/5 +4/5 +4/5         DTRs are 2+ biceps, triceps, brachioradialis, patella, and Achilles. Mildly antalgic    Ambulation without assistive device. FWB. IMAGING: due 6 wks post-op    PAST MEDICAL HISTORY   Past Medical History:   Diagnosis Date    Asthma 2012    +PFT for mild obstructive disease/COPD    Back pain, chronic 2012    Dr. Humble Elizalde referred to Dr. Patricio Lopez    [facility]     GERD (gastroesophageal reflux disease)     H/O mammogram 10/03/2017    No evidence of malignancy    Hard of hearing     chronic ruptured TM    HTN (hypertension) 2012    Hx of screening mammography 2016    [facility], normal, routine recs    Hyperlipidemia     Nausea & vomiting     Sinus congestion 2012    Spinal stenosis of lumbar region with radiculopathy 2015    Dr. Lisset Bakre       Past Surgical History:   Procedure Laterality Date    HX  SECTION      HX TUBAL LIGATION     . MEDICATIONS      Current Outpatient Prescriptions   Medication Sig Dispense Refill    traMADol (ULTRAM) 50 mg tablet Take 1 Tab by mouth every six (6) hours as needed for Pain (s/p cervical fusion). Max Daily Amount: 200 mg. 50 Tab 0    promethazine (PHENERGAN) 25 mg tablet Take 1 Tab by mouth every eight (8) hours as needed for Nausea. Indications: POST-OPERATIVE NAUSEA AND VOMITING 20 Tab 0    lisinopril-hydroCHLOROthiazide (PRINZIDE, ZESTORETIC) 20-12.5 mg per tablet Take 1 Tab by mouth daily. Indications: hypertension 30 Tab 3    dexlansoprazole (DEXILANT) 30 mg capsule Take 1 Cap by mouth daily. Indications: gastroesophageal reflux disease 90 Cap 3    montelukast (SINGULAIR) 10 mg tablet Take 1 Tab by mouth daily. Indications:  Allergic Rhinitis 30 Tab 6    ezetimibe (ZETIA) 10 mg tablet Take 1 Tab by mouth daily. 90 Tab 3    mometasone (NASONEX) 50 mcg/actuation nasal spray 2 Sprays by Both Nostrils route daily. 3 Container 3    magnesium oxide (MAG-OX) 400 mg tablet Take 1 Tab by mouth daily. 30 Tab 11    fluticasone-salmeterol (ADVAIR) 250-50 mcg/dose diskus inhaler Take 1 Puff by inhalation every twelve (12) hours. 3 Inhaler 3    albuterol (PROVENTIL HFA) 90 mcg/actuation inhaler Take 2 Puffs by inhalation every six (6) hours as needed. 1 Inhaler 3    Cholecalciferol, Vitamin D3, (VITAMIN D3) 2,000 unit cap capsule Take 2,000 Units by mouth daily. 30 Cap 11    topiramate (TOPAMAX) 25 mg tablet Take 1 tab po QHS for one week, then increase to 2 tabs po QHS 60 Tab 0        ALLERGIES    Allergies   Allergen Reactions    Lyrica [Pregabalin] Other (comments)     Medication causes dry eyes, unstable balance, and lack of concentration.  Mold Extracts Runny Nose    Pollen Extracts Itching    Statins-Hmg-Coa Reductase Inhibitors Myalgia    Topamax [Topiramate] Vertigo          SOCIAL HISTORY    Social History     Social History    Marital status: SINGLE     Spouse name: N/A    Number of children: N/A    Years of education: N/A     Occupational History    Not on file.      Social History Main Topics    Smoking status: Current Every Day Smoker     Packs/day: 1.00     Types: Cigarettes    Smokeless tobacco: Never Used    Alcohol use No      Comment: h/o alcohol abuse in remission    Drug use: No    Sexual activity: Not on file     Other Topics Concern     Service No    Blood Transfusions No    Caffeine Concern Yes    Occupational Exposure No    Hobby Hazards No    Sleep Concern No    Stress Concern No    Weight Concern No    Special Diet No    Back Care No    Exercise Yes     walking    Bike Helmet No    Seat Belt Yes    Self-Exams No     Social History Narrative       FAMILY HISTORY    Family History   Problem Relation Age of Onset  Heart Disease Mother     Hypertension Mother     Cancer Mother     Coronary Artery Disease Mother 67    Heart Surgery Mother 68    Heart Disease Father     Hypertension Father     Stroke Father     Heart Surgery Father 46    Lung Disease Father     Heart Disease Brother     Heart Disease Brother          April Lizz, TRINH

## 2018-03-14 NOTE — PATIENT INSTRUCTIONS
Cervical Spinal Fusion: What to Expect at Home  Your Recovery    After surgery, you can expect your neck to feel stiff and sore. This should improve in the weeks after surgery. You may have trouble sitting or standing in one position for very long and may need pain medicine in the weeks after your surgery. You may need to wear a neck brace for a while. It may take 4 to 6 weeks to get back to your usual activities, but it may depend on what kind of surgery you had. Your doctor may advise you to work with a physical therapist to strengthen the muscles around your neck and back. This care sheet gives you a general idea about how long it will take for you to recover. But each person recovers at a different pace. Follow the steps below to get better as quickly as possible. How can you care for yourself at home? Activity  ? · Rest when you feel tired. Getting enough sleep will help you recover. ? · Try to walk each day. Start by walking a little more than you did the day before. Bit by bit, increase the amount you walk. Walking boosts blood flow and helps prevent pneumonia and constipation. Walking may also decrease your muscle soreness after surgery. ? · Follow your doctor's directions about not lifting anything that would strain your neck and back. This may include a child, heavy grocery bags and milk containers, a heavy briefcase or backpack, cat litter or dog food bags, or a vacuum . ? · Avoid strenuous activities, such as bicycle riding, jogging, weightlifting, or aerobic exercise, until your doctor says it is okay. ? · Do not drive for 2 to 4 weeks after your surgery or until your doctor says it is okay. ? · Avoid taking long car trips for 2 to 4 weeks after surgery. Your neck may become tired and painful from sitting too long in one position. ? · You will probably need to take 4 to 6 weeks off from work. It depends on the type of work you do and how you feel.    ? · You may have sex as soon as you feel able, but avoid positions that put stress on your neck or cause pain. Diet  ? · You can eat your normal diet. If your stomach is upset, try bland, low-fat foods like plain rice, broiled chicken, toast, and yogurt. ? · Drink plenty of fluids. If you have kidney, heart, or liver disease and have to limit fluids, talk with your doctor before you increase the amount of fluids you drink. ? · You may notice that your bowel movements are not regular right after your surgery. This is common. Try to avoid constipation and straining with bowel movements. You may want to take a fiber supplement every day. If you have not had a bowel movement after a couple of days, ask your doctor about taking a mild laxative. Medicines  ? · Your doctor will tell you if and when you can restart your medicines. He or she will also give you instructions about taking any new medicines. ? · If you take blood thinners, such as warfarin (Coumadin), clopidogrel (Plavix), or aspirin, be sure to talk to your doctor. He or she will tell you if and when to start taking those medicines again. Make sure that you understand exactly what your doctor wants you to do. ? · Be safe with medicines. Take pain medicines exactly as directed. ¨ If the doctor gave you a prescription medicine for pain, take it as prescribed. ¨ If you are not taking a prescription pain medicine, ask your doctor if you can take an over-the-counter medicine. ? · If your doctor prescribed antibiotics, take them as directed. Do not stop taking them just because you feel better. You need to take the full course of antibiotics. ? · If you think your pain pill is making you sick to your stomach:  ¨ Take your pills after meals (unless your doctor has told you not to). ¨ Ask your doctor for a different pain pill. Incision care  ? · You will be given specific instructions about how to care for the cut (incision) the doctor made.  The instructions will depend on the type of materials used to close the cut. Exercise  ? · Do exercises as instructed by your doctor or physical therapist to improve your strength and flexibility. Other instructions  ? · To reduce stiffness and help sore muscles, use a warm water bottle, a heating pad set on low, or a warm cloth on your neck. Do not put heat right over the incision. Do not go to sleep with a heating pad on your skin. Follow-up care is a key part of your treatment and safety. Be sure to make and go to all appointments, and call your doctor if you are having problems. It's also a good idea to know your test results and keep a list of the medicines you take. When should you call for help? Call 911 anytime you think you may need emergency care. For example, call if:  ? · You passed out (lost consciousness). ? · You have chest pain, are short of breath, or cough up blood. ? · You are unable to move an arm or a leg at all. ?Call your doctor now or seek immediate medical care if:  ? · You have pain that does not get better after you take pain medicine. ? · You have loose stitches, or your incision comes open. ? · Bright red blood has soaked through the bandage over your incision. ? · You have signs of a blood clot in your leg (called a deep vein thrombosis), such as:  ¨ Pain in your calf, back of the knee, thigh, or groin. ¨ Redness or swelling in your leg. ? · You have signs of infection, such as:  ¨ Increased pain, swelling, warmth, or redness. ¨ Red streaks leading from the incision. ¨ Pus draining from the incision. ¨ A fever. ? · You have new or worse symptoms in your arms, legs, chest, belly, or buttocks. Symptoms may include:  ¨ Numbness or tingling. ¨ Weakness. ¨ Pain. ? · You lose bladder or bowel control. ? Watch closely for any changes in your health, and be sure to contact your doctor if:  ? · You do not get better as expected. Where can you learn more?   Go to http://giselle-erika.info/. Enter T275 in the search box to learn more about \"Cervical Spinal Fusion: What to Expect at Home. \"  Current as of: March 21, 2017  Content Version: 11.4  © 9562-3944 [employer], [employer]. Care instructions adapted under license by [employer] (which disclaims liability or warranty for this information). If you have questions about a medical condition or this instruction, always ask your healthcare professional. [address] any warranty or liability for your use of this information.

## 2018-03-19 ENCOUNTER — DOCUMENTATION ONLY (OUTPATIENT)
Dept: ORTHOPEDIC SURGERY | Age: 63
End: 2018-03-19

## 2018-03-19 NOTE — PROGRESS NOTES
Spoke with the Patient this morning. She is doing much better today. The pain resolved with in 24 hours of starting the MDP. She has not had to take a tramadol today. She has good strength and states she feels the best she has felt in 3 weeks. Please call the patient to make a FU with an NP this week so we can check on her in the office. She has to arrange a ride in advance.

## 2018-03-21 ENCOUNTER — OFFICE VISIT (OUTPATIENT)
Dept: ORTHOPEDIC SURGERY | Age: 63
End: 2018-03-21

## 2018-03-21 VITALS
HEIGHT: 61 IN | RESPIRATION RATE: 16 BRPM | OXYGEN SATURATION: 99 % | SYSTOLIC BLOOD PRESSURE: 139 MMHG | BODY MASS INDEX: 23.03 KG/M2 | DIASTOLIC BLOOD PRESSURE: 74 MMHG | TEMPERATURE: 97.4 F | WEIGHT: 122 LBS | HEART RATE: 95 BPM

## 2018-03-21 DIAGNOSIS — M47.12 CERVICAL SPONDYLOSIS WITH MYELOPATHY: Primary | ICD-10-CM

## 2018-03-21 DIAGNOSIS — Z98.1 S/P CERVICAL SPINAL FUSION: ICD-10-CM

## 2018-03-21 NOTE — PROGRESS NOTES
[address]  [place]  Phone: [phone]  Fax: [phone]  PROGRESS NOTE-Post-op  Patient: Franklyn Cheema                MRN: [id number]       SSN: [id number]  YOB: 1955        AGE: 57 y.o. SEX: female  There is no height or weight on file to calculate BMI. PCP: Elena Frank NP  03/21/18    No chief complaint on file. HISTORY OF PRESENT ILLNESS:  Veronica Chino is a 57 y. o. female with history of neck pain, arm pain, numbness of the arm(s), loss of strength of the arm(s) and balance and dexterity issues and hyper-reflexia in RUE who had an Posterior Cervical Fusion C3-7 surgery 3 weeks ago for Cervical Myelopathy.  Pain prior to surgery was in the C5-7 distribution from neck to hand.  Pain is described as aching, burning, numbing, tingling and radiating.  Pain is worse with looking up, looking down, pushing, pulling and affects sleep, work and recreational activities. Pain is better with relaxation, pain medication and ice. She comes in today for follow-up of some LUE pain that she called Dr. Rogerio Zabala for over the weekend. Dr. Rogerio Zabala called in a MDP. She is now doing much better, the pain is mainly in the left shoulder and is described as a deep ache. She has had a hard time with nausea with the pain medication, even from the Tramadol. She even tried taking it with Phenergan. She stopped all pain medications for the last 2 days and she has not had any episodes of nausea/vomiting since. Sunday, she was down on her knees cleaning up the floor and her dog pushed her over on to the floor, she plopped on her buttocks. She reports no increased pain since the fall and she is neurologically intact on exam.  Arm pain has improved since surgery.  Her pain is now mainly incisional neck and bilateral shoulder pain. Her RUE Hyper-reflexia has also improved and is a +2 now.  She reports that her balance is improving as well. Patient denies any fevers, wound drainage, bladder/bowel dysfunction, new onset weakness, saddle paresthesia, new onset radiculopathy or nerve pain, or other neurological deficits. Reports that she is swallowing without difficulty. Pt desires to continue with evaluation of neck and arm pain and postoperative care. ASSESSMENT   Diagnoses and all orders for this visit:    1. Cervical spondylosis with myelopathy    2. S/P cervical spinal fusion       IMPRESSION AND PLAN:  This is a pt who had a posterior cervical fusion surgery 3 weeks ago. She is doing better with improved LUE with the MDP. She is completely off pain medications due to them causing nausea/vomitting and constipation. She had a good BM this am and has no nausea/vomitting episodes for 2 days now. She had a semi-fall from being on her knees to the floor when she got down on her knees to wipe up the floor and her dog pushed her over. She has no increased pain from that. She is improving neurologically with improved strength, balance, and her Hyper-reflexia has essentially resolved. I got a set of x-rays for Dr. Ethan Kevin review. Her incision is looking good. .   Note UDS on 3/14/18 +THC, she is off pain medications and shouldn't need anymore. 1) Pt was given information on activity post-operative and wound care. 2) Reviewed activity and to avoid NSAIDs x 3 months. 3) Call if LUE doesn't continue to improve  4) Ms. Marisa Nunez has a reminder for a \"due or due soon\" health maintenance. I have asked that she contact her primary care provider, Aliya Sanchez NP, for follow-up on this health maintenance. 5) We have informed the patient to notify us for immediate appointment if he has any worsening neurogical symptoms or if an emergency situation presents, then call 911  6)  demonstrated consistency with prescribing. 7) Pt will follow-up in 3 WKS w/ Dr. Stacey Gregoroi.     SUBJECTIVE    Smoking Status Quit 1 mo ago  Work Hair-dressor    Pain Scale: /10    Pain Assessment  3/14/2018   Location of Pain Neck   Pain Location Comment -   Location Modifiers -   Severity of Pain 3   Quality of Pain Aching   Quality of Pain Comment -   Duration of Pain -   Frequency of Pain Constant   Aggravating Factors (No Data)   Aggravating Factors Comment moving around   Limiting Behavior -   Relieving Factors (No Data)   Relieving Factors Comment tramadol, tylenol   Result of Injury -           REVIEW OF SYSTEMS  Constitutional: Negative for fever, chills, or weight change. Respiratory: Negative for cough or shortness of breath. Cardiovascular: Negative for chest pain or palpitations. Gastrointestinal: Negative for incontinence, acid reflux, change in bowel habits, or constipation. Genitourinary: Negative for incontinence, dysuria and flank pain. Musculoskeletal: Positive for Neck and bilateral shoulder pain. See HPI. Skin: Negative for rash. Neurological:No radiculopathy. See HPI. Endo/Heme/Allergies: Negative. Psychiatric/Behavioral: Negative. PHYSICAL EXAMINATION  There were no vitals taken for this visit. Accompanied by self, friend in waiting room. Constitutional:  Well developed, well nourished, in no acute distress. Psychiatric: Affect and mood are appropriate. Integumentary: No rashes or abrasions noted on exposed areas. Wound: Posterior neck incision Incision healing well, no drainage, no erythema, no hernia, no seroma, no swelling, no dehiscence, incision well approximated. Cardiovascular/Peripheral Vascular: +2 radial & pedal pulses. No peripheral edema is noted. Lymphatic:  No evidence of lymphedema. No cervical lymphadenopathy. SPINE/MUSCULOSKELETAL EXAM    Cervical spine:    Removed and reapplied Soft Collar  Neck is midline. Normal muscle tone. No focal atrophy is noted. Shoulder ROM intact. Neck ROM not tested. Tenderness to palpation bilateral trapezius. Negative Hernandez's sign.      Sensation grossly intact to light touch. MOTOR:     Biceps Triceps Deltoids Wrist Ext Wrist Flex Hand Intrin   Right 5/5 5/5 5/5 5/5 5/5 5/5   Left 5/5 5/5 5/5 5/5 5/5 5/5      Hip Flex Quads Hamstrings Ankle DF EHL Ankle PF   Right 5/5 5/5 5/5 5/5 5/5 5/5   Left 5/5 5/5 5/5 5/5 5/5 5/5         DTRs are 2+ biceps, triceps, brachioradialis, patella, and Achilles. Mildly antalgic    Ambulation without assistive device. FWB. IMAGIN V CS X-RAY    PAST MEDICAL HISTORY   Past Medical History:   Diagnosis Date    Asthma 2012    +PFT for mild obstructive disease/COPD    Back pain, chronic 2012    Dr. Dann Mc referred to Dr. Jorge Denton    [facility]     GERD (gastroesophageal reflux disease)     H/O mammogram 10/03/2017    No evidence of malignancy    Hard of hearing     chronic ruptured TM    HTN (hypertension) 2012    Hx of screening mammography 2016    [facility], normal, routine recs    Hyperlipidemia     Nausea & vomiting     Sinus congestion 2012    Spinal stenosis of lumbar region with radiculopathy 2015    Dr. Eyad Johnston       Past Surgical History:   Procedure Laterality Date    HX  SECTION      HX TUBAL LIGATION     . MEDICATIONS      Current Outpatient Prescriptions   Medication Sig Dispense Refill    traMADol (ULTRAM) 50 mg tablet Take 1 Tab by mouth every six (6) hours as needed for Pain (s/p cervical fusion). Max Daily Amount: 200 mg. 50 Tab 0    promethazine (PHENERGAN) 25 mg tablet Take 1 Tab by mouth every eight (8) hours as needed for Nausea. Indications: POST-OPERATIVE NAUSEA AND VOMITING 20 Tab 0    lisinopril-hydroCHLOROthiazide (PRINZIDE, ZESTORETIC) 20-12.5 mg per tablet Take 1 Tab by mouth daily. Indications: hypertension 30 Tab 3    dexlansoprazole (DEXILANT) 30 mg capsule Take 1 Cap by mouth daily. Indications: gastroesophageal reflux disease 90 Cap 3    montelukast (SINGULAIR) 10 mg tablet Take 1 Tab by mouth daily. Indications: Allergic Rhinitis 30 Tab 6    ezetimibe (ZETIA) 10 mg tablet Take 1 Tab by mouth daily. 90 Tab 3    topiramate (TOPAMAX) 25 mg tablet Take 1 tab po QHS for one week, then increase to 2 tabs po QHS 60 Tab 0    mometasone (NASONEX) 50 mcg/actuation nasal spray 2 Sprays by Both Nostrils route daily. 3 Container 3    magnesium oxide (MAG-OX) 400 mg tablet Take 1 Tab by mouth daily. 30 Tab 11    fluticasone-salmeterol (ADVAIR) 250-50 mcg/dose diskus inhaler Take 1 Puff by inhalation every twelve (12) hours. 3 Inhaler 3    albuterol (PROVENTIL HFA) 90 mcg/actuation inhaler Take 2 Puffs by inhalation every six (6) hours as needed. 1 Inhaler 3    Cholecalciferol, Vitamin D3, (VITAMIN D3) 2,000 unit cap capsule Take 2,000 Units by mouth daily. 30 Cap 11        ALLERGIES    Allergies   Allergen Reactions    Lyrica [Pregabalin] Other (comments)     Medication causes dry eyes, unstable balance, and lack of concentration.  Mold Extracts Runny Nose    Pollen Extracts Itching    Statins-Hmg-Coa Reductase Inhibitors Myalgia    Topamax [Topiramate] Vertigo          SOCIAL HISTORY    Social History     Social History    Marital status: SINGLE     Spouse name: N/A    Number of children: N/A    Years of education: N/A     Occupational History    Not on file.      Social History Main Topics    Smoking status: Current Every Day Smoker     Packs/day: 1.00     Types: Cigarettes    Smokeless tobacco: Never Used    Alcohol use No      Comment: h/o alcohol abuse in remission    Drug use: No    Sexual activity: Not on file     Other Topics Concern     Service No    Blood Transfusions No    Caffeine Concern Yes    Occupational Exposure No    Hobby Hazards No    Sleep Concern No    Stress Concern No    Weight Concern No    Special Diet No    Back Care No    Exercise Yes     walking    Bike Helmet No    Seat Belt Yes    Self-Exams No     Social History Narrative       FAMILY HISTORY    Family History   Problem Relation Age of Onset    Heart Disease Mother     Hypertension Mother     Cancer Mother     Coronary Artery Disease Mother 67    Heart Surgery Mother 68    Heart Disease Father     Hypertension Father     Stroke Father     Heart Surgery Father 46    Lung Disease Father     Heart Disease Brother     Heart Disease Brother          Linda Vegas NP

## 2018-03-23 ENCOUNTER — TELEPHONE (OUTPATIENT)
Dept: ORTHOPEDIC SURGERY | Age: 63
End: 2018-03-23

## 2018-03-23 RX ORDER — PROMETHAZINE HYDROCHLORIDE 25 MG/1
25 TABLET ORAL
Qty: 10 TAB | Refills: 0 | Status: SHIPPED | OUTPATIENT
Start: 2018-03-23 | End: 2019-01-08

## 2018-03-23 RX ORDER — PROMETHAZINE HYDROCHLORIDE 25 MG/1
25 TABLET ORAL
Qty: 20 TAB | Refills: 0 | OUTPATIENT
Start: 2018-03-23

## 2018-03-23 NOTE — TELEPHONE ENCOUNTER
The patient reports she is still experiencing n/v. She admits to taking 1 Utram on yesterday at 4 pm and 2 Tylenol this morning. She regurgitated a few times this morning and felt a little better after eating. She requests phenergan as a back up because she may have to take a Tramadol over the weekend if the pain becomes unbearable.

## 2018-03-23 NOTE — TELEPHONE ENCOUNTER
Last Visit: 03/21/2018 with TRINH Boone  Date of Surgery: 02/28/2018 ACDF C3-7  Next Appointment: 04/10/2018 with MD Miri Bustillos   Previous Refill Encounters: 03/01/2018 per NP Ajit Basurto #[id number]    Requested Prescriptions     Pending Prescriptions Disp Refills    promethazine (PHENERGAN) 25 mg tablet 20 Tab 0     Sig: Take 1 Tab by mouth every eight (8) hours as needed for Nausea.  Indications: POST-OPERATIVE NAUSEA AND VOMITING

## 2018-03-26 ENCOUNTER — TELEPHONE (OUTPATIENT)
Dept: FAMILY MEDICINE CLINIC | Age: 63
End: 2018-03-26

## 2018-03-26 NOTE — TELEPHONE ENCOUNTER
Medication: Advair 250-50 mcg, dose: 1 inhalation, how often: twice daily, current number of medication days provided: 90, refill per application. Lot #: [id number], EXP 03/2019. This medication was received and verified for the following 1. Correct Patient, 2. Correct Diagnosis, 3. Correct Drug, 4. Correct route, and no current allergy to medication. Please contact patient to come  their medications.      Renato Bahena MSN, RN, FNP-C     [facility]

## 2018-04-10 ENCOUNTER — OFFICE VISIT (OUTPATIENT)
Dept: ORTHOPEDIC SURGERY | Age: 63
End: 2018-04-10

## 2018-04-10 VITALS
TEMPERATURE: 98.1 F | DIASTOLIC BLOOD PRESSURE: 77 MMHG | RESPIRATION RATE: 16 BRPM | WEIGHT: 122 LBS | BODY MASS INDEX: 23.03 KG/M2 | HEART RATE: 92 BPM | HEIGHT: 61 IN | SYSTOLIC BLOOD PRESSURE: 141 MMHG | OXYGEN SATURATION: 100 %

## 2018-04-10 DIAGNOSIS — M48.8X2 OSSIFICATION OF POSTERIOR LONGITUDINAL LIGAMENT IN CERVICAL REGION (HCC): ICD-10-CM

## 2018-04-10 DIAGNOSIS — M47.12 CERVICAL SPONDYLOSIS WITH MYELOPATHY: ICD-10-CM

## 2018-04-10 DIAGNOSIS — Z98.1 S/P CERVICAL SPINAL FUSION: Primary | ICD-10-CM

## 2018-04-10 NOTE — PROGRESS NOTES
[address] [facility] [address]  [address]  [place], [address] [place] [address] Street  Phone: [phone]  Fax: [phone]  PROGRESS NOTE  Patient: Karolina Davis                MRN: [id number]       SSN: [id number]  YOB: 1955        AGE: 57 y.o. SEX: female  Body mass index is 23.05 kg/(m^2). PCP: Vick Carroll NP  04/10/18    Chief Complaint   Patient presents with    Surgical Follow-up       HISTORY OF PRESENT ILLNESS, RADIOGRAPHS, and PLAN:     HISTORY:  Ms. Peyton Malone returns today. She is six weeks out from her posterior cervical decompression and fusion for myeloradiculopathy. She has had dramatic improvement. She has had resolution of her hand numbness, weakness, cramping, as well as her leg cramping. She is able to walk better. Her main issue now, is one of just her head feeling heavy and stiffness. Her wound is healed and dry. She is objectively, neurologically intact. 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 She has limited work tolerance and standing tolerance and use of her arm tolerance. She is going to be in a home exercise program.      ASSESSMENT/PLAN: We will see her back in six weeks time. Her x-rays look good. cc: Deja Lin, N.P.         Past Medical History:   Diagnosis Date    Asthma 8/25/2012    +PFT for mild obstructive disease/COPD    Back pain, chronic 8/25/2012    Dr. Leon Resendiz referred to Dr. Elida Larkin    [facility]     GERD (gastroesophageal reflux disease)     H/O mammogram 10/03/2017    No evidence of malignancy    Hard of hearing     chronic ruptured TM    HTN (hypertension) 8/25/2012    Hx of screening mammography 09/06/2016    [facility], normal, routine recs    Hyperlipidemia     Nausea & vomiting     Sinus congestion 8/25/2012    Spinal stenosis of lumbar region with radiculopathy 9/2015    Dr. Fabricio Bennett [place]       Family History   Problem Relation Age of Onset    Heart Disease Mother     Hypertension Mother     Cancer Mother     Coronary Artery Disease Mother 67    Heart Surgery Mother 68    Heart Disease Father     Hypertension Father     Stroke Father     Heart Surgery Father 46    Lung Disease Father     Heart Disease Brother     Heart Disease Brother        Current Outpatient Prescriptions   Medication Sig Dispense Refill    dexlansoprazole (DEXILANT) 30 mg capsule Take 1 Cap by mouth daily. Indications: gastroesophageal reflux disease 90 Cap 3    montelukast (SINGULAIR) 10 mg tablet Take 1 Tab by mouth daily. Indications: Allergic Rhinitis 30 Tab 6    mometasone (NASONEX) 50 mcg/actuation nasal spray 2 Sprays by Both Nostrils route daily. 3 Container 3    albuterol (PROVENTIL HFA) 90 mcg/actuation inhaler Take 2 Puffs by inhalation every six (6) hours as needed. 1 Inhaler 3    Cholecalciferol, Vitamin D3, (VITAMIN D3) 2,000 unit cap capsule Take 2,000 Units by mouth daily. 30 Cap 11    promethazine (PHENERGAN) 25 mg tablet Take 1 Tab by mouth every eight (8) hours as needed for Nausea. Indications: POST-OPERATIVE NAUSEA AND VOMITING 10 Tab 0    traMADol (ULTRAM) 50 mg tablet Take 1 Tab by mouth every six (6) hours as needed for Pain (s/p cervical fusion). Max Daily Amount: 200 mg. 50 Tab 0    lisinopril-hydroCHLOROthiazide (PRINZIDE, ZESTORETIC) 20-12.5 mg per tablet Take 1 Tab by mouth daily. Indications: hypertension 30 Tab 3    ezetimibe (ZETIA) 10 mg tablet Take 1 Tab by mouth daily. 90 Tab 3    topiramate (TOPAMAX) 25 mg tablet Take 1 tab po QHS for one week, then increase to 2 tabs po QHS 60 Tab 0    magnesium oxide (MAG-OX) 400 mg tablet Take 1 Tab by mouth daily. 30 Tab 11    fluticasone-salmeterol (ADVAIR) 250-50 mcg/dose diskus inhaler Take 1 Puff by inhalation every twelve (12) hours.  3 Inhaler 3       Allergies   Allergen Reactions    Lyrica [Pregabalin] Other (comments)     Medication causes dry eyes, unstable balance, and lack of concentration.  Mold Extracts Runny Nose    Pollen Extracts Itching    Statins-Hmg-Coa Reductase Inhibitors Myalgia    Topamax [Topiramate] Vertigo       Past Surgical History:   Procedure Laterality Date    HX  SECTION      HX TUBAL LIGATION         Past Medical History:   Diagnosis Date    Asthma 2012    +PFT for mild obstructive disease/COPD    Back pain, chronic 2012    Dr. Raffy Hawkins referred to Dr. Stoney Rene    [facility]     GERD (gastroesophageal reflux disease)     H/O mammogram 10/03/2017    No evidence of malignancy    Hard of hearing     chronic ruptured TM    HTN (hypertension) 2012    Hx of screening mammography 2016    [facility], normal, routine recs    Hyperlipidemia     Nausea & vomiting     Sinus congestion 2012    Spinal stenosis of lumbar region with radiculopathy 2015    Dr. Rocio Rob, [place]       Social History     Social History    Marital status: SINGLE     Spouse name: N/A    Number of children: N/A    Years of education: N/A     Occupational History    Not on file. Social History Main Topics    Smoking status: Current Every Day Smoker     Packs/day: 1.00     Types: Cigarettes    Smokeless tobacco: Never Used    Alcohol use No      Comment: h/o alcohol abuse in remission    Drug use: No    Sexual activity: Not on file     Other Topics Concern     Service No    Blood Transfusions No    Caffeine Concern Yes    Occupational Exposure No    Hobby Hazards No    Sleep Concern No    Stress Concern No    Weight Concern No    Special Diet No    Back Care No    Exercise Yes     walking    Bike Helmet No    Seat Belt Yes    Self-Exams No     Social History Narrative         REVIEW OF SYSTEMS:   CONSTITUTIONAL SYMPTOMS:  Negative. EYES:  Negative.    EARS, NOSE, THROAT AND MOUTH: Negative. CARDIOVASCULAR:  Negative. RESPIRATORY:  Negative. GENITOURINARY: Per HPI. GASTROINTESTINAL:  Per HPI. INTEGUMENTARY (SKIN AND/OR BREAST):  Negative. MUSCULOSKELETAL: Per HPI.   ENDOCRINE/RHEUMATOLOGIC:  Negative. NEUROLOGICAL:  Per HPI. HEMATOLOGIC/LYMPHATIC:  Negative. ALLERGIC/IMMUNOLOGIC:  Negative. PSYCHIATRIC:  Negative. PHYSICAL EXAMINATION:   Visit Vitals    /77    Pulse 92    Temp 98.1 °F (36.7 °C) (Oral)    Resp 16    Ht 5' 1\" (1.549 m)    Wt 122 lb (55.3 kg)    SpO2 100%    BMI 23.05 kg/m2    PAIN SCALE: 4/10    CONSTITUTIONAL: The patient is in no apparent distress and is alert and oriented x 3. HEENT: Normocephalic. Hearing grossly intact. NECK: Supple and symmetric. no tenderness, or masses were felt. RESPIRATORY: No labored breathing. CARDIOVASCULAR: The carotid pulses were normal. Peripheral pulses were 2+. CHEST: Normal AP diameter and normal contour without any kyphoscoliosis. LYMPHATIC: No lymphadenopathy was appreciated in the neck, axillae or groin. SKIN:  Incision healing well, no drainage, no erythema, no hernia, no seroma, no swelling, no dehiscence, incision well approximated. Negative for scars, rashes, lesions, or ulcers on the right upper, right lower, left upper, left lower and trunk. NEUROLOGICAL: Alert and oriented x 3. Ambulation without assistive device. FWB. EXTREMITIES: See musculoskeletal.  MUSCULOSKELETAL:   Head and Neck:  Negative for misalignment, asymmetry, crepitation, defects, tenderness masses or effusions.  Left Upper Extremity: Inspection, percussion and palpation preformed. Hernandezs sign is negative.  Right Upper Extremity: Inspection, percussion and palpation preformed. Hernandezs sign is negative.  Spine, Ribs and Pelvis: Inspection, percussion and palpation preformed. Negative for misalignment, asymmetry, crepitation, defects, tenderness masses or effusions.     Left Lower Extremity: Inspection, percussion and palpation preformed. Negative straight leg raise.  Right Lower Extremity: Inspection, percussion and palpation preformed. Negative straight leg raise. SPINE EXAM:     Cervical spine: Neck is midline. Normal muscle tone. No focal atrophy is noted. ASSESSMENT    ICD-10-CM ICD-9-CM    1. S/P cervical spinal fusion Z98.1 V45.4 AMB POC XRAY, SPINE, CERVICAL; 2 OR 3      REFERRAL TO SOCIAL WORK   2. Cervical spondylosis with myelopathy M47.12 721.1 AMB POC XRAY, SPINE, CERVICAL; 2 OR 3      REFERRAL TO SOCIAL WORK   3. Ossification of posterior longitudinal ligament in cervical region [facility]) M48.8X2 723.7 REFERRAL TO SOCIAL WORK       Written by Layton Cobb, as dictated by Joann Bustamante MD.    I, Dr. Joann Bustamante MD, confirm that all documentation is accurate.

## 2018-04-10 NOTE — MR AVS SNAPSHOT
[address] [facility] [address] [facility] [address] 
[phone] Patient: Young Nieto MRN: [id number] HTD:0/5/5588 Visit Information Date & Time Provider Department Dept. Phone Encounter #  
 4/10/2018 10:50 AM Homero Blizzard, MD [facility] [phone] [id number] Follow-up Instructions Return in about 6 weeks (around 5/22/2018) for with NP. Follow-up and Disposition History Your Appointments 6/4/2018  1:00 PM  
Follow Up with Luis Glynn NP [address] [facility] ([address] [facility]) Appt Note: 6 mth follow up  
 [address] [facility] [address]  
[address] [facility] [address] Upcoming Health Maintenance Date Due Pneumococcal 19-64 Medium Risk (1 of 1 - PPSV23) 9/8/1972 DTaP/Tdap/Td series (1 - Tdap) 9/8/1974 FOBT Q 1 YEAR AGE 50-75 9/8/2003 ZOSTER VACCINE AGE 60> 7/8/2013 PAP AKA CERVICAL CYTOLOGY 9/2/2017 BREAST CANCER SCRN MAMMOGRAM 10/4/2019 Allergies as of 4/10/2018  Review Complete On: 4/10/2018 By: Homero Blizzard, MD  
  
 Severity Noted Reaction Type Reactions Lyrica [Pregabalin]  10/04/2017    Other (comments) Medication causes dry eyes, unstable balance, and lack of concentration. Mold Extracts  11/11/2014   Systemic Runny Nose Pollen Extracts  02/22/2018    Itching Statins-hmg-coa Reductase Inhibitors  06/05/2017   Intolerance Myalgia Topamax [Topiramate]  01/08/2018    Vertigo Current Immunizations  Reviewed on 10/29/2015 Name Date Influenza Vaccine 10/1/2017, 10/6/2016 Influenza Vaccine Cinthia Majorler) 10/29/2015 Influenza Vaccine (Quad) PF 10/31/2013 Influenza Vaccine (Trivalent) 11/11/2014  3:00 PM  
  
 Not reviewed this visit You Were Diagnosed With   
  
 Codes Comments S/P cervical spinal fusion    -  Primary ICD-10-CM: Z98.1 ICD-9-CM: V45.4 Cervical spondylosis with myelopathy     ICD-10-CM: M47.12 
ICD-9-CM: 721.1 Ossification of posterior longitudinal ligament in cervical region [facility])     ICD-10-CM: L98.2I7 ICD-9-CM: 723.7 Vitals BP Pulse Temp Resp Height(growth percentile) Weight(growth percentile) 141/77 92 98.1 °F (36.7 °C) (Oral) 16 5' 1\" (1.549 m) 122 lb (55.3 kg) SpO2 BMI OB Status Smoking Status 100% 23.05 kg/m2 Postmenopausal Current Every Day Smoker BMI and BSA Data Body Mass Index Body Surface Area 23.05 kg/m 2 1.54 m 2 Preferred Pharmacy Pharmacy Name Phone Ricardo Barajas Ruddy [address] [facility] [phone] Your Updated Medication List  
  
   
This list is accurate as of 4/10/18 12:06 PM.  Always use your most recent med list.  
  
  
  
  
 albuterol 90 mcg/actuation inhaler Commonly known as:  PROVENTIL HFA Take 2 Puffs by inhalation every six (6) hours as needed. Cholecalciferol (Vitamin D3) 2,000 unit Cap capsule Commonly known as:  VITAMIN D3 Take 2,000 Units by mouth daily. dexlansoprazole 30 mg capsule Commonly known as:  DEXILANT Take 1 Cap by mouth daily. Indications: gastroesophageal reflux disease  
  
 ezetimibe 10 mg tablet Commonly known as:  Lavon [place] Take 1 Tab by mouth daily. fluticasone-salmeterol 250-50 mcg/dose diskus inhaler Commonly known as:  ADVAIR Take 1 Puff by inhalation every twelve (12) hours. lisinopril-hydroCHLOROthiazide 20-12.5 mg per tablet Commonly known as:  Amarilis Dukes Take 1 Tab by mouth daily. Indications: hypertension  
  
 magnesium oxide 400 mg tablet Commonly known as:  MAG-OX Take 1 Tab by mouth daily. mometasone 50 mcg/actuation nasal spray Commonly known as:  NASONEX  
2 Sprays by Both Nostrils route daily. montelukast 10 mg tablet Commonly known as:  SINGULAIR Take 1 Tab by mouth daily. Indications: Allergic Rhinitis  
  
 promethazine 25 mg tablet Commonly known as:  PHENERGAN Take 1 Tab by mouth every eight (8) hours as needed for Nausea. Indications: POST-OPERATIVE NAUSEA AND VOMITING  
  
 topiramate 25 mg tablet Commonly known as:  TOPAMAX Take 1 tab po QHS for one week, then increase to 2 tabs po QHS  
  
 traMADol 50 mg tablet Commonly known as:  ULTRAM  
Take 1 Tab by mouth every six (6) hours as needed for Pain (s/p cervical fusion). Max Daily Amount: 200 mg. We Performed the Following AMB POC XRAY, SPINE, CERVICAL; 2 OR 3 [86855 CPT(R)] REFERRAL TO SOCIAL WORK [UKA37 Custom] Comments: SSD Disability staci Follow-up Instructions Return in about 6 weeks (around 5/22/2018) for with NP. Referral Information Referral ID Referred By Referred To  
  
 [id number] Ced Proper Not Available Visits Status Start Date End Date 1 New Request 4/10/18 4/10/19 If your referral has a status of pending review or denied, additional information will be sent to support the outcome of this decision. Patient Instructions Neck: Exercises Your Care Instructions Here are some examples of typical rehabilitation exercises for your condition. Start each exercise slowly. Ease off the exercise if you start to have pain. Your doctor or physical therapist will tell you when you can start these exercises and which ones will work best for you. How to do the exercises Neck stretch 1. This stretch works best if you keep your shoulder down as you lean away from it. To help you remember to do this, start by relaxing your shoulders and lightly holding on to your thighs or your chair. 2. Tilt your head toward your shoulder and hold for 15 to 30 seconds. Let the weight of your head stretch your muscles.  
3. If you would like a little added stretch, use your hand to gently and steadily pull your head toward your shoulder. For example, keeping your right shoulder down, lean your head to the left. 4. Repeat 2 to 4 times toward each shoulder. Diagonal neck stretch 1. Turn your head slightly toward the direction you will be stretching, and tilt your head diagonally toward your chest and hold for 15 to 30 seconds. 2. If you would like a little added stretch, use your hand to gently and steadily pull your head forward on the diagonal. 
3. Repeat 2 to 4 times toward each side. Dorsal glide stretch The dorsal glide stretches the back of the neck. If you feel pain, do not glide so far back. Some people find this exercise easier to do while lying on their backs with an ice pack on the neck. 1. Sit or stand tall and look straight ahead. 2. Slowly tuck your chin as you glide your head backward over your body 3. Hold for a count of 6, and then relax for up to 10 seconds. 4. Repeat 8 to 12 times. Chest and shoulder stretch 1. Sit or stand tall and glide your head backward as in the dorsal glide stretch. 2. Raise both arms so that your hands are next to your ears. 3. Take a deep breath, and as you breathe out, lower your elbows down and behind your back. You will feel your shoulder blades slide down and together, and at the same time you will feel a stretch across your chest and the front of your shoulders. 4. Hold for about 6 seconds, and then relax for up to 10 seconds. 5. Repeat 8 to 12 times. Strengthening: Hands on head 1. Move your head backward, forward, and side to side against gentle pressure from your hands, holding each position for about 6 seconds. 2. Repeat 8 to 12 times. Follow-up care is a key part of your treatment and safety. Be sure to make and go to all appointments, and call your doctor if you are having problems. It's also a good idea to know your test results and keep a list of the medicines you take. Where can you learn more? Go to http://giselle-erika.info/. Enter P975 in the search box to learn more about \"Neck: Exercises. \" Current as of: March 21, 2017 Content Version: 11.4 © 6525-9249 [employer]. Care instructions adapted under license by [employer] (which disclaims liability or warranty for this information). If you have questions about a medical condition or this instruction, always ask your healthcare professional. [place] [address] any warranty or liability for your use of this information. Shoulder Blade: Exercises Your Care Instructions Here are some examples of typical exercises for your condition. Start each exercise slowly. Ease off the exercise if you start to have pain. Your doctor or physical therapist will tell you when you can start these exercises and which ones will work best for you. How to do the exercises Shoulder roll 5. Stand tall with your chin slightly tucked. Imagine that a string at the top of your head is pulling you straight up. 6. Keep your arms relaxed. All motion will be in your shoulders. 7. Shrug your shoulders up toward your ears, then up and back. [place] your shoulders down and back, like you're sliding your hands down into your back pants pockets. 8. Repeat the circles at least 2 to 4 times. 9. This exercise is also helpful anytime you want to relax. Lower neck and upper back stretch 4. With your arms about shoulder height, clasp your hands in front of you. 5. Drop your chin toward your chest. 
6. Reach straight forward so you are rounding your upper back. Think about pulling your shoulder blades apart. [place] feel a stretch across your upper back and shoulders. Hold for at least 6 seconds. 7. Repeat 2 to 4 times. Triceps stretch 5. Reach your arm straight up. 6. Keeping your elbow in place, bend your arm and reach your hand down behind your back. 7. With your other hand, apply gentle pressure to the bent elbow. Екатерина Larkin feel a stretch at the back of your upper arm and shoulder. Hold about 6 seconds. 8. Repeat 2 to 4 times with each arm. Shoulder stretch 6. Relax your shoulders. 7. Raise one arm to shoulder height, and reach it across your chest. 
8. Pull the arm slightly toward you with your other arm. This will help you get a gentle stretch. Hold for about 6 seconds. 9. Repeat 2 to 4 times. Shoulder blade squeeze 3. Sit or stand up tall with your arms at your sides. 4. Keep your shoulders relaxed and down, not shrugged. 5. Squeeze your shoulder blades together. Hold for 6 seconds, then relax. 6. Repeat 8 to 12 times. Straight-arm shoulder blade squeeze 1. Sit or stand tall. Relax your shoulders. 2. With palms down, hold your elastic tubing or band straight out in front of you. 3. Start with slight tension in the tubing or band, with your hands about shoulder-width apart. 4. Slowly pull straight out to the sides, squeezing your shoulder blades together. Keep your arms straight and at shoulder height. Slowly release. 5. Repeat 8 to 12 times. Rowing 1. [place] your elastic tubing or band at about waist height. Take one end in each hand. 2. Sit or stand with your feet hip-width apart. 3. Hold your arms straight in front of you. Adjust your distance to create slight tension in the tubing or band. 4. Slightly tuck your chin. Relax your shoulders. 5. Without shrugging your shoulders, pull straight back. Your elbows will pass alongside your waist. 
Pull-downs 1. [place] your elastic tubing or band in the top of a closed door. Take one end in each hand. 2. Either sit or stand, depending on what is more comfortable. If you feel unsteady, sit on a chair. 3. Start with your arms up and comfortably apart, elbows straight. There should be a slight tension in the tubing or band. 4. Slightly tuck your chin, and look straight ahead. 5. Keeping your back straight, slowly pull down and back, bending your elbows. 6. Stop where your hands are level with your chin, in a \"goalpost\" position. 7. Repeat 8 to 12 times. Chest T stretch 1. Lie on your back. Raise your knees so they are bent. Plant your feet on the floor, hip-width apart. 2. Tuck your chin, and relax your shoulders. 3. Reach your arms straight out to the sides. If you don't feel a mild stretch in your shoulders and across your chest, use a foam roll or a tightly rolled blanket under your spine, from your tailbone to your head. 4. Relax in this position for at least 15 to 30 seconds while you breathe normally. Repeat 2 to 4 times. 5. As you get used to this stretch, keep adding a little more time until you are able relax in this position for 2 or 3 minutes. When you can relax for at least 2 minutes, you only need to do the exercise 1 time per session. Chest goalpost stretch 1. Lie on your back. Raise your knees so they are bent. Plant your feet on the floor, hip-width apart. 2. Tuck your chin, and relax your shoulders. 3. Reach your arms straight out to the sides. 4. Bend your arms at the elbows, with your hands pointed toward the top of your head. Your arms should make an L on either side of your head. Your palms should be facing up. 5. If you don't feel a mild stretch in your shoulders and across your chest, use a foam roll or tightly rolled blanket under your spine, from your tailbone to your head. 6. Relax in this position for at least 15 to 30 seconds while you breathe normally. Repeat 2 to 4 times. 7. Each day you do this exercise, add a little more time until you can relax in this position for 2 or 3 minutes. When you can relax for at least 2 minutes, you only need to do the exercise 1 time per session. Follow-up care is a key part of your treatment and safety.  Be sure to make and go to all appointments, and call your doctor if you are having problems. It's also a good idea to know your test results and keep a list of the medicines you take. Where can you learn more? Go to http://giselle-erika.info/. Enter [phone] in the search box to learn more about \"Shoulder Blade: Exercises. \" Current as of: March 21, 2017 Content Version: 11.4 © 0010-4609 [employer]. Care instructions adapted under license by [employer] (which disclaims liability or warranty for this information). If you have questions about a medical condition or this instruction, always ask your healthcare professional. [address] any warranty or liability for your use of this information. Please provide this summary of care documentation to your next provider. Your primary care clinician is listed as Bennie Saleh. If you have any questions after today's visit, please call [phone].

## 2018-04-12 ENCOUNTER — TELEPHONE (OUTPATIENT)
Dept: FAMILY MEDICINE CLINIC | Age: 63
End: 2018-04-12

## 2018-04-12 DIAGNOSIS — I10 ESSENTIAL HYPERTENSION: Primary | ICD-10-CM

## 2018-04-12 RX ORDER — LOSARTAN POTASSIUM AND HYDROCHLOROTHIAZIDE 12.5; 5 MG/1; MG/1
1 TABLET ORAL DAILY
Qty: 90 TAB | Refills: 0 | Status: SHIPPED | OUTPATIENT
Start: 2018-04-12 | End: 2018-06-04 | Stop reason: SDUPTHER

## 2018-04-12 NOTE — TELEPHONE ENCOUNTER
Patient would like to change her BP meds, she states that Linsinopril makes her cough she has not taken it for the past 6 weeks. Can you change meds to something else, please isaiah into [facility] on [address].

## 2018-04-12 NOTE — TELEPHONE ENCOUNTER
Spoke with patient via telephone and she reports since being on Prinzide 20-12.5 She has started to have a cough and is requesting to be changed to something else. She was recently placed on Prinzide as Benicar HCTZ had been discontinued from patient assistance program, however she tolerated that medication with no side effects. Discussed with patient that we will be placing her back on something comparable to Benicar HCTZ due to cost effectiveness. Pt was receptive and agreed with changes. Questions and concerns addressed. Good RX coupon sent to phone for patient to take to [facility] to get discounted med. Orders Placed This Encounter    losartan-hydroCHLOROthiazide (HYZAAR) 50-12.5 mg per tablet     Sig: Take 1 Tab by mouth daily.  Indications: hypertension     Dispense:  90 Tab     Refill:  0     Medications Discontinued During This Encounter   Medication Reason    lisinopril-hydroCHLOROthiazide (PRINZIDE, ZESTORETIC) 20-12.5 mg per tablet Side Effects

## 2018-05-21 ENCOUNTER — OFFICE VISIT (OUTPATIENT)
Dept: ORTHOPEDIC SURGERY | Age: 63
End: 2018-05-21

## 2018-05-21 DIAGNOSIS — Z98.1 S/P CERVICAL SPINAL FUSION: ICD-10-CM

## 2018-05-21 DIAGNOSIS — M54.2 NECK PAIN: ICD-10-CM

## 2018-05-21 DIAGNOSIS — M47.12 CERVICAL SPONDYLOSIS WITH MYELOPATHY: Primary | ICD-10-CM

## 2018-05-21 NOTE — PATIENT INSTRUCTIONS

## 2018-05-21 NOTE — MR AVS SNAPSHOT
[address] [facility] [address] [facility] [address] 
[phone] Patient: Young Nieto MRN: [id number] YUS:0/4/0459 Visit Information Date & Time Provider Department Dept. Phone Encounter #  
 5/21/2018 11:00 AM Riley Sepulveda NP [facility] [phone] [id number] Follow-up Instructions Return in about 3 months (around 8/21/2018). Routing History Follow-up and Disposition History Your Appointments 6/4/2018  1:00 PM  
Follow Up with Luis Glynn NP [address] [facility] ([facility]) Appt Note: 6 mth follow up  
 [address] [facility] [place] [address]  
[address] [facility] [address] Upcoming Health Maintenance Date Due Pneumococcal 19-64 Medium Risk (1 of 1 - PPSV23) 9/8/1972 DTaP/Tdap/Td series (1 - Tdap) 9/8/1974 FOBT Q 1 YEAR AGE 50-75 9/8/2003 ZOSTER VACCINE AGE 60> 7/8/2013 PAP AKA CERVICAL CYTOLOGY 9/2/2017 Influenza Age 5 to Adult 12/4/2018* BREAST CANCER SCRN MAMMOGRAM 10/4/2019 *Topic was postponed. The date shown is not the original due date. Allergies as of 5/21/2018  Review Complete On: 5/21/2018 By: Riley Sepulveda NP Severity Noted Reaction Type Reactions Lisinopril  04/12/2018    Cough Lyrica [Pregabalin]  10/04/2017    Other (comments) Medication causes dry eyes, unstable balance, and lack of concentration. Mold Extracts  11/11/2014   Systemic Runny Nose Pollen Extracts  02/22/2018    Itching Statins-hmg-coa Reductase Inhibitors  06/05/2017   Intolerance Myalgia Topamax [Topiramate]  01/08/2018    Vertigo Current Immunizations  Reviewed on 10/29/2015 Name Date Influenza Vaccine 10/1/2017, 10/6/2016 Influenza Vaccine Cinthia Marlena) 10/29/2015 Influenza Vaccine (Quad) PF 10/31/2013 Influenza Vaccine (Trivalent) 11/11/2014  3:00 PM  
  
 Not reviewed this visit You Were Diagnosed With   
  
 Codes Comments Cervical spondylosis with myelopathy    -  Primary ICD-10-CM: M47.12 
ICD-9-CM: 721.1 Neck pain     ICD-10-CM: M54.2 ICD-9-CM: 723.1 S/P cervical spinal fusion     ICD-10-CM: Z98.1 ICD-9-CM: V45.4 Vitals OB Status Smoking Status Postmenopausal Current Every Day Smoker Preferred Pharmacy Pharmacy Name Phone Elo Palmer,[address] [facility] [phone] Your Updated Medication List  
  
   
This list is accurate as of 5/21/18 11:23 AM.  Always use your most recent med list.  
  
  
  
  
 albuterol 90 mcg/actuation inhaler Commonly known as:  PROVENTIL HFA Take 2 Puffs by inhalation every six (6) hours as needed. Cholecalciferol (Vitamin D3) 2,000 unit Cap capsule Commonly known as:  VITAMIN D3 Take 2,000 Units by mouth daily. dexlansoprazole 30 mg capsule Commonly known as:  DEXILANT Take 1 Cap by mouth daily. Indications: gastroesophageal reflux disease  
  
 ezetimibe 10 mg tablet Commonly known as:  Brown [place]rumble Take 1 Tab by mouth daily. fluticasone-salmeterol 250-50 mcg/dose diskus inhaler Commonly known as:  ADVAIR Take 1 Puff by inhalation every twelve (12) hours. losartan-hydroCHLOROthiazide 50-12.5 mg per tablet Commonly known as:  HYZAAR Take 1 Tab by mouth daily. Indications: hypertension  
  
 magnesium oxide 400 mg tablet Commonly known as:  MAG-OX Take 1 Tab by mouth daily. mometasone 50 mcg/actuation nasal spray Commonly known as:  NASONEX  
2 Sprays by Both Nostrils route daily. montelukast 10 mg tablet Commonly known as:  SINGULAIR Take 1 Tab by mouth daily. Indications: Allergic Rhinitis  
  
 promethazine 25 mg tablet Commonly known as:  PHENERGAN Take 1 Tab by mouth every eight (8) hours as needed for Nausea. Indications: POST-OPERATIVE NAUSEA AND VOMITING  
  
 topiramate 25 mg tablet Commonly known as:  TOPAMAX Take 1 tab po QHS for one week, then increase to 2 tabs po QHS  
  
 traMADol 50 mg tablet Commonly known as:  ULTRAM  
Take 1 Tab by mouth every six (6) hours as needed for Pain (s/p cervical fusion). Max Daily Amount: 200 mg. We Performed the Following AMB POC XRAY, SPINE, CERVICAL; 2 OR 3 [59259 CPT(R)] Follow-up Instructions Return in about 3 months (around 8/21/2018). Patient Instructions Neck: Exercises Your Care Instructions Here are some examples of typical rehabilitation exercises for your condition. Start each exercise slowly. Ease off the exercise if you start to have pain. Your doctor or physical therapist will tell you when you can start these exercises and which ones will work best for you. How to do the exercises Neck stretch 1. This stretch works best if you keep your shoulder down as you lean away from it. To help you remember to do this, start by relaxing your shoulders and lightly holding on to your thighs or your chair. 2. Tilt your head toward your shoulder and hold for 15 to 30 seconds. Let the weight of your head stretch your muscles. 3. If you would like a little added stretch, use your hand to gently and steadily pull your head toward your shoulder. For example, keeping your right shoulder down, lean your head to the left. 4. Repeat 2 to 4 times toward each shoulder. Diagonal neck stretch 1. Turn your head slightly toward the direction you will be stretching, and tilt your head diagonally toward your chest and hold for 15 to 30 seconds. 2. If you would like a little added stretch, use your hand to gently and steadily pull your head forward on the diagonal. 
3. Repeat 2 to 4 times toward each side. Dorsal glide stretch The dorsal glide stretches the back of the neck.  If you feel pain, do not glide so far back. Some people find this exercise easier to do while lying on their backs with an ice pack on the neck. 1. Sit or stand tall and look straight ahead. 2. Slowly tuck your chin as you glide your head backward over your body 3. Hold for a count of 6, and then relax for up to 10 seconds. 4. Repeat 8 to 12 times. Chest and shoulder stretch 1. Sit or stand tall and glide your head backward as in the dorsal glide stretch. 2. Raise both arms so that your hands are next to your ears. 3. Take a deep breath, and as you breathe out, lower your elbows down and behind your back. You will feel your shoulder blades slide down and together, and at the same time you will feel a stretch across your chest and the front of your shoulders. 4. Hold for about 6 seconds, and then relax for up to 10 seconds. 5. Repeat 8 to 12 times. Strengthening: Hands on head 1. Move your head backward, forward, and side to side against gentle pressure from your hands, holding each position for about 6 seconds. 2. Repeat 8 to 12 times. Follow-up care is a key part of your treatment and safety. Be sure to make and go to all appointments, and call your doctor if you are having problems. It's also a good idea to know your test results and keep a list of the medicines you take. Where can you learn more? Go to http://giselle-erika.info/. Enter P975 in the search box to learn more about \"Neck: Exercises. \" Current as of: March 21, 2017 Content Version: 11.4 © 3365-8641 [employer]. Care instructions adapted under license by [employer] (which disclaims liability or warranty for this information). If you have questions about a medical condition or this instruction, always ask your healthcare professional. [address] any warranty or liability for your use of this information. Please provide this summary of care documentation to your next provider. Your primary care clinician is listed as Annalisa Ramon. If you have any questions after today's visit, please call [phone].

## 2018-05-21 NOTE — PROGRESS NOTES
[address]  [address], [address]  [place] [address]  Phone: [phone]  Fax: [phone]  PROGRESS NOTE-Post-op  Patient: Young Nieto                MRN: [id number]       SSN: [id number]  YOB: 1955        AGE: 57 y.o. SEX: female  There is no height or weight on file to calculate BMI. PCP: Luis Glynn NP  05/21/18    Chief Complaint   Patient presents with    Neck Pain     F/U       HISTORY OF PRESENT ILLNESS:  Isabel Chino is a 57 y. o. female with history of neck pain, arm pain, numbness of the arm(s), loss of strength of the arm(s) and balance and dexterity issues and hyper-reflexia in [facility] who had an Posterior Cervical Fusion C3-7 surgery three months ago for Cervical Myelopathy.  Pain prior to surgery was in the C5-7 distribution from neck to hand.  Pain is described as aching, burning, numbing, tingling and radiating.  Pain is worse with looking up, looking down, pushing, pulling and affects sleep, work and recreational activities. Pain is better with relaxation, pain medication and ice. neck pain, arm pain, loss of strength of the arm(s) and balance and dexterity issues and hyper-reflexia has significantly improved since surgery. Her pain is now some left-sided myofascial pain. She reports improved balance as well. She is on a HEP, her insurance does not cover PT. States she has been using OTC Tylenol with moderate, RELIEF. Patient denies any fevers, wound drainage, bladder/bowel dysfunction, new onset weakness, saddle paresthesia, new onset radiculopathy or nerve pain, or other neurological deficits. Reports that she is swallowing without difficulty. Pt desires to continue with evaluation of Neck and arm pain and postoperative care. ASSESSMENT   Diagnoses and all orders for this visit:    1. Cervical spondylosis with myelopathy    2. Neck pain  -     AMB POC XRAY, SPINE, CERVICAL; 2 OR 3    3.  S/P cervical spinal fusion  - AMB POC XRAY, SPINE, CERVICAL; 2 OR 3       IMPRESSION AND PLAN:  This is a pt who had a cervical fusion surgery 3 months ago. She is doing well with resolution of her radicular pain. She has normal strength and DTRs on exam. Her last set of x-rays showed appropriate alignment and fixation. I got another set today for Dr. Hector Mailk review. 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 She has a poor tolerance for overhead activity and even manual/dexterity work. She is fused from C3-7. Her incision is healed. Isabel Dalal 1) Pt was given information on neck exercises post-operative and wound care. 2) Reviewed activity   3) HEP  4) Ms. Todd Zuñiga has a reminder for a \"due or due soon\" health maintenance. I have asked that she contact her primary care provider, Km Song NP, for follow-up on this health maintenance. 5) We have informed the patient to notify us for immediate appointment if he has any worsening neurogical symptoms or if an emergency situation presents, then call 911  6)  demonstrated consistency with prescribing. 7) Pt will follow-up in 3 mo w/me. SUBJECTIVE    Smoking Status Recently quit  Work Applying for [employer], Hair dressor    Pain Scale: 0 - No pain/10    Pain Assessment  5/21/2018   Location of Pain Neck   Pain Location Comment -   Location Modifiers -   Severity of Pain 0   Quality of Pain -   Quality of Pain Comment -   Duration of Pain -   Frequency of Pain Intermittent   Aggravating Factors -   Aggravating Factors Comment -   Limiting Behavior -   Relieving Factors -   Relieving Factors Comment -   Result of Injury No           REVIEW OF SYSTEMS  Constitutional: Negative for fever, chills, or weight change. Respiratory: Negative for cough or shortness of breath. Cardiovascular: Negative for chest pain or palpitations. Gastrointestinal: Negative for incontinence, acid reflux, change in bowel habits, or constipation.   Genitourinary: Negative for incontinence, dysuria and flank pain.   Musculoskeletal: Positive for neck pain. See HPI. Skin: Negative for rash. Neurological:no radiculopathy. See HPI. Endo/Heme/Allergies: Negative. Psychiatric/Behavioral: Negative. PHYSICAL EXAMINATION  There were no vitals taken for this visit. Accompanied by self. Constitutional:  Well developed, well nourished, in no acute distress. Psychiatric: Affect and mood are appropriate. Integumentary: No rashes or abrasions noted on exposed areas. Cardiovascular/Peripheral Vascular: +2 radial & pedal pulses. No peripheral edema is noted. Lymphatic:  No evidence of lymphedema. No cervical lymphadenopathy. SPINE/MUSCULOSKELETAL EXAM    Cervical spine:  Neck is midline. Normal muscle tone. No focal atrophy is noted. Shoulder ROM intact. Neck ROM decreased with flexion, extension, turning right, turning left. Tenderness to palpation bilateral trapezius. Negative Hernandez's sign. Sensation grossly intact to light touch. MOTOR:     Biceps Triceps Deltoids Wrist Ext Wrist Flex Hand Intrin   Right 5/5 5/5 5/5 5/5 5/5 5/5   Left 5/5 5/5 5/5 5/5 5/5 5/5      Hip Flex Quads Hamstrings Ankle DF EHL Ankle PF   Right 5/5 5/5 5/5 5/5 5/5 5/5   Left 5/5 5/5 5/5 5/5 5/5 5/5         DTRs are 1+ biceps, triceps, brachioradialis, patella, and Achilles. normal gait and station    Ambulation without assistive device. FWB.     IMAGING: due in 3 mo    PAST MEDICAL HISTORY   Past Medical History:   Diagnosis Date    Asthma 8/25/2012    +PFT for mild obstructive disease/COPD    Back pain, chronic 8/25/2012    Dr. Ab Mckinnon referred to Dr. Yasmin Seip    Chronic obstructive pulmonary disease ([facility] [address]     GERD (gastroesophageal reflux disease)     H/O mammogram 10/03/2017    No evidence of malignancy    Hard of hearing     chronic ruptured TM    HTN (hypertension) 8/25/2012    Hx of screening mammography 09/06/2016    E[facility], normal, routine recs    Hyperlipidemia     Nausea & vomiting     Sinus congestion 2012    Spinal stenosis of lumbar region with radiculopathy 2015    Dr. Aida Berry       Past Surgical History:   Procedure Laterality Date    HX  SECTION      HX ORTHOPAEDIC  2018    cervical surgery(Dr. Alvin Hidalgo)    HX TUBAL LIGATION     . MEDICATIONS      Current Outpatient Prescriptions   Medication Sig Dispense Refill    losartan-hydroCHLOROthiazide (HYZAAR) 50-12.5 mg per tablet Take 1 Tab by mouth daily. Indications: hypertension 90 Tab 0    dexlansoprazole (DEXILANT) 30 mg capsule Take 1 Cap by mouth daily. Indications: gastroesophageal reflux disease 90 Cap 3    montelukast (SINGULAIR) 10 mg tablet Take 1 Tab by mouth daily. Indications: Allergic Rhinitis 30 Tab 6    ezetimibe (ZETIA) 10 mg tablet Take 1 Tab by mouth daily. 90 Tab 3    mometasone (NASONEX) 50 mcg/actuation nasal spray 2 Sprays by Both Nostrils route daily. 3 Container 3    albuterol (PROVENTIL HFA) 90 mcg/actuation inhaler Take 2 Puffs by inhalation every six (6) hours as needed. 1 Inhaler 3    Cholecalciferol, Vitamin D3, (VITAMIN D3) 2,000 unit cap capsule Take 2,000 Units by mouth daily. 30 Cap 11    promethazine (PHENERGAN) 25 mg tablet Take 1 Tab by mouth every eight (8) hours as needed for Nausea. Indications: POST-OPERATIVE NAUSEA AND VOMITING 10 Tab 0    traMADol (ULTRAM) 50 mg tablet Take 1 Tab by mouth every six (6) hours as needed for Pain (s/p cervical fusion). Max Daily Amount: 200 mg. 50 Tab 0    topiramate (TOPAMAX) 25 mg tablet Take 1 tab po QHS for one week, then increase to 2 tabs po QHS 60 Tab 0    magnesium oxide (MAG-OX) 400 mg tablet Take 1 Tab by mouth daily. 30 Tab 11    fluticasone-salmeterol (ADVAIR) 250-50 mcg/dose diskus inhaler Take 1 Puff by inhalation every twelve (12) hours.  3 Inhaler 3        ALLERGIES    Allergies   Allergen Reactions    Lisinopril Cough    Lyrica [Pregabalin] Other (comments)     Medication causes dry eyes, unstable balance, and lack of concentration.  Mold Extracts Runny Nose    Pollen Extracts Itching    Statins-Hmg-Coa Reductase Inhibitors Myalgia    Topamax [Topiramate] Vertigo          SOCIAL HISTORY    Social History     Social History    Marital status: SINGLE     Spouse name: N/A    Number of children: N/A    Years of education: N/A     Occupational History    Not on file.      Social History Main Topics    Smoking status: Current Every Day Smoker     Packs/day: 1.00     Types: Cigarettes    Smokeless tobacco: Never Used    Alcohol use No      Comment: h/o alcohol abuse in remission    Drug use: No    Sexual activity: Not on file     Other Topics Concern     Service No    Blood Transfusions No    Caffeine Concern Yes    Occupational Exposure No    Hobby Hazards No    Sleep Concern No    Stress Concern No    Weight Concern No    Special Diet No    Back Care No    Exercise Yes     walking    Bike Helmet No    Seat Belt Yes    Self-Exams No     Social History Narrative       FAMILY HISTORY    Family History   Problem Relation Age of Onset    Heart Disease Mother     Hypertension Mother     Cancer Mother     Coronary Artery Disease Mother 67    Heart Surgery Mother 68    Heart Disease Father     Hypertension Father     Stroke Father     Heart Surgery Father 46    Lung Disease Father     Heart Disease Brother     Heart Disease Brother          Teddy Dhaliwal NP

## 2018-05-22 ENCOUNTER — TELEPHONE (OUTPATIENT)
Dept: FAMILY MEDICINE CLINIC | Age: 63
End: 2018-05-22

## 2018-05-22 NOTE — TELEPHONE ENCOUNTER
Medication: Dexilant 30 mg  , dose: 1 tab, how often: every day as needed , current number of medication days provided: 90, refill per application. Lot #: [id number], EXP 05/2019. This medication was received and verified for the following 1. Correct Patient, 2. Correct Diagnosis, 3. Correct Drug, 4. Correct route, and no current allergy to medication. Please contact patient to come  their medications.      Leola Paz, MSN, RN, [facility]

## 2018-05-29 ENCOUNTER — LAB ONLY (OUTPATIENT)
Dept: FAMILY MEDICINE CLINIC | Age: 63
End: 2018-05-29

## 2018-05-29 ENCOUNTER — HOSPITAL ENCOUNTER (OUTPATIENT)
Dept: LAB | Age: 63
Discharge: HOME | End: 2018-05-29

## 2018-05-29 DIAGNOSIS — E78.5 HYPERLIPIDEMIA WITH TARGET LDL LESS THAN 100: ICD-10-CM

## 2018-05-29 DIAGNOSIS — I10 ESSENTIAL HYPERTENSION: ICD-10-CM

## 2018-05-29 DIAGNOSIS — E78.5 HYPERLIPIDEMIA WITH TARGET LDL LESS THAN 100: Primary | ICD-10-CM

## 2018-05-29 LAB
ALBUMIN SERPL-MCNC: 4.2 G/DL (ref 3.4–5)
ALBUMIN/GLOB SERPL: 1.3 {RATIO} (ref 0.8–1.7)
ALP SERPL-CCNC: 83 U/L (ref 45–117)
ALT SERPL-CCNC: 24 U/L (ref 13–56)
ANION GAP SERPL CALC-SCNC: 9 MMOL/L (ref 3–18)
AST SERPL-CCNC: 20 U/L (ref 15–37)
BASOPHILS # BLD: 0.1 K/UL (ref 0–0.06)
BASOPHILS NFR BLD: 1 % (ref 0–2)
BILIRUB SERPL-MCNC: 0.4 MG/DL (ref 0.2–1)
BUN SERPL-MCNC: 11 MG/DL (ref 7–18)
BUN/CREAT SERPL: 14 (ref 12–20)
CALCIUM SERPL-MCNC: 11.3 MG/DL (ref 8.5–10.1)
CHLORIDE SERPL-SCNC: 102 MMOL/L (ref 100–108)
CHOLEST SERPL-MCNC: 257 MG/DL
CO2 SERPL-SCNC: 27 MMOL/L (ref 21–32)
CREAT SERPL-MCNC: 0.79 MG/DL (ref 0.6–1.3)
DIFFERENTIAL METHOD BLD: ABNORMAL
EOSINOPHIL # BLD: 0.1 K/UL (ref 0–0.4)
EOSINOPHIL NFR BLD: 2 % (ref 0–5)
ERYTHROCYTE [DISTWIDTH] IN BLOOD BY AUTOMATED COUNT: 14.5 % (ref 11.6–14.5)
GLOBULIN SER CALC-MCNC: 3.3 G/DL (ref 2–4)
GLUCOSE SERPL-MCNC: 91 MG/DL (ref 74–99)
HCT VFR BLD AUTO: 41.9 % (ref 35–45)
HDLC SERPL-MCNC: 66 MG/DL (ref 40–60)
HDLC SERPL: 3.9 {RATIO} (ref 0–5)
HGB BLD-MCNC: 14 G/DL (ref 12–16)
LDLC SERPL CALC-MCNC: 162.4 MG/DL (ref 0–100)
LIPID PROFILE,FLP: ABNORMAL
LYMPHOCYTES # BLD: 1.7 K/UL (ref 0.9–3.6)
LYMPHOCYTES NFR BLD: 22 % (ref 21–52)
MAGNESIUM SERPL-MCNC: 2.1 MG/DL (ref 1.6–2.6)
MCH RBC QN AUTO: 31.2 PG (ref 24–34)
MCHC RBC AUTO-ENTMCNC: 33.4 G/DL (ref 31–37)
MCV RBC AUTO: 93.3 FL (ref 74–97)
MONOCYTES # BLD: 0.9 K/UL (ref 0.05–1.2)
MONOCYTES NFR BLD: 11 % (ref 3–10)
NEUTS SEG # BLD: 5.2 K/UL (ref 1.8–8)
NEUTS SEG NFR BLD: 64 % (ref 40–73)
PLATELET # BLD AUTO: 416 K/UL (ref 135–420)
PMV BLD AUTO: 9.7 FL (ref 9.2–11.8)
POTASSIUM SERPL-SCNC: 4.3 MMOL/L (ref 3.5–5.5)
PROT SERPL-MCNC: 7.5 G/DL (ref 6.4–8.2)
RBC # BLD AUTO: 4.49 M/UL (ref 4.2–5.3)
SODIUM SERPL-SCNC: 138 MMOL/L (ref 136–145)
TRIGL SERPL-MCNC: 143 MG/DL (ref ?–150)
VLDLC SERPL CALC-MCNC: 28.6 MG/DL
WBC # BLD AUTO: 8.1 K/UL (ref 4.6–13.2)

## 2018-05-29 PROCEDURE — 83735 ASSAY OF MAGNESIUM: CPT | Performed by: NURSE PRACTITIONER

## 2018-05-29 PROCEDURE — 80061 LIPID PANEL: CPT | Performed by: NURSE PRACTITIONER

## 2018-05-29 PROCEDURE — 80053 COMPREHEN METABOLIC PANEL: CPT | Performed by: NURSE PRACTITIONER

## 2018-05-29 PROCEDURE — 85025 COMPLETE CBC W/AUTO DIFF WBC: CPT | Performed by: NURSE PRACTITIONER

## 2018-06-04 ENCOUNTER — TELEPHONE (OUTPATIENT)
Dept: FAMILY MEDICINE CLINIC | Age: 63
End: 2018-06-04

## 2018-06-04 ENCOUNTER — OFFICE VISIT (OUTPATIENT)
Dept: FAMILY MEDICINE CLINIC | Age: 63
End: 2018-06-04

## 2018-06-04 VITALS
TEMPERATURE: 98.2 F | SYSTOLIC BLOOD PRESSURE: 145 MMHG | HEIGHT: 61 IN | OXYGEN SATURATION: 98 % | BODY MASS INDEX: 24.05 KG/M2 | WEIGHT: 127.4 LBS | RESPIRATION RATE: 20 BRPM | DIASTOLIC BLOOD PRESSURE: 80 MMHG | HEART RATE: 94 BPM

## 2018-06-04 DIAGNOSIS — J44.9 CHRONIC OBSTRUCTIVE PULMONARY DISEASE, UNSPECIFIED COPD TYPE (HCC): ICD-10-CM

## 2018-06-04 DIAGNOSIS — I10 ESSENTIAL HYPERTENSION: Primary | ICD-10-CM

## 2018-06-04 DIAGNOSIS — E78.5 HYPERLIPIDEMIA WITH TARGET LDL LESS THAN 100: ICD-10-CM

## 2018-06-04 RX ORDER — LOSARTAN POTASSIUM AND HYDROCHLOROTHIAZIDE 12.5; 5 MG/1; MG/1
1 TABLET ORAL DAILY
Qty: 90 TAB | Refills: 1 | Status: SHIPPED | OUTPATIENT
Start: 2018-06-04 | End: 2018-10-22 | Stop reason: SDUPTHER

## 2018-06-04 RX ORDER — MONTELUKAST SODIUM 10 MG/1
10 TABLET ORAL DAILY
Qty: 30 TAB | Refills: 6 | Status: SHIPPED | OUTPATIENT
Start: 2018-06-04 | End: 2019-01-08 | Stop reason: SDUPTHER

## 2018-06-04 NOTE — PATIENT INSTRUCTIONS
Please contact [facility]  Directions  Address: [address], [address]   Phone: [phone]  Suggest an edit

## 2018-06-04 NOTE — PROGRESS NOTES
[address]  [address] [facility], [address]  [phone] office/[phone] fax      6/4/2018    Reason for visit:   Chief Complaint   Patient presents with    Follow Up Chronic Condition    Hypertension    Cholesterol Problem     Pt had been off Zetia for 3 months under direction of Dr. Alvin Hidalgo for cervical fusion surgery.  COPD    Transitions Of Care     Pt Medicare will be kicking in on 9/1/18       Patient: Karolina Davis, 1955, [id number]       Primary MD: Vick Carroll NP    Subjective:   Karolina Davis, a 57 y.o. female, who presents for Follow Up Chronic Condition. Pt stable on medications. Has not been on zetia regularly, hence explains elevated cholesterol. Follow Up Chronic Condition   Pertinent negatives include no chest pain, no abdominal pain, no headaches and no shortness of breath. Hypertension    The history is provided by the patient. This is a chronic problem. Associated symptoms include neck pain. Pertinent negatives include no chest pain, no orthopnea, no PND, no anxiety, no confusion, no malaise/fatigue, no blurred vision, no headaches, no tinnitus, no peripheral edema, no dizziness, no shortness of breath, no nausea and no vomiting. There are no associated agents to hypertension. Risk factors include smoking/tobacco exposure. Cholesterol Problem   The history is provided by the patient. This is a chronic problem. The problem occurs daily. The problem has been gradually worsening. Pertinent negatives include no chest pain, no abdominal pain, no headaches and no shortness of breath. The symptoms are aggravated by smoking. The symptoms are relieved by medications and eating. COPD   This is a chronic problem. The problem occurs daily. The problem has not changed since onset. Pertinent negatives include no chest pain, no abdominal pain, no headaches and no shortness of breath. The symptoms are relieved by medications.  The treatment provided moderate relief. Past Medical History:   Diagnosis Date    Asthma 2012    +PFT for mild obstructive disease/COPD    Back pain, chronic 2012    Dr. Kong Bray referred to Dr. Salvador Srcuggs    Chronic obstructive pulmonary disease ([facility]     GERD (gastroesophageal reflux disease)     H/O mammogram 10/03/2017    No evidence of malignancy    Hard of hearing     chronic ruptured TM    HTN (hypertension) 2012    Hx of screening mammography 2016    [facility], normal, routine recs    Hyperlipidemia     Nausea & vomiting     Sinus congestion 2012    Spinal stenosis of lumbar region with radiculopathy 2015    Dr. Livia Winston       Past Surgical History:   Procedure Laterality Date    HX  SECTION      HX ORTHOPAEDIC  2018    cervical surgery(Dr. Aldo Stuart)    HX TUBAL LIGATION         Social History     Social History    Marital status: SINGLE     Spouse name: N/A    Number of children: N/A    Years of education: N/A     Occupational History    Not on file. Social History Main Topics    Smoking status: Current Every Day Smoker     Packs/day: 1.00     Types: Cigarettes    Smokeless tobacco: Never Used    Alcohol use No      Comment: h/o alcohol abuse in remission    Drug use: No    Sexual activity: Not on file     Other Topics Concern     Service No    Blood Transfusions No    Caffeine Concern Yes    Occupational Exposure No    Hobby Hazards No    Sleep Concern No    Stress Concern No    Weight Concern No    Special Diet No    Back Care No    Exercise Yes     walking    Bike Helmet No    Seat Belt Yes    Self-Exams No     Social History Narrative       Allergies   Allergen Reactions    Lisinopril Cough    Lyrica [Pregabalin] Other (comments)     Medication causes dry eyes, unstable balance, and lack of concentration.      Mold Extracts Runny Nose    Pollen Extracts Itching    Statins-Hmg-Coa Reductase Inhibitors Myalgia    Topamax [Topiramate] Vertigo       Current Outpatient Prescriptions on File Prior to Visit   Medication Sig Dispense Refill    dexlansoprazole (DEXILANT) 30 mg capsule Take 1 Cap by mouth daily. Indications: gastroesophageal reflux disease 90 Cap 3    ezetimibe (ZETIA) 10 mg tablet Take 1 Tab by mouth daily. 90 Tab 3    mometasone (NASONEX) 50 mcg/actuation nasal spray 2 Sprays by Both Nostrils route daily. 3 Container 3    magnesium oxide (MAG-OX) 400 mg tablet Take 1 Tab by mouth daily. 30 Tab 11    albuterol (PROVENTIL HFA) 90 mcg/actuation inhaler Take 2 Puffs by inhalation every six (6) hours as needed. 1 Inhaler 3    Cholecalciferol, Vitamin D3, (VITAMIN D3) 2,000 unit cap capsule Take 2,000 Units by mouth daily. 30 Cap 11    promethazine (PHENERGAN) 25 mg tablet Take 1 Tab by mouth every eight (8) hours as needed for Nausea. Indications: POST-OPERATIVE NAUSEA AND VOMITING 10 Tab 0    traMADol (ULTRAM) 50 mg tablet Take 1 Tab by mouth every six (6) hours as needed for Pain (s/p cervical fusion). Max Daily Amount: 200 mg. 50 Tab 0    topiramate (TOPAMAX) 25 mg tablet Take 1 tab po QHS for one week, then increase to 2 tabs po QHS 60 Tab 0    fluticasone-salmeterol (ADVAIR) 250-50 mcg/dose diskus inhaler Take 1 Puff by inhalation every twelve (12) hours. 3 Inhaler 3     No current facility-administered medications on file prior to visit. Review of Systems   Constitutional: Negative. Negative for malaise/fatigue. HENT: Negative. Negative for tinnitus. Eyes: Negative. Negative for blurred vision. Respiratory: Negative. Negative for shortness of breath. Cardiovascular: Negative. Negative for chest pain, orthopnea and PND. Gastrointestinal: Negative. Negative for abdominal pain, nausea and vomiting. Genitourinary: Negative. Musculoskeletal: Positive for neck pain. Skin: Negative. Neurological: Negative. Negative for dizziness and headaches. Endo/Heme/Allergies: Negative. Psychiatric/Behavioral: Negative. Negative for confusion. Objective:   Visit Vitals    /80    Pulse 94    Temp 98.2 °F (36.8 °C)    Resp 20    Ht 5' 1\" (1.549 m)    Wt 127 lb 6.4 oz (57.8 kg)    SpO2 98%    BMI 24.07 kg/m2      Wt Readings from Last 3 Encounters:   06/04/18 127 lb 6.4 oz (57.8 kg)   04/10/18 122 lb (55.3 kg)   03/21/18 122 lb (55.3 kg)     Lab Results   Component Value Date/Time    Glucose 91 05/29/2018 09:05 AM       Pertinent Lab Results:    Physical Exam   Constitutional: She is oriented to person, place, and time. She appears well-developed and well-nourished. HENT:   Head: Normocephalic. Eyes: Pupils are equal, round, and reactive to light. Neck: Normal range of motion. Neck supple. No JVD present. Carotid bruit is not present. Cardiovascular: Normal rate, regular rhythm, normal heart sounds and intact distal pulses. No murmur heard. Pulmonary/Chest: Effort normal and breath sounds normal. No respiratory distress. Abdominal: Soft. Bowel sounds are normal.   Musculoskeletal: Normal range of motion. Neurological: She is alert and oriented to person, place, and time. She has normal reflexes. Skin: Skin is warm and dry. Psychiatric: She has a normal mood and affect. Her behavior is normal.   Vitals reviewed. ICD-10-CM ICD-9-CM    1. Essential hypertension I10 401.9 REFERRAL TO PRIMARY CARE      losartan-hydroCHLOROthiazide (HYZAAR) 50-12.5 mg per tablet   2. Hyperlipidemia with target LDL less than 100 E78.5 272.4 REFERRAL TO PRIMARY CARE   3. Chronic obstructive pulmonary disease, unspecified [facility] J44.9 496 REFERRAL TO PRIMARY CARE   4. Transition of care performed with sharing of clinical summary Z91.89 V15.89 REFERRAL TO PRIMARY CARE     Diagnoses and all orders for this visit:    1. Essential hypertension  -     REFERRAL TO PRIMARY CARE  -     losartan-hydroCHLOROthiazide (HYZAAR) 50-12.5 mg per tablet;  Take 1 Tab by mouth daily. Indications: hypertension    2. Hyperlipidemia with target LDL less than 100  -     REFERRAL TO PRIMARY CARE    3. Chronic obstructive pulmonary disease, unspecified COPD type ([facility] [address]  -     REFERRAL TO PRIMARY CARE    4. Transition of care performed with sharing of clinical summary  -     REFERRAL TO PRIMARY CARE    Other orders  -     montelukast (SINGULAIR) 10 mg tablet; Take 1 Tab by mouth daily. Indications: Allergic Rhinitis        Follow-up Disposition:  Return in about 3 months (around 9/4/2018), or if symptoms worsen or fail to improve. reviewed diet, exercise and weight control  very strongly urged to quit smoking to reduce cardiovascular risk  cardiovascular risk and specific lipid/LDL goals reviewed  reviewed medications and side effects in detail  use of aspirin to prevent MI and TIA's discussed  Patient to [facility] at [facility] until Medicare Kicks in. Will give her medications until then. Patient given information to establish care at [address] [place] [address].    Call [facility] for Financial Assistance    Medications Discontinued During This Encounter   Medication Reason    montelukast (SINGULAIR) 10 mg tablet Reorder    losartan-hydroCHLOROthiazide (HYZAAR) 50-12.5 mg per tablet Reorder

## 2018-06-04 NOTE — MR AVS SNAPSHOT
[address] [facility] [address] [phone] Patient: Wendy Murguia MRN: [id number] AGK:[birth year] Visit Information Date & Time Provider Department Dept. Phone Encounter #  
 6/4/2018  1:00 PM Armond Dobbs NP [address] [facility] [id number] Follow-up Instructions Return in about 3 months (around 9/4/2018), or if symptoms worsen or fail to improve. Your Appointments 8/20/2018 11:00 AM  
Follow Up with Cricket Denver, NP  
[facility] (Stephanie Demetriaine) Appt Note: 3 MO F/U  
 [address] [facility] [address]  
[phone]  
  
   
 [address] [facility] [address] Upcoming Health Maintenance Date Due Pneumococcal 19-64 Medium Risk (1 of 1 - PPSV23) 9/8/1972 DTaP/Tdap/Td series (1 - Tdap) 9/8/1974 FOBT Q 1 YEAR AGE 50-75 9/8/2003 ZOSTER VACCINE AGE 60> 7/8/2013 PAP AKA CERVICAL CYTOLOGY 9/2/2017 Influenza Age 5 to Adult 12/4/2018* BREAST CANCER SCRN MAMMOGRAM 10/4/2019 *Topic was postponed. The date shown is not the original due date. Allergies as of 6/4/2018  Review Complete On: 6/4/2018 By: Yari Santos Severity Noted Reaction Type Reactions Lisinopril  04/12/2018    Cough Lyrica [Pregabalin]  10/04/2017    Other (comments) Medication causes dry eyes, unstable balance, and lack of concentration. Mold Extracts  11/11/2014   Systemic Runny Nose Pollen Extracts  02/22/2018    Itching Statins-hmg-coa Reductase Inhibitors  06/05/2017   Intolerance Myalgia Topamax [Topiramate]  01/08/2018    Vertigo Current Immunizations  Reviewed on 10/29/2015 Name Date Influenza Vaccine 10/1/2017, 10/6/2016 Influenza Vaccine Haven Sallies) 10/29/2015 Influenza Vaccine (Quad) PF 10/31/2013  Influenza Vaccine (Trivalent) 11/11/2014  3:00 PM  
 Not reviewed this visit You Were Diagnosed With   
  
 Codes Comments Essential hypertension    -  Primary ICD-10-CM: I10 
ICD-9-CM: 401.9 Cervical spondylosis with myelopathy     ICD-10-CM: M47.12 
ICD-9-CM: 721.1 S/P cervical spinal fusion     ICD-10-CM: Z98.1 ICD-9-CM: V45.4 Chronic obstructive pulmonary disease, unspecified COPD type ([facility] 75.)     ICD-10-CM: J44.9 ICD-9-CM: 970 Transition of care performed with sharing of clinical summary     ICD-10-CM: Z91.89 ICD-9-CM: V15.89 Vitals BP Pulse Temp Resp Height(growth percentile) Weight(growth percentile) 145/80 94 98.2 °F (36.8 °C) 20 5' 1\" (1.549 m) 127 lb 6.4 oz (57.8 kg) SpO2 BMI OB Status Smoking Status 98% 24.07 kg/m2 Postmenopausal Current Every Day Smoker BMI and BSA Data Body Mass Index Body Surface Area 24.07 kg/m 2 1.58 m 2 Preferred Pharmacy Pharmacy Name Phone [facility] [address] [facility] [phone] Your Updated Medication List  
  
   
This list is accurate as of 6/4/18  1:29 PM.  Always use your most recent med list.  
  
  
  
  
 albuterol 90 mcg/actuation inhaler Commonly known as:  PROVENTIL HFA Take 2 Puffs by inhalation every six (6) hours as needed. Cholecalciferol (Vitamin D3) 2,000 unit Cap capsule Commonly known as:  VITAMIN D3 Take 2,000 Units by mouth daily. dexlansoprazole 30 mg capsule Commonly known as:  DEXILANT Take 1 Cap by mouth daily. Indications: gastroesophageal reflux disease  
  
 ezetimibe 10 mg tablet Commonly known as:  Gibran Mireille Take 1 Tab by mouth daily. fluticasone-salmeterol 250-50 mcg/dose diskus inhaler Commonly known as:  ADVAIR Take 1 Puff by inhalation every twelve (12) hours. losartan-hydroCHLOROthiazide 50-12.5 mg per tablet Commonly known as:  HYZAAR Take 1 Tab by mouth daily. Indications: hypertension  
  
 magnesium oxide 400 mg tablet Commonly known as:  MAG-OX Take 1 Tab by mouth daily. mometasone 50 mcg/actuation nasal spray Commonly known as:  NASONEX  
2 Sprays by Both Nostrils route daily. montelukast 10 mg tablet Commonly known as:  SINGULAIR Take 1 Tab by mouth daily. Indications: Allergic Rhinitis  
  
 promethazine 25 mg tablet Commonly known as:  PHENERGAN Take 1 Tab by mouth every eight (8) hours as needed for Nausea. Indications: POST-OPERATIVE NAUSEA AND VOMITING  
  
 topiramate 25 mg tablet Commonly known as:  TOPAMAX Take 1 tab po QHS for one week, then increase to 2 tabs po QHS  
  
 traMADol 50 mg tablet Commonly known as:  ULTRAM  
Take 1 Tab by mouth every six (6) hours as needed for Pain (s/p cervical fusion). Max Daily Amount: 200 mg. Prescriptions Sent to Pharmacy Refills  
 montelukast (SINGULAIR) 10 mg tablet 6 Sig: Take 1 Tab by mouth daily. Indications: Allergic Rhinitis Class: Normal  
 Pharmacy: [address] Ph #: [phone] Route: Oral  
 losartan-hydroCHLOROthiazide (HYZAAR) 50-12.5 mg per tablet 1 Sig: Take 1 Tab by mouth daily. Indications: hypertension Class: Normal  
 Pharmacy: [address] Ph #: [phone] Route: Oral  
  
We Performed the Following REFERRAL TO PRIMARY CARE [MGA314 CPT(R)] Comments:  
 Pt has Medicare starting 9/1/18 Follow-up Instructions Return in about 3 months (around 9/4/2018), or if symptoms worsen or fail to improve. Referral Information Referral ID Referred By Referred To  
  
 [id number] [address] [place] [address], [address] [place] [address] Phone: [phone] Fax: [phone] Visits Status Start Date End Date 1 New Request 6/4/18 6/4/19  If your referral has a status of pending review or denied, additional information will be sent to support the outcome of this decision. Patient Instructions Please contact [facility] Address: [place], [address],[place] Phone: [phone] Suggest an edit Please provide this summary of care documentation to your next provider. Your primary care clinician is listed as Saniya Freeman. If you have any questions after today's visit, please call [phone].

## 2018-06-05 NOTE — TELEPHONE ENCOUNTER
Medication: Zetia 10mg , dose: 1 tab, how often: qd , current number of medication days provided: 90, refill per application. Lot #: [id number], EXP 06/2019. This medication was received and verified for the following 1. Correct Patient, 2. Correct Diagnosis, 3. Correct Drug, 4. Correct route, and no current allergy to medication. Please contact patient to come  their medications.      REVA Wilson, RN, [facility]

## 2018-06-20 ENCOUNTER — TELEPHONE (OUTPATIENT)
Dept: FAMILY MEDICINE CLINIC | Age: 63
End: 2018-06-20

## 2018-06-20 NOTE — TELEPHONE ENCOUNTER
Medication: Advair 250/50 mcg, dose: 1 inhalation, how often: twice daily, current number of medication days provided: 90, refill per application. Lot #: [id number], EXP 06/2019. This medication was received and verified for the following 1. Correct Patient, 2. Correct Diagnosis, 3. Correct Drug, 4. Correct route, and no current allergy to medication. Please contact patient to come  their medications.      Shannen Mcelroy, MSN, RN, FNP-C     [facility]

## 2018-07-10 ENCOUNTER — TELEPHONE (OUTPATIENT)
Dept: FAMILY MEDICINE CLINIC | Age: 63
End: 2018-07-10

## 2018-07-10 NOTE — TELEPHONE ENCOUNTER
Medication: Proventil HFA, dose: 2 puffs, how often: every 6 hours as needed for wheezing, current number of medication days provided: 90, refill per application. Lot# [id number], EXP 07/2019 . This medication was received and verified for the following 1. Correct Patient, 2. Correct Diagnosis, 3. Correct Drug, 4. Correct route, and no current allergy to medication. Medication: Nasonex, dose: 2 sprays both nostrils, how often: every day as needed for allergies, current number of medication days provided: 90, refill per application. Lot #: [id number], EXP 07/2019. This medication was received and verified for the following 1. Correct Patient, 2. Correct Diagnosis, 3. Correct Drug, 4. Correct route, and no current allergy to medication. Please contact patient to come  their medications.      Eliza Jernigan, MSN, RN, [facility]

## 2018-08-29 ENCOUNTER — TELEPHONE (OUTPATIENT)
Dept: FAMILY MEDICINE CLINIC | Age: 63
End: 2018-08-29

## 2018-08-30 NOTE — TELEPHONE ENCOUNTER
Medication: Dexilant 30mg  , dose: 1 tab, how often: daily, current number of medication days provided: 90, refill per application. Lot #: [id number], EXP 08/2019. This medication was received and verified for the following 1. Correct Patient, 2. Correct Diagnosis, 3. Correct Drug, 4. Correct route, and no current allergy to medication. Please contact patient to come  their medications.      Hallie Wright MSN, RN, [facility]

## 2018-09-06 ENCOUNTER — HOSPITAL ENCOUNTER (OUTPATIENT)
Dept: LAB | Age: 63
Discharge: HOME | End: 2018-09-06
Payer: COMMERCIAL

## 2018-09-06 ENCOUNTER — OFFICE VISIT (OUTPATIENT)
Dept: FAMILY MEDICINE CLINIC | Age: 63
End: 2018-09-06

## 2018-09-06 VITALS
DIASTOLIC BLOOD PRESSURE: 72 MMHG | RESPIRATION RATE: 16 BRPM | TEMPERATURE: 97.9 F | SYSTOLIC BLOOD PRESSURE: 130 MMHG | HEART RATE: 78 BPM | WEIGHT: 127 LBS | HEIGHT: 61 IN | BODY MASS INDEX: 23.98 KG/M2 | OXYGEN SATURATION: 99 %

## 2018-09-06 DIAGNOSIS — I10 ESSENTIAL HYPERTENSION: ICD-10-CM

## 2018-09-06 DIAGNOSIS — E78.00 PURE HYPERCHOLESTEROLEMIA: ICD-10-CM

## 2018-09-06 DIAGNOSIS — E78.00 PURE HYPERCHOLESTEROLEMIA: Primary | ICD-10-CM

## 2018-09-06 LAB
ALT SERPL-CCNC: 27 U/L (ref 13–56)
ANION GAP SERPL CALC-SCNC: 7 MMOL/L (ref 3–18)
AST SERPL-CCNC: 24 U/L (ref 15–37)
BUN SERPL-MCNC: 13 MG/DL (ref 7–18)
BUN/CREAT SERPL: 18 (ref 12–20)
CALCIUM SERPL-MCNC: 11.8 MG/DL (ref 8.5–10.1)
CHLORIDE SERPL-SCNC: 105 MMOL/L (ref 100–108)
CHOLEST SERPL-MCNC: 252 MG/DL
CO2 SERPL-SCNC: 27 MMOL/L (ref 21–32)
CREAT SERPL-MCNC: 0.73 MG/DL (ref 0.6–1.3)
GLUCOSE SERPL-MCNC: 87 MG/DL (ref 74–99)
HDLC SERPL-MCNC: 71 MG/DL (ref 40–60)
HDLC SERPL: 3.5 {RATIO} (ref 0–5)
LDLC SERPL CALC-MCNC: 147.8 MG/DL (ref 0–100)
LIPID PROFILE,FLP: ABNORMAL
POTASSIUM SERPL-SCNC: 4.8 MMOL/L (ref 3.5–5.5)
SODIUM SERPL-SCNC: 139 MMOL/L (ref 136–145)
TRIGL SERPL-MCNC: 166 MG/DL (ref ?–150)
VLDLC SERPL CALC-MCNC: 33.2 MG/DL

## 2018-09-06 PROCEDURE — 36415 COLL VENOUS BLD VENIPUNCTURE: CPT | Performed by: FAMILY MEDICINE

## 2018-09-06 PROCEDURE — 84450 TRANSFERASE (AST) (SGOT): CPT | Performed by: FAMILY MEDICINE

## 2018-09-06 PROCEDURE — 80061 LIPID PANEL: CPT | Performed by: FAMILY MEDICINE

## 2018-09-06 PROCEDURE — 80048 BASIC METABOLIC PNL TOTAL CA: CPT | Performed by: FAMILY MEDICINE

## 2018-09-06 PROCEDURE — 84460 ALANINE AMINO (ALT) (SGPT): CPT | Performed by: FAMILY MEDICINE

## 2018-09-06 RX ORDER — ACETAMINOPHEN 500 MG
1000 TABLET ORAL
COMMUNITY

## 2018-09-06 NOTE — PATIENT INSTRUCTIONS

## 2018-09-06 NOTE — PROGRESS NOTES
HISTORY OF PRESENT ILLNESS  Eren Medrano is a 57 y.o. female. HPI   Pt is new to [facility] but not new to the medical group. Patient is here today for evaluation and treatment of: Hypertension/Cholesterol problem    Hypertension: BP is stable today;  she is on meds as listed below; Cholesterol: she is on zetia; she takes med daily. Takes tylenol TID for her pain. Current Outpatient Prescriptions:     acetaminophen (TYLENOL EXTRA STRENGTH) 500 mg tablet, Take 1,000 mg by mouth every eight (8) hours as needed for Pain., Disp: , Rfl:     montelukast (SINGULAIR) 10 mg tablet, Take 1 Tab by mouth daily. Indications: Allergic Rhinitis, Disp: 30 Tab, Rfl: 6    losartan-hydroCHLOROthiazide (HYZAAR) 50-12.5 mg per tablet, Take 1 Tab by mouth daily. Indications: hypertension, Disp: 90 Tab, Rfl: 1    promethazine (PHENERGAN) 25 mg tablet, Take 1 Tab by mouth every eight (8) hours as needed for Nausea. Indications: POST-OPERATIVE NAUSEA AND VOMITING, Disp: 10 Tab, Rfl: 0    dexlansoprazole (DEXILANT) 30 mg capsule, Take 1 Cap by mouth daily. Indications: gastroesophageal reflux disease, Disp: 90 Cap, Rfl: 3    ezetimibe (ZETIA) 10 mg tablet, Take 1 Tab by mouth daily. , Disp: 90 Tab, Rfl: 3    mometasone (NASONEX) 50 mcg/actuation nasal spray, 2 Sprays by Both Nostrils route daily. , Disp: 3 Container, Rfl: 3    fluticasone-salmeterol (ADVAIR) 250-50 mcg/dose diskus inhaler, Take 1 Puff by inhalation every twelve (12) hours. , Disp: 3 Inhaler, Rfl: 3    albuterol (PROVENTIL HFA) 90 mcg/actuation inhaler, Take 2 Puffs by inhalation every six (6) hours as needed. , Disp: 1 Inhaler, Rfl: 3    Cholecalciferol, Vitamin D3, (VITAMIN D3) 2,000 unit cap capsule, Take 2,000 Units by mouth daily. , Disp: 30 Cap, Rfl: 11      PMH,  Meds, Allergies, Family History, Social history reviewed    Review of Systems   Constitutional: Negative for chills and fever.    Cardiovascular: Negative for chest pain, palpitations and leg swelling. Physical Exam   Constitutional: She appears well-developed and well-nourished. No distress. Cardiovascular: Normal rate and regular rhythm. Exam reveals no gallop and no friction rub. No murmur heard. Pulmonary/Chest: Breath sounds normal. No respiratory distress. She has no wheezes. She has no rales. Musculoskeletal: She exhibits no edema. Nursing note and vitals reviewed. Visit Vitals    /72 (BP 1 Location: Left arm, BP Patient Position: Sitting)    Pulse 78    Temp 97.9 °F (36.6 °C) (Oral)    Resp 16    Ht 5' 1\" (1.549 m)    Wt 127 lb (57.6 kg)    SpO2 99%    BMI 24 kg/m2         ASSESSMENT and PLAN    ICD-10-CM ICD-9-CM    1. Pure hypercholesterolemia E78.00 272.0 LIPID PANEL   2. Essential hypertension X84 146.5 METABOLIC PANEL, BASIC      AST      ALT       As above,  treatment plan as listed below  Orders Placed This Encounter    LIPID PANEL    METABOLIC PANEL, BASIC    AST    ALT    acetaminophen (TYLENOL EXTRA STRENGTH) 500 mg tablet     Follow-up Disposition:  Return in about 3 months (around 12/6/2018) for welcome to medicare. An After Visit Summary was printed and given to the patient. This has been fully explained to the patient, who indicates understanding.

## 2018-09-06 NOTE — MR AVS SNAPSHOT
[address] [facility] 
 
 
 [address] [address] [address] [address] 
[phone] Patient: Marley Shelton MRN: [id number] SHC:[birth year]46 Visit Information Date & Time Provider Department Dept. Phone Encounter #  
 9/6/2018 10:00 AM Orlando Glass [address] [place] [address] [phone] [id number] Follow-up Instructions Return in about 3 months (around 12/6/2018) for welcome to medicare. Your Appointments 9/10/2018  1:40 PM  
Follow Up with Cedric Mayo NP  
[facility] ([facility]) Appt Note: 3 MO F/U - pt r/s 8/20 appt Medicare will be in effect by then* Patient was rescheduled due to the provider being out of the office on 9/4/18; 3 MO F/U - pt r/s 8/20 appt Medicare will be in effect by then* Patient was rescheduled due to the provider/Buttery being out of the office on 9/4/18* patient requested [place] [address] [facility] [address] [phone]  
  
   
 [address] [facility] [address] Upcoming Health Maintenance Date Due Pneumococcal 19-64 Medium Risk (1 of 1 - PPSV23) 9/8/1972 DTaP/Tdap/Td series (1 - Tdap) 9/8/1974 FOBT Q 1 YEAR AGE 50-75 9/8/2003 ZOSTER VACCINE AGE 60> 7/8/2013 PAP AKA CERVICAL CYTOLOGY 9/2/2017 Bone Densitometry (Dexa) Screening 9/8/2018 Influenza Age 5 to Adult 12/4/2018* BREAST CANCER SCRN MAMMOGRAM 10/4/2019 *Topic was postponed. The date shown is not the original due date. Allergies as of 9/6/2018  Review Complete On: 9/6/2018 By: Orlando Glass MD  
  
 Severity Noted Reaction Type Reactions Lisinopril  04/12/2018    Cough Lyrica [Pregabalin]  10/04/2017    Other (comments) Medication causes dry eyes, unstable balance, and lack of concentration. Mold Extracts  11/11/2014   Systemic Runny Nose Pollen Extracts  02/22/2018    Itching Statins-hmg-coa Reductase Inhibitors  06/05/2017   Intolerance Myalgia Topamax [Topiramate]  01/08/2018    Vertigo Current Immunizations  Reviewed on 10/29/2015 Name Date Influenza Vaccine 10/1/2017, 10/6/2016 Influenza Vaccine Rad Me) 10/29/2015 Influenza Vaccine (Quad) PF 10/31/2013 Influenza Vaccine (Trivalent) 11/11/2014  3:00 PM  
  
 Not reviewed this visit You Were Diagnosed With   
  
 Codes Comments Pure hypercholesterolemia    -  Primary ICD-10-CM: E78.00 ICD-9-CM: 272.0 Essential hypertension     ICD-10-CM: I10 
ICD-9-CM: 401.9 Vitals BP Pulse Temp Resp Height(growth percentile) Weight(growth percentile) 130/72 (BP 1 Location: Left arm, BP Patient Position: Sitting) 78 97.9 °F (36.6 °C) (Oral) 16 5' 1\" (1.549 m) 127 lb (57.6 kg) SpO2 BMI OB Status Smoking Status 99% 24 kg/m2 Postmenopausal Current Every Day Smoker BMI and BSA Data Body Mass Index Body Surface Area  
 24 kg/m 2 1.57 m 2 Preferred Pharmacy Pharmacy Name Phone 305 [facility], [address] [place] [address] [address] [facility] 134 Your Updated Medication List  
  
   
This list is accurate as of 9/6/18 11:02 AM.  Always use your most recent med list.  
  
  
  
  
 albuterol 90 mcg/actuation inhaler Commonly known as:  PROVENTIL HFA Take 2 Puffs by inhalation every six (6) hours as needed. Cholecalciferol (Vitamin D3) 2,000 unit Cap capsule Commonly known as:  VITAMIN D3 Take 2,000 Units by mouth daily. dexlansoprazole 30 mg capsule Commonly known as:  DEXILANT Take 1 Cap by mouth daily. Indications: gastroesophageal reflux disease  
  
 ezetimibe 10 mg tablet Commonly known as:  Darryl Lamprey Take 1 Tab by mouth daily. fluticasone-salmeterol 250-50 mcg/dose diskus inhaler Commonly known as:  ADVAIR Take 1 Puff by inhalation every twelve (12) hours. losartan-hydroCHLOROthiazide 50-12.5 mg per tablet Commonly known as:  HYZAAR Take 1 Tab by mouth daily. Indications: hypertension  
  
 mometasone 50 mcg/actuation nasal spray Commonly known as:  NASONEX  
2 Sprays by Both Nostrils route daily. montelukast 10 mg tablet Commonly known as:  SINGULAIR Take 1 Tab by mouth daily. Indications: Allergic Rhinitis  
  
 promethazine 25 mg tablet Commonly known as:  PHENERGAN Take 1 Tab by mouth every eight (8) hours as needed for Nausea. Indications: POST-OPERATIVE NAUSEA AND VOMITING  
  
 TYLENOL EXTRA STRENGTH 500 mg tablet Generic drug:  acetaminophen Take 1,000 mg by mouth every eight (8) hours as needed for Pain. Follow-up Instructions Return in about 3 months (around 12/6/2018) for welcome to medicare. To-Do List   
 09/06/2018 Lab:  ALT   
  
 09/06/2018 Lab:  AST   
  
 09/06/2018 Lab:  LIPID PANEL   
  
 09/06/2018 Lab:  METABOLIC PANEL, BASIC Patient Instructions DASH Diet: Care Instructions Your Care Instructions The DASH diet is an eating plan that can help lower your blood pressure. DASH stands for Dietary Approaches to Stop Hypertension. Hypertension is high blood pressure. The DASH diet focuses on eating foods that are high in calcium, potassium, and magnesium. These nutrients can lower blood pressure. The foods that are highest in these nutrients are fruits, vegetables, low-fat dairy products, nuts, seeds, and legumes. But taking calcium, potassium, and magnesium supplements instead of eating foods that are high in those nutrients does not have the same effect. The DASH diet also includes whole grains, fish, and poultry. The DASH diet is one of several lifestyle changes your doctor may recommend to lower your high blood pressure. Your doctor may also want you to decrease the amount of sodium in your diet. Lowering sodium while following the DASH diet can lower blood pressure even further than just the DASH diet alone. Follow-up care is a key part of your treatment and safety. Be sure to make and go to all appointments, and call your doctor if you are having problems. It's also a good idea to know your test results and keep a list of the medicines you take. How can you care for yourself at home? Following the DASH diet · Eat 4 to 5 servings of fruit each day. A serving is 1 medium-sized piece of fruit, ½ cup chopped or canned fruit, 1/4 cup dried fruit, or 4 ounces (½ cup) of fruit juice. Choose fruit more often than fruit juice. · Eat 4 to 5 servings of vegetables each day. A serving is 1 cup of lettuce or raw leafy vegetables, ½ cup of chopped or cooked vegetables, or 4 ounces (½ cup) of vegetable juice. Choose vegetables more often than vegetable juice. · Get 2 to 3 servings of low-fat and fat-free dairy each day. A serving is 8 ounces of milk, 1 cup of yogurt, or 1 ½ ounces of cheese. · Eat 6 to 8 servings of grains each day. A serving is 1 slice of bread, 1 ounce of dry cereal, or ½ cup of cooked rice, pasta, or cooked cereal. Try to choose whole-grain products as much as possible. · Limit lean meat, poultry, and fish to 2 servings each day. A serving is 3 ounces, about the size of a deck of cards. · Eat 4 to 5 servings of nuts, seeds, and legumes (cooked dried beans, lentils, and split peas) each week. A serving is 1/3 cup of nuts, 2 tablespoons of seeds, or ½ cup of cooked beans or peas. · Limit fats and oils to 2 to 3 servings each day. A serving is 1 teaspoon of vegetable oil or 2 tablespoons of salad dressing. · Limit sweets and added sugars to 5 servings or less a week. A serving is 1 tablespoon jelly or jam, ½ cup sorbet, or 1 cup of lemonade. · Eat less than 2,300 milligrams (mg) of sodium a day. If you limit your sodium to 1,500 mg a day, you can lower your blood pressure even more. Tips for success · Start small.  Do not try to make dramatic changes to your diet all at once. You might feel that you are missing out on your favorite foods and then be more likely to not follow the plan. Make small changes, and stick with them. Once those changes become habit, add a few more changes. · Try some of the following: ¨ Make it a goal to eat a fruit or vegetable at every meal and at snacks. This will make it easy to get the recommended amount of fruits and vegetables each day. ¨ Try yogurt topped with fruit and nuts for a snack or healthy dessert. ¨ Add lettuce, tomato, cucumber, and onion to sandwiches. ¨ Combine a ready-made pizza crust with low-fat mozzarella cheese and lots of vegetable toppings. Try using tomatoes, squash, spinach, broccoli, carrots, cauliflower, and onions. ¨ Have a variety of cut-up vegetables with a low-fat dip as an appetizer instead of chips and dip. ¨ Sprinkle sunflower seeds or chopped almonds over salads. Or try adding chopped walnuts or almonds to cooked vegetables. ¨ Try some vegetarian meals using beans and peas. Add garbanzo or kidney beans to salads. Make burritos and tacos with mashed solomon beans or black beans. Where can you learn more? Go to http://giselle-erika.info/. Enter U676 in the search box to learn more about \"DASH Diet: Care Instructions. \" Current as of: December 6, 2017 Content Version: 11.7 © 4580-8616 [employer]. Care instructions adapted under license by [employer] (which disclaims liability or warranty for this information). If you have questions about a medical condition or this instruction, always ask your healthcare professional. [place] [address] any warranty or liability for your use of this information. Please provide this summary of care documentation to your next provider. Your primary care clinician is listed as Guanako Drummond. If you have any questions after today's visit, please call [phone].

## 2018-09-24 ENCOUNTER — OFFICE VISIT (OUTPATIENT)
Dept: ORTHOPEDIC SURGERY | Age: 63
End: 2018-09-24

## 2018-09-24 VITALS
HEART RATE: 82 BPM | HEIGHT: 61 IN | RESPIRATION RATE: 17 BRPM | DIASTOLIC BLOOD PRESSURE: 78 MMHG | TEMPERATURE: 98.2 F | WEIGHT: 129.8 LBS | BODY MASS INDEX: 24.51 KG/M2 | OXYGEN SATURATION: 97 % | SYSTOLIC BLOOD PRESSURE: 124 MMHG

## 2018-09-24 DIAGNOSIS — M48.061 SPINAL STENOSIS OF LUMBAR REGION WITH RADICULOPATHY: ICD-10-CM

## 2018-09-24 DIAGNOSIS — Z98.1 S/P CERVICAL SPINAL FUSION: ICD-10-CM

## 2018-09-24 DIAGNOSIS — M54.16 SPINAL STENOSIS OF LUMBAR REGION WITH RADICULOPATHY: ICD-10-CM

## 2018-09-24 DIAGNOSIS — M48.02 CERVICAL SPINAL STENOSIS: Primary | ICD-10-CM

## 2018-09-24 NOTE — PATIENT INSTRUCTIONS

## 2018-09-24 NOTE — MR AVS SNAPSHOT
[address] [facility] [address] [facility] [address] 
[phone] Patient: Cornelio Dominique MRN: [id number] Visit Information Date & Time Provider Department Dept. Phone Encounter #  
 9/24/2018  1:00 PM Tarik Steve NP [facility] [phone] [id number] Follow-up Instructions Return in about 6 months (around 3/24/2019). Your Appointments 1/8/2019 10:40 AM  
Office Visit with Stephan Hargrove MD  
[facility]) Appt Note: wellness [address] [address]  
[phone]  
  
   
 [address],  [address] Upcoming Health Maintenance Date Due DTaP/Tdap/Td series (1 - Tdap) 9/8/1974 Shingrix Vaccine Age 50> (1 of 2) 9/8/2003 FOBT Q 1 YEAR AGE 50-75 9/8/2003 PAP AKA CERVICAL CYTOLOGY 9/2/2017 GLAUCOMA SCREENING Q2Y 9/8/2018 Bone Densitometry (Dexa) Screening 9/8/2018 Pneumococcal 65+ Low/Medium Risk (1 of 2 - PCV13) 9/8/2018 Influenza Age 5 to Adult 12/4/2018* BREAST CANCER SCRN MAMMOGRAM 10/4/2019 *Topic was postponed. The date shown is not the original due date. Allergies as of 9/24/2018  Review Complete On: 9/24/2018 By: Karrie Stubbs Severity Noted Reaction Type Reactions Lisinopril  04/12/2018    Cough Lyrica [Pregabalin]  10/04/2017    Other (comments) Medication causes dry eyes, unstable balance, and lack of concentration. Mold Extracts  11/11/2014   Systemic Runny Nose Pollen Extracts  02/22/2018    Itching Statins-hmg-coa Reductase Inhibitors  06/05/2017   Intolerance Myalgia Topamax [Topiramate]  01/08/2018    Vertigo Current Immunizations  Reviewed on 10/29/2015 Name Date Influenza Vaccine 10/1/2017, 10/6/2016 Influenza Vaccine Veronia Halle) 10/29/2015 Influenza Vaccine (Quad) PF 10/31/2013 Influenza Vaccine (Trivalent) 11/11/2014  3:00 PM  
  
 Not reviewed this visit You Were Diagnosed With   
  
 Codes Comments Cervical spinal stenosis    -  Primary ICD-10-CM: M48.02 
ICD-9-CM: 723.0 Spinal stenosis of lumbar region with radiculopathy     ICD-10-CM: M48.061, M54.16 
ICD-9-CM: 724.02, 724.4 S/P cervical spinal fusion     ICD-10-CM: Z98.1 ICD-9-CM: V45.4 Vitals BP Pulse Temp Resp Height(growth percentile) Weight(growth percentile) 124/78 82 98.2 °F (36.8 °C) 17 5' 1\" (1.549 m) 129 lb 12.8 oz (58.9 kg) SpO2 BMI OB Status Smoking Status 97% 24.53 kg/m2 Postmenopausal Current Every Day Smoker BMI and BSA Data Body Mass Index Body Surface Area 24.53 kg/m 2 1.59 m 2 Preferred Pharmacy Pharmacy Name Phone [facility] [address], [address] [address] [phone] Your Updated Medication List  
  
   
This list is accurate as of 9/24/18  1:53 PM.  Always use your most recent med list.  
  
  
  
  
 albuterol 90 mcg/actuation inhaler Commonly known as:  PROVENTIL HFA Take 2 Puffs by inhalation every six (6) hours as needed. Cholecalciferol (Vitamin D3) 2,000 unit Cap capsule Commonly known as:  VITAMIN D3 Take 2,000 Units by mouth daily. dexlansoprazole 30 mg capsule Commonly known as:  DEXILANT Take 1 Cap by mouth daily. Indications: gastroesophageal reflux disease  
  
 ezetimibe 10 mg tablet Commonly known as:  Venus Rich Take 1 Tab by mouth daily. fluticasone-salmeterol 250-50 mcg/dose diskus inhaler Commonly known as:  ADVAIR Take 1 Puff by inhalation every twelve (12) hours. losartan-hydroCHLOROthiazide 50-12.5 mg per tablet Commonly known as:  HYZAAR Take 1 Tab by mouth daily. Indications: hypertension  
  
 mometasone 50 mcg/actuation nasal spray Commonly known as:  NASONEX  
2 Sprays by Both Nostrils route daily. montelukast 10 mg tablet Commonly known as:  SINGULAIR Take 1 Tab by mouth daily. Indications: Allergic Rhinitis  
  
 promethazine 25 mg tablet Commonly known as:  PHENERGAN Take 1 Tab by mouth every eight (8) hours as needed for Nausea. Indications: POST-OPERATIVE NAUSEA AND VOMITING  
  
 TYLENOL EXTRA STRENGTH 500 mg tablet Generic drug:  acetaminophen Take 1,000 mg by mouth every eight (8) hours as needed for Pain. We Performed the Following AMB POC XRAY, SPINE, CERVICAL; 2 OR 3 [67205 CPT(R)] Follow-up Instructions Return in about 6 months (around 3/24/2019). Patient Instructions Neck: Exercises Your Care Instructions Here are some examples of typical rehabilitation exercises for your condition. Start each exercise slowly. Ease off the exercise if you start to have pain. Your doctor or physical therapist will tell you when you can start these exercises and which ones will work best for you. How to do the exercises Neck stretch 1. This stretch works best if you keep your shoulder down as you lean away from it. To help you remember to do this, start by relaxing your shoulders and lightly holding on to your thighs or your chair. 2. Tilt your head toward your shoulder and hold for 15 to 30 seconds. Let the weight of your head stretch your muscles. 3. If you would like a little added stretch, use your hand to gently and steadily pull your head toward your shoulder. For example, keeping your right shoulder down, lean your head to the left. 4. Repeat 2 to 4 times toward each shoulder. Diagonal neck stretch 1. Turn your head slightly toward the direction you will be stretching, and tilt your head diagonally toward your chest and hold for 15 to 30 seconds. 2. If you would like a little added stretch, use your hand to gently and steadily pull your head forward on the diagonal. 
3. Repeat 2 to 4 times toward each side. Dorsal glide stretch The dorsal glide stretches the back of the neck. If you feel pain, do not glide so far back. Some people find this exercise easier to do while lying on their backs with an ice pack on the neck. 1. Sit or stand tall and look straight ahead. 2. Slowly tuck your chin as you glide your head backward over your body 3. Hold for a count of 6, and then relax for up to 10 seconds. 4. Repeat 8 to 12 times. Chest and shoulder stretch 1. Sit or stand tall and glide your head backward as in the dorsal glide stretch. 2. Raise both arms so that your hands are next to your ears. 3. Take a deep breath, and as you breathe out, lower your elbows down and behind your back. You will feel your shoulder blades slide down and together, and at the same time you will feel a stretch across your chest and the front of your shoulders. 4. Hold for about 6 seconds, and then relax for up to 10 seconds. 5. Repeat 8 to 12 times. Strengthening: Hands on head 1. Move your head backward, forward, and side to side against gentle pressure from your hands, holding each position for about 6 seconds. 2. Repeat 8 to 12 times. Follow-up care is a key part of your treatment and safety. Be sure to make and go to all appointments, and call your doctor if you are having problems. It's also a good idea to know your test results and keep a list of the medicines you take. Where can you learn more? Go to http://giselle-erika.info/. Enter P975 in the search box to learn more about \"Neck: Exercises. \" Current as of: November 29, 2017 Content Version: 11.7 © 8111-7892 [employer]. Care instructions adapted under license by [employer] (which disclaims liability or warranty for this information). If you have questions about a medical condition or this instruction, always ask your healthcare professional. [address] any warranty or liability for your use of this information. Please provide this summary of care documentation to your next provider. Your primary care clinician is listed as [address]. If you have any questions after today's visit, please call [phone].

## 2018-09-24 NOTE — PROGRESS NOTES
[address]  [place], [address] [place] [address] Street  Phone: [phone]  Fax: [phone]  PROGRESS NOTE  Patient: Kathryn Lino                MRN: [id number]       SSN: [id number]  YOB: 1955        AGE: 70 y.o. SEX: female  Body mass index is 24.53 kg/(m^2). PCP: Manjit Avila MD  09/24/18    Chief Complaint   Patient presents with    Neck Pain    Shoulder Pain     Bilateral     Follow-up     3 MO        HISTORY OF PRESENT ILLNESS:  Johanny Chino is a 70 y. o. female with history of neck pain, arm pain, numbness of the arm(s), loss of strength of the arm(s) and balance and dexterity issues and hyper-reflexia in [facility] who had an Posterior Cervical Fusion C3-7 surgery seven months ago for Cervical Myelopathy.  Pain prior to surgery was in the C5-7 distribution from neck to hand.  Pain is described as aching, burning, numbing, tingling and radiating.  Pain is worse with looking up, looking down, pushing, pulling and affects sleep, work and recreational activities. Pain is better with relaxation, pain medication and ice. Neck pain, arm pain, loss of strength of the arm(s) and balance and dexterity issues and hyper-reflexia has significantly improved since surgery. Her pain is now some left-sided myofascial pain. She reports improved balance as well. She is on a HEP, her insurance does not cover PT. She continues to have chronic back with LLE sciatica pain that she has had for years. It is managed with Tylenol and Naproxen. States she has been using OTC Tylenol with moderate, RELIEF. Patient denies any fevers, wound drainage, bladder/bowel dysfunction, new onset weakness, saddle paresthesia, new onset radiculopathy or nerve pain, or other neurological deficits. Reports that she is swallowing without difficulty. Pt desires to continue with evaluation of Neck and arm pain and postoperative care.     ASSESSMENT   Diagnoses and all orders for this visit:    1. Cervical spinal stenosis  -     [12060] C Spine 2-3V    2. Spinal stenosis of lumbar region with radiculopathy    3. S/P cervical spinal fusion  -     [53144] C Spine 2-3V       IMPRESSION AND PLAN:  This is a pt who had a posterior cervical fusion seven months ago for myelopathy. She is doing well from that with resolution of her myelopathic symptoms. She continues with her chronic back with LLE sciatica. It is well managed conservatively. I got a set of CS x-rays with no acute findings. 1) Pt was given information on neck exercises   2) Continue HEP  3) Continue OTC Tylenol and Naproxen  4) Ms. Sean Reyes has a reminder for a \"due or due soon\" health maintenance. I have asked that she contact her primary care provider, Keara Servin MD, for follow-up on this health maintenance. 5) We have informed patient to notify us for immediate appointment if he has any worsening neurogical symptoms or if an emergency situation presents, then call 911  6)  has been reviewed and is appropriate  7) Pt will follow-up in 6 mo w/ Terrell Conway for yearly post-op eval.    Subjective    Work Hair dressor    Smoking Status current smoker, has reduced    Pain Scale: 6/10    Pain Assessment  9/24/2018   Location of Pain Neck;Arm   Pain Location Comment -   Location Modifiers Left;Right   Severity of Pain 4   Quality of Pain Aching; Throbbing   Quality of Pain Comment -   Duration of Pain -   Frequency of Pain Constant   Aggravating Factors -   Aggravating Factors Comment -   Limiting Behavior -   Relieving Factors Nothing   Relieving Factors Comment -   Result of Injury -         REVIEW OF SYSTEMS  Constitutional: Negative for fever, chills, or weight change. Respiratory: Negative for cough or shortness of breath. Cardiovascular: Negative for chest pain or palpitations. Gastrointestinal: Negative for incontinence, acid reflux, change in bowel habits, or constipation.   Genitourinary: Negative for incontinence, dysuria and flank pain. Musculoskeletal: Positive for neck and back and LLE pain. See HPI. Skin: Negative for rash. Neurological:LLE L5-S1  radiculopathy. See HPI. Endo/Heme/Allergies: Negative. Psychiatric/Behavioral: Negative. PHYSICAL EXAMINATION  Visit Vitals    /78    Pulse 82    Temp 98.2 °F (36.8 °C)    Resp 17    Ht 5' 1\" (1.549 m)    Wt 129 lb 12.8 oz (58.9 kg)    SpO2 97%    BMI 24.53 kg/m2         Accompanied by Self. Constitutional:  Well developed, well nourished, in no acute distress. Psychiatric: Affect and mood are appropriate. Integumentary: No rashes or abrasions noted on exposed areas. Cardiovascular/Peripheral Vascular: +2 radial & pedal pulses. No peripheral edema is noted. Lymphatic:  No evidence of lymphedema. No cervical lymphadenopathy. SPINE/MUSCULOSKELETAL EXAM    Cervical spine:  Neck is midline. Normal muscle tone. No focal atrophy is noted. Neck ROM decreased and stiffness with flexion, extension, turning right, turning left. Shoulder ROM intact. Tenderness to palpation bilateral trapezii. Negative Spurling's sign. Negative Tinel's sign. Negative Hernandez's sign. Sensation grossly intact to light touch. Lumbar spine:  No rash, ecchymosis, or gross obliquity. No fasciculations. No focal atrophy is noted. Range of motion is intact with flexion, extension, turning right, turning left. Tenderness to palpation left low back and sciatic notch. SI joints non-tender. Trochanters non tender. Straight leg raise negative    Sensation grossly intact to light touch. MOTOR:     Biceps Triceps Deltoids Wrist Ext Wrist Flex Hand Intrin   Right 5/5 5/5 5/5 5/5 5/5 5/5   Left 5/5 5/5 5/5 5/5 5/5 5/5      Hip Flex Quads Hamstrings Ankle DF EHL Ankle PF   Right 5/5 5/5 5/5 5/5 5/5 5/5   Left 5/5 5/5 5/5 5/5 5/5 5/5         Ambulation without assistive device. FWB.     Forward postured gait        PAST MEDICAL HISTORY   Past Medical History: Diagnosis Date    Allergic rhinitis     Asthma 2012    +PFT for mild obstructive disease/COPD    Back pain, chronic 2012    Dr. Cheryl Salcido referred to Dr. Haven Vasquez    Chronic obstructive pulmonary disease ([facility]     GERD (gastroesophageal reflux disease)     H/O mammogram 10/03/2017    No evidence of malignancy    Hard of hearing     chronic ruptured TM    HTN (hypertension) 2012    Hx of screening mammography 2016    EWL, normal, routine recs    Hyperlipidemia     Nausea & vomiting     Sinus congestion 2012    Spinal stenosis of lumbar region with radiculopathy 2015    Dr. Saintclair [place]       Past Surgical History:   Procedure Laterality Date    HX  SECTION      HX ORTHOPAEDIC  2018    cervical surgery(Dr. [facility])    HX TUBAL LIGATION     . MEDICATIONS      Current Outpatient Prescriptions   Medication Sig Dispense Refill    acetaminophen (TYLENOL EXTRA STRENGTH) 500 mg tablet Take 1,000 mg by mouth every eight (8) hours as needed for Pain.  montelukast (SINGULAIR) 10 mg tablet Take 1 Tab by mouth daily. Indications: Allergic Rhinitis 30 Tab 6    losartan-hydroCHLOROthiazide (HYZAAR) 50-12.5 mg per tablet Take 1 Tab by mouth daily. Indications: hypertension 90 Tab 1    promethazine (PHENERGAN) 25 mg tablet Take 1 Tab by mouth every eight (8) hours as needed for Nausea. Indications: POST-OPERATIVE NAUSEA AND VOMITING 10 Tab 0    dexlansoprazole (DEXILANT) 30 mg capsule Take 1 Cap by mouth daily. Indications: gastroesophageal reflux disease 90 Cap 3    ezetimibe (ZETIA) 10 mg tablet Take 1 Tab by mouth daily. 90 Tab 3    mometasone (NASONEX) 50 mcg/actuation nasal spray 2 Sprays by Both Nostrils route daily. 3 Container 3    fluticasone-salmeterol (ADVAIR) 250-50 mcg/dose diskus inhaler Take 1 Puff by inhalation every twelve (12) hours.  3 Inhaler 3    albuterol (PROVENTIL HFA) 90 mcg/actuation inhaler Take 2 Puffs by inhalation every six (6) hours as needed. 1 Inhaler 3    Cholecalciferol, Vitamin D3, (VITAMIN D3) 2,000 unit cap capsule Take 2,000 Units by mouth daily. 30 Cap 11        ALLERGIES    Allergies   Allergen Reactions    Lisinopril Cough    Lyrica [Pregabalin] Other (comments)     Medication causes dry eyes, unstable balance, and lack of concentration.      Mold Extracts Runny Nose    Pollen Extracts Itching    Statins-Hmg-Coa Reductase Inhibitors Myalgia    Topamax [Topiramate] Vertigo          SOCIAL HISTORY    Social History     Social History    Marital status: SINGLE     Spouse name: N/A    Number of children: N/A    Years of education: N/A     Occupational History    hairdresser      Social History Main Topics    Smoking status: Current Every Day Smoker     Packs/day: 1.00     Types: Cigarettes    Smokeless tobacco: Never Used    Alcohol use No      Comment: h/o alcohol abuse in remission    Drug use: No    Sexual activity: No     Other Topics Concern     Service No    Blood Transfusions No    Caffeine Concern Yes    Occupational Exposure No    Hobby Hazards No    Sleep Concern No    Stress Concern No    Weight Concern No    Special Diet No    Back Care No    Exercise Yes     walking    Bike Helmet No    Seat Belt Yes    Self-Exams No     Social History Narrative       FAMILY HISTORY    Family History   Problem Relation Age of Onset    Heart Disease Mother     Hypertension Mother     Cancer Mother     Coronary Artery Disease Mother 67    Heart Surgery Mother 68    Heart Disease Father     Hypertension Father     Stroke Father     Heart Surgery Father 46    Lung Disease Father     Heart Disease Brother     Heart Disease Brother          Jay Grossman NP

## 2018-10-22 DIAGNOSIS — I10 ESSENTIAL HYPERTENSION: ICD-10-CM

## 2018-10-22 RX ORDER — LOSARTAN POTASSIUM AND HYDROCHLOROTHIAZIDE 12.5; 5 MG/1; MG/1
1 TABLET ORAL DAILY
Qty: 90 TAB | Refills: 1 | Status: SHIPPED | OUTPATIENT
Start: 2018-10-22 | End: 2019-01-08 | Stop reason: SDUPTHER

## 2019-01-08 ENCOUNTER — OFFICE VISIT (OUTPATIENT)
Dept: FAMILY MEDICINE CLINIC | Age: 64
End: 2019-01-08

## 2019-01-08 VITALS
DIASTOLIC BLOOD PRESSURE: 75 MMHG | HEART RATE: 82 BPM | HEIGHT: 61 IN | OXYGEN SATURATION: 98 % | WEIGHT: 130.8 LBS | SYSTOLIC BLOOD PRESSURE: 124 MMHG | BODY MASS INDEX: 24.7 KG/M2 | TEMPERATURE: 98.1 F | RESPIRATION RATE: 18 BRPM

## 2019-01-08 DIAGNOSIS — Z78.0 ASYMPTOMATIC MENOPAUSE: ICD-10-CM

## 2019-01-08 DIAGNOSIS — Z13.6 SCREENING FOR ISCHEMIC HEART DISEASE: ICD-10-CM

## 2019-01-08 DIAGNOSIS — Z13.31 SCREENING FOR DEPRESSION: ICD-10-CM

## 2019-01-08 DIAGNOSIS — Z12.39 SCREENING BREAST EXAMINATION: ICD-10-CM

## 2019-01-08 DIAGNOSIS — Z00.00 WELCOME TO MEDICARE PREVENTIVE VISIT: Primary | ICD-10-CM

## 2019-01-08 DIAGNOSIS — I10 ESSENTIAL HYPERTENSION: ICD-10-CM

## 2019-01-08 DIAGNOSIS — Z12.31 ENCOUNTER FOR SCREENING MAMMOGRAM FOR MALIGNANT NEOPLASM OF BREAST: ICD-10-CM

## 2019-01-08 RX ORDER — EZETIMIBE 10 MG/1
10 TABLET ORAL DAILY
Qty: 90 TAB | Refills: 3 | Status: SHIPPED | OUTPATIENT
Start: 2019-01-08 | End: 2020-01-20 | Stop reason: SDUPTHER

## 2019-01-08 RX ORDER — OMEPRAZOLE 20 MG/1
20 CAPSULE, DELAYED RELEASE ORAL DAILY
Qty: 90 CAP | Refills: 3 | Status: SHIPPED | COMMUNITY
Start: 2019-01-08 | End: 2019-07-17

## 2019-01-08 RX ORDER — LOSARTAN POTASSIUM AND HYDROCHLOROTHIAZIDE 12.5; 5 MG/1; MG/1
1 TABLET ORAL DAILY
Qty: 90 TAB | Refills: 3 | Status: SHIPPED | OUTPATIENT
Start: 2019-01-08 | End: 2020-01-22 | Stop reason: SDUPTHER

## 2019-01-08 RX ORDER — NAPROXEN 500 MG/1
500 TABLET ORAL 2 TIMES DAILY WITH MEALS
COMMUNITY
End: 2019-01-08 | Stop reason: SDUPTHER

## 2019-01-08 NOTE — PROGRESS NOTES
Chief Complaint   Patient presents with   [facility]eng[facility]arenHolley Annual Wellness Visit     1. Have you been to the ER, urgent care clinic since your last visit? Hospitalized since your last visit? No     2. Have you seen or consulted any other health care providers outside of the [address] [place] [address] since your last visit? Include any pap smears or colon screening. No       This is a \"Welcome to [employer]"  Initial Preventive Physical Examination (IPPE) providing Personalized Prevention Plan Services (Performed in the first 12 months of enrollment)    I have reviewed the patient's medical history in detail and updated the computerized patient record. She feels well; History     Past Medical History:   Diagnosis Date    Allergic rhinitis     Asthma 2012    +PFT for mild obstructive disease/COPD    Back pain, chronic 2012    Dr. Angle Phillips referred to Dr. Gio Jefferson    [facility]     GERD (gastroesophageal reflux disease)     H/O mammogram 10/03/2017    No evidence of malignancy    Hard of hearing     chronic ruptured TM    HTN (hypertension) 2012    Hx of screening mammography 2016    EWL, normal, routine recs    Hyperlipidemia     Nausea & vomiting     Sinus congestion 2012    Spinal stenosis of lumbar region with radiculopathy 2015    Dr. Senior Memos      Past Surgical History:   Procedure Laterality Date    HX  SECTION      HX ORTHOPAEDIC  2018    cervical surgery(Dr. Ha Ga)    HX TUBAL LIGATION       Current Outpatient Medications   Medication Sig Dispense Refill    naproxen (NAPROSYN) 500 mg tablet Take 500 mg by mouth two (2) times daily (with meals).  losartan-hydroCHLOROthiazide (HYZAAR) 50-12.5 mg per tablet Take 1 Tab by mouth daily. 90 Tab 3    ezetimibe (ZETIA) 10 mg tablet Take 1 Tab by mouth daily. 90 Tab 3    omeprazole (PRILOSEC) 20 mg capsule Take 1 Cap by mouth daily.  90 Cap 3    acetaminophen (TYLENOL EXTRA STRENGTH) 500 mg tablet Take 1,000 mg by mouth every eight (8) hours as needed for Pain.  montelukast (SINGULAIR) 10 mg tablet Take 1 Tab by mouth daily. Indications: Allergic Rhinitis 30 Tab 6    mometasone (NASONEX) 50 mcg/actuation nasal spray 2 Sprays by Both Nostrils route daily. 3 Container 3    fluticasone-salmeterol (ADVAIR) 250-50 mcg/dose diskus inhaler Take 1 Puff by inhalation every twelve (12) hours. 3 Inhaler 3    albuterol (PROVENTIL HFA) 90 mcg/actuation inhaler Take 2 Puffs by inhalation every six (6) hours as needed. 1 Inhaler 3    Cholecalciferol, Vitamin D3, (VITAMIN D3) 2,000 unit cap capsule Take 2,000 Units by mouth daily. 30 Cap 11     Allergies   Allergen Reactions    Lisinopril Cough    Lyrica [Pregabalin] Other (comments)     Medication causes dry eyes, unstable balance, and lack of concentration.  Mold Extracts Runny Nose    Pollen Extracts Itching    Statins-Hmg-Coa Reductase Inhibitors Myalgia    Topamax [Topiramate] Vertigo     Family History   Problem Relation Age of Onset    Heart Disease Mother     Hypertension Mother     Cancer Mother     Coronary Artery Disease Mother 67    Heart Surgery Mother 68    Heart Disease Father     Hypertension Father     Stroke Father     Heart Surgery Father 46    Lung Disease Father     Heart Disease Brother     Heart Disease Brother      Social History     Tobacco Use    Smoking status: Current Every Day Smoker     Packs/day: 1.00     Types: Cigarettes    Smokeless tobacco: Never Used   Substance Use Topics    Alcohol use: No     Comment: h/o alcohol abuse in remission     Diet, Lifestyle: No special diet \" it could be better\" avoids burgers;      Exercise level: moderately active ; used to walk regularly    Depression Risk Screen     PHQ over the last two weeks 1/8/2019   Little interest or pleasure in doing things Not at all   Feeling down, depressed, irritable, or hopeless Not at all   Total Score PHQ 2 0 Alcohol Risk Screen   You do not drink alcohol or very rarely. Functional Ability and Level of Safety   Hearing Loss  Hearing is diminished;  Has a hole in an eardrum; Has tinnitus    Vision Screening  Vision is good. No exam data present      Activities of Daily Living  The home contains: no safety equipment. Patient does total self care    Fall Risk Screen  Fall Risk Assessment, last 12 mths 1/8/2019   Able to walk? Yes   Fall in past 12 months? Yes   Fall with injury? No   Number of falls in past 12 months 2   Fall Risk Score 2       Abuse Screen  Patient is not abused    Screening EKG   EKG order placed: Yes     wnl    Patient Care Team   Patient Care Team:  Sancho Salinas MD as PCP - General (Family Practice)     PE:   General appearance: alert, cooperative, no distress, appears stated age  Neck: supple, symmetrical, trachea midline, no adenopathy, thyroid: not enlarged, symmetric, no tenderness/mass/nodules, no carotid bruit and no JVD  Lungs: clear to auscultation bilaterally  Heart: regular rate and rhythm, S1, S2 normal, no murmur, click, rub or gallop  Extremities: extremities normal, atraumatic, no cyanosis or edema  Skin: some seborrheic keratosis  On breast;  Back is clear. End of Life Planning   Advanced care planning directives were discussed with the patient and /or family/caregiver. Assessment/Plan   Education and counseling provided:  Are appropriate based on today's review and evaluation  End-of-Life planning (with patient's consent)  Cardiovascular screening blood test    Diagnoses and all orders for this visit:    1. Welcome to Medicare preventive visit  -     AMB POC EKG ROUTINE W/ 12 LEADS, SCREEN ()    2. Screening for depression  -     DEPRESSION SCREEN ANNUAL    3. Screening for ischemic heart disease  -     LIPID PANEL; Future  -     LIPID PANEL; Future  -     METABOLIC PANEL, BASIC; Future    4.  Encounter for screening mammogram for malignant neoplasm of breast    5. Screening breast examination  -     [facility] MAMMO BI SCREENING INCL CAD; Future    6. Essential hypertension  -     losartan-hydroCHLOROthiazide (HYZAAR) 50-12.5 mg per tablet; Take 1 Tab by mouth daily. 7. Asymptomatic menopause  -     DEXA BONE DENSITY STUDY AXIAL; Future    Other orders  -     ezetimibe (ZETIA) 10 mg tablet; Take 1 Tab by mouth daily. -     omeprazole (PRILOSEC) 20 mg capsule; Take 1 Cap by mouth daily. Follow-up Disposition:  Return in about 6 months (around 7/8/2019) for htn/chol. An After Visit Summary was printed and given to the patient. This has been fully explained to the patient, who indicates understanding.   Pt declines a colo referral

## 2019-01-08 NOTE — TELEPHONE ENCOUNTER
Requested Prescriptions     Pending Prescriptions Disp Refills    montelukast (SINGULAIR) 10 mg tablet 30 Tab 6     Sig: Take 1 Tab by mouth daily.  naproxen (NAPROSYN) 500 mg tablet       Sig: Take 1 Tab by mouth two (2) times daily (with meals).      Pt needs a 90 day supply

## 2019-01-08 NOTE — PATIENT INSTRUCTIONS
Medicare Wellness Visit, Female     The best way to live healthy is to have a lifestyle where you eat a well-balanced diet, exercise regularly, limit alcohol use, and quit all forms of tobacco/nicotine, if applicable. Regular preventive services are another way to keep healthy. Preventive services (vaccines, screening tests, monitoring & exams) can help personalize your care plan, which helps you manage your own care. Screening tests can find health problems at the earliest stages, when they are easiest to treat. [facility] follows the current, evidence-based guidelines published by the [facility] Jerry Rik ([facility]) when recommending preventive services for our patients. Because we follow these guidelines, sometimes recommendations change over time as research supports it. (For example, mammograms used to be recommended annually. Even though Medicare will still pay for an annual mammogram, the newer guidelines recommend a mammogram every two years for women of average risk.)  Of course, you and your doctor may decide to screen more often for some diseases, based on your risk and your health status. Preventive services for you include:  - Medicare offers their members a free annual wellness visit, which is time for you and your primary care provider to discuss and plan for your preventive service needs. Take advantage of this benefit every year!  -All adults over the age of 72 should receive the recommended pneumonia vaccines. Current USPSTF guidelines recommend a series of two vaccines for the best pneumonia protection.   -All adults should have a flu vaccine yearly and a tetanus vaccine every 10 years. All adults age 61 and older should receive a shingles vaccine once in their lifetime.    -A bone mass density test is recommended when a woman turns 65 to screen for osteoporosis. This test is only recommended one time, as a screening.  Some providers will use this same test as a disease monitoring tool if you already have osteoporosis. -All adults age 38-68 who are overweight should have a diabetes screening test once every three years.   -Other screening tests and preventive services for persons with diabetes include: an eye exam to screen for diabetic retinopathy, a kidney function test, a foot exam, and stricter control over your cholesterol.   -Cardiovascular screening for adults with routine risk involves an electrocardiogram (ECG) at intervals determined by your doctor.   -Colorectal cancer screenings should be done for adults age 54-65 with no increased risk factors for colorectal cancer. There are a number of acceptable methods of screening for this type of cancer. Each test has its own benefits and drawbacks. Discuss with your doctor what is most appropriate for you during your annual wellness visit. The different tests include: colonoscopy (considered the best screening method), a fecal occult blood test, a fecal DNA test, and sigmoidoscopy. -Breast cancer screenings are recommended every other year for women of normal risk, age 54-69.  -Cervical cancer screenings for women over age 72 are only recommended with certain risk factors.   -All adults born between [facility] should be screened once for Hepatitis C. Here is a list of your current Health Maintenance items (your personalized list of preventive services) with a due date:  Health Maintenance Due   Topic Date Due    DTaP/Tdap/Td  (1 - Tdap) 09/08/1974    Shingles Vaccine (1 of 2) 09/08/2003    Stool testing for trace blood  09/08/2003    Flu Vaccine  08/01/2018    Glaucoma Screening   09/08/2018    Bone Mineral Density   09/08/2018    Pneumococcal Vaccine (1 of 2 - PCV13) 09/08/2018         Medicare Wellness Visit, Female     The best way to live healthy is to have a lifestyle where you eat a well-balanced diet, exercise regularly, limit alcohol use, and quit all forms of tobacco/nicotine, if applicable. Regular preventive services are another way to keep healthy. Preventive services (vaccines, screening tests, monitoring & exams) can help personalize your care plan, which helps you manage your own care. Screening tests can find health problems at the earliest stages, when they are easiest to treat. [facility] follows the current, evidence-based guidelines published by the [facility] Jerry Jolly (USPSTF) when recommending preventive services for our patients. Because we follow these guidelines, sometimes recommendations change over time as research supports it. (For example, mammograms used to be recommended annually. Even though Medicare will still pay for an annual mammogram, the newer guidelines recommend a mammogram every two years for women of average risk.)  Of course, you and your doctor may decide to screen more often for some diseases, based on your risk and your health status. Preventive services for you include:  - Medicare offers their members a free annual wellness visit, which is time for you and your primary care provider to discuss and plan for your preventive service needs. Take advantage of this benefit every year!  -All adults over the age of 72 should receive the recommended pneumonia vaccines. Current USPSTF guidelines recommend a series of two vaccines for the best pneumonia protection.   -All adults should have a flu vaccine yearly and a tetanus vaccine every 10 years. All adults age 61 and older should receive a shingles vaccine once in their lifetime.    -A bone mass density test is recommended when a woman turns 65 to screen for osteoporosis. This test is only recommended one time, as a screening. Some providers will use this same test as a disease monitoring tool if you already have osteoporosis.   -All adults age 38-68 who are overweight should have a diabetes screening test once every three years.   -Other screening tests and preventive services for persons with diabetes include: an eye exam to screen for diabetic retinopathy, a kidney function test, a foot exam, and stricter control over your cholesterol.   -Cardiovascular screening for adults with routine risk involves an electrocardiogram (ECG) at intervals determined by your doctor.   -Colorectal cancer screenings should be done for adults age 54-65 with no increased risk factors for colorectal cancer. There are a number of acceptable methods of screening for this type of cancer. Each test has its own benefits and drawbacks. Discuss with your doctor what is most appropriate for you during your annual wellness visit. The different tests include: colonoscopy (considered the best screening method), a fecal occult blood test, a fecal DNA test, and sigmoidoscopy. -Breast cancer screenings are recommended every other year for women of normal risk, age 54-69.  -Cervical cancer screenings for women over age 72 are only recommended with certain risk factors.   -All adults born between [facility] should be screened once for Hepatitis C.      Here is a list of your current Health Maintenance items (your personalized list of preventive services) with a due date:  Health Maintenance Due   Topic Date Due    DTaP/Tdap/Td  (1 - Tdap) 09/08/1974    Shingles Vaccine (1 of 2) 09/08/2003    Stool testing for trace blood  09/08/2003    Flu Vaccine  08/01/2018    Glaucoma Screening   09/08/2018    Bone Mineral Density   09/08/2018    Pneumococcal Vaccine (1 of 2 - PCV13) 09/08/2018

## 2019-01-08 NOTE — TELEPHONE ENCOUNTER
Last OV today  Last refill monteulast 6/4/18 by another provider.                 Naprosyn not filled before

## 2019-01-11 RX ORDER — MONTELUKAST SODIUM 10 MG/1
10 TABLET ORAL DAILY
Qty: 90 TAB | Refills: 1 | Status: SHIPPED | OUTPATIENT
Start: 2019-01-11 | End: 2019-07-17 | Stop reason: SDUPTHER

## 2019-01-11 RX ORDER — NAPROXEN 500 MG/1
500 TABLET ORAL
Qty: 30 TAB | Refills: 0 | Status: SHIPPED | OUTPATIENT
Start: 2019-01-11 | End: 2019-07-17

## 2019-01-14 ENCOUNTER — TELEPHONE (OUTPATIENT)
Dept: FAMILY MEDICINE CLINIC | Age: 64
End: 2019-01-14

## 2019-01-14 ENCOUNTER — HOSPITAL ENCOUNTER (OUTPATIENT)
Dept: LAB | Age: 64
Discharge: HOME | End: 2019-01-14
Payer: COMMERCIAL

## 2019-01-14 DIAGNOSIS — Z13.6 SCREENING FOR ISCHEMIC HEART DISEASE: ICD-10-CM

## 2019-01-14 LAB
ANION GAP SERPL CALC-SCNC: 6 MMOL/L (ref 3–18)
BUN SERPL-MCNC: 22 MG/DL (ref 7–18)
BUN/CREAT SERPL: 27 (ref 12–20)
CALCIUM SERPL-MCNC: 10.9 MG/DL (ref 8.5–10.1)
CHLORIDE SERPL-SCNC: 106 MMOL/L (ref 100–108)
CHOLEST SERPL-MCNC: 254 MG/DL
CO2 SERPL-SCNC: 26 MMOL/L (ref 21–32)
CREAT SERPL-MCNC: 0.82 MG/DL (ref 0.6–1.3)
GLUCOSE SERPL-MCNC: 88 MG/DL (ref 74–99)
HDLC SERPL-MCNC: 74 MG/DL (ref 40–60)
HDLC SERPL: 3.4 {RATIO} (ref 0–5)
LDLC SERPL CALC-MCNC: 160.6 MG/DL (ref 0–100)
LIPID PROFILE,FLP: ABNORMAL
POTASSIUM SERPL-SCNC: 4.2 MMOL/L (ref 3.5–5.5)
SODIUM SERPL-SCNC: 138 MMOL/L (ref 136–145)
TRIGL SERPL-MCNC: 97 MG/DL (ref ?–150)
VLDLC SERPL CALC-MCNC: 19.4 MG/DL

## 2019-01-14 PROCEDURE — 80048 BASIC METABOLIC PNL TOTAL CA: CPT

## 2019-01-14 PROCEDURE — 36415 COLL VENOUS BLD VENIPUNCTURE: CPT

## 2019-01-14 PROCEDURE — 80061 LIPID PANEL: CPT

## 2019-01-14 NOTE — TELEPHONE ENCOUNTER
Called patient at [phone] (home)  (non-secure line) and did not leave a detailed message. Returned patient's call. Patient needs to provider more information.

## 2019-03-26 ENCOUNTER — OFFICE VISIT (OUTPATIENT)
Dept: ORTHOPEDIC SURGERY | Age: 64
End: 2019-03-26

## 2019-03-26 VITALS
SYSTOLIC BLOOD PRESSURE: 111 MMHG | TEMPERATURE: 98.2 F | DIASTOLIC BLOOD PRESSURE: 69 MMHG | HEART RATE: 92 BPM | RESPIRATION RATE: 17 BRPM

## 2019-03-26 DIAGNOSIS — M54.2 CERVICAL PAIN: ICD-10-CM

## 2019-03-26 DIAGNOSIS — Z98.1 S/P CERVICAL SPINAL FUSION: Primary | ICD-10-CM

## 2019-03-26 DIAGNOSIS — M47.12 CERVICAL SPONDYLOSIS WITH MYELOPATHY: ICD-10-CM

## 2019-03-26 RX ORDER — NAPROXEN 500 MG/1
500 TABLET, DELAYED RELEASE ORAL 2 TIMES DAILY WITH MEALS
Qty: 60 TAB | Refills: 2 | Status: SHIPPED | OUTPATIENT
Start: 2019-03-26 | End: 2019-07-02 | Stop reason: SDUPTHER

## 2019-03-26 NOTE — PROGRESS NOTES
[facility] [address]  [address], [address]  [place] [address]  Phone: [phone]  Fax: [phone]  PROGRESS NOTE  Patient: Eliane Swift                MRN: [id number]       SSN: [id number]  YOB: 1955        AGE: 70 y.o. SEX: female  There is no height or weight on file to calculate BMI. PCP: Zander Becerra MD  03/26/19    Chief Complaint   Patient presents with    Neck Pain     6mo fu       HISTORY OF PRESENT ILLNESS, RADIOGRAPHS, and PLAN:     HISTORY OF PRESENT ILLNESS:  Ms. Aydin Mancilla returns today. She is a year out from her posterior cervical decompression and fusion. The myelopathic symptoms have improved dramatically. She still has a neck ache. She cannot do hair as much, but we talked about work around utilizing a soft collar, her antiinflammatory use and rest.  I have given her a prescription strength for Naprosyn which she finds helpful. ASSESSMENT/PLAN:   Will have her follow-up with one of my NPs for routine follow-up. I am pleased by her overall successful. She just has severe polyarticular arthritis and it has taken its toll. cc:  Candace Campbell M.D.            Past Medical History:   Diagnosis Date    Allergic rhinitis     Asthma 8/25/2012    +PFT for mild obstructive disease/COPD    Back pain, chronic 8/25/2012    Dr. Elizabeth Hathaway referred to Dr. Sukhwinder Pfeiffer    [facility] obstructive pulmonary [facility] ([facility]     GERD (gastroesophageal reflux disease)     H/O mammogram 10/03/2017    No evidence of malignancy    Hard of hearing     chronic ruptured TM    HTN (hypertension) 8/25/2012    Hx of screening mammography 09/06/2016    EWL, normal, routine recs    Hyperlipidemia     Nausea & vomiting     Sinus congestion 8/25/2012    Spinal stenosis of lumbar region with radiculopathy 9/2015    Dr. Vicky Garcia       Family History   Problem Relation Age of Onset    Heart Disease Mother     Hypertension Mother     Cancer Mother  Coronary Artery Disease Mother 67    Heart Surgery Mother 68    Heart Disease Father     Hypertension Father     Stroke Father     Heart Surgery Father 46    Lung Disease Father     Heart Disease Brother     Heart Disease Brother        Current Outpatient Medications   Medication Sig Dispense Refill    montelukast (SINGULAIR) 10 mg tablet Take 1 Tab by mouth daily. 90 Tab 1    naproxen (NAPROSYN) 500 mg tablet Take 1 Tab by mouth two (2) times daily as needed. Take with food 30 Tab 0    losartan-hydroCHLOROthiazide (HYZAAR) 50-12.5 mg per tablet Take 1 Tab by mouth daily. 90 Tab 3    ezetimibe (ZETIA) 10 mg tablet Take 1 Tab by mouth daily. 90 Tab 3    omeprazole (PRILOSEC) 20 mg capsule Take 1 Cap by mouth daily. 90 Cap 3    acetaminophen (TYLENOL EXTRA STRENGTH) 500 mg tablet Take 1,000 mg by mouth every eight (8) hours as needed for Pain.  mometasone (NASONEX) 50 mcg/actuation nasal spray 2 Sprays by Both Nostrils route daily. 3 Container 3    fluticasone-salmeterol (ADVAIR) 250-50 mcg/dose diskus inhaler Take 1 Puff by inhalation every twelve (12) hours. 3 Inhaler 3    albuterol (PROVENTIL HFA) 90 mcg/actuation inhaler Take 2 Puffs by inhalation every six (6) hours as needed. 1 Inhaler 3    Cholecalciferol, Vitamin D3, (VITAMIN D3) 2,000 unit cap capsule Take 2,000 Units by mouth daily. 30 Cap 11       Allergies   Allergen Reactions    Lisinopril Cough    Lyrica [Pregabalin] Other (comments)     Medication causes dry eyes, unstable balance, and lack of concentration.      Mold Extracts Runny Nose    Pollen Extracts Itching    Statins-Hmg-Coa Reductase Inhibitors Myalgia    Topamax [Topiramate] Vertigo       Past Surgical History:   Procedure Laterality Date    HX  SECTION      HX ORTHOPAEDIC  2018    cervical surgery(Dr. David Johnson)    HX TUBAL LIGATION         Past Medical History:   Diagnosis Date    Allergic rhinitis     Asthma 2012    +PFT for mild obstructive disease/COPD    Back pain, chronic 8/25/2012    Dr. Prince Skelton referred to Dr. Aba Reyes    Chronic obstructive pulmonary disease ([facility]     GERD (gastroesophageal reflux disease)     H/O mammogram 10/03/2017    No evidence of malignancy    Hard of hearing     chronic ruptured TM    HTN (hypertension) 8/25/2012    Hx of screening mammography 09/06/2016    EWL, normal, routine recs    Hyperlipidemia     Nausea & vomiting     Sinus congestion 8/25/2012    Spinal stenosis of lumbar region with radiculopathy 9/2015    Dr. Luis Varela, [place]       Social History     Socioeconomic History    Marital status: SINGLE     Spouse name: Not on file    Number of children: Not on file    Years of education: Not on file    Highest education level: Not on file   Occupational History    Occupation: [employer]   Social Needs    Financial resource strain: Not on file    Food insecurity:     Worry: Not on file     Inability: Not on file   [employer] needs:     Medical: Not on file     Non-medical: Not on file   Tobacco Use    Smoking status: Current Every Day Smoker     Packs/day: 1.00     Types: Cigarettes    Smokeless tobacco: Never Used   Substance and Sexual Activity    Alcohol use: No     Comment: h/o alcohol abuse in remission    Drug use: No    Sexual activity: Never   Lifestyle    Physical activity:     Days per week: Not on file     Minutes per session: Not on file    Stress: Not on file   Relationships    Social connections:     Talks on phone: Not on file     Gets together: Not on file     Attends Faith service: Not on file     Active member of club or organization: Not on file     Attends meetings of clubs or organizations: Not on file     Relationship status: Not on file    Intimate partner violence:     Fear of current or ex partner: Not on file     Emotionally abused: Not on file     Physically abused: Not on file     Forced sexual activity: Not on file   Other Topics Concern     Service No    Blood Transfusions No    Caffeine Concern Yes    Occupational Exposure No    Hobby Hazards No    Sleep Concern No    Stress Concern No    Weight Concern No    Special Diet No    Back Care No    Exercise Yes     Comment: walking    Bike Helmet No    Seat Belt Yes    Self-Exams No   Social History Narrative    Not on file         REVIEW OF SYSTEMS:   CONSTITUTIONAL SYMPTOMS:  Negative. EYES:  Negative. EARS, NOSE, THROAT AND MOUTH:  Negative. CARDIOVASCULAR:  Negative. RESPIRATORY:  Negative. GENITOURINARY: Per HPI. GASTROINTESTINAL:  Per HPI. INTEGUMENTARY (SKIN AND/OR BREAST):  Negative. MUSCULOSKELETAL: Per HPI.   ENDOCRINE/RHEUMATOLOGIC:  Negative. NEUROLOGICAL:  Per HPI. HEMATOLOGIC/LYMPHATIC:  Negative. ALLERGIC/IMMUNOLOGIC:  Negative. PSYCHIATRIC:  Negative. PHYSICAL EXAMINATION:   Visit Vitals  /69 (BP 1 Location: Left arm, BP Patient Position: Sitting)   Pulse 92   Temp 98.2 °F (36.8 °C) (Oral)   Resp 17    PAIN SCALE: 2/10    CONSTITUTIONAL: The patient is in no apparent distress and is alert and oriented x 3. HEENT: Normocephalic. Hearing grossly intact. NECK: Supple and symmetric. no tenderness, or masses were felt. RESPIRATORY: No labored breathing. CARDIOVASCULAR: The carotid pulses were normal. Peripheral pulses were 2+. CHEST: Normal AP diameter and normal contour without any kyphoscoliosis. LYMPHATIC: No lymphadenopathy was appreciated in the neck, axillae or groin. SKIN:  Negative for scars, rashes, lesions, or ulcers on the right upper, right lower, left upper, left lower and trunk. NEUROLOGICAL: Alert and oriented x 3. Ambulation without assistive device. FWB. EXTREMITIES: See musculoskeletal.  MUSCULOSKELETAL:   Head and Neck: Neck pain, mild. Negative for misalignment, asymmetry, crepitation, defects, tenderness masses or effusions.  Left Upper Extremity: Inspection, percussion and palpation performed. Hernandezs sign is negative.  Right Upper Extremity: Inspection, percussion and palpation performed. Hernandezs sign is negative.  Spine, Ribs and Pelvis: Inspection, percussion and palpation performed. Negative for misalignment, asymmetry, crepitation, defects, tenderness masses or effusions.  Left Lower Extremity: Inspection, percussion and palpation performed. Negative straight leg raise.  Right Lower Extremity: Inspection, percussion and palpation performed. Negative straight leg raise. SPINE EXAM:     Cervical spine: Neck is midline. Normal muscle tone. No focal atrophy is noted. ASSESSMENT    ICD-10-CM ICD-9-CM    1. S/P cervical spinal fusion Z98.1 V45.4    2. Cervical spondylosis with myelopathy M47.12 721.1    3. Cervical pain M54.2 723.1 AMB POC XRAY, SPINE, CERVICAL; 2 OR 3       Written by Juany Bonilla, as dictated by Kailey Cabrera MD.    I, Dr. Kailey Cabrera MD, confirm that all documentation is accurate.

## 2019-07-02 DIAGNOSIS — Z98.1 S/P CERVICAL SPINAL FUSION: ICD-10-CM

## 2019-07-02 DIAGNOSIS — M47.12 CERVICAL SPONDYLOSIS WITH MYELOPATHY: ICD-10-CM

## 2019-07-02 DIAGNOSIS — M54.2 CERVICAL PAIN: ICD-10-CM

## 2019-07-02 RX ORDER — NAPROXEN 500 MG/1
500 TABLET, DELAYED RELEASE ORAL 2 TIMES DAILY WITH MEALS
Qty: 60 TAB | Refills: 2 | Status: SHIPPED | OUTPATIENT
Start: 2019-07-02 | End: 2020-01-20 | Stop reason: SDUPTHER

## 2019-07-02 NOTE — TELEPHONE ENCOUNTER
Last Visit: 3/26/19 with MD Bro Pettit  Next Appointment: return in 3 months  Previous Refill Encounter(s): 3/26/19 #60 with 2 refills    Requested Prescriptions     Pending Prescriptions Disp Refills    naproxen EC (NAPROSYN EC) 500 mg EC tablet 60 Tab 2     Sig: Take 1 Tab by mouth two (2) times daily (with meals).

## 2019-07-17 ENCOUNTER — OFFICE VISIT (OUTPATIENT)
Dept: FAMILY MEDICINE CLINIC | Age: 64
End: 2019-07-17

## 2019-07-17 VITALS
HEIGHT: 61 IN | BODY MASS INDEX: 23.86 KG/M2 | WEIGHT: 126.4 LBS | SYSTOLIC BLOOD PRESSURE: 124 MMHG | DIASTOLIC BLOOD PRESSURE: 70 MMHG | TEMPERATURE: 97.6 F | OXYGEN SATURATION: 100 % | HEART RATE: 75 BPM | RESPIRATION RATE: 20 BRPM

## 2019-07-17 DIAGNOSIS — E78.00 PURE HYPERCHOLESTEROLEMIA: ICD-10-CM

## 2019-07-17 DIAGNOSIS — I10 ESSENTIAL HYPERTENSION: Primary | ICD-10-CM

## 2019-07-17 RX ORDER — FLUTICASONE PROPIONATE AND SALMETEROL 250; 50 UG/1; UG/1
1 POWDER RESPIRATORY (INHALATION) EVERY 12 HOURS
Qty: 3 INHALER | Refills: 3 | Status: SHIPPED | OUTPATIENT
Start: 2019-07-17 | End: 2020-01-20 | Stop reason: SDUPTHER

## 2019-07-17 RX ORDER — MONTELUKAST SODIUM 10 MG/1
10 TABLET ORAL DAILY
Qty: 90 TAB | Refills: 1 | Status: SHIPPED | OUTPATIENT
Start: 2019-07-17 | End: 2020-01-20 | Stop reason: SDUPTHER

## 2019-07-17 NOTE — PATIENT INSTRUCTIONS
High Blood Pressure: Care Instructions  Overview    It's normal for blood pressure to go up and down throughout the day. But if it stays up, you have high blood pressure. Another name for high blood pressure is hypertension. Despite what a lot of people think, high blood pressure usually doesn't cause headaches or make you feel dizzy or lightheaded. It usually has no symptoms. But it does increase your risk of stroke, heart attack, and other problems. You and your doctor will talk about your risks of these problems based on your blood pressure. Your doctor will give you a goal for your blood pressure. Your goal will be based on your health and your age. Lifestyle changes, such as eating healthy and being active, are always important to help lower blood pressure. You might also take medicine to reach your blood pressure goal.  Follow-up care is a key part of your treatment and safety. Be sure to make and go to all appointments, and call your doctor if you are having problems. It's also a good idea to know your test results and keep a list of the medicines you take. How can you care for yourself at home? Medical treatment  · If you stop taking your medicine, your blood pressure will go back up. You may take one or more types of medicine to lower your blood pressure. Be safe with medicines. Take your medicine exactly as prescribed. Call your doctor if you think you are having a problem with your medicine. · Talk to your doctor before you start taking aspirin every day. Aspirin can help certain people lower their risk of a heart attack or stroke. But taking aspirin isn't right for everyone, because it can cause serious bleeding. · See your doctor regularly. You may need to see the doctor more often at first or until your blood pressure comes down. · If you are taking blood pressure medicine, talk to your doctor before you take decongestants or anti-inflammatory medicine, such as ibuprofen.  Some of these medicines can raise blood pressure. · Learn how to check your blood pressure at home. Lifestyle changes  · Stay at a healthy weight. This is especially important if you put on weight around the waist. Losing even 10 pounds can help you lower your blood pressure. · If your doctor recommends it, get more exercise. Walking is a good choice. Bit by bit, increase the amount you walk every day. Try for at least 30 minutes on most days of the week. You also may want to swim, bike, or do other activities. · Avoid or limit alcohol. Talk to your doctor about whether you can drink any alcohol. · Try to limit how much sodium you eat to less than 2,300 milligrams (mg) a day. Your doctor may ask you to try to eat less than 1,500 mg a day. · Eat plenty of fruits (such as bananas and oranges), vegetables, legumes, whole grains, and low-fat dairy products. · Lower the amount of saturated fat in your diet. Saturated fat is found in animal products such as milk, cheese, and meat. Limiting these foods may help you lose weight and also lower your risk for heart disease. · Do not smoke. Smoking increases your risk for heart attack and stroke. If you need help quitting, talk to your doctor about stop-smoking programs and medicines. These can increase your chances of quitting for good. When should you call for help? Call 911 anytime you think you may need emergency care. This may mean having symptoms that suggest that your blood pressure is causing a serious heart or blood vessel problem. Your blood pressure may be over 180/120.   For example, call 911 if:    · You have symptoms of a heart attack. These may include:  ? Chest pain or pressure, or a strange feeling in the chest.  ? Sweating. ? Shortness of breath. ? Nausea or vomiting. ? Pain, pressure, or a strange feeling in the back, neck, jaw, or upper belly or in one or both shoulders or arms. ? Lightheadedness or sudden weakness.   ? A fast or irregular heartbeat.     · You have symptoms of a stroke. These may include:  ? Sudden numbness, tingling, weakness, or loss of movement in your face, arm, or leg, especially on only one side of your body. ? Sudden vision changes. ? Sudden trouble speaking. ? Sudden confusion or trouble understanding simple statements. ? Sudden problems with walking or balance. ? A sudden, severe headache that is different from past headaches.     · You have severe back or belly pain.    Do not wait until your blood pressure comes down on its own. Get help right away.   Call your doctor now or seek immediate care if:    · Your blood pressure is much higher than normal (such as 180/120 or higher), but you don't have symptoms.     · You think high blood pressure is causing symptoms, such as:  ? Severe headache.  ? Blurry vision.    Watch closely for changes in your health, and be sure to contact your doctor if:    · Your blood pressure measures higher than your doctor recommends at least 2 times. That means the top number is higher or the bottom number is higher, or both.     · You think you may be having side effects from your blood pressure medicine. Where can you learn more? Go to http://giselle-erika.info/. Enter T452 in the search box to learn more about \"High Blood Pressure: Care Instructions. \"  Current as of: July 22, 2018  Content Version: 11.9  © 2257-9387 [employer], [employer]. Care instructions adapted under license by [employer] (which disclaims liability or warranty for this information). If you have questions about a medical condition or this instruction, always ask your healthcare professional. [place] [address] any warranty or liability for your use of this information. High Blood Pressure: Care Instructions  Overview    It's normal for blood pressure to go up and down throughout the day. But if it stays up, you have high blood pressure.  Another name for high blood pressure is hypertension. Despite what a lot of people think, high blood pressure usually doesn't cause headaches or make you feel dizzy or lightheaded. It usually has no symptoms. But it does increase your risk of stroke, heart attack, and other problems. You and your doctor will talk about your risks of these problems based on your blood pressure. Your doctor will give you a goal for your blood pressure. Your goal will be based on your health and your age. Lifestyle changes, such as eating healthy and being active, are always important to help lower blood pressure. You might also take medicine to reach your blood pressure goal.  Follow-up care is a key part of your treatment and safety. Be sure to make and go to all appointments, and call your doctor if you are having problems. It's also a good idea to know your test results and keep a list of the medicines you take. How can you care for yourself at home? Medical treatment  · If you stop taking your medicine, your blood pressure will go back up. You may take one or more types of medicine to lower your blood pressure. Be safe with medicines. Take your medicine exactly as prescribed. Call your doctor if you think you are having a problem with your medicine. · Talk to your doctor before you start taking aspirin every day. Aspirin can help certain people lower their risk of a heart attack or stroke. But taking aspirin isn't right for everyone, because it can cause serious bleeding. · See your doctor regularly. You may need to see the doctor more often at first or until your blood pressure comes down. · If you are taking blood pressure medicine, talk to your doctor before you take decongestants or anti-inflammatory medicine, such as ibuprofen. Some of these medicines can raise blood pressure. · Learn how to check your blood pressure at home. Lifestyle changes  · Stay at a healthy weight.  This is especially important if you put on weight around the waist. Losing even 10 pounds can help you lower your blood pressure. · If your doctor recommends it, get more exercise. Walking is a good choice. Bit by bit, increase the amount you walk every day. Try for at least 30 minutes on most days of the week. You also may want to swim, bike, or do other activities. · Avoid or limit alcohol. Talk to your doctor about whether you can drink any alcohol. · Try to limit how much sodium you eat to less than 2,300 milligrams (mg) a day. Your doctor may ask you to try to eat less than 1,500 mg a day. · Eat plenty of fruits (such as bananas and oranges), vegetables, legumes, whole grains, and low-fat dairy products. · Lower the amount of saturated fat in your diet. Saturated fat is found in animal products such as milk, cheese, and meat. Limiting these foods may help you lose weight and also lower your risk for heart disease. · Do not smoke. Smoking increases your risk for heart attack and stroke. If you need help quitting, talk to your doctor about stop-smoking programs and medicines. These can increase your chances of quitting for good. When should you call for help? Call 911 anytime you think you may need emergency care. This may mean having symptoms that suggest that your blood pressure is causing a serious heart or blood vessel problem. Your blood pressure may be over 180/120.   For example, call 911 if:    · You have symptoms of a heart attack. These may include:  ? Chest pain or pressure, or a strange feeling in the chest.  ? Sweating. ? Shortness of breath. ? Nausea or vomiting. ? Pain, pressure, or a strange feeling in the back, neck, jaw, or upper belly or in one or both shoulders or arms. ? Lightheadedness or sudden weakness. ? A fast or irregular heartbeat.     · You have symptoms of a stroke. These may include:  ? Sudden numbness, tingling, weakness, or loss of movement in your face, arm, or leg, especially on only one side of your body. ? Sudden vision changes.   ? Sudden trouble speaking. ? Sudden confusion or trouble understanding simple statements. ? Sudden problems with walking or balance. ? A sudden, severe headache that is different from past headaches.     · You have severe back or belly pain.    Do not wait until your blood pressure comes down on its own. Get help right away.   Call your doctor now or seek immediate care if:    · Your blood pressure is much higher than normal (such as 180/120 or higher), but you don't have symptoms.     · You think high blood pressure is causing symptoms, such as:  ? Severe headache.  ? Blurry vision.    Watch closely for changes in your health, and be sure to contact your doctor if:    · Your blood pressure measures higher than your doctor recommends at least 2 times. That means the top number is higher or the bottom number is higher, or both.     · You think you may be having side effects from your blood pressure medicine. Where can you learn more? Go to http://giselle-erika.info/. Enter V234 in the search box to learn more about \"High Blood Pressure: Care Instructions. \"  Current as of: July 22, 2018  Content Version: 11.9  © 9826-2685 [employer]. Care instructions adapted under license by [employer] (which disclaims liability or warranty for this information). If you have questions about a medical condition or this instruction, always ask your healthcare professional. [place] [address] any warranty or liability for your use of this information.

## 2019-07-17 NOTE — PROGRESS NOTES
Patient here for a hypertension/cholesterol problems Follow up. 1. Have you been to the ER, urgent care clinic since your last visit? Hospitalized since your last visit? No    2. Have you seen or consulted any other health care providers outside of the [address] [place] [address] since your last visit? Include any pap smears or colon screening.  No

## 2019-07-17 NOTE — PROGRESS NOTES
HISTORY OF PRESENT ILLNESS  Mehnaz Parks is a 70 y.o. female. HPI  Patient is here today for evaluation and treatment of: /Hypertension/Cholesterol problem  Hypertension:    BP is stable; she is using her BP machine at home; Cholesterol: On zetia; Tolerates med well; unable to tolerate statins;       Current Outpatient Medications:     naproxen EC (NAPROSYN EC) 500 mg EC tablet, Take 1 Tab by mouth two (2) times daily (with meals). , Disp: 60 Tab, Rfl: 2    montelukast (SINGULAIR) 10 mg tablet, Take 1 Tab by mouth daily. , Disp: 90 Tab, Rfl: 1    losartan-hydroCHLOROthiazide (HYZAAR) 50-12.5 mg per tablet, Take 1 Tab by mouth daily. , Disp: 90 Tab, Rfl: 3    ezetimibe (ZETIA) 10 mg tablet, Take 1 Tab by mouth daily. , Disp: 90 Tab, Rfl: 3    acetaminophen (TYLENOL EXTRA STRENGTH) 500 mg tablet, Take 1,000 mg by mouth every eight (8) hours as needed for Pain., Disp: , Rfl:     mometasone (NASONEX) 50 mcg/actuation nasal spray, 2 Sprays by Both Nostrils route daily. , Disp: 3 Container, Rfl: 3    fluticasone-salmeterol (ADVAIR) 250-50 mcg/dose diskus inhaler, Take 1 Puff by inhalation every twelve (12) hours. , Disp: 3 Inhaler, Rfl: 3    albuterol (PROVENTIL HFA) 90 mcg/actuation inhaler, Take 2 Puffs by inhalation every six (6) hours as needed. , Disp: 1 Inhaler, Rfl: 3    Cholecalciferol, Vitamin D3, (VITAMIN D3) 2,000 unit cap capsule, Take 2,000 Units by mouth daily. , Disp: 30 Cap, Rfl: 11    PMH,  Meds, Allergies, Family History, Social history reviewed    Review of Systems   Constitutional: Negative for chills and fever. Respiratory: Positive for shortness of breath (at baseline; but none currently out of ordiinary). Cardiovascular: Negative for chest pain. Gastrointestinal: Positive for heartburn (with foods).        Physical Exam   Visit Vitals  /70 (BP 1 Location: Left arm, BP Patient Position: Sitting)   Pulse 75   Temp 97.6 °F (36.4 °C) (Oral)   Resp 20   Ht 5' 1\" (1.549 m)   Wt 126 lb 6.4 oz (57.3 kg)   SpO2 100%   BMI 23.88 kg/m²     General appearance: alert, cooperative, no distress, appears stated age  Neck: supple, symmetrical, trachea midline, no adenopathy, thyroid: not enlarged, symmetric, no tenderness/mass/nodules, no carotid bruit and no JVD  Lungs: clear to auscultation bilaterally  Heart: regular rate and rhythm, S1, S2 normal, no murmur, click, rub or gallop    Extremities: extremities normal, atraumatic, no cyanosis or edema    Lab Results   Component Value Date/Time    Cholesterol, total 254 (H) 01/14/2019 09:20 AM    HDL Cholesterol 74 (H) 01/14/2019 09:20 AM    LDL, calculated 160.6 (H) 01/14/2019 09:20 AM    VLDL, calculated 19.4 01/14/2019 09:20 AM    Triglyceride 97 01/14/2019 09:20 AM    CHOL/HDL Ratio 3.4 01/14/2019 09:20 AM     Lab Results   Component Value Date/Time    Sodium 138 01/14/2019 09:20 AM    Potassium 4.2 01/14/2019 09:20 AM    Chloride 106 01/14/2019 09:20 AM    CO2 26 01/14/2019 09:20 AM    Anion gap 6 01/14/2019 09:20 AM    Glucose 88 01/14/2019 09:20 AM    BUN 22 (H) 01/14/2019 09:20 AM    Creatinine 0.82 01/14/2019 09:20 AM    BUN/Creatinine ratio 27 (H) 01/14/2019 09:20 AM    GFR est AA >60 01/14/2019 09:20 AM    GFR est non-AA >60 01/14/2019 09:20 AM    Calcium 10.9 (H) 01/14/2019 09:20 AM       ASSESSMENT and PLAN    ICD-10-CM ICD-9-CM    1. Essential hypertension N91 396.0 METABOLIC PANEL, BASIC   2. Pure hypercholesterolemia E78.00 272.0 LIPID PANEL      AST      ALT       As above,   above all stable unless otherwise noted   treatment plan as listed below  Orders Placed This Encounter    LIPID PANEL    METABOLIC PANEL, BASIC    AST    ALT    montelukast (SINGULAIR) 10 mg tablet    fluticasone propion-salmeterol (ADVAIR/WIXELA) 250-50 mcg/dose diskus inhaler     Follow-up and Dispositions    · Return in about 6 months (around 1/17/2020) for well exam.       An After Visit Summary was printed and given to the patient.   This has been fully explained to the patient, who indicates understanding.

## 2019-07-22 ENCOUNTER — HOSPITAL ENCOUNTER (OUTPATIENT)
Dept: LAB | Age: 64
Discharge: HOME | End: 2019-07-22
Payer: COMMERCIAL

## 2019-07-22 DIAGNOSIS — I10 ESSENTIAL HYPERTENSION: ICD-10-CM

## 2019-07-22 DIAGNOSIS — E78.00 PURE HYPERCHOLESTEROLEMIA: ICD-10-CM

## 2019-07-22 LAB
ALT SERPL-CCNC: 19 U/L (ref 13–56)
ANION GAP SERPL CALC-SCNC: 5 MMOL/L (ref 3–18)
AST SERPL-CCNC: 19 U/L (ref 10–38)
BUN SERPL-MCNC: 19 MG/DL (ref 7–18)
BUN/CREAT SERPL: 21 (ref 12–20)
CALCIUM SERPL-MCNC: 11.1 MG/DL (ref 8.5–10.1)
CHLORIDE SERPL-SCNC: 107 MMOL/L (ref 100–111)
CHOLEST SERPL-MCNC: 234 MG/DL
CO2 SERPL-SCNC: 26 MMOL/L (ref 21–32)
CREAT SERPL-MCNC: 0.89 MG/DL (ref 0.6–1.3)
GLUCOSE SERPL-MCNC: 77 MG/DL (ref 74–99)
HDLC SERPL-MCNC: 69 MG/DL (ref 40–60)
HDLC SERPL: 3.4 {RATIO} (ref 0–5)
LDLC SERPL CALC-MCNC: 144.4 MG/DL (ref 0–100)
LIPID PROFILE,FLP: ABNORMAL
POTASSIUM SERPL-SCNC: 4.1 MMOL/L (ref 3.5–5.5)
SODIUM SERPL-SCNC: 138 MMOL/L (ref 136–145)
TRIGL SERPL-MCNC: 103 MG/DL (ref ?–150)
VLDLC SERPL CALC-MCNC: 20.6 MG/DL

## 2019-07-22 PROCEDURE — 80061 LIPID PANEL: CPT

## 2019-07-22 PROCEDURE — 80048 BASIC METABOLIC PNL TOTAL CA: CPT

## 2019-07-22 PROCEDURE — 84460 ALANINE AMINO (ALT) (SGPT): CPT

## 2019-07-22 PROCEDURE — 36415 COLL VENOUS BLD VENIPUNCTURE: CPT

## 2019-07-22 PROCEDURE — 84450 TRANSFERASE (AST) (SGOT): CPT

## 2019-12-25 NOTE — TELEPHONE ENCOUNTER
Medication: Benicar HCT 20/12.5 mg, dose: 1 tab, how often: daily, current number of medication days provided: 90, refill per application. Lot # [id number], EXP 05/2019. This medication was received and verified for the following 1. Correct Patient, 2. Correct Diagnosis, 3. Correct Drug, 4. Correct route, and no current allergy to medication.     Denis Elkins, MSN, RN, FNP-C     [facility]

## 2020-01-20 ENCOUNTER — OFFICE VISIT (OUTPATIENT)
Dept: FAMILY MEDICINE CLINIC | Age: 65
End: 2020-01-20

## 2020-01-20 VITALS
OXYGEN SATURATION: 96 % | BODY MASS INDEX: 24.55 KG/M2 | HEIGHT: 61 IN | DIASTOLIC BLOOD PRESSURE: 78 MMHG | SYSTOLIC BLOOD PRESSURE: 124 MMHG | WEIGHT: 130 LBS | TEMPERATURE: 97.9 F | RESPIRATION RATE: 16 BRPM | HEART RATE: 82 BPM

## 2020-01-20 DIAGNOSIS — I10 ESSENTIAL HYPERTENSION: ICD-10-CM

## 2020-01-20 DIAGNOSIS — M47.12 CERVICAL SPONDYLOSIS WITH MYELOPATHY: ICD-10-CM

## 2020-01-20 DIAGNOSIS — J44.9 COPD, MILD (HCC): ICD-10-CM

## 2020-01-20 DIAGNOSIS — Z12.31 OTHER SCREENING MAMMOGRAM: ICD-10-CM

## 2020-01-20 DIAGNOSIS — M54.2 CERVICAL PAIN: ICD-10-CM

## 2020-01-20 DIAGNOSIS — I10 ESSENTIAL HYPERTENSION: Primary | ICD-10-CM

## 2020-01-20 DIAGNOSIS — Z98.1 S/P CERVICAL SPINAL FUSION: ICD-10-CM

## 2020-01-20 DIAGNOSIS — Z78.0 ASYMPTOMATIC MENOPAUSAL STATE: ICD-10-CM

## 2020-01-20 RX ORDER — NAPROXEN 500 MG/1
500 TABLET, DELAYED RELEASE ORAL 2 TIMES DAILY WITH MEALS
Qty: 60 TAB | Refills: 2 | Status: CANCELLED | OUTPATIENT
Start: 2020-01-20

## 2020-01-20 RX ORDER — NAPROXEN 500 MG/1
500 TABLET, DELAYED RELEASE ORAL
Qty: 60 TAB | Refills: 3 | Status: SHIPPED | OUTPATIENT
Start: 2020-01-20 | End: 2020-01-22 | Stop reason: SDUPTHER

## 2020-01-20 RX ORDER — ALBUTEROL SULFATE 90 UG/1
2 AEROSOL, METERED RESPIRATORY (INHALATION)
Qty: 1 INHALER | Refills: 3 | Status: SHIPPED | OUTPATIENT
Start: 2020-01-20 | End: 2020-01-22 | Stop reason: SDUPTHER

## 2020-01-20 RX ORDER — MONTELUKAST SODIUM 10 MG/1
10 TABLET ORAL DAILY
Qty: 90 TAB | Refills: 1 | Status: CANCELLED | OUTPATIENT
Start: 2020-01-20

## 2020-01-20 RX ORDER — LOSARTAN POTASSIUM AND HYDROCHLOROTHIAZIDE 12.5; 5 MG/1; MG/1
1 TABLET ORAL DAILY
Qty: 90 TAB | Refills: 3 | Status: CANCELLED | OUTPATIENT
Start: 2020-01-20

## 2020-01-20 RX ORDER — ALBUTEROL SULFATE 90 UG/1
2 AEROSOL, METERED RESPIRATORY (INHALATION)
Qty: 1 INHALER | Refills: 3 | Status: CANCELLED | OUTPATIENT
Start: 2020-01-20

## 2020-01-20 RX ORDER — EZETIMIBE 10 MG/1
10 TABLET ORAL DAILY
Qty: 90 TAB | Refills: 3 | Status: SHIPPED | OUTPATIENT
Start: 2020-01-20 | End: 2020-01-22 | Stop reason: SDUPTHER

## 2020-01-20 RX ORDER — FLUTICASONE PROPIONATE AND SALMETEROL 250; 50 UG/1; UG/1
1 POWDER RESPIRATORY (INHALATION) EVERY 12 HOURS
Qty: 3 INHALER | Refills: 3 | Status: CANCELLED | OUTPATIENT
Start: 2020-01-20

## 2020-01-20 RX ORDER — MONTELUKAST SODIUM 10 MG/1
10 TABLET ORAL DAILY
Qty: 90 TAB | Refills: 3 | Status: SHIPPED | OUTPATIENT
Start: 2020-01-20 | End: 2020-01-22 | Stop reason: SDUPTHER

## 2020-01-20 RX ORDER — FLUTICASONE PROPIONATE AND SALMETEROL 250; 50 UG/1; UG/1
1 POWDER RESPIRATORY (INHALATION) EVERY 12 HOURS
Qty: 3 INHALER | Refills: 3 | Status: SHIPPED | OUTPATIENT
Start: 2020-01-20 | End: 2020-04-16

## 2020-01-20 NOTE — PROGRESS NOTES
Subjective:   75 y.o. female for Well Woman Check. Her gyne and breast care is done elsewhere by her Ob-Gyne physician. Has COPD;  She still smokes ; advised to quit. Needs refills. She requests a parking placard due to her h/o back pain due to spinal stenosis with radiculopathy. Patient Active Problem List    Diagnosis Date Noted    HTN (hypertension) 08/25/2012     Priority: 2 - Two     Class: Chronic    Back pain, chronic 08/25/2012     Priority: 2 - Two     Class: Chronic    Asthma 08/25/2012     Priority: 3 - Three     Class: Chronic    Sinus congestion 08/25/2012     Priority: 3 - Three     Class: Temporary    Allergic rhinitis     Chronic obstructive pulmonary disease (HCC)     GERD (gastroesophageal reflux disease)     Hyperlipidemia     Neck pain 05/21/2018    Use of opiates for therapeutic purposes 03/14/2018    S/P cervical spinal fusion 03/02/2018    Cervical spondylosis with myelopathy 02/28/2018    Cervical stenosis of spine 02/28/2018    Ossification of posterior longitudinal ligament in cervical region [facility]) 01/08/2018    Cervical spinal stenosis 01/08/2018    COPD, mild ([facility] [address] 10/21/2015    Spinal stenosis of lumbar region with radiculopathy 09/01/2015    Vitamin D deficiency 11/11/2014    Hyperlipidemia with target LDL less than 100 11/11/2014    Dermatitis 02/25/2013     Current Outpatient Medications   Medication Sig Dispense Refill    naproxen EC (NAPROSYN EC) 500 mg EC tablet Take 1 Tab by mouth two (2) times daily as needed for Pain. 60 Tab 3    fluticasone propion-salmeteroL (ADVAIR/WIXELA) 250-50 mcg/dose diskus inhaler Take 1 Puff by inhalation every twelve (12) hours. 3 Inhaler 3    ezetimibe (ZETIA) 10 mg tablet Take 1 Tab by mouth daily. 90 Tab 3    montelukast (SINGULAIR) 10 mg tablet Take 1 Tab by mouth daily.  Indications: inflammation of the nose due to an allergy 90 Tab 3    albuterol (PROVENTIL HFA) 90 mcg/actuation inhaler Take 2 Puffs by inhalation every six (6) hours as needed for Wheezing. 1 Inhaler 3    losartan-hydroCHLOROthiazide (HYZAAR) 50-12.5 mg per tablet Take 1 Tab by mouth daily. 90 Tab 3    acetaminophen (TYLENOL EXTRA STRENGTH) 500 mg tablet Take 1,000 mg by mouth every eight (8) hours as needed for Pain.  mometasone (NASONEX) 50 mcg/actuation nasal spray 2 Sprays by Both Nostrils route daily. 3 Container 3    Cholecalciferol, Vitamin D3, (VITAMIN D3) 2,000 unit cap capsule Take 2,000 Units by mouth daily. 30 Cap 11     Allergies   Allergen Reactions    Lisinopril Cough    Lyrica [Pregabalin] Other (comments)     Medication causes dry eyes, unstable balance, and lack of concentration.      Mold Extracts Runny Nose    Pollen Extracts Itching    Statins-Hmg-Coa Reductase Inhibitors Myalgia    Topamax [Topiramate] Vertigo     Past Medical History:   Diagnosis Date    Allergic rhinitis     Asthma 2012    +PFT for mild obstructive disease/COPD    Back pain, chronic 2012    Dr. Fischer Van Buren referred to Dr. Alek Patino    [facility]     GERD (gastroesophageal reflux disease)     H/O mammogram 10/03/2017    No evidence of malignancy    Hard of hearing     chronic ruptured TM    HTN (hypertension) 2012    Hx of screening mammography 2016    [facility], normal, routine recs    Hyperlipidemia     Nausea & vomiting     Sinus congestion 2012    Spinal stenosis of lumbar region with radiculopathy 2015    Dr. Nick Caro     Past Surgical History:   Procedure Laterality Date    HX  SECTION      HX ORTHOPAEDIC  2018    cervical surgery(Dr. Dominga Merritt)    HX TUBAL LIGATION       Family History   Problem Relation Age of Onset    Heart Disease Mother     Hypertension Mother     Cancer Mother     Coronary Artery Disease Mother 67    Heart Surgery Mother 68    Heart Disease Father     Hypertension Father     Stroke Father     Heart Surgery Father 46    Lung Disease Father     Heart Disease Brother     Heart Disease Brother      Social History     Tobacco Use    Smoking status: Current Every Day Smoker     Packs/day: 1.00     Types: Cigarettes    Smokeless tobacco: Never Used   Substance Use Topics    Alcohol use: No     Comment: h/o alcohol abuse in remission        Lab Results   Component Value Date/Time    Hemoglobin A1c 5.9 05/04/2015 09:31 AM    Hemoglobin A1c 5.8 11/05/2014 08:30 AM    Glucose 77 07/22/2019 08:48 AM    Microalbumin/Creat ratio (mg/g creat) 39 11/05/2014 08:30 AM    Microalbumin,urine random 1.40 11/05/2014 08:30 AM    LDL, calculated 144.4 (H) 07/22/2019 08:48 AM    Creatinine 0.89 07/22/2019 08:48 AM      Lab Results   Component Value Date/Time    Cholesterol, total 234 (H) 07/22/2019 08:48 AM    HDL Cholesterol 69 (H) 07/22/2019 08:48 AM    LDL, calculated 144.4 (H) 07/22/2019 08:48 AM    Triglyceride 103 07/22/2019 08:48 AM    CHOL/HDL Ratio 3.4 07/22/2019 08:48 AM        Specific concerns today: back pain that aggravates her walking; Requesting a parking placard. .    Review of Systems  A comprehensive review of systems was negative except for that written in the HPI. Objective:   Blood pressure 124/78, pulse 82, temperature 97.9 °F (36.6 °C), temperature source Tympanic, resp. rate 16, height 5' 1\" (1.549 m), weight 130 lb (59 kg), SpO2 96 %.    Physical Examination:   General appearance - alert, well appearing, and in no distress  Ears - bilateral TM's and external ear canals normal  Nose - normal and patent, no erythema, discharge or polyps  Mouth - mucous membranes moist, pharynx normal without lesions  Neck - supple, no significant adenopathy  Lymphatics - no palpable lymphadenopathy, no hepatosplenomegaly  Chest - clear to auscultation, no wheezes, rales or rhonchi, symmetric air entry  Heart - normal rate, regular rhythm, normal S1, S2, no murmurs, rubs, clicks or gallops  Abdomen - soft, nontender, nondistended, no masses or organomegaly  Breasts - breasts appear normal, no suspicious masses, no skin or nipple changes or axillary nodes  Musculoskeletal - no joint tenderness, deformity or swelling  Extremities - no pedal edema noted  Skin - normal coloration and turgor, no rashes, no suspicious skin lesions noted     Assessment/Plan:     follow low fat diet, follow low salt diet, continue present plan, routine labs ordered    ICD-10-CM ICD-9-CM    1. Essential hypertension I10 401.9 LIPID PANEL      METABOLIC PANEL, BASIC   2. Asymptomatic menopausal state Z78.0 V49.81 DEXA BONE DENSITY STUDY AXIAL   3. Other screening mammogram Z12.31 V76.12 [facility] MAMMO BI SCREENING INCL CAD       As above,   above all stable unless otherwise noted   treatment plan as listed below  Orders Placed This Encounter    [facility] MAMMO BI SCREENING INCL CAD    DEXA BONE DENSITY STUDY AXIAL    LIPID PANEL    METABOLIC PANEL, BASIC    naproxen EC (NAPROSYN EC) 500 mg EC tablet    fluticasone propion-salmeteroL (ADVAIR/WIXELA) 250-50 mcg/dose diskus inhaler    ezetimibe (ZETIA) 10 mg tablet    montelukast (SINGULAIR) 10 mg tablet    albuterol (PROVENTIL HFA) 90 mcg/actuation inhaler     Follow-up and Dispositions    · Return in about 6 months (around 7/20/2020) for htn, Chol. An After Visit Summary was printed and given to the patient. This has been fully explained to the patient, who indicates understanding.   Refilled meds needed  Filled out DMV form  Labs as ordered

## 2020-01-20 NOTE — PROGRESS NOTES
Chief Complaint   Patient presents with    Complete Physical       Pt preferred language for health care discussion is english. Is someone accompanying this pt? no    Is the patient using any DME equipment during OV? no    Depression Screening:  3 most recent [facility] Screens 1/20/2020 7/17/2019 3/26/2019 1/8/2019 5/21/2018   Little interest or pleasure in doing things Not at all Not at all Not at all Not at all Not at all   Feeling down, depressed, irritable, or hopeless Not at all Not at all Not at all Not at all Not at all   Total Score PHQ 2 0 0 0 0 0       Learning Assessment:  Learning Assessment 5/11/2015 3/19/2015   PRIMARY LEARNER Patient Patient   HIGHEST LEVEL OF EDUCATION - PRIMARY LEARNER  DID NOT GRADUATE HIGH SCHOOL -   BARRIERS PRIMARY LEARNER NONE -   [address] CAREGIVER No -   PRIMARY LANGUAGE ENGLISH ENGLISH   LEARNER PREFERENCE PRIMARY READING DEMONSTRATION   ANSWERED BY patient self   RELATIONSHIP SELF SELF       Abuse Screening:  Abuse Screening Questionnaire 9/6/2018   Do you ever feel afraid of your partner? N   Are you in a relationship with someone who physically or mentally threatens you? N   Is it safe for you to go home? Y       Fall Risk  Fall Risk Assessment, last 12 mths 7/17/2019   Able to walk? Yes   Fall in past 12 months? Yes   Fall with injury? Yes   Number of falls in past 12 months 1   Fall Risk Score 2           Advance Directive:  1. Do you have an advance directive in place? Patient Reply:no    2. If not, would you like material regarding how to put one in place? Patient Reply: no    2. Per patient no changes to their ACP contact no. Coordination of Care:  1. Have you been to the ER, urgent care clinic since your last visit? Hospitalized since your last visit?n o    2. Have you seen or consulted any other health care providers outside of the [address] [place] [address] since your last visit? Include any pap smears or colon screening.  no

## 2020-01-20 NOTE — PATIENT INSTRUCTIONS
Well Visit, Over 72: Care Instructions  Your Care Instructions    Physical exams can help you stay healthy. Your doctor has checked your overall health and may have suggested ways to take good care of yourself. He or she also may have recommended tests. At home, you can help prevent illness with healthy eating, regular exercise, and other steps. Follow-up care is a key part of your treatment and safety. Be sure to make and go to all appointments, and call your doctor if you are having problems. It's also a good idea to know your test results and keep a list of the medicines you take. How can you care for yourself at home? · Reach and stay at a healthy weight. This will lower your risk for many problems, such as obesity, diabetes, heart disease, and high blood pressure. · Get at least 30 minutes of exercise on most days of the week. Walking is a good choice. You also may want to do other activities, such as running, swimming, cycling, or playing tennis or team sports. · Do not smoke. Smoking can make health problems worse. If you need help quitting, talk to your doctor about stop-smoking programs and medicines. These can increase your chances of quitting for good. · Protect your skin from too much sun. When you're outdoors from 10 a.m. to 4 p.m., stay in the shade or cover up with clothing and a hat with a wide brim. Wear sunglasses that block UV rays. Even when it's cloudy, put broad-spectrum sunscreen (SPF 30 or higher) on any exposed skin. · See a dentist one or two times a year for checkups and to have your teeth cleaned. · Wear a seat belt in the car. Follow your doctor's advice about when to have certain tests. These tests can spot problems early. For men and women  · Cholesterol. Your doctor will tell you how often to have this done based on your overall health and other things that can increase your risk for heart attack and stroke. · Blood pressure.  Have your blood pressure checked during a routine doctor visit. Your doctor will tell you how often to check your blood pressure based on your age, your blood pressure results, and other factors. · Diabetes. Ask your doctor whether you should have tests for diabetes. · Vision. Experts recommend that you have yearly exams for glaucoma and other age-related eye problems. · Hearing. Tell your doctor if you notice any change in your hearing. You can have tests to find out how well you hear. · Colon cancer tests. Keep having colon cancer tests as your doctor recommends. You can have one of several types of tests. · Heart attack and stroke risk. At least every 4 to 6 years, you should have your risk for heart attack and stroke assessed. Your doctor uses factors such as your age, blood pressure, cholesterol, and whether you smoke or have diabetes to show what your risk for a heart attack or stroke is over the next 10 years. · Osteoporosis. Talk to your doctor about whether you should have a bone density test to find out whether you have thinning bones. Also ask your doctor about whether you should take calcium and vitamin D supplements. For women  · Pap test and pelvic exam. You may no longer need a Pap test. Talk with your doctor about whether to stop or continue to have Pap tests. · Breast exam and mammogram. Ask how often you should have a mammogram, which is an X-ray of your breasts. A mammogram can spot breast cancer before it can be felt and when it is easiest to treat. · Thyroid disease. Talk to your doctor about whether to have your thyroid checked as part of a regular physical exam. Women have an increased chance of a thyroid problem. For men  · Prostate exam. Talk to your doctor about whether you should have a blood test (called a PSA test) for prostate cancer.  Experts recommend that you discuss the benefits and risks of the test with your doctor before you decide whether to have this test. Some experts say that men ages 79 and older no longer need testing. · Abdominal aortic aneurysm. Ask your doctor whether you should have a test to check for an aneurysm. You may need a test if you ever smoked or if your parent, brother, sister, or child has had an aneurysm. When should you call for help? Watch closely for changes in your health, and be sure to contact your doctor if you have any problems or symptoms that concern you. Where can you learn more? Go to http://giselle-erika.info/. Enter R221 in the search box to learn more about \"Well Visit, Over 65: Care Instructions. \"  Current as of: December 13, 2018  Content Version: 12.2  © 8766-4163 [email or web address]. Care instructions adapted under license by [employer] (which disclaims liability or warranty for this information). If you have questions about a medical condition or this instruction, always ask your healthcare professional. [address] any warranty or liability for your use of this information.

## 2020-01-22 DIAGNOSIS — I10 ESSENTIAL HYPERTENSION: ICD-10-CM

## 2020-01-22 NOTE — TELEPHONE ENCOUNTER
Patient called, states did not  rx from pharmacy because wants script sent to home delivery service that will be more affordable to her, except for the Fluticasone propion-salmeterol, asking if contact info  can be given to see if they can get at reduced cost.  Meds requesting to be sent by mail from [address] [place] [address] are   Naproxen 500mg tab  Ezetimibe 10mg tab  Albuterol 90mcg inhaler  Montelukast 10mg tab  And Losartan  Contact # [phone] Fax [phone]    Please adv pt [phone]

## 2020-01-22 NOTE — TELEPHONE ENCOUNTER
Meds pended to new pharmacy as per patient request. Please sign if appropriate. Last Visit: 1/20/20 with MD Michelle Wolff  Next Appointment: return in 6 months    Requested Prescriptions     Pending Prescriptions Disp Refills    losartan-hydroCHLOROthiazide (HYZAAR) 50-12.5 mg per tablet 90 Tab 3     Sig: Take 1 Tab by mouth daily. Indications: high blood pressure    naproxen EC (NAPROSYN EC) 500 mg EC tablet 60 Tab 3     Sig: Take 1 Tab by mouth two (2) times daily as needed for Pain.  ezetimibe (ZETIA) 10 mg tablet 90 Tab 3     Sig: Take 1 Tab by mouth daily.  albuterol (PROVENTIL HFA) 90 mcg/actuation inhaler 1 Inhaler 3     Sig: Take 2 Puffs by inhalation every six (6) hours as needed for Wheezing.  montelukast (SINGULAIR) 10 mg tablet 90 Tab 3     Sig: Take 1 Tab by mouth daily.  Indications: inflammation of the nose due to an allergy

## 2020-01-27 ENCOUNTER — HOSPITAL ENCOUNTER (OUTPATIENT)
Dept: LAB | Age: 65
Discharge: HOME | End: 2020-01-27
Payer: MEDICARE

## 2020-01-27 DIAGNOSIS — I10 ESSENTIAL HYPERTENSION: ICD-10-CM

## 2020-01-27 LAB
ANION GAP SERPL CALC-SCNC: 6 MMOL/L (ref 3–18)
BUN SERPL-MCNC: 23 MG/DL (ref 7–18)
BUN/CREAT SERPL: 26 (ref 12–20)
CALCIUM SERPL-MCNC: 11.3 MG/DL (ref 8.5–10.1)
CHLORIDE SERPL-SCNC: 105 MMOL/L (ref 100–111)
CHOLEST SERPL-MCNC: 280 MG/DL
CO2 SERPL-SCNC: 27 MMOL/L (ref 21–32)
CREAT SERPL-MCNC: 0.9 MG/DL (ref 0.6–1.3)
GLUCOSE SERPL-MCNC: 90 MG/DL (ref 74–99)
HDLC SERPL-MCNC: 66 MG/DL (ref 40–60)
HDLC SERPL: 4.2 {RATIO} (ref 0–5)
LDLC SERPL CALC-MCNC: 197.2 MG/DL (ref 0–100)
LIPID PROFILE,FLP: ABNORMAL
POTASSIUM SERPL-SCNC: 4.4 MMOL/L (ref 3.5–5.5)
SODIUM SERPL-SCNC: 138 MMOL/L (ref 136–145)
TRIGL SERPL-MCNC: 84 MG/DL (ref ?–150)
VLDLC SERPL CALC-MCNC: 16.8 MG/DL

## 2020-01-27 PROCEDURE — 80061 LIPID PANEL: CPT

## 2020-01-27 PROCEDURE — 36415 COLL VENOUS BLD VENIPUNCTURE: CPT

## 2020-01-27 PROCEDURE — 80048 BASIC METABOLIC PNL TOTAL CA: CPT

## 2020-01-27 RX ORDER — MONTELUKAST SODIUM 10 MG/1
10 TABLET ORAL DAILY
Qty: 90 TAB | Refills: 3 | Status: SHIPPED | OUTPATIENT
Start: 2020-01-27 | End: 2020-11-06

## 2020-01-27 RX ORDER — LOSARTAN POTASSIUM AND HYDROCHLOROTHIAZIDE 12.5; 5 MG/1; MG/1
1 TABLET ORAL DAILY
Qty: 90 TAB | Refills: 3 | Status: SHIPPED | OUTPATIENT
Start: 2020-01-27 | End: 2020-11-06

## 2020-01-27 RX ORDER — ALBUTEROL SULFATE 90 UG/1
2 AEROSOL, METERED RESPIRATORY (INHALATION)
Qty: 3 INHALER | Refills: 1 | Status: SHIPPED | OUTPATIENT
Start: 2020-01-27 | End: 2020-04-16

## 2020-01-27 RX ORDER — EZETIMIBE 10 MG/1
10 TABLET ORAL DAILY
Qty: 90 TAB | Refills: 3 | Status: SHIPPED | OUTPATIENT
Start: 2020-01-27 | End: 2020-11-06

## 2020-01-27 RX ORDER — NAPROXEN 500 MG/1
500 TABLET, DELAYED RELEASE ORAL
Qty: 180 TAB | Refills: 1 | Status: SHIPPED | OUTPATIENT
Start: 2020-01-27 | End: 2021-01-05 | Stop reason: ALTCHOICE

## 2020-01-27 NOTE — TELEPHONE ENCOUNTER
Patient needs her meds sent through mail service. She walked in the office and added mail order pharmacy to [facility].    Mail order [address] phone number [phone]

## 2020-01-27 NOTE — TELEPHONE ENCOUNTER
Previous scripts were sent to [facility]. Patient now requesting they be sent to [facility]. Please sign if appropriate.

## 2020-02-18 ENCOUNTER — HOSPITAL ENCOUNTER (OUTPATIENT)
Dept: BONE DENSITY | Age: 65
Discharge: HOME | End: 2020-02-18
Attending: FAMILY MEDICINE
Payer: MEDICARE

## 2020-02-18 ENCOUNTER — HOSPITAL ENCOUNTER (OUTPATIENT)
Dept: MAMMOGRAPHY | Age: 65
Discharge: HOME | End: 2020-02-18
Attending: FAMILY MEDICINE
Payer: MEDICARE

## 2020-02-18 DIAGNOSIS — Z12.31 OTHER SCREENING MAMMOGRAM: ICD-10-CM

## 2020-02-18 DIAGNOSIS — Z78.0 ASYMPTOMATIC MENOPAUSAL STATE: ICD-10-CM

## 2020-02-18 PROCEDURE — 77080 DXA BONE DENSITY AXIAL: CPT

## 2020-02-18 PROCEDURE — 77063 BREAST TOMOSYNTHESIS BI: CPT

## 2020-03-04 ENCOUNTER — TELEPHONE (OUTPATIENT)
Dept: FAMILY MEDICINE CLINIC | Age: 65
End: 2020-03-04

## 2020-03-04 NOTE — TELEPHONE ENCOUNTER
Pt called stating she need a letter stating she need someone to come cut her grass because she is unable due to back problem,spinostenosis, please advise [phone].  [employer], [employer] program

## 2020-03-04 NOTE — LETTER
3/17/2020 7:26 PM 
 
Ms. Billie Padilla 
[address] [place] [phone] To Whom It May Concern: 
 
 
Please be advised that Ms. Harvey Tobin has multiple chronic medical conditions. Due to her condition she is in need of having someone assist her with mowing her lawn. She has spinal stenosis. Sincerely, Batool Cuadra MD

## 2020-03-13 NOTE — TELEPHONE ENCOUNTER
Patient called and wanted to know if anything has happened with the letter she is requesting from Dr. Darby York. She is unable to cut her grass and said that she needs to turn it in as soon as possible. She has multiple health problems and requesting for a return call as soon as possible please.

## 2020-03-16 NOTE — TELEPHONE ENCOUNTER
Spoke with patient to inform that Dr. Darby York has received request for letter regarding cutting the grass. Informed that provider does have 7 to 10 business days for request. Will contact patient when letter is ready for . Patient verbalized understanding.

## 2020-03-17 ENCOUNTER — TELEPHONE (OUTPATIENT)
Dept: FAMILY MEDICINE CLINIC | Age: 65
End: 2020-03-17

## 2020-03-17 NOTE — TELEPHONE ENCOUNTER
Spoke with patient to inform that letter is ready to  regarding lawn care. Patient verbalized understanding.

## 2020-03-25 ENCOUNTER — TELEPHONE (OUTPATIENT)
Dept: FAMILY MEDICINE CLINIC | Age: 65
End: 2020-03-25

## 2020-03-25 NOTE — TELEPHONE ENCOUNTER
Spoke with patient to continue treating symptoms with over the counter medication and informed that Dr. Susi Malone is unable to prescribe an antibiotic. Informed that patient may go to urgent care. Patient verbalized understanding.

## 2020-03-25 NOTE — TELEPHONE ENCOUNTER
Spoke with the patient. Patient comments she has been experiencing the following  Cough, nasal congestion, chest congestion, headaches. Denies any chills,sweats or fevers. No recent exposure to any with or suspected of having COVID 19  Has been self quarantine and taking mucinex which has been helpful. Patient requesting an antibiotic.

## 2020-04-16 ENCOUNTER — OFFICE VISIT (OUTPATIENT)
Dept: ORTHOPEDIC SURGERY | Age: 65
End: 2020-04-16

## 2020-04-16 DIAGNOSIS — Z98.1 HISTORY OF FUSION OF CERVICAL SPINE: ICD-10-CM

## 2020-04-16 DIAGNOSIS — M48.062 SPINAL STENOSIS OF LUMBAR REGION WITH NEUROGENIC CLAUDICATION: Primary | ICD-10-CM

## 2020-04-16 RX ORDER — GABAPENTIN 100 MG/1
CAPSULE ORAL
Qty: 90 CAP | Refills: 1 | Status: SHIPPED | OUTPATIENT
Start: 2020-04-16 | End: 2020-04-17 | Stop reason: SDUPTHER

## 2020-04-16 NOTE — PROGRESS NOTES
Gualberto Yi is a 75 y.o. female evaluated via patient initiated telephone call on 4/16/2020. I was in the office during this call. Diagnoses and all orders for this visit:    1. Spinal stenosis of lumbar region with neurogenic claudication, severe L4/5 by MRI '15  -     gabapentin (NEURONTIN) 100 mg capsule; 1 tablet p.o. nightly x3 days then increase to twice daily x3 days then continue 1 tablet p.o. 3 times daily thereafter. 2. History of fusion of cervical spine        1. 75 y.o. female with known severe lumbar stenosis now becoming more symptomatic. She is concerned about the financial implications of surgery. She understands that if she has progressive neurogenic bowel or bladder issues, this would be the treatment. She is done well with her cervical decompression. 2. Discussed urgent symptoms for which she should be evaluated in office on emergent basis. 3. Trial ramp of Gabapentin 100mg  4. COVID 19 precautions: handwashing, staying at home, physical distancing. Do get some fresh air and sunshine. Follow-up and Dispositions    · Return in about 1 month (around 5/16/2020) for med f/u. Consent:  She and/or health care decision maker is aware that that she may receive a bill for this telephone service, depending on her insurance coverage, and has provided verbal consent to proceed: Yes      Documentation:  Current Outpatient Medications on File Prior to Visit   Medication Sig Dispense Refill    rutin/hesp/bioflav/C/[id number] (BIOFLEX PO) Take  by mouth.  losartan-hydroCHLOROthiazide (HYZAAR) 50-12.5 mg per tablet Take 1 Tab by mouth daily. Indications: high blood pressure 90 Tab 3    naproxen EC (NAPROSYN EC) 500 mg EC tablet Take 1 Tab by mouth two (2) times daily as needed for Pain. 180 Tab 1    ezetimibe (ZETIA) 10 mg tablet Take 1 Tab by mouth daily. 90 Tab 3    montelukast (SINGULAIR) 10 mg tablet Take 1 Tab by mouth daily.  Indications: inflammation of the nose due to an allergy 90 Tab 3    acetaminophen (TYLENOL EXTRA STRENGTH) 500 mg tablet Take 1,000 mg by mouth every eight (8) hours as needed for Pain.  Cholecalciferol, Vitamin D3, (VITAMIN D3) 2,000 unit cap capsule Take 2,000 Units by mouth daily. 30 Cap 11    [DISCONTINUED] albuterol (PROVENTIL HFA) 90 mcg/actuation inhaler Take 2 Puffs by inhalation every six (6) hours as needed for Wheezing. 3 Inhaler 1    [DISCONTINUED] fluticasone propion-salmeteroL (ADVAIR/WIXELA) 250-50 mcg/dose diskus inhaler Take 1 Puff by inhalation every twelve (12) hours. 3 Inhaler 3    [DISCONTINUED] mometasone (NASONEX) 50 mcg/actuation nasal spray 2 Sprays by Both Nostrils route daily. 3 Container 3     No current facility-administered medications on file prior to visit. Allergies   Allergen Reactions    Lisinopril Cough    Lyrica [Pregabalin] Other (comments)     Medication causes dry eyes, unstable balance, and lack of concentration.      Mold Extracts Runny Nose    Pollen Extracts Itching    Statins-Hmg-Coa Reductase Inhibitors Myalgia    Topamax [Topiramate] Vertigo      Past Medical History:   Diagnosis Date    Allergic rhinitis     Asthma 2012    +PFT for mild obstructive disease/COPD    Back pain, chronic 2012    Dr. Kelsey Christina referred to Dr. Roxanne Horvath    [facility]     GERD (gastroesophageal reflux disease)     H/O mammogram 10/03/2017    No evidence of malignancy    Hard of hearing     chronic ruptured TM    HTN (hypertension) 2012    Hx of screening mammography 2016    [facility], normal, routine recs    Hyperlipidemia     Nausea & vomiting     Sinus congestion 2012    Spinal stenosis of lumbar region with radiculopathy 2015    Dr. Nya Oleary      Past Surgical History:   Procedure Laterality Date    HX  SECTION      HX ORTHOPAEDIC  2018    cervical surgery(Dr. Ortega Bañuelos)    HX TUBAL LIGATION          Pain Assessment  2020 Location of Pain Back;Leg   Pain Location Comment -   Location Modifiers -   Severity of Pain 5   Quality of Pain Aching   Quality of Pain Comment numbness tingling   Duration of Pain Persistent   Frequency of Pain Constant   Aggravating Factors -   Aggravating Factors Comment -   Limiting Behavior Some   Relieving Factors Nothing   Relieving Factors Comment -   Result of Injury No          I communicated with the patient and/or health care decision maker about the following. Details of this discussion including any medical advice provided:    Pt was last evaluated 3/2019 by Dr. Kellee Galindo for year f/u of cervical lami fusion C4-7. Now 2 years out, she admits to neck aches worse with cold weather. Her primary complaint today is low back pain that has been chronic with several months of worsening LLE symptoms. She reports imbalance and LLE weakness. She denies falls. She c/o trouble and pain rising to a stand, but feels better once standing. She reports taking Tylenol QAM and Aleve QHS with little benefit. Admits to urinary stress incontinence and small amounts of bowel leakage. Denies saddle paresthesias. Denies trying Gabapentin. Pt admits she does not want to consider surgery due to a high deductable insurance plan. Pt affirms she is trying to take appropriate precautions to avoid covid. Treatments patient has tried:  Physical therapy: Unknown  Doing HEP: Unknown  Non-opioid medications: Yes Failed Topamax - vertigo, Lyrica - dry eyes, imbalance,   Spinal injections: Yes GRETCHEN L4-5 2016 by me, GRETCHEN L5-S1 2017  Spinal surgery- Yes. C3-4-5-6-7 lami fusion Dr. Kellee Galindo 2/2018  Last L MRI 2015: multilevel deg changes, severe stenosis L4-5-S1     reviewed. No entries. Pt lives alone. Works as . Has a $5000 deductible with her Medicare plan. MRI lumbar spine 2015  Impression:  Multilevel degenerative changes resulting in central canal and foraminal  stenosis.  Severe foraminal stenosis at L4-L5 and L5-S1. Severe central canal  stenosis at L4-L5. I affirm this is a Patient Initiated Episode with an Established Patient who has not had a related appointment within my department in the past 7 days or scheduled within the next 24 hours. Total Time: minutes: 5-10 minutes Phone visit start time 11:12 AM, end time 11:21 AM.    Note: not billable if this call serves to triage the patient into an appointment for the relevant concern        Written by Melvin King, as dictated by Sandra Hou MD.    I, Dr. Sandra Hou MD, confirm that all documentation is accurate.

## 2020-04-17 ENCOUNTER — TELEPHONE (OUTPATIENT)
Dept: ORTHOPEDIC SURGERY | Age: 65
End: 2020-04-17

## 2020-04-17 DIAGNOSIS — M48.062 SPINAL STENOSIS OF LUMBAR REGION WITH NEUROGENIC CLAUDICATION: ICD-10-CM

## 2020-04-17 RX ORDER — GABAPENTIN 100 MG/1
CAPSULE ORAL
Qty: 21 CAP | Refills: 0 | Status: SHIPPED | OUTPATIENT
Start: 2020-04-17 | End: 2020-06-19

## 2020-04-17 NOTE — TELEPHONE ENCOUNTER
Spoke with patient, informed patient of message below. Patient stated understanding no further actions needed at this time.

## 2020-04-17 NOTE — TELEPHONE ENCOUNTER
Patient was given a refill of Gabapentin to be processed through the mail order pharmacy, however, it will be about 8 days until she receives it. Are we able to provide her a weeks worth until she can receive this in the mail? Please advise patient at [phone].

## 2020-06-19 DIAGNOSIS — M48.062 SPINAL STENOSIS OF LUMBAR REGION WITH NEUROGENIC CLAUDICATION: ICD-10-CM

## 2020-06-19 RX ORDER — GABAPENTIN 100 MG/1
CAPSULE ORAL
Qty: 90 CAP | Refills: 0 | Status: SHIPPED | OUTPATIENT
Start: 2020-06-19 | End: 2020-06-29 | Stop reason: DRUGHIGH

## 2020-06-29 ENCOUNTER — OFFICE VISIT (OUTPATIENT)
Dept: ORTHOPEDIC SURGERY | Age: 65
End: 2020-06-29

## 2020-06-29 VITALS
RESPIRATION RATE: 16 BRPM | SYSTOLIC BLOOD PRESSURE: 118 MMHG | WEIGHT: 141 LBS | HEART RATE: 80 BPM | HEIGHT: 61 IN | BODY MASS INDEX: 26.62 KG/M2 | DIASTOLIC BLOOD PRESSURE: 72 MMHG | TEMPERATURE: 97.9 F

## 2020-06-29 DIAGNOSIS — M48.062 SPINAL STENOSIS OF LUMBAR REGION WITH NEUROGENIC CLAUDICATION: Primary | ICD-10-CM

## 2020-06-29 DIAGNOSIS — Z98.1 HISTORY OF FUSION OF CERVICAL SPINE: ICD-10-CM

## 2020-06-29 RX ORDER — GABAPENTIN 300 MG/1
300 CAPSULE ORAL 2 TIMES DAILY
Qty: 180 CAP | Refills: 1 | Status: SHIPPED | OUTPATIENT
Start: 2020-06-29 | End: 2020-11-16

## 2020-06-29 NOTE — PATIENT INSTRUCTIONS
Preventing Falls: Care Instructions  Your Care Instructions     Getting around your home safely can be a challenge if you have injuries or health problems that make it easy for you to fall. Loose rugs and furniture in walkways are among the dangers for many older people who have problems walking or who have poor eyesight. People who have conditions such as arthritis, osteoporosis, or dementia also have to be careful not to fall. You can make your home safer with a few simple measures. Follow-up care is a key part of your treatment and safety. Be sure to make and go to all appointments, and call your doctor if you are having problems. It's also a good idea to know your test results and keep a list of the medicines you take. How can you care for yourself at home? Taking care of yourself  · You may get dizzy if you do not drink enough water. To prevent dehydration, drink plenty of fluids, enough so that your urine is light yellow or clear like water. Choose water and other caffeine-free clear liquids. If you have kidney, heart, or liver disease and have to limit fluids, talk with your doctor before you increase the amount of fluids you drink. · Exercise regularly to improve your strength, muscle tone, and balance. Walk if you can. Swimming may be a good choice if you cannot walk easily. · Have your vision and hearing checked each year or any time you notice a change. If you have trouble seeing and hearing, you might not be able to avoid objects and could lose your balance. · Know the side effects of the medicines you take. Ask your doctor or pharmacist whether the medicines you take can affect your balance. Sleeping pills or sedatives can affect your balance. · Limit the amount of alcohol you drink. Alcohol can impair your balance and other senses. · Ask your doctor whether calluses or corns on your feet need to be removed.  If you wear loose-fitting shoes because of calluses or corns, you can lose your balance and fall. · Talk to your doctor if you have numbness in your feet. Preventing falls at home  · Remove raised doorway thresholds, throw rugs, and clutter. Repair loose carpet or raised areas in the floor. · Move furniture and electrical cords to keep them out of walking paths. · Use nonskid floor wax, and wipe up spills right away, especially on ceramic tile floors. · If you use a walker or cane, put rubber tips on it. If you use crutches, clean the bottoms of them regularly with an abrasive pad, such as steel wool. · Keep your house well lit, especially [place], and outside walkways. Use night-lights in areas such as hallways and bathrooms. Add extra light switches or use remote switches (such as switches that go on or off when you clap your hands) to make it easier to turn lights on if you have to get up during the night. · Install sturdy handrails on stairways. · Move items in your cabinets so that the things you use a lot are on the lower shelves (about waist level). · Keep a cordless phone and a flashlight with new batteries by your bed. If possible, put a phone in each of the main rooms of your house, or carry a cell phone in case you fall and cannot reach a phone. Or, you can wear a device around your neck or wrist. You push a button that sends a signal for help. · Wear low-heeled shoes that fit well and give your feet good support. Use footwear with nonskid soles. Check the heels and soles of your shoes for wear. Repair or replace worn heels or soles. · Do not wear socks without shoes on wood floors. · Walk on the grass when the sidewalks are slippery. If you live in an area that gets snow and ice in the winter, sprinkle salt on slippery steps and sidewalks. Preventing falls in the bath  · Install grab bars and nonskid mats inside and outside your shower or tub and near the toilet and sinks. · Use shower chairs and bath benches.   · Use a hand-held shower head that will allow you to sit while showering. · Get into a tub or shower by putting the weaker leg in first. Get out of a tub or shower with your strong side first.  · Repair loose toilet seats and consider installing a raised toilet seat to make getting on and off the toilet easier. · Keep your bathroom door unlocked while you are in the shower. Where can you learn more? Go to http://giselle-erika.info/  Enter G117 in the search box to learn more about \"Preventing Falls: Care Instructions. \"  Current as of: August 7, 2019               Content Version: 12.5  © 0613-6443 [employer]. Care instructions adapted under license by [employer] (which disclaims liability or warranty for this information). If you have questions about a medical condition or this instruction, always ask your healthcare professional. [place] [address] any warranty or liability for your use of this information. Hip Bursitis: Exercises  Introduction  Here are some examples of exercises for you to try. The exercises may be suggested for a condition or for rehabilitation. Start each exercise slowly. Ease off the exercises if you start to have pain. You will be told when to start these exercises and which ones will work best for you. How to do the exercises  Hip rotator stretch   1. Lie on your back with both knees bent and your feet flat on the floor. 2. Put the ankle of your affected leg on your opposite thigh near your knee. 3. Use your hand to gently push your knee away from your body until you feel a gentle stretch around your hip. 4. Hold the stretch for 15 to 30 seconds. 5. Repeat 2 to 4 times. 6. Repeat steps 1 through 5, but this time use your hand to gently pull your knee toward your opposite shoulder. Iliotibial band stretch   1. Lean sideways against a wall. If you are not steady on your feet, hold on to a chair or counter.   2. Stand on the leg with the affected hip, with that leg close to the wall. Then cross your other leg in front of it. 3. Let your affected hip drop out to the side of your body and against wall. Then lean away from your affected hip until you feel a stretch. 4. Hold the stretch for 15 to 30 seconds. 5. Repeat 2 to 4 times. Straight-leg raises to the outside   1. Lie on your side, with your affected hip on top. 2. Tighten the front thigh muscles of your top leg to keep your knee straight. 3. Keep your hip and your leg straight in line with the rest of your body, and keep your knee pointing forward. Do not drop your hip back. 4. Lift your top leg straight up toward the ceiling, about 12 inches off the floor. Hold for about 6 seconds, then slowly lower your leg. 5. Repeat 8 to 12 times. Clamshell   1. Lie on your side, with your affected hip on top and your head propped on a pillow. Keep your feet and knees together and your knees bent. 2. Raise your top knee, but keep your feet together. Do not let your hips roll back. Your legs should open up like a clamshell. 3. Hold for 6 seconds. 4. Slowly lower your knee back down. Rest for 10 seconds. 5. Repeat 8 to 12 times. Follow-up care is a key part of your treatment and safety. Be sure to make and go to all appointments, and call your doctor if you are having problems. It's also a good idea to know your test results and keep a list of the medicines you take. Where can you learn more? Go to http://giselle-erika.info/  Enter H674 in the search box to learn more about \"Hip Bursitis: Exercises. \"  Current as of: March 2, 2020               Content Version: 12.5  © 6212-3267 [employer]. Care instructions adapted under license by [employer] (which disclaims liability or warranty for this information).  If you have questions about a medical condition or this instruction, always ask your healthcare professional. Garry Rodriguez disclaims any warranty or liability for your use of this information. Low Back Pain: Exercises  Introduction  Here are some examples of exercises for you to try. The exercises may be suggested for a condition or for rehabilitation. Start each exercise slowly. Ease off the exercises if you start to have pain. You will be told when to start these exercises and which ones will work best for you. How to do the exercises  Press-up   1. Lie on your stomach, supporting your body with your forearms. 2. Press your elbows down into the floor to raise your upper back. As you do this, relax your stomach muscles and allow your back to arch without using your back muscles. As your press up, do not let your hips or pelvis come off the floor. 3. Hold for 15 to 30 seconds, then relax. 4. Repeat 2 to 4 times. Alternate arm and leg (bird dog) exercise   Do this exercise slowly. Try to keep your body straight at all times, and do not let one hip drop lower than the other. 1. Start on the floor, on your hands and knees. 2. Tighten your belly muscles. 3. Raise one leg off the floor, and hold it straight out behind you. Be careful not to let your hip drop down, because that will twist your trunk. 4. Hold for about 6 seconds, then lower your leg and switch to the other leg. 5. Repeat 8 to 12 times on each leg. 6. Over time, work up to holding for 10 to 30 seconds each time. 7. If you feel stable and secure with your leg raised, try raising the opposite arm straight out in front of you at the same time. Knee-to-chest exercise   1. Lie on your back with your knees bent and your feet flat on the floor. 2. Bring one knee to your chest, keeping the other foot flat on the floor (or keeping the other leg straight, whichever feels better on your lower back). 3. Keep your lower back pressed to the floor. Hold for at least 15 to 30 seconds. 4. Relax, and lower the knee to the starting position. 5. Repeat with the other leg.  Repeat 2 to 4 times with each leg. 6. To get more stretch, put your other leg flat on the floor while pulling your knee to your chest.    Curl-ups   1. Lie on the floor on your back with your knees bent at a 90-degree angle. Your feet should be flat on the floor, about 12 inches from your buttocks. 2. Cross your arms over your chest. If this bothers your neck, try putting your hands behind your neck (not your head), with your elbows spread apart. 3. Slowly tighten your belly muscles and raise your shoulder blades off the floor. 4. Keep your head in line with your body, and do not press your chin to your chest.  5. Hold this position for 1 or 2 seconds, then slowly lower yourself back down to the floor. 6. Repeat 8 to 12 times. Pelvic tilt exercise   1. Lie on your back with your knees bent. 2. \"Brace\" your stomach. This means to tighten your muscles by pulling in and imagining your belly button moving toward your spine. You should feel like your back is pressing to the floor and your hips and pelvis are rocking back. 3. Hold for about 6 seconds while you breathe smoothly. 4. Repeat 8 to 12 times. Heel dig bridging   1. Lie on your back with both knees bent and your ankles bent so that only your heels are digging into the floor. Your knees should be bent about 90 degrees. 2. Then push your heels into the floor, squeeze your buttocks, and lift your hips off the floor until your shoulders, hips, and knees are all in a straight line. 3. Hold for about 6 seconds as you continue to breathe normally, and then slowly lower your hips back down to the floor and rest for up to 10 seconds. 4. Do 8 to 12 repetitions. Hamstring stretch in doorway   1. Lie on your back in a doorway, with one leg through the open door. 2. Slide your leg up the wall to straighten your knee. You should feel a gentle stretch down the back of your leg. 3. Hold the stretch for at least 15 to 30 seconds.  Do not arch your back, point your toes, or bend either knee. Keep one heel touching the floor and the other heel touching the wall. 4. Repeat with your other leg. 5. Do 2 to 4 times for each leg. Hip flexor stretch   1. Kneel on the floor with one knee bent and one leg behind you. Place your forward knee over your foot. Keep your other knee touching the floor. 2. Slowly push your hips forward until you feel a stretch in the upper thigh of your rear leg. 3. Hold the stretch for at least 15 to 30 seconds. Repeat with your other leg. 4. Do 2 to 4 times on each side. Wall sit   1. Stand with your back 10 to 12 inches away from a wall. 2. Lean into the wall until your back is flat against it. 3. Slowly slide down until your knees are slightly bent, pressing your lower back into the wall. 4. Hold for about 6 seconds, then slide back up the wall. 5. Repeat 8 to 12 times. Follow-up care is a key part of your treatment and safety. Be sure to make and go to all appointments, and call your doctor if you are having problems. It's also a good idea to know your test results and keep a list of the medicines you take. Where can you learn more? Go to http://giselle-erika.info/  Enter S857 in the search box to learn more about \"Low Back Pain: Exercises. \"  Current as of: March 2, 2020               Content Version: 12.5  © 1620-0235 Healthwise, Incorporated. Care instructions adapted under license by [employer] (which disclaims liability or warranty for this information). If you have questions about a medical condition or this instruction, always ask your healthcare professional. [place] [address] any warranty or liability for your use of this information.

## 2020-06-29 NOTE — PROGRESS NOTES
[facility] [address]  [address], [address]  [place], [facility]  Phone: [phone]  Fax: [phone]        Yahaira Regalado  : 1955  PCP: Cassidy Austin MD    PROGRESS NOTE      ASSESSMENT AND PLAN    Diagnoses and all orders for this visit:    1. Spinal stenosis of lumbar region with neurogenic claudication, severe L4/5 by MRI '15  -     gabapentin (NEURONTIN) 300 mg capsule; Take 1 Cap by mouth two (2) times a day. Max Daily Amount: 600 mg.    2. History of fusion of cervical spine      1. 75 y.o. female w/ LSS,  Symptomatic improvement w/Gabapentin  2. Advised to continue HEP  3. Increase Gabapentin to 300 mg  4. Given information on fall preventions, hip and low back exercises    Follow-up and Dispositions    · Return in about 6 months (around 2020). HISTORY OF PRESENT ILLNESS  Annamaria Butler is a 75 y.o. female. Pt was last evaluated 2020 for lumbar stenosis. Trial of Gabapentin. Pt reports that the Gabapentin has been very beneficial for her pain, and she has decreased pain when standing. She states that she has been doing well recently overall. Denies falls, but affirms that her balance is really bad. She states that her balance has been interfering with her lifestyle. She reports wanting to go back into her old exercise regimen, including bowling. Location of pain: low back  Does pain radiate into extremities: LLE  Does patient have weakness: some  Pt denies saddle paresthesias. Medications pt is on: Gabapentin 100 mg 2 tabs qam, 2 tabs qhs, without drowsiness and benefit. She reports taking Tylenol QAM and Aleve QHS with little benefit. Denies persistent fevers, chills, weight changes, neurogenic bowel or bladder symptoms. Pt denies recent ED visits or hospitalizations.      Treatments patient has tried:  Physical therapy: Unknown  Doing HEP: Yes; stretches  Non-opioid medications: Yes Failed Topamax - vertigo, Lyrica - dry eyes, imbalance,   Spinal injections: Yes GRETCHEN L4-5 2016 by me, GRETCHEN L5-S1 2017  Spinal surgery- Yes. C3-4-5-6-7 lami fusion Dr. Clarence Short 2018  Last L MRI : multilevel deg changes, severe stenosis L4-5-S1      reviewed. No entries. Pt lives alone. Works as . Has a $5000 deductible with her Medicare plan. Pain Assessment  2020   Location of Pain Back;Leg   Pain Location Comment -   Location Modifiers Left;Right   Severity of Pain 2   Quality of Pain Aching   Quality of Pain Comment numbness tingling   Duration of Pain -   Frequency of Pain Constant   Aggravating Factors Bending;Straightening;Standing   Aggravating Factors Comment -   Limiting Behavior -   Relieving Factors Rest   Relieving Factors Comment meds   Result of Injury -       PAST MEDICAL HISTORY   Past Medical History:   Diagnosis Date    Allergic rhinitis     Asthma 2012    +PFT for mild obstructive disease/COPD    Back pain, chronic 2012    Dr. Whitney Santana [facility]    Chronic obstructive pulmonary disease ([facility] [address]     GERD (gastroesophageal reflux disease)     H/O mammogram 10/03/2017    No evidence of malignancy    Hard of hearing     chronic ruptured TM    HTN (hypertension) 2012    Hx of screening mammography 2016    E[facility], normal, routine recs    Hyperlipidemia     Nausea & vomiting     Sinus congestion 2012    Spinal stenosis of lumbar region with radiculopathy 2015    Dr. [facility]       Past Surgical History:   Procedure Laterality Date    HX  SECTION      HX ORTHOPAEDIC  2018    cervical surgery(Dr. Clarence Short)    HX TUBAL LIGATION     . MEDICATIONS      Current Outpatient Medications   Medication Sig Dispense Refill    gabapentin (NEURONTIN) 300 mg capsule Take 1 Cap by mouth two (2) times a day. Max Daily Amount: 600 mg. 180 Cap 1    rutin/hesp/bioflav/C/krvmdi377 (BIOFLEX PO) Take  by mouth.       losartan-hydroCHLOROthiazide (HYZAAR) 50-12.5 mg per tablet Take 1 Tab by mouth daily. Indications: high blood pressure 90 Tab 3    ezetimibe (ZETIA) 10 mg tablet Take 1 Tab by mouth daily. 90 Tab 3    montelukast (SINGULAIR) 10 mg tablet Take 1 Tab by mouth daily. Indications: inflammation of the nose due to an allergy 90 Tab 3    acetaminophen (TYLENOL EXTRA STRENGTH) 500 mg tablet Take 1,000 mg by mouth every eight (8) hours as needed for Pain.  Cholecalciferol, Vitamin D3, (VITAMIN D3) 2,000 unit cap capsule Take 2,000 Units by mouth daily. 30 Cap 11    naproxen EC (NAPROSYN EC) 500 mg EC tablet Take 1 Tab by mouth two (2) times daily as needed for Pain. 180 Tab 1        Controlled Substance Monitoring:    No flowsheet data found. ALLERGIES    Allergies   Allergen Reactions    Lisinopril Cough    Lyrica [Pregabalin] Other (comments)     Medication causes dry eyes, unstable balance, and lack of concentration.      Mold Extracts Runny Nose    Pollen Extracts Itching    Statins-Hmg-Coa Reductase Inhibitors Myalgia    Topamax [Topiramate] Vertigo          SOCIAL HISTORY    Social History     Socioeconomic History    Marital status: SINGLE     Spouse name: Not on file    Number of children: Not on file    Years of education: Not on file    Highest education level: Not on file   Occupational History    Occupation: [employer]   Social Needs    Financial resource strain: Not on file    Food insecurity     Worry: Not on file     Inability: Not on file   [employer] needs     Medical: Not on file     Non-medical: Not on file   Tobacco Use    Smoking status: Current Every Day Smoker     Packs/day: 1.00     Types: Cigarettes    Smokeless tobacco: Never Used   Substance and Sexual Activity    Alcohol use: No     Comment: h/o alcohol abuse in remission    Drug use: No    Sexual activity: Never   Lifestyle    Physical activity     Days per week: Not on file     Minutes per session: Not on file    Stress: Not on file   Relationships    Social connections     Talks on phone: Not on file     Gets together: Not on file     Attends Protestant service: Not on file     Active member of club or organization: Not on file     Attends meetings of clubs or organizations: Not on file     Relationship status: Not on file    Intimate partner violence     Fear of current or ex partner: Not on file     Emotionally abused: Not on file     Physically abused: Not on file     Forced sexual activity: Not on file   Other Topics Concern     Service No    Blood Transfusions No    Caffeine Concern Yes    Occupational Exposure No    Hobby Hazards No    Sleep Concern No    Stress Concern No    Weight Concern No    Special Diet No    Back Care No    Exercise Yes     Comment: walking    Bike Helmet No    Seat Belt Yes    Self-Exams No   Social History Narrative    Not on file       FAMILY HISTORY    Family History   Problem Relation Age of Onset    Heart Disease Mother     Hypertension Mother     Cancer Mother     Coronary Artery Disease Mother 67    Heart Surgery Mother 68    Heart Disease Father     Hypertension Father     Stroke Father     Heart Surgery Father 46    Lung Disease Father     Heart Disease Brother     Heart Disease Brother        REVIEW OF SYSTEMS  Review of Systems   Constitutional: Negative for chills, fever and weight loss. Respiratory: Negative for shortness of breath. Cardiovascular: Negative for chest pain. Gastrointestinal: Negative for constipation. Negative for fecal incontinence   Genitourinary: Negative for dysuria. Negative for urinary incontinence   Musculoskeletal: Positive for back pain. Per HPI   Skin: Negative for rash. Neurological: Negative for dizziness, tingling, tremors, focal weakness and headaches. Endo/Heme/Allergies: Does not bruise/bleed easily. Psychiatric/Behavioral: The patient does not have insomnia.         PHYSICAL EXAMINATION  Visit Vitals  /72 (BP 1 Location: Left arm, BP Patient Position: Sitting)   Pulse 80   Temp 97.9 °F (36.6 °C) (Oral)   Resp 16   Ht 5' 1\" (1.549 m)   Wt 141 lb (64 kg)   BMI 26.64 kg/m²         Accompanied by self. Constitutional:  Well developed, well nourished, in no acute distress. Psychiatric: Affect and mood are appropriate. Integumentary: No rashes or abrasions noted on exposed areas. Cardiovascular/Peripheral Vascular: No peripheral edema is noted BLE. SPINE/MUSCULOSKELETAL EXAM    Lumbar spine:  No rash, ecchymosis, or gross obliquity. No fasciculations. No focal atrophy is noted. Tenderness to palpation L sciatic notch. SI joints non-tender. Trochanters non tender. MOTOR:       Hip Flex  Quads Hamstrings Ankle DF EHL Ankle PF   Right +4/5 +4/5 +4/5 +4/5 +4/5 +4/5   Left 4/5 +4/5 +4/5 +4/5 +4/5 +4/5   Bursa pain with L hip ROM      Straight Leg raise negative. Severe difficulty with tandem gait. Ambulation without assistive device. FWB. Written by Jen Kaur, as dictated by Tarsha Ayala MD.    I, Dr. Tarsha Ayala MD, confirm that all documentation is accurate. Ms. Susi Uribe may have a reminder for a \"due or due soon\" health maintenance. I have asked that she contact her primary care provider for follow-up on this health maintenance.

## 2020-06-29 NOTE — PROGRESS NOTES
Verbal order entered per Dr. Melissa Ivey as documented on blue sheet:Pended Gabapentin 300mg take 1 tab po BID. Disp 180 with 1 refill    Maritza Choi presents today for   Chief Complaint   Patient presents with    Back Pain     FU       Is someone accompanying this pt? NO    Is the patient using any DME equipment during OV? NO    Depression Screening:  3 most recent PHQ Screens 6/29/2020   Little interest or pleasure in doing things Not at all   Feeling down, depressed, irritable, or hopeless Not at all   Total Score PHQ 2 0       Learning Assessment:  Learning Assessment 5/11/2015   PRIMARY LEARNER Patient   HIGHEST LEVEL OF EDUCATION - PRIMARY LEARNER  DID NOT GRADUATE [facility] PRIMARY LEARNER NONE   CO-LEARNER CAREGIVER No   PRIMARY LANGUAGE ENGLISH   LEARNER PREFERENCE PRIMARY READING   ANSWERED BY patient   RELATIONSHIP SELF       Abuse Screening:  Abuse Screening Questionnaire 9/6/2018   Do you ever feel afraid of your partner? N   Are you in a relationship with someone who physically or mentally threatens you? N   Is it safe for you to go home? Y       Fall Risk  Fall Risk Assessment, last 12 mths 6/29/2020   Able to walk? Yes   Fall in past 12 months? No   Fall with injury? -   Number of falls in past 12 months -   Fall Risk Score -         Coordination of Care:  1. Have you been to the ER, urgent care clinic since your last visit? NO  Hospitalized since your last visit? NO    2. Have you seen or consulted any other health care providers outside of the [address] [place] [address] since your last visit? NO Include any pap smears or colon screening.  NO    Last  Checked 6/29/2020

## 2020-07-20 ENCOUNTER — VIRTUAL VISIT (OUTPATIENT)
Dept: FAMILY MEDICINE CLINIC | Age: 65
End: 2020-07-20

## 2020-07-20 DIAGNOSIS — Z13.31 SCREENING FOR DEPRESSION: ICD-10-CM

## 2020-07-20 DIAGNOSIS — E78.00 PURE HYPERCHOLESTEROLEMIA: ICD-10-CM

## 2020-07-20 DIAGNOSIS — Z00.00 MEDICARE ANNUAL WELLNESS VISIT, SUBSEQUENT: Primary | ICD-10-CM

## 2020-07-20 DIAGNOSIS — I10 ESSENTIAL HYPERTENSION: ICD-10-CM

## 2020-07-20 NOTE — PROGRESS NOTES
Herber Dalal is a 75 y.o. female, evaluated via audio-only technology on 7/20/2020 for Hypertension; Cholesterol Problem; and Annual Wellness Visit  . Assessment & Plan:   Diagnoses and all orders for this visit:    1. Medicare annual wellness visit, subsequent    2. Essential hypertension  -     LIPID PANEL; Future  -     METABOLIC PANEL, COMPREHENSIVE; Future    3. Pure hypercholesterolemia  -     LIPID PANEL; Future  -     METABOLIC PANEL, COMPREHENSIVE; Future    4. Screening for depression  -     DEPRESSION SCREEN ANNUAL        As above,   above all stable unless otherwise noted   treatment plan as listed below  Orders Placed This Encounter    Depression Screen Annual    LIPID PANEL    METABOLIC PANEL, COMPREHENSIVE     Follow-up and Dispositions    · Return in about 6 months (around 1/20/2021) for htn, Chol. This has been fully explained to the patient, who indicates understanding. 12  Subjective:     Pt here to follow up on HTN and Chol:  She also is due for her medicare well exam and agrees to have this done. She is on meds as listed below; She is compliant with her meds and tolerates meds well. Prior to Admission medications    Medication Sig Start Date End Date Taking? Authorizing Provider   gabapentin (NEURONTIN) 300 mg capsule Take 1 Cap by mouth two (2) times a day. Max Daily Amount: 600 mg. 6/29/20  Yes Zenon Holt MD   losartan-hydroCHLOROthiazide [facility]) 50-12.5 mg per tablet Take 1 Tab by mouth daily. Indications: high blood pressure 1/27/20  Yes Jessika Olivarez MD   naproxen EC (NAPROSYN EC) 500 mg EC tablet Take 1 Tab by mouth two (2) times daily as needed for Pain. 1/27/20  Yes Jessika Olivarez MD   ezetimibe (ZETIA) 10 mg tablet Take 1 Tab by mouth daily. 1/27/20  Yes Jessika Olivarez MD   montelukast (SINGULAIR) 10 mg tablet Take 1 Tab by mouth daily.  Indications: inflammation of the nose due to an allergy 1/27/20  Yes Jessika Olivarez MD   acetaminophen (TYLENOL EXTRA STRENGTH) 500 mg tablet Take 1,000 mg by mouth every eight (8) hours as needed for Pain. Yes Provider, Historical   Cholecalciferol, Vitamin D3, (VITAMIN D3) 2,000 unit cap capsule Take 2,000 Units by mouth daily. 2/14/15  Yes Joseline Prior, NP     Patient Active Problem List    Diagnosis Date Noted    HTN (hypertension) 08/25/2012     Priority: 2 - Two     Class: Chronic    Back pain, chronic 08/25/2012     Priority: 2 - Two     Class: Chronic    Asthma 08/25/2012     Priority: 3 - Three     Class: Chronic    Sinus congestion 08/25/2012     Priority: 3 - Three     Class: Temporary    Allergic rhinitis     Chronic obstructive pulmonary disease (HCC)     GERD (gastroesophageal reflux disease)     Hyperlipidemia     Neck pain 05/21/2018    Use of opiates for therapeutic purposes 03/14/2018    S/P cervical spinal fusion 03/02/2018    Cervical spondylosis with myelopathy 02/28/2018    Cervical stenosis of spine 02/28/2018    Ossification of posterior longitudinal ligament in cervical region [facility]) 01/08/2018    Cervical spinal stenosis 01/08/2018    COPD, mild ([address] 10/21/2015    Spinal stenosis of lumbar region with neurogenic claudication 09/01/2015    Vitamin D deficiency 11/11/2014    Hyperlipidemia with target LDL less than 100 11/11/2014    Dermatitis 02/25/2013     Current Outpatient Medications   Medication Sig Dispense Refill    gabapentin (NEURONTIN) 300 mg capsule Take 1 Cap by mouth two (2) times a day. Max Daily Amount: 600 mg. 180 Cap 1    losartan-hydroCHLOROthiazide (HYZAAR) 50-12.5 mg per tablet Take 1 Tab by mouth daily. Indications: high blood pressure 90 Tab 3    naproxen EC (NAPROSYN EC) 500 mg EC tablet Take 1 Tab by mouth two (2) times daily as needed for Pain. 180 Tab 1    ezetimibe (ZETIA) 10 mg tablet Take 1 Tab by mouth daily. 90 Tab 3    montelukast (SINGULAIR) 10 mg tablet Take 1 Tab by mouth daily.  Indications: inflammation of the nose due to an allergy 90 Tab 3    acetaminophen (TYLENOL EXTRA STRENGTH) 500 mg tablet Take 1,000 mg by mouth every eight (8) hours as needed for Pain.  Cholecalciferol, Vitamin D3, (VITAMIN D3) 2,000 unit cap capsule Take 2,000 Units by mouth daily. 30 Cap 11     Allergies   Allergen Reactions    Lisinopril Cough    Lyrica [Pregabalin] Other (comments)     Medication causes dry eyes, unstable balance, and lack of concentration.      Mold Extracts Runny Nose    Pollen Extracts Itching    Statins-Hmg-Coa Reductase Inhibitors Myalgia    Topamax [Topiramate] Vertigo     Past Medical History:   Diagnosis Date    Allergic rhinitis     Asthma 2012    +PFT for mild obstructive disease/COPD    Back pain, chronic 2012    Dr. Erica Greenberg referred to Dr. Tez Knox    [facility]     GERD (gastroesophageal reflux disease)     H/O mammogram 10/03/2017    No evidence of malignancy    Hard of hearing     chronic ruptured TM    HTN (hypertension) 2012    Hx of screening mammography 2016    EWL, normal, routine recs    Hyperlipidemia     Nausea & vomiting     Sinus congestion 2012    Spinal stenosis of lumbar region with radiculopathy 2015    Dr. Farhana Guzman     Past Surgical History:   Procedure Laterality Date    HX  SECTION      HX ORTHOPAEDIC  2018    cervical surgery(Dr. Darby Cumming)    HX TUBAL LIGATION       Family History   Problem Relation Age of Onset    Heart Disease Mother     Hypertension Mother     Cancer Mother     Coronary Artery Disease Mother 67    Heart Surgery Mother 68    Heart Disease Father     Hypertension Father     Stroke Father     Heart Surgery Father 46    Lung Disease Father     Heart Disease Brother     Heart Disease Brother      Social History     Tobacco Use    Smoking status: Current Every Day Smoker     Packs/day: 1.00     Types: Cigarettes    Smokeless tobacco: Never Used   Substance Use Topics    Alcohol use: No     Comment: h/o alcohol abuse in remission       Lab Results   Component Value Date/Time    Cholesterol, total 280 (H) 01/27/2020 09:25 AM    HDL Cholesterol 66 (H) 01/27/2020 09:25 AM    LDL, calculated 197.2 (H) 01/27/2020 09:25 AM    VLDL, calculated 16.8 01/27/2020 09:25 AM    Triglyceride 84 01/27/2020 09:25 AM    CHOL/HDL Ratio 4.2 01/27/2020 09:25 AM     Lab Results   Component Value Date/Time    Sodium 138 01/27/2020 09:25 AM    Potassium 4.4 01/27/2020 09:25 AM    Chloride 105 01/27/2020 09:25 AM    CO2 27 01/27/2020 09:25 AM    Anion gap 6 01/27/2020 09:25 AM    Glucose 90 01/27/2020 09:25 AM    BUN 23 (H) 01/27/2020 09:25 AM    Creatinine 0.90 01/27/2020 09:25 AM    BUN/Creatinine ratio 26 (H) 01/27/2020 09:25 AM    GFR est AA >60 01/27/2020 09:25 AM    GFR est non-AA >60 01/27/2020 09:25 AM    Calcium 11.3 (H) 01/27/2020 09:25 AM         Review of Systems   Constitutional: Negative for chills and fever. Respiratory: Negative for shortness of breath and wheezing. Cardiovascular: Negative for chest pain and palpitations. All other systems reviewed and are negative. No flowsheet data found. Shahrzad Vallecillo, who was evaluated through a patient-initiated, synchronous (real-time) audio only encounter, and/or her healthcare decision maker, is aware that it is a billable service, with coverage as determined by her insurance carrier. She provided verbal consent to proceed: Yes. She has not had a related appointment within my department in the past 7 days or scheduled within the next 24 hours. Total Time: 21-30 minutes    Lizeth Tam MD     This is the Subsequent Medicare Annual Wellness Exam, performed 12 months or more after the Initial AWV or the last Subsequent AWV    I have reviewed the patient's medical history in detail and updated the computerized patient record.      History     Patient Active Problem List   Diagnosis Code    HTN (hypertension) I10    Asthma J45.909    Back pain, chronic M54.9, G89.29    Sinus congestion R09.81    Dermatitis L30.9    Vitamin D deficiency E55.9    Hyperlipidemia with target LDL less than 100 E78.5    COPD, mild (HCC) J44.9    Ossification of posterior longitudinal ligament in cervical region [facility]) M48.8X2    Cervical spinal stenosis M48.02    Cervical spondylosis with myelopathy M47.12    Cervical stenosis of spine M48.02    S/P cervical spinal fusion Z98.1    Use of opiates for therapeutic purposes Z79.891    Neck pain M54.2    Allergic rhinitis J30.9    Chronic obstructive pulmonary disease (HCC) J44.9    GERD (gastroesophageal reflux disease) K21.9    Hyperlipidemia E78.5    Spinal stenosis of lumbar region with neurogenic claudication M48.062     Past Medical History:   Diagnosis Date    Allergic rhinitis     Asthma 2012    +PFT for mild obstructive disease/COPD    Back pain, chronic 2012    Dr. Irene Laboy referred to Dr. Rafa Causey    Chronic obstructive pulmonary disease ([facility]     GERD (gastroesophageal reflux disease)     H/O mammogram 10/03/2017    No evidence of malignancy    Hard of hearing     chronic ruptured TM    HTN (hypertension) 2012    Hx of screening mammography 2016    EWL, normal, routine recs    Hyperlipidemia     Nausea & vomiting     Sinus congestion 2012    Spinal stenosis of lumbar region with radiculopathy 2015    Dr. Jayne Cobian      Past Surgical History:   Procedure Laterality Date    HX  SECTION      HX ORTHOPAEDIC  2018    cervical surgery(Dr. Mavis Darnell)    HX TUBAL LIGATION       Current Outpatient Medications   Medication Sig Dispense Refill    gabapentin (NEURONTIN) 300 mg capsule Take 1 Cap by mouth two (2) times a day. Max Daily Amount: 600 mg. 180 Cap 1    losartan-hydroCHLOROthiazide (HYZAAR) 50-12.5 mg per tablet Take 1 Tab by mouth daily.  Indications: high blood pressure 90 Tab 3    naproxen EC (NAPROSYN EC) 500 mg EC tablet Take 1 Tab by mouth two (2) times daily as needed for Pain. 180 Tab 1    ezetimibe (ZETIA) 10 mg tablet Take 1 Tab by mouth daily. 90 Tab 3    montelukast (SINGULAIR) 10 mg tablet Take 1 Tab by mouth daily. Indications: inflammation of the nose due to an allergy 90 Tab 3    acetaminophen (TYLENOL EXTRA STRENGTH) 500 mg tablet Take 1,000 mg by mouth every eight (8) hours as needed for Pain.  Cholecalciferol, Vitamin D3, (VITAMIN D3) 2,000 unit cap capsule Take 2,000 Units by mouth daily. 30 Cap 11     Allergies   Allergen Reactions    Lisinopril Cough    Lyrica [Pregabalin] Other (comments)     Medication causes dry eyes, unstable balance, and lack of concentration.      Mold Extracts Runny Nose    Pollen Extracts Itching    Statins-Hmg-Coa Reductase Inhibitors Myalgia    Topamax [Topiramate] Vertigo       Family History   Problem Relation Age of Onset    Heart Disease Mother     Hypertension Mother     Cancer Mother     Coronary Artery Disease Mother 67    Heart Surgery Mother 68    Heart Disease Father     Hypertension Father     Stroke Father     Heart Surgery Father 46    Lung Disease Father     Heart Disease Brother     Heart Disease Brother      Social History     Tobacco Use    Smoking status: Current Every Day Smoker     Packs/day: 1.00     Types: Cigarettes    Smokeless tobacco: Never Used   Substance Use Topics    Alcohol use: No     Comment: h/o alcohol abuse in remission       Depression Risk Factor Screening:     3 most recent PHQ Screens 7/20/2020   Little interest or pleasure in doing things Not at all   Feeling down, depressed, irritable, or hopeless Not at all   Total Score PHQ 2 0        Alcohol Risk Factor Screening:   Do you average 1 drink per night or more than 7 drinks a week:  No    On any one occasion in the past three months have you have had more than 3 drinks containing alcohol:  No      Functional Ability and Level of Safety:   Hearing: Hearing is diminished     Activities of Daily Living: The home contains: no safety equipment. Patient does total self care     Ambulation: with mild difficulty and due to her spinal stenosis     Fall Risk:  Fall Risk Assessment, last 12 mths 6/29/2020   Able to walk? Yes   Fall in past 12 months? No   Fall with injury? -   Number of falls in past 12 months -   Fall Risk Score -     Abuse Screen:  Patient is not abused       Cognitive Screening   Has your family/caregiver stated any concerns about your memory: no    Cognitive Screening: cognition is intact. Patient Care Team   Patient Care Team:  Ra Jacobs MD as PCP - General (Family Medicine)  Ra Jacobs MD as PCP - [facility] Provider    Assessment/Plan   Education and counseling provided:  Are appropriate based on today's review and evaluation  End-of-Life planning (with patient's consent)    Diagnoses and all orders for this visit:    1. Medicare annual wellness visit, subsequent    2. Essential hypertension  -     LIPID PANEL; Future  -     METABOLIC PANEL, COMPREHENSIVE; Future    3. Pure hypercholesterolemia  -     LIPID PANEL; Future  -     METABOLIC PANEL, COMPREHENSIVE; Future    4. Screening for depression  -     [place] Maintenance Due   Topic Date Due    DTaP/Tdap/Td series (1 - Tdap) 09/08/1974    Shingrix Vaccine Age 50> (1 of 2) 09/08/2003    FOBT Q1Y Age 54-65  09/08/2003       Shade Offer, who was evaluated through a synchronous (real-time) audio only encounter, and/or her healthcare decision maker, is aware that it is a billable service, with coverage as determined by her insurance carrier. She provided verbal consent to proceed: Yes, and patient identification was verified. It was conducted pursuant to the emergency declaration under the [facility], [address] [place] [address] authority and the [employer] General Act. A caregiver was present when appropriate. Ability to conduct physical exam was limited. I was in the office. The patient was at home.     Cristy Sandoval MD

## 2020-07-20 NOTE — PATIENT INSTRUCTIONS

## 2020-11-02 DIAGNOSIS — I10 ESSENTIAL HYPERTENSION: ICD-10-CM

## 2020-11-06 RX ORDER — EZETIMIBE 10 MG/1
TABLET ORAL
Qty: 90 TAB | Refills: 3 | Status: SHIPPED | OUTPATIENT
Start: 2020-11-06 | End: 2021-06-07

## 2020-11-06 RX ORDER — LOSARTAN POTASSIUM AND HYDROCHLOROTHIAZIDE 12.5; 5 MG/1; MG/1
1 TABLET ORAL DAILY
Qty: 90 TAB | Refills: 3 | Status: SHIPPED | OUTPATIENT
Start: 2020-11-06 | End: 2021-09-28

## 2020-11-06 RX ORDER — MONTELUKAST SODIUM 10 MG/1
10 TABLET ORAL DAILY
Qty: 90 TAB | Refills: 3 | Status: SHIPPED | OUTPATIENT
Start: 2020-11-06 | End: 2021-09-14

## 2020-11-13 DIAGNOSIS — M48.062 SPINAL STENOSIS OF LUMBAR REGION WITH NEUROGENIC CLAUDICATION: ICD-10-CM

## 2020-11-16 RX ORDER — GABAPENTIN 300 MG/1
CAPSULE ORAL
Qty: 180 CAP | Refills: 3 | Status: SHIPPED | OUTPATIENT
Start: 2020-11-16 | End: 2021-10-11 | Stop reason: SDUPTHER

## 2020-11-30 ENCOUNTER — HOSPITAL ENCOUNTER (OUTPATIENT)
Dept: LAB | Age: 65
Discharge: HOME | End: 2020-11-30

## 2020-11-30 LAB — XX-LABCORP SPECIMEN COL,LCBCF: NORMAL

## 2020-11-30 PROCEDURE — 99001 SPECIMEN HANDLING PT-LAB: CPT

## 2020-12-01 LAB
ALBUMIN SERPL-MCNC: 4.8 G/DL (ref 3.8–4.8)
ALBUMIN/GLOB SERPL: 2.2 {RATIO} (ref 1.2–2.2)
ALP SERPL-CCNC: 73 IU/L (ref 39–117)
ALT SERPL-CCNC: 12 IU/L (ref 0–32)
AST SERPL-CCNC: 19 IU/L (ref 0–40)
BILIRUB SERPL-MCNC: 0.3 MG/DL (ref 0–1.2)
BUN SERPL-MCNC: 19 MG/DL (ref 8–27)
BUN/CREAT SERPL: 19 (ref 12–28)
CALCIUM SERPL-MCNC: 11.7 MG/DL (ref 8.7–10.3)
CHLORIDE SERPL-SCNC: 102 MMOL/L (ref 96–106)
CHOLEST SERPL-MCNC: 233 MG/DL (ref 100–199)
CO2 SERPL-SCNC: 23 MMOL/L (ref 20–29)
CREAT SERPL-MCNC: 1.01 MG/DL (ref 0.57–1)
GLOBULIN SER CALC-MCNC: 2.2 G/DL (ref 1.5–4.5)
GLUCOSE SERPL-MCNC: 83 MG/DL (ref 65–99)
HDLC SERPL-MCNC: 64 MG/DL
LDLC SERPL CALC-MCNC: 154 MG/DL (ref 0–99)
POTASSIUM SERPL-SCNC: 4.6 MMOL/L (ref 3.5–5.2)
PROT SERPL-MCNC: 7 G/DL (ref 6–8.5)
SODIUM SERPL-SCNC: 139 MMOL/L (ref 134–144)
TRIGL SERPL-MCNC: 85 MG/DL (ref 0–149)
VLDLC SERPL CALC-MCNC: 15 MG/DL (ref 5–40)

## 2021-01-05 ENCOUNTER — VIRTUAL VISIT (OUTPATIENT)
Dept: FAMILY MEDICINE CLINIC | Age: 66
End: 2021-01-05
Payer: MEDICARE

## 2021-01-05 DIAGNOSIS — Z00.00 MEDICARE ANNUAL WELLNESS VISIT, SUBSEQUENT: Primary | ICD-10-CM

## 2021-01-05 PROCEDURE — 3017F COLORECTAL CA SCREEN DOC REV: CPT | Performed by: FAMILY MEDICINE

## 2021-01-05 PROCEDURE — 1101F PT FALLS ASSESS-DOCD LE1/YR: CPT | Performed by: FAMILY MEDICINE

## 2021-01-05 PROCEDURE — G9899 SCRN MAM PERF RSLTS DOC: HCPCS | Performed by: FAMILY MEDICINE

## 2021-01-05 PROCEDURE — G8427 DOCREV CUR MEDS BY ELIG CLIN: HCPCS | Performed by: FAMILY MEDICINE

## 2021-01-05 PROCEDURE — G0439 PPPS, SUBSEQ VISIT: HCPCS | Performed by: FAMILY MEDICINE

## 2021-01-05 PROCEDURE — G8536 NO DOC ELDER MAL SCRN: HCPCS | Performed by: FAMILY MEDICINE

## 2021-01-05 PROCEDURE — G8756 NO BP MEASURE DOC: HCPCS | Performed by: FAMILY MEDICINE

## 2021-01-05 PROCEDURE — G8419 CALC BMI OUT NRM PARAM NOF/U: HCPCS | Performed by: FAMILY MEDICINE

## 2021-01-05 PROCEDURE — G8399 PT W/DXA RESULTS DOCUMENT: HCPCS | Performed by: FAMILY MEDICINE

## 2021-01-05 PROCEDURE — G8432 DEP SCR NOT DOC, RNG: HCPCS | Performed by: FAMILY MEDICINE

## 2021-01-05 NOTE — PROGRESS NOTES
This is the Subsequent Medicare Annual Wellness Exam, performed 12 months or more after the Initial AWV or the last Subsequent AWV    I have reviewed the patient's medical history in detail and updated the computerized patient record. Pt has been well;   Has quit smoking   Some sinus HA's and COPD sx- she declines medication for COPD at this time due to cost.     Has had blood work; her cholesterol is elevated; She has been on cholesterol meds before     Depression Risk Factor Screening:     3 most recent PHQ Screens 7/20/2020   Little interest or pleasure in doing things Not at all   Feeling down, depressed, irritable, or hopeless Not at all   Total Score PHQ 2 0       Alcohol Risk Screen    Do you average more than 1 drink per night or more than 7 drinks a week:  No    On any one occasion in the past three months have you have had more than 3 drinks containing alcohol:  No        Functional Ability and Level of Safety:    Hearing: hearing is diminished. Activities of Daily Living: The home contains: no safety equipment. Patient does total self care      Ambulation: with mild difficulty and off balance at times due to neck and back pain     Fall Risk:  Fall Risk Assessment, last 12 mths 6/29/2020   Able to walk? Yes   Fall in past 12 months? No   Number of falls in past 12 months -   Fall with injury? -      Abuse Screen:  Patient is not abused       Cognitive Screening    Has your family/caregiver stated any concerns about your memory: no    Cognitive Screening: cognition is intact; Assessment/Plan   Education and counseling provided:  Are appropriate based on today's review and evaluation  End-of-Life planning (with patient's consent)    Diagnoses and all orders for this visit:    1.  Medicare annual wellness visit, subsequent      As above,   above all stable unless otherwise noted   treatment plan as listed below  Orders Placed This Encounter    nicotine polacrilex (NICORETTE BU)     Follow-up and Dispositions    · Return in about 5 months (around 6/5/2021), or copd, for htn. This has been fully explained to the patient, who indicates understanding.   An After Visit Summary is available via [facility] Due     Health Maintenance Due   Topic Date Due    DTaP/Tdap/Td series (1 - Tdap) 09/08/1974    Shingrix Vaccine Age 50> (1 of 2) 09/08/2003       Patient Care Team   Patient Care Team:  Chay Montgomery MD as PCP - General (Family Medicine)  Chay Montgomery MD as PCP - [facility] Empaneled Provider    History     Patient Active Problem List   Diagnosis Code    HTN (hypertension) I10    Asthma J45.909    Back pain, chronic M54.9, G89.29    Sinus congestion R09.81    Dermatitis L30.9    Vitamin D deficiency E55.9    Hyperlipidemia with target LDL less than 100 E78.5    COPD, mild ([facility] J44.9    Ossification of posterior longitudinal ligament in cervical region [facility]) M48.8X2    Cervical spinal stenosis M48.02    Cervical spondylosis with myelopathy M47.12    Cervical stenosis of spine M48.02    S/P cervical spinal fusion Z98.1    Use of opiates for therapeutic purposes Z79.891    Neck pain M54.2    Allergic rhinitis J30.9    Chronic obstructive pulmonary disease ([address] J44.9    GERD (gastroesophageal reflux disease) K21.9    Hyperlipidemia E78.5    Spinal stenosis of lumbar region with neurogenic claudication M48.062     Past Medical History:   Diagnosis Date    Allergic rhinitis     Asthma 8/25/2012    +PFT for mild obstructive disease/COPD    Back pain, chronic 8/25/2012    Dr. Whitney Santana referred to Dr. Army Nowak    Chronic obstructive pulmonary disease ([facility]     GERD (gastroesophageal reflux disease)     H/O mammogram 10/03/2017    No evidence of malignancy    Hard of hearing     chronic ruptured TM    HTN (hypertension) 8/25/2012    Hx of screening mammography 09/06/2016    EWL, normal, routine recs    Hyperlipidemia     Nausea & vomiting     Sinus congestion 2012    Spinal stenosis of lumbar region with radiculopathy 2015    Dr. Víctor Blanco      Past Surgical History:   Procedure Laterality Date    HX  SECTION      HX ORTHOPAEDIC  2018    cervical surgery(Dr. Nikki Pruett)    HX TUBAL LIGATION       Current Outpatient Medications   Medication Sig Dispense Refill    nicotine polacrilex (NICORETTE BU) by Buccal route.  gabapentin (NEURONTIN) 300 mg capsule TAKE 1 CAPSULE BY MOUTH  TWICE DAILY 180 Cap 3    losartan-hydroCHLOROthiazide (HYZAAR) 50-12.5 mg per tablet TAKE 1 TAB BY MOUTH DAILY. INDICATIONS: HIGH BLOOD  PRESSURE 90 Tab 3    ezetimibe (ZETIA) 10 mg tablet TAKE 1 TABLET BY MOUTH  DAILY 90 Tab 3    montelukast (SINGULAIR) 10 mg tablet TAKE 1 TAB BY MOUTH DAILY. INDICATIONS: INFLAMMATION  OF THE NOSE DUE TO AN  ALLERGY 90 Tab 3    acetaminophen (TYLENOL EXTRA STRENGTH) 500 mg tablet Take 1,000 mg by mouth every eight (8) hours as needed for Pain.  Cholecalciferol, Vitamin D3, (VITAMIN D3) 2,000 unit cap capsule Take 2,000 Units by mouth daily. 30 Cap 11     Allergies   Allergen Reactions    Lisinopril Cough    Lyrica [Pregabalin] Other (comments)     Medication causes dry eyes, unstable balance, and lack of concentration.  Mold Extracts Runny Nose    Pollen Extracts Itching    Statins-Hmg-Coa Reductase Inhibitors Myalgia    Topamax [Topiramate] Vertigo       Family History   Problem Relation Age of Onset    Heart Disease Mother     Hypertension Mother     Cancer Mother     Coronary Artery Disease Mother 67    Heart Surgery Mother 68    Heart Disease Father     Hypertension Father     Stroke Father     Heart Surgery Father 46    Lung Disease Father     Heart Disease Brother     Heart Disease Brother      Social History     Tobacco Use    Smoking status: Current Every Day Smoker     Packs/day: 1.00     Types: Cigarettes    Smokeless tobacco: Never Used   Substance Use Topics    Alcohol use:  No Comment: h/o alcohol abuse in remission       Inessa Glass, who was evaluated through a synchronous (real-time) audio only encounter, and/or her healthcare decision maker, is aware that it is a billable service, with coverage as determined by her insurance carrier. She provided verbal consent to proceed: Yes, and patient identification was verified. It was conducted pursuant to the emergency declaration under the [address] [place] [address] and the [employer] General Act. A caregiver was present when appropriate. Ability to conduct physical exam was limited. I was in the office. The patient was at home.     Matt Olivas MD

## 2021-01-05 NOTE — PATIENT INSTRUCTIONS
Medicare Wellness Visit, Female The best way to live healthy is to have a lifestyle where you eat a well-balanced diet, exercise regularly, limit alcohol use, and quit all forms of tobacco/nicotine, if applicable. Regular preventive services are another way to keep healthy. Preventive services (vaccines, screening tests, monitoring & exams) can help personalize your care plan, which helps you manage your own care. Screening tests can find health problems at the earliest stages, when they are easiest to treat. [facility] follows the current, evidence-based guidelines published by the [facility] ([facility]) when recommending preventive services for our patients. Because we follow these guidelines, sometimes recommendations change over time as research supports it. (For example, mammograms used to be recommended annually. Even though Medicare will still pay for an annual mammogram, the newer guidelines recommend a mammogram every two years for women of average risk). Of course, you and your doctor may decide to screen more often for some diseases, based on your risk and your co-morbidities (chronic disease you are already diagnosed with). Preventive services for you include: - Medicare offers their members a free annual wellness visit, which is time for you and your primary care provider to discuss and plan for your preventive service needs. Take advantage of this benefit every year! 
-All adults over the age of 72 should receive the recommended pneumonia vaccines. Current USPSTF guidelines recommend a series of two vaccines for the best pneumonia protection.  
-All adults should have a flu vaccine yearly and a tetanus vaccine every 10 years.  
-All adults age 48 and older should receive the shingles vaccines (series of two vaccines). -All adults age 38-68 who are overweight should have a diabetes screening test once every three years. -All adults born between [birth year] and 1967 should be screened once for Hepatitis C. 
-Other screening tests and preventive services for persons with diabetes include: an eye exam to screen for diabetic retinopathy, a kidney function test, a foot exam, and stricter control over your cholesterol.  
-Cardiovascular screening for adults with routine risk involves an electrocardiogram (ECG) at intervals determined by your doctor.  
-Colorectal cancer screenings should be done for adults age 54-65 with no increased risk factors for colorectal cancer. There are a number of acceptable methods of screening for this type of cancer. Each test has its own benefits and drawbacks. Discuss with your doctor what is most appropriate for you during your annual wellness visit. The different tests include: colonoscopy (considered the best screening method), a fecal occult blood test, a fecal DNA test, and sigmoidoscopy. 
 
-A bone mass density test is recommended when a woman turns 65 to screen for osteoporosis. This test is only recommended one time, as a screening. Some providers will use this same test as a disease monitoring tool if you already have osteoporosis. -Breast cancer screenings are recommended every other year for women of normal risk, age 54-69. 
-Cervical cancer screenings for women over age 72 are only recommended with certain risk factors. Here is a list of your current Health Maintenance items (your personalized list of preventive services) with a due date: 
Health Maintenance Due Topic Date Due  
 DTaP/Tdap/Td  (1 - Tdap) 09/08/1974  Shingles Vaccine (1 of 2) 09/08/2003

## 2021-04-24 DIAGNOSIS — M48.062 SPINAL STENOSIS OF LUMBAR REGION WITH NEUROGENIC CLAUDICATION: ICD-10-CM

## 2021-04-25 RX ORDER — GABAPENTIN 300 MG/1
CAPSULE ORAL
Qty: 180 CAP | Refills: 3 | OUTPATIENT
Start: 2021-04-25

## 2021-04-26 NOTE — TELEPHONE ENCOUNTER
I tried to contact the pt regarding her refill request. She was not able to be reached. A brief message was left asking for a return call to the office regarding the request received. I did mention in the message that we will need to see the pt in the office in order for this refill to be approved. The number to the office was provided so that the pt can call and ask questions or schedule an appt. The name \"María\" was on the voicemail.

## 2021-04-27 NOTE — TELEPHONE ENCOUNTER
Patient left a message returning our call. She was told by the pharmacy that her prescription was . She thought she had enough refills to last her a year.

## 2021-04-28 NOTE — TELEPHONE ENCOUNTER
I called and spoke to MsKaren Chaitanya Kenyon. The pt was identified using 2 pt identifiers. She was asked if she is running out of medication. The pt confirmed that she has 130 days of medication. Her appt is scheduled for June to see Dr. Pito Goncalves. Pt was going to be offered an appt this week with an NP if she needed medication. The pt declined and states that she will need to talk with Dr. Pito Goncalves about her neck and possibly some shots for her back. No changes will be made at this time.

## 2021-06-07 ENCOUNTER — OFFICE VISIT (OUTPATIENT)
Dept: ORTHOPEDIC SURGERY | Age: 66
End: 2021-06-07
Payer: MEDICARE

## 2021-06-07 VITALS
OXYGEN SATURATION: 100 % | BODY MASS INDEX: 27.21 KG/M2 | WEIGHT: 138.6 LBS | HEART RATE: 82 BPM | HEIGHT: 60 IN | TEMPERATURE: 97.2 F

## 2021-06-07 DIAGNOSIS — M48.062 SPINAL STENOSIS OF LUMBAR REGION WITH NEUROGENIC CLAUDICATION: Primary | ICD-10-CM

## 2021-06-07 DIAGNOSIS — Z98.1 HISTORY OF FUSION OF CERVICAL SPINE: ICD-10-CM

## 2021-06-07 PROCEDURE — 3017F COLORECTAL CA SCREEN DOC REV: CPT | Performed by: PHYSICAL MEDICINE & REHABILITATION

## 2021-06-07 PROCEDURE — G8427 DOCREV CUR MEDS BY ELIG CLIN: HCPCS | Performed by: PHYSICAL MEDICINE & REHABILITATION

## 2021-06-07 PROCEDURE — G8432 DEP SCR NOT DOC, RNG: HCPCS | Performed by: PHYSICAL MEDICINE & REHABILITATION

## 2021-06-07 PROCEDURE — 20552 NJX 1/MLT TRIGGER POINT 1/2: CPT | Performed by: PHYSICAL MEDICINE & REHABILITATION

## 2021-06-07 PROCEDURE — G8536 NO DOC ELDER MAL SCRN: HCPCS | Performed by: PHYSICAL MEDICINE & REHABILITATION

## 2021-06-07 PROCEDURE — 1101F PT FALLS ASSESS-DOCD LE1/YR: CPT | Performed by: PHYSICAL MEDICINE & REHABILITATION

## 2021-06-07 PROCEDURE — G8399 PT W/DXA RESULTS DOCUMENT: HCPCS | Performed by: PHYSICAL MEDICINE & REHABILITATION

## 2021-06-07 PROCEDURE — G9899 SCRN MAM PERF RSLTS DOC: HCPCS | Performed by: PHYSICAL MEDICINE & REHABILITATION

## 2021-06-07 PROCEDURE — G8756 NO BP MEASURE DOC: HCPCS | Performed by: PHYSICAL MEDICINE & REHABILITATION

## 2021-06-07 PROCEDURE — 1090F PRES/ABSN URINE INCON ASSESS: CPT | Performed by: PHYSICAL MEDICINE & REHABILITATION

## 2021-06-07 PROCEDURE — 99214 OFFICE O/P EST MOD 30 MIN: CPT | Performed by: PHYSICAL MEDICINE & REHABILITATION

## 2021-06-07 PROCEDURE — G8419 CALC BMI OUT NRM PARAM NOF/U: HCPCS | Performed by: PHYSICAL MEDICINE & REHABILITATION

## 2021-06-07 RX ORDER — BETAMETHASONE SODIUM PHOSPHATE AND BETAMETHASONE ACETATE 3; 3 MG/ML; MG/ML
12 INJECTION, SUSPENSION INTRA-ARTICULAR; INTRALESIONAL; INTRAMUSCULAR; SOFT TISSUE ONCE
Status: COMPLETED | OUTPATIENT
Start: 2021-06-07 | End: 2021-06-07

## 2021-06-07 RX ORDER — GABAPENTIN 300 MG/1
300 CAPSULE ORAL 3 TIMES DAILY
Qty: 90 CAPSULE | Refills: 5 | Status: SHIPPED | OUTPATIENT
Start: 2021-06-07 | End: 2021-07-09 | Stop reason: SDUPTHER

## 2021-06-07 RX ORDER — LIDOCAINE HYDROCHLORIDE 20 MG/ML
0.5 INJECTION, SOLUTION INFILTRATION; PERINEURAL ONCE
Status: COMPLETED | OUTPATIENT
Start: 2021-06-07 | End: 2021-06-07

## 2021-06-07 RX ORDER — IPRATROPIUM BROMIDE 21 UG/1
SPRAY, METERED NASAL
COMMUNITY
Start: 2021-03-04 | End: 2021-08-12 | Stop reason: ALTCHOICE

## 2021-06-07 RX ORDER — BUPIVACAINE HYDROCHLORIDE 2.5 MG/ML
0.5 INJECTION, SOLUTION INFILTRATION; PERINEURAL ONCE
Status: COMPLETED | OUTPATIENT
Start: 2021-06-07 | End: 2021-06-07

## 2021-06-07 RX ADMIN — BUPIVACAINE HYDROCHLORIDE 1.25 MG: 2.5 INJECTION, SOLUTION INFILTRATION; PERINEURAL at 12:37

## 2021-06-07 RX ADMIN — LIDOCAINE HYDROCHLORIDE 10 MG: 20 INJECTION, SOLUTION INFILTRATION; PERINEURAL at 12:38

## 2021-06-07 RX ADMIN — BETAMETHASONE SODIUM PHOSPHATE AND BETAMETHASONE ACETATE 12 MG: 3; 3 INJECTION, SUSPENSION INTRA-ARTICULAR; INTRALESIONAL; INTRAMUSCULAR; SOFT TISSUE at 12:36

## 2021-06-07 NOTE — PROGRESS NOTES
[address]  [address], [address]  [address]  Phone: [phone]  Fax: [phone]        Rachell Bach  : 1955  PCP: Rikki Albarran MD    PROGRESS NOTE      ASSESSMENT AND PLAN    Diagnoses and all orders for this visit:    1. Spinal stenosis of lumbar region with neurogenic claudication, severe L4/5 by MRI '15  -     betamethasone (CELESTONE) injection 12 mg  -     bupivacaine HCl (MARCAINE) 0.25 % (2.5 mg/mL) injection 1.25 mg  -     lidocaine (XYLOCAINE) 20 mg/mL (2 %) injection 10 mg  -     gabapentin (NEURONTIN) 300 mg capsule; Take 1 Capsule by mouth three (3) times daily. Max Daily Amount: 900 mg.    2. History of fusion of cervical spine  -     gabapentin (NEURONTIN) 300 mg capsule; Take 1 Capsule by mouth three (3) times daily. Max Daily Amount: 900 mg.        1. William Myers is a 77 y.o. female LSS, hx cervical fusion with progressive symptoms of lumbar spinal stenosis. She has a high deductible health plan with a $5000 minimum and is unable to afford repeat MRIs or epidural injections. 2. Refill gabapentin 300 3 times daily. 3. Trial of bilateral iliolumbar trigger point injections in the office today. 4. Continue as needed Aleve with food. 5. Discussed surgery, she has had known severe lumbar stenosis for many years, last MRI . .     Follow-up and Dispositions    · Return in about 6 months (around 2021) for routine med f/u. HISTORY OF PRESENT ILLNESS      William Myers is a 77 y.o. female presents for follow up for:    Pain Assessment  2021   Location of Pain Neck;Back;Leg;Arm;Finger   Pain Location Comment -   Location Modifiers Left;Right   Severity of Pain 4   Quality of Pain Sharp;Dull;Aching; Other (Comment)   Quality of Pain Comment N/T in all extremities   Duration of Pain -   Frequency of Pain Constant   Aggravating Factors -   Aggravating Factors Comment -   Limiting Behavior -   Relieving Factors -   Relieving Factors Comment -   Result of Injury -       Lives alone, anxiety w/COVID. Can't walk/stand LBP into LEs. Wants to be able to talk to neighbors. Denies persistent fevers, unintended weight loss, saddle paresthesias, or neurogenic bladder/bowel symptoms. No recent infections. She has been fully vaccinated against COVID-19. No difficulty with prior cortisone injections    Onset:chronic LBP  Work Status:hairdresser, retired  Pertinent PMHx:  Asthma, GERD, [place], HTN    Investigations:  2015 L MRI severe stenosis L4/L5/S1  Treatments:  PT HEP (5K deductible)  Beneficial meds: Gabapentin  Failed medications: Topamax - vertigo, Lyrica - dry eyes, imbalance,   Spinal injections: Yes GRETCHEN L4-5 2016 by me, GRETCHEN L5-S1 2017  Spinal surgery- Yes. C3-4-5-6-7 lami fusion Dr. Maliha Santana 2/2018    PHYSICAL EXAMINATION  Visit Vitals  Pulse 82   Temp 97.2 °F (36.2 °C) (Tympanic)   Ht 4' 11.75\" (1.518 m)   Wt 138 lb 9.6 oz (62.9 kg)   SpO2 100%   BMI 27.30 kg/m²     TTP L4/5  LE/UE  Motor intact  SLR neg  DTR 2+ brisk throughout  Unable to do tandem gait. [facility]  OFFICE PROCEDURE PROGRESS NOTE      PROCEDURE: In the office today after informed consent using aseptic technique, the patient was injected with a total of 2 cc of 6 mg/cc Celestone, 0.5 cc each of Lidocaine and Marcaine into her bilateral iliolumbar trigger point. Chart reviewed for the following:   IDr. Hal, have reviewed the History, Physical and updated the Allergic reactions for Cinthia Cifuentes. Local measures (ice/heat) and medications have not alleviated the symptoms. TIME OUT performed immediately prior to start of procedure:   Dr. Hal MONGE, have performed the following reviews on Cinthia Cifuentes prior to the start of the procedure:       Patient denies any recent fevers, chills, antibiotics, recent cortisone injections, or infections.         * Patient was identified by name and date of birth   * Agreement on procedure being performed was verified  * Risks and Benefits explained to the patient  * Procedure site verified and marked as necessary  * Patient was positioned for comfort  * Consent was signed and verified     Time: 12:30 PM    Date of procedure: 6/7/2021    Procedure performed by:  Sanju Ruffin MD    Provider assisted by: None    Patient assisted by: Self    How tolerated by patient: Pt tolerated the procedure well with no complications. Ms. Isis Smith may have a reminder for a \"due or due soon\" health maintenance. I have asked that she contact her primary care provider for follow-up on this health maintenance. This note was created using Dragon transcription software, unintended errors may be present.

## 2021-07-09 DIAGNOSIS — M48.062 SPINAL STENOSIS OF LUMBAR REGION WITH NEUROGENIC CLAUDICATION: ICD-10-CM

## 2021-07-09 DIAGNOSIS — Z98.1 HISTORY OF FUSION OF CERVICAL SPINE: ICD-10-CM

## 2021-07-09 NOTE — TELEPHONE ENCOUNTER
Patient is requesting a 90 d/s supply. Her insurance charges her for a 30 d/s but will give her 90 d/s at no cost.    Last Visit: 6/7/21 with MD Sowmya Estrada  Next Appointment: Advised to follow-up in 6 months    Requested Prescriptions     Pending Prescriptions Disp Refills    gabapentin (NEURONTIN) 300 mg capsule 270 Capsule 1     Sig: Take 1 Capsule by mouth three (3) times daily. Max Daily Amount: 900 mg.

## 2021-07-11 RX ORDER — GABAPENTIN 300 MG/1
300 CAPSULE ORAL 3 TIMES DAILY
Qty: 270 CAPSULE | Refills: 1 | Status: SHIPPED | OUTPATIENT
Start: 2021-07-11 | End: 2021-12-21 | Stop reason: SDUPTHER

## 2021-07-15 ENCOUNTER — OFFICE VISIT (OUTPATIENT)
Dept: FAMILY MEDICINE CLINIC | Age: 66
End: 2021-07-15
Payer: MEDICARE

## 2021-07-15 VITALS
TEMPERATURE: 96 F | OXYGEN SATURATION: 97 % | HEART RATE: 80 BPM | SYSTOLIC BLOOD PRESSURE: 133 MMHG | WEIGHT: 132 LBS | RESPIRATION RATE: 16 BRPM | HEIGHT: 59 IN | DIASTOLIC BLOOD PRESSURE: 68 MMHG | BODY MASS INDEX: 26.61 KG/M2

## 2021-07-15 DIAGNOSIS — M79.10 MYALGIA: ICD-10-CM

## 2021-07-15 DIAGNOSIS — Z86.19 H/O COLD SORES: ICD-10-CM

## 2021-07-15 DIAGNOSIS — Z12.31 ENCOUNTER FOR SCREENING MAMMOGRAM FOR MALIGNANT NEOPLASM OF BREAST: Primary | ICD-10-CM

## 2021-07-15 DIAGNOSIS — I10 ESSENTIAL HYPERTENSION: ICD-10-CM

## 2021-07-15 DIAGNOSIS — M25.50 ARTHRALGIA, UNSPECIFIED JOINT: ICD-10-CM

## 2021-07-15 DIAGNOSIS — F90.2 ATTENTION DEFICIT HYPERACTIVITY DISORDER (ADHD), COMBINED TYPE: ICD-10-CM

## 2021-07-15 DIAGNOSIS — F41.8 ANXIOUS DEPRESSION: ICD-10-CM

## 2021-07-15 PROCEDURE — 3017F COLORECTAL CA SCREEN DOC REV: CPT | Performed by: NURSE PRACTITIONER

## 2021-07-15 PROCEDURE — 99204 OFFICE O/P NEW MOD 45 MIN: CPT | Performed by: NURSE PRACTITIONER

## 2021-07-15 PROCEDURE — 1101F PT FALLS ASSESS-DOCD LE1/YR: CPT | Performed by: NURSE PRACTITIONER

## 2021-07-15 PROCEDURE — G8536 NO DOC ELDER MAL SCRN: HCPCS | Performed by: NURSE PRACTITIONER

## 2021-07-15 PROCEDURE — G8399 PT W/DXA RESULTS DOCUMENT: HCPCS | Performed by: NURSE PRACTITIONER

## 2021-07-15 PROCEDURE — G8754 DIAS BP LESS 90: HCPCS | Performed by: NURSE PRACTITIONER

## 2021-07-15 PROCEDURE — 1090F PRES/ABSN URINE INCON ASSESS: CPT | Performed by: NURSE PRACTITIONER

## 2021-07-15 PROCEDURE — G8419 CALC BMI OUT NRM PARAM NOF/U: HCPCS | Performed by: NURSE PRACTITIONER

## 2021-07-15 PROCEDURE — G8427 DOCREV CUR MEDS BY ELIG CLIN: HCPCS | Performed by: NURSE PRACTITIONER

## 2021-07-15 PROCEDURE — G8432 DEP SCR NOT DOC, RNG: HCPCS | Performed by: NURSE PRACTITIONER

## 2021-07-15 PROCEDURE — G8752 SYS BP LESS 140: HCPCS | Performed by: NURSE PRACTITIONER

## 2021-07-15 PROCEDURE — G9899 SCRN MAM PERF RSLTS DOC: HCPCS | Performed by: NURSE PRACTITIONER

## 2021-07-15 RX ORDER — CHOLECALCIFEROL (VITAMIN D3) 125 MCG
1 CAPSULE ORAL DAILY
COMMUNITY
End: 2022-09-22

## 2021-07-15 RX ORDER — METHOCARBAMOL 500 MG/1
500 TABLET, FILM COATED ORAL
Qty: 30 TABLET | Refills: 1 | Status: SHIPPED | OUTPATIENT
Start: 2021-07-15 | End: 2021-08-12 | Stop reason: ALTCHOICE

## 2021-07-15 RX ORDER — VALACYCLOVIR HYDROCHLORIDE 1 G/1
TABLET, FILM COATED ORAL
Qty: 4 TABLET | Refills: 1 | Status: SHIPPED | OUTPATIENT
Start: 2021-07-15 | End: 2022-01-17 | Stop reason: ALTCHOICE

## 2021-07-15 RX ORDER — SERTRALINE HYDROCHLORIDE 50 MG/1
50 TABLET, FILM COATED ORAL DAILY
Qty: 30 TABLET | Refills: 2 | Status: SHIPPED | OUTPATIENT
Start: 2021-07-15 | End: 2021-08-12 | Stop reason: ALTCHOICE

## 2021-07-15 RX ORDER — ASPIRIN 81 MG/1
81 TABLET ORAL
COMMUNITY
End: 2022-09-22

## 2021-07-15 NOTE — PROGRESS NOTES
Ro Mckeon is a 77 y.o. female presenting today for New Patient (establish care), Anxiety, and Hypertension (follow-up)  . Chief Complaint   Patient presents with    New Patient     establish care    Anxiety    Hypertension     follow-up       HPI:  Ro Mckeon presents to the office today to establish care with a new provider. She is a prior patient of Dr. Carrie Wang and carries a medical diagnosis for hypertension, GERD, asthma, COPD, hyperlipidemia and vitamin D deficiency. She is complaining of  Bilateral leg cramps and shoulder and neck spasms   Hypertension-prescribed losartan-hydrochlorothiazide daily. Her BP is 133/68 and she denies any chest pain or palpitation. She is negative for any dizziness or headache. Hyperlipidemia-patient notes she has hyperlipidemia but does not take a statin. She reports the statin caused myalgia. Patient is managed by orthopedics for her history of spinal stenosis of the lumbar region and neurogenic claudication, severe L4-L5. Her orthopedics is Dr. Brittney Gleason. ROS    ROS:  History obtained from the patient intake forms which are reviewed with the patient  · General: negative for - chills, fever, weight changes or malaise  · HEENT: no sore throat, nasal congestion, vision problems or ear problems  · Respiratory: no cough, shortness of breath, or wheezing  · Cardiovascular: no chest pain, palpitations, or dyspnea on exertion  · Gastrointestinal: no abdominal pain, N/V, change in bowel habits, or black or bloody stools  · Musculoskeletal:bilateral leg cramps and neck spasm  · Neurological: no numbness, tingling, headache or dizziness  · Endo:  No polyuria or polydipsia. · : no hematuria, dysuria, frequency, hesitancy, or nocturia. · Psychological:sad, anxiety    Allergies   Allergen Reactions    Lisinopril Cough    Lyrica [Pregabalin] Other (comments)     Medication causes dry eyes, unstable balance, and lack of concentration.      Mold Extracts Runny Nose    Pollen Extracts Itching    Statins-Hmg-Coa Reductase Inhibitors Myalgia    Topamax [Topiramate] Vertigo       PHQ Screening   3 most recent PHQ Screens 7/15/2021   Little interest or pleasure in doing things Not at all   Feeling down, depressed, irritable, or hopeless Not at all   Total Score PHQ 2 0       History  Past Medical History:   Diagnosis Date    Allergic rhinitis     Asthma 2012    +PFT for mild obstructive disease/COPD    Back pain, chronic 2012    Dr. Owen Loya referred to Dr. Dior Sandhu    [facility]     GERD (gastroesophageal reflux disease)     H/O mammogram 10/03/2017    No evidence of malignancy    Hard of hearing     chronic ruptured TM    HTN (hypertension) 2012    Hx of screening mammography 2016    [facility], normal, routine recs    Hyperlipidemia     Nausea & vomiting     Sinus congestion 2012    Spinal stenosis of lumbar region with radiculopathy 2015    Dr. Driver Cyndi       Past Surgical History:   Procedure Laterality Date    HX  SECTION      HX ORTHOPAEDIC  2018    cervical surgery(Dr. Dinesh Selby)    HX TUBAL LIGATION         Social History     Socioeconomic History    Marital status: SINGLE     Spouse name: Not on file    Number of children: Not on file    Years of education: Not on file    Highest education level: Not on file   Occupational History    Occupation: hairdresser   Tobacco Use    Smoking status: Former Smoker     Packs/day: 1.00     Types: Cigarettes     Quit date: 3/26/2020     Years since quittin.3    Smokeless tobacco: Never Used   Substance and Sexual Activity    Alcohol use: No     Comment: h/o alcohol abuse in remission    Drug use: No    Sexual activity: Never   Other Topics Concern     Service No    Blood Transfusions No    Caffeine Concern Yes    Occupational Exposure No    Hobby Hazards No    Sleep Concern No    Stress Concern No    Weight Concern No    Special Diet No    Back Care No    Exercise Yes     Comment: walking    Bike Helmet No    Seat Belt Yes    Self-Exams No   Social History Narrative    Not on file     Social Determinants of Health     Financial Resource Strain:     Difficulty of Paying Living Expenses:    Food Insecurity:     Worried About Running Out of Food in the Last Year:     Ran Out of Food in the Last Year:    Transportation Needs:     Lack of Transportation (Medical):  Lack of Transportation (Non-Medical):    Physical Activity:     Days of Exercise per Week:     Minutes of Exercise per Session:    Stress:     Feeling of Stress :    Social Connections:     Frequency of Communication with Friends and Family:     Frequency of Social Gatherings with Friends and Family:     Attends Denominational Services:     Active Member of Clubs or Organizations:     Attends Club or Organization Meetings:     Marital Status:    Intimate Partner Violence:     Fear of Current or Ex-Partner:     Emotionally Abused:     Physically Abused:     Sexually Abused:        Current Outpatient Medications   Medication Sig Dispense Refill    naproxen sodium (Aleve) 220 mg cap Take 2 Capsules by mouth daily.  vit B Cmplx 3-FA-Vit C-Biotin (NEPHRO MIK RX) 1- mg-mg-mcg tablet Take 1 Tablet by mouth daily.  aspirin delayed-release 81 mg tablet Take 81 mg by mouth. 3 x Weekly      oxymetazoline HCl (AFRIN NASAL SPRAY NA) by Nasal route.  methocarbamoL (ROBAXIN) 500 mg tablet Take 1 Tablet by mouth three (3) times daily as needed for Muscle Spasm(s). 30 Tablet 1    valACYclovir (VALTREX) 1 gram tablet Take 2 tablets by mouth every 12 hours x 1 day  Indications: cold sores 4 Tablet 1    sertraline (ZOLOFT) 50 mg tablet Take 1 Tablet by mouth daily. 30 Tablet 2    gabapentin (NEURONTIN) 300 mg capsule Take 1 Capsule by mouth three (3) times daily.  Max Daily Amount: 900 mg. 270 Capsule 1    losartan-hydroCHLOROthiazide (HYZAAR) 50-12.5 mg per tablet TAKE 1 TAB BY MOUTH DAILY. INDICATIONS: HIGH BLOOD  PRESSURE 90 Tab 3    montelukast (SINGULAIR) 10 mg tablet TAKE 1 TAB BY MOUTH DAILY. INDICATIONS: INFLAMMATION  OF THE NOSE DUE TO AN  ALLERGY 90 Tab 3    ipratropium (ATROVENT) 21 mcg (0.03 %) nasal spray  (Patient not taking: Reported on 7/15/2021)      nicotine polacrilex (NICORETTE BU) by Buccal route. (Patient not taking: Reported on 7/15/2021)      gabapentin (NEURONTIN) 300 mg capsule TAKE 1 CAPSULE BY MOUTH  TWICE DAILY (Patient not taking: Reported on 7/15/2021) 180 Cap 3    acetaminophen (TYLENOL EXTRA STRENGTH) 500 mg tablet Take 1,000 mg by mouth every eight (8) hours as needed for Pain. (Patient not taking: Reported on 7/15/2021)      Cholecalciferol, Vitamin D3, (VITAMIN D3) 2,000 unit cap capsule Take 2,000 Units by mouth daily. 30 Cap 11         Vitals:    07/15/21 1324   BP: 133/68   Pulse: 80   Resp: 16   Temp: (!) 96 °F (35.6 °C)   TempSrc: Oral   SpO2: 97%   Weight: 132 lb (59.9 kg)   Height: 4' 11\" (1.499 m)   PainSc:   0 - No pain       Physical Exam  Vitals and nursing note reviewed. Constitutional:       Appearance: Normal appearance. HENT:      Head: Normocephalic. Cardiovascular:      Rate and Rhythm: Normal rate and regular rhythm. Pulses: Normal pulses. Heart sounds: Normal heart sounds. Pulmonary:      Effort: Pulmonary effort is normal.      Breath sounds: Normal breath sounds. Abdominal:      General: Bowel sounds are normal.      Palpations: Abdomen is soft. Skin:     General: Skin is warm and dry. Neurological:      General: No focal deficit present. Mental Status: She is alert. No visits with results within 3 Month(s) from this visit. Latest known visit with results is:   Hospital Outpatient Visit on 11/30/2020   Component Date Value Ref Range Status    [facility]  11/30/2020 Specimens collected/sent to [facility]. Please direct inquiries to ([phone]). Final       No results found for any visits on 07/15/21. Patient Care Team:  Patient Care Team:  Niki Nur NP as PCP - General (Nurse Practitioner)  Niki Nur NP as PCP - Marty Mantilla Provider      Assessment / Plan:      ICD-10-CM ICD-9-CM    1. Encounter for screening mammogram for malignant neoplasm of breast  Z12.31 V76.12 JOBY MAMMO BI SCREENING INCL CAD   2. Essential hypertension  I10 401.9 LIPID PANEL      METABOLIC PANEL, COMPREHENSIVE      CBC WITH AUTOMATED DIFF   3. Attention deficit hyperactivity disorder (ADHD), combined type  F90.2 314.01 REFERRAL TO NEUROPSYCHOLOGY   4. H/O cold sores  Z86.19 V12.09 HSV 1/2 AB, IGG/IGM      valACYclovir (VALTREX) 1 gram tablet   5. Arthralgia, unspecified joint  M25.50 719.40 SED RATE (ESR)      C REACTIVE PROTEIN, QT   6. 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 Myalgia  M79.10 729.1 methocarbamoL (ROBAXIN) 500 mg tablet     Fasting labs  Sed rate and C-reactive protein ordered  Medications ordered  Referral to neuropsychologist for ADD work-up    Follow-up and Dispositions    · Return in about 6 weeks (around 8/26/2021). I asked the patient if she  had any questions and answered her  questions. The patient stated that she understands the treatment plan and agrees with the treatment plan    This document was created with a voice activated dictation system and may contain transcription errors.

## 2021-07-15 NOTE — PROGRESS NOTES
Ebony Crow presents today for   Chief Complaint   Patient presents with    New Patient     establish care    Anxiety    Hypertension     follow-up       Is someone accompanying this pt? no    Is the patient using any DME equipment during OV? no    Depression Screening:  3 most recent PHQ Screens 7/15/2021   Little interest or pleasure in doing things Not at all   Feeling down, depressed, irritable, or hopeless Not at all   Total Score PHQ 2 0       Learning Assessment:  Learning Assessment 5/11/2015   PRIMARY LEARNER Patient   HIGHEST LEVEL OF EDUCATION - PRIMARY LEARNER  DID NOT GRADUATE HIGH SCHOOL   BARRIERS PRIMARY LEARNER NONE   CO-LEARNER CAREGIVER No   PRIMARY LANGUAGE ENGLISH   LEARNER PREFERENCE PRIMARY READING   ANSWERED BY patient   RELATIONSHIP SELF       Abuse Screening:  Abuse Screening Questionnaire 9/6/2018   Do you ever feel afraid of your partner? N   Are you in a relationship with someone who physically or mentally threatens you? N   Is it safe for you to go home? Y       Fall Risk  Fall Risk Assessment, last 12 mths 7/15/2021   Able to walk? Yes   Fall in past 12 months? 1   Do you feel unsteady? 1   Are you worried about falling 1   Is TUG test greater than 12 seconds? 0   Is the gait abnormal? 0   Number of falls in past 12 months 2   Fall with injury? 0       Health Maintenance reviewed and discussed and ordered per Provider. Health Maintenance Due   Topic Date Due    DTaP/Tdap/Td series (1 - Tdap) Never done    Shingrix Vaccine Age 50> (1 of 2) Never done    Breast Cancer Screen Mammogram  02/18/2021   . Coordination of Care:  1. Have you been to the ER, urgent care clinic since your last visit? Hospitalized since your last visit? no    2. Have you seen or consulted any other health care providers outside of the [address] [place] [address] since your last visit? Include any pap smears or colon screening.  no

## 2021-07-27 ENCOUNTER — APPOINTMENT (OUTPATIENT)
Dept: FAMILY MEDICINE CLINIC | Age: 66
End: 2021-07-27

## 2021-07-27 ENCOUNTER — HOSPITAL ENCOUNTER (OUTPATIENT)
Dept: LAB | Age: 66
Discharge: HOME | End: 2021-07-27
Payer: MEDICARE

## 2021-07-27 DIAGNOSIS — M25.50 ARTHRALGIA, UNSPECIFIED JOINT: ICD-10-CM

## 2021-07-27 DIAGNOSIS — Z86.19 H/O COLD SORES: ICD-10-CM

## 2021-07-27 DIAGNOSIS — I10 ESSENTIAL HYPERTENSION: ICD-10-CM

## 2021-07-27 LAB
ALBUMIN SERPL-MCNC: 4 G/DL (ref 3.4–5)
ALBUMIN/GLOB SERPL: 1.2 {RATIO} (ref 0.8–1.7)
ALP SERPL-CCNC: 71 U/L (ref 45–117)
ALT SERPL-CCNC: 19 U/L (ref 13–56)
ANION GAP SERPL CALC-SCNC: 5 MMOL/L (ref 3–18)
AST SERPL-CCNC: 21 U/L (ref 10–38)
BASOPHILS # BLD: 0.1 K/UL (ref 0–0.1)
BASOPHILS NFR BLD: 2 % (ref 0–2)
BILIRUB SERPL-MCNC: 0.5 MG/DL (ref 0.2–1)
BUN SERPL-MCNC: 16 MG/DL (ref 7–18)
BUN/CREAT SERPL: 21 (ref 12–20)
CALCIUM SERPL-MCNC: 11.3 MG/DL (ref 8.5–10.1)
CHLORIDE SERPL-SCNC: 105 MMOL/L (ref 100–111)
CHOLEST SERPL-MCNC: 271 MG/DL
CO2 SERPL-SCNC: 28 MMOL/L (ref 21–32)
CREAT SERPL-MCNC: 0.75 MG/DL (ref 0.6–1.3)
CRP SERPL-MCNC: <0.3 MG/DL (ref 0–0.3)
DIFFERENTIAL METHOD BLD: ABNORMAL
EOSINOPHIL # BLD: 0.2 K/UL (ref 0–0.4)
EOSINOPHIL NFR BLD: 3 % (ref 0–5)
ERYTHROCYTE [DISTWIDTH] IN BLOOD BY AUTOMATED COUNT: 12.7 % (ref 11.6–14.5)
ERYTHROCYTE [SEDIMENTATION RATE] IN BLOOD: 38 MM/HR (ref 0–30)
GLOBULIN SER CALC-MCNC: 3.3 G/DL (ref 2–4)
GLUCOSE SERPL-MCNC: 80 MG/DL (ref 74–99)
HCT VFR BLD AUTO: 39.7 % (ref 35–45)
HDLC SERPL-MCNC: 63 MG/DL (ref 40–60)
HDLC SERPL: 4.3 {RATIO} (ref 0–5)
HGB BLD-MCNC: 12.9 G/DL (ref 12–16)
LDLC SERPL CALC-MCNC: 179.2 MG/DL (ref 0–100)
LIPID PROFILE,FLP: ABNORMAL
LYMPHOCYTES # BLD: 1.7 K/UL (ref 0.9–3.6)
LYMPHOCYTES NFR BLD: 22 % (ref 21–52)
MCH RBC QN AUTO: 31.2 PG (ref 24–34)
MCHC RBC AUTO-ENTMCNC: 32.5 G/DL (ref 31–37)
MCV RBC AUTO: 95.9 FL (ref 74–97)
MONOCYTES # BLD: 0.8 K/UL (ref 0.05–1.2)
MONOCYTES NFR BLD: 10 % (ref 3–10)
NEUTS SEG # BLD: 4.8 K/UL (ref 1.8–8)
NEUTS SEG NFR BLD: 64 % (ref 40–73)
PLATELET # BLD AUTO: 357 K/UL (ref 135–420)
PMV BLD AUTO: 10.5 FL (ref 9.2–11.8)
POTASSIUM SERPL-SCNC: 4.3 MMOL/L (ref 3.5–5.5)
PROT SERPL-MCNC: 7.3 G/DL (ref 6.4–8.2)
RBC # BLD AUTO: 4.14 M/UL (ref 4.2–5.3)
SODIUM SERPL-SCNC: 138 MMOL/L (ref 136–145)
TRIGL SERPL-MCNC: 144 MG/DL (ref ?–150)
VLDLC SERPL CALC-MCNC: 28.8 MG/DL
WBC # BLD AUTO: 7.6 K/UL (ref 4.6–13.2)

## 2021-07-27 PROCEDURE — 86694 HERPES SIMPLEX NES ANTBDY: CPT

## 2021-07-27 PROCEDURE — 86140 C-REACTIVE PROTEIN: CPT

## 2021-07-27 PROCEDURE — 80053 COMPREHEN METABOLIC PANEL: CPT

## 2021-07-27 PROCEDURE — 36415 COLL VENOUS BLD VENIPUNCTURE: CPT

## 2021-07-27 PROCEDURE — 85025 COMPLETE CBC W/AUTO DIFF WBC: CPT

## 2021-07-27 PROCEDURE — 86696 HERPES SIMPLEX TYPE 2 TEST: CPT

## 2021-07-27 PROCEDURE — 80061 LIPID PANEL: CPT

## 2021-07-27 PROCEDURE — 85652 RBC SED RATE AUTOMATED: CPT

## 2021-07-28 LAB — HSV1+2 IGM SER IA-ACNC: <0.91 RATIO (ref 0–0.9)

## 2021-07-29 LAB
HSV-2 IGG SUPPLEMENTAL TEST: POSITIVE
HSV2 IGG SER IA-ACNC: 2.52 INDEX (ref 0–0.9)

## 2021-08-03 PROBLEM — J44.9 CHRONIC OBSTRUCTIVE PULMONARY DISEASE (HCC): Status: RESOLVED | Noted: 2021-08-03 | Resolved: 2021-08-03

## 2021-08-03 PROBLEM — E78.5 HYPERLIPIDEMIA: Status: RESOLVED | Noted: 2021-08-03 | Resolved: 2021-08-03

## 2021-08-09 ENCOUNTER — HOSPITAL ENCOUNTER (OUTPATIENT)
Dept: MAMMOGRAPHY | Age: 66
Discharge: HOME | End: 2021-08-09
Attending: NURSE PRACTITIONER
Payer: MEDICARE

## 2021-08-09 DIAGNOSIS — Z12.31 ENCOUNTER FOR SCREENING MAMMOGRAM FOR MALIGNANT NEOPLASM OF BREAST: ICD-10-CM

## 2021-08-09 PROCEDURE — 77063 BREAST TOMOSYNTHESIS BI: CPT

## 2021-08-12 ENCOUNTER — HOSPITAL ENCOUNTER (OUTPATIENT)
Dept: LAB | Age: 66
Discharge: HOME | End: 2021-08-12
Payer: MEDICARE

## 2021-08-12 ENCOUNTER — OFFICE VISIT (OUTPATIENT)
Dept: FAMILY MEDICINE CLINIC | Age: 66
End: 2021-08-12
Payer: MEDICARE

## 2021-08-12 VITALS
HEIGHT: 59 IN | HEART RATE: 87 BPM | RESPIRATION RATE: 16 BRPM | OXYGEN SATURATION: 96 % | BODY MASS INDEX: 26.41 KG/M2 | DIASTOLIC BLOOD PRESSURE: 77 MMHG | WEIGHT: 131 LBS | TEMPERATURE: 97.5 F | SYSTOLIC BLOOD PRESSURE: 119 MMHG

## 2021-08-12 DIAGNOSIS — F41.8 ANXIOUS DEPRESSION: ICD-10-CM

## 2021-08-12 DIAGNOSIS — E83.52 HYPERCALCEMIA: Primary | ICD-10-CM

## 2021-08-12 DIAGNOSIS — E78.5 HYPERLIPIDEMIA WITH TARGET LDL LESS THAN 100: ICD-10-CM

## 2021-08-12 DIAGNOSIS — E83.52 HYPERCALCEMIA: ICD-10-CM

## 2021-08-12 PROCEDURE — G8754 DIAS BP LESS 90: HCPCS | Performed by: NURSE PRACTITIONER

## 2021-08-12 PROCEDURE — 82330 ASSAY OF CALCIUM: CPT

## 2021-08-12 PROCEDURE — G8432 DEP SCR NOT DOC, RNG: HCPCS | Performed by: NURSE PRACTITIONER

## 2021-08-12 PROCEDURE — G8419 CALC BMI OUT NRM PARAM NOF/U: HCPCS | Performed by: NURSE PRACTITIONER

## 2021-08-12 PROCEDURE — G8536 NO DOC ELDER MAL SCRN: HCPCS | Performed by: NURSE PRACTITIONER

## 2021-08-12 PROCEDURE — G8752 SYS BP LESS 140: HCPCS | Performed by: NURSE PRACTITIONER

## 2021-08-12 PROCEDURE — 3017F COLORECTAL CA SCREEN DOC REV: CPT | Performed by: NURSE PRACTITIONER

## 2021-08-12 PROCEDURE — G8428 CUR MEDS NOT DOCUMENT: HCPCS | Performed by: NURSE PRACTITIONER

## 2021-08-12 PROCEDURE — 99213 OFFICE O/P EST LOW 20 MIN: CPT | Performed by: NURSE PRACTITIONER

## 2021-08-12 PROCEDURE — 1101F PT FALLS ASSESS-DOCD LE1/YR: CPT | Performed by: NURSE PRACTITIONER

## 2021-08-12 PROCEDURE — 1090F PRES/ABSN URINE INCON ASSESS: CPT | Performed by: NURSE PRACTITIONER

## 2021-08-12 PROCEDURE — 36415 COLL VENOUS BLD VENIPUNCTURE: CPT

## 2021-08-12 PROCEDURE — G9899 SCRN MAM PERF RSLTS DOC: HCPCS | Performed by: NURSE PRACTITIONER

## 2021-08-12 PROCEDURE — G8399 PT W/DXA RESULTS DOCUMENT: HCPCS | Performed by: NURSE PRACTITIONER

## 2021-08-12 RX ORDER — EZETIMIBE 10 MG/1
10 TABLET ORAL DAILY
Qty: 90 TABLET | Refills: 1 | Status: SHIPPED | OUTPATIENT
Start: 2021-08-12 | End: 2022-01-03

## 2021-08-12 RX ORDER — FLUTICASONE PROPIONATE 50 MCG
2 SPRAY, SUSPENSION (ML) NASAL DAILY
COMMUNITY

## 2021-08-12 NOTE — PROGRESS NOTES
Luzmaria Wing is a 77 y.o. female presenting today for Anxiety and Labs (results)  . Chief Complaint   Patient presents with    Anxiety    Labs     results       HPI:  Luzmaria Wing presents to the office today for slightly follow-up. Patient also had labs which were reviewed today during the visit. Patient was complaining of anxiety and was requesting a referral to psychiatry. She notes that she has been stressed. Patient also carries a medical diagnosis for hyperlipidemia. Recent lipid panel July 27, 2021. Cholesterol 271, HDL 63 and . ROS    ROS:  History obtained from the patient intake forms which are reviewed with the patient  · General: negative for - chills, fever, weight changes or malaise  · HEENT: no sore throat, nasal congestion, vision problems or ear problems  · Respiratory: no cough, shortness of breath, or wheezing  · Cardiovascular: no chest pain, palpitations, or dyspnea on exertion  · Gastrointestinal: no abdominal pain, N/V, change in bowel habits, or black or bloody stools  · Musculoskeletal: no back pain, joint pain, joint stiffness, muscle pain or muscle weakness  · Neurological: no numbness, tingling, headache or dizziness  · Endo:  No polyuria or polydipsia. · : no hematuria, dysuria, frequency, hesitancy, or nocturia. · Psychological: negative for - anxiety, depression, sleep disturbances, suicidal or homicidal ideations    Allergies   Allergen Reactions    Lisinopril Cough    Lyrica [Pregabalin] Other (comments)     Medication causes dry eyes, unstable balance, and lack of concentration.      Mold Extracts Runny Nose    Pollen Extracts Itching    Statins-Hmg-Coa Reductase Inhibitors Myalgia    Topamax [Topiramate] Vertigo       PHQ Screening   3 most recent PHQ Screens 8/12/2021   Little interest or pleasure in doing things Not at all   Feeling down, depressed, irritable, or hopeless Not at all   Total Score PHQ 2 0       History  Past Medical History: Diagnosis Date    Allergic rhinitis     Asthma 2012    +PFT for mild obstructive disease/COPD    Back pain, chronic 2012    Dr. Sawyer Srinivasan referred to Dr. Chayito Caro    Chronic obstructive pulmonary disease ([facility]     GERD (gastroesophageal reflux disease)     H/O mammogram 10/03/2017    No evidence of malignancy    Hard of hearing     chronic ruptured TM    HTN (hypertension) 2012    Hx of screening mammography 2016    [facility], normal, routine recs    Hyperlipidemia     Nausea & vomiting     Sinus congestion 2012    Spinal stenosis of lumbar region with radiculopathy 2015    Dr. Lan Marquez       Past Surgical History:   Procedure Laterality Date    HX  SECTION      HX ORTHOPAEDIC  2018    cervical surgery(Dr. Justin Marinelli)    HX TUBAL LIGATION         Social History     Socioeconomic History    Marital status: SINGLE     Spouse name: Not on file    Number of children: Not on file    Years of education: Not on file    Highest education level: Not on file   Occupational History    Occupation: hairdresser   Tobacco Use    Smoking status: Former Smoker     Packs/day: 1.00     Types: Cigarettes     Quit date: 3/26/2020     Years since quittin.4    Smokeless tobacco: Never Used   Substance and Sexual Activity    Alcohol use: No     Comment: h/o alcohol abuse in remission    Drug use: No    Sexual activity: Never   Other Topics Concern     Service No    Blood Transfusions No    Caffeine Concern Yes    Occupational Exposure No    Hobby Hazards No    Sleep Concern No    Stress Concern No    Weight Concern No    Special Diet No    Back Care No    Exercise Yes     Comment: walking    Bike Helmet No    Seat Belt Yes    Self-Exams No   Social History Narrative    Not on file     Social Determinants of Health     Financial Resource Strain:     Difficulty of Paying Living Expenses:    Food Insecurity:     Worried About Running Out of Food in the Last Year:     Ran Out of Food in the Last Year:    Transportation Needs:     Lack of Transportation (Medical):  Lack of Transportation (Non-Medical):    Physical Activity:     Days of Exercise per Week:     Minutes of Exercise per Session:    Stress:     Feeling of Stress :    Social Connections:     Frequency of Communication with Friends and Family:     Frequency of Social Gatherings with Friends and Family:     Attends Buddhism Services:     Active Member of Clubs or Organizations:     Attends Club or Organization Meetings:     Marital Status:    Intimate Partner Violence:     Fear of Current or Ex-Partner:     Emotionally Abused:     Physically Abused:     Sexually Abused:        Current Outpatient Medications   Medication Sig Dispense Refill    fluticasone propionate (Flonase Allergy Relief) 50 mcg/actuation nasal spray 2 Sprays by Both Nostrils route daily.  ezetimibe (ZETIA) 10 mg tablet Take 1 Tablet by mouth daily. 90 Tablet 1    naproxen sodium (Aleve) 220 mg cap Take 2 Capsules by mouth daily.  vit B Cmplx 3-FA-Vit C-Biotin (NEPHRO MIK RX) 1- mg-mg-mcg tablet Take 1 Tablet by mouth daily.  aspirin delayed-release 81 mg tablet Take 81 mg by mouth. 3 x Weekly      valACYclovir (VALTREX) 1 gram tablet Take 2 tablets by mouth every 12 hours x 1 day  Indications: cold sores 4 Tablet 1    gabapentin (NEURONTIN) 300 mg capsule TAKE 1 CAPSULE BY MOUTH  TWICE DAILY 180 Cap 3    losartan-hydroCHLOROthiazide (HYZAAR) 50-12.5 mg per tablet TAKE 1 TAB BY MOUTH DAILY. INDICATIONS: HIGH BLOOD  PRESSURE 90 Tab 3    montelukast (SINGULAIR) 10 mg tablet TAKE 1 TAB BY MOUTH DAILY. INDICATIONS: INFLAMMATION  OF THE NOSE DUE TO AN  ALLERGY 90 Tab 3    Cholecalciferol, Vitamin D3, (VITAMIN D3) 2,000 unit cap capsule Take 2,000 Units by mouth daily. 30 Cap 11    gabapentin (NEURONTIN) 300 mg capsule Take 1 Capsule by mouth three (3) times daily.  Max Daily Amount: 900 mg. 270 Capsule 1    acetaminophen (TYLENOL EXTRA STRENGTH) 500 mg tablet Take 1,000 mg by mouth every eight (8) hours as needed for Pain. (Patient not taking: Reported on 7/15/2021)           Vitals:    08/12/21 1140   BP: 119/77   Pulse: 87   Resp: 16   Temp: 97.5 °F (36.4 °C)   TempSrc: Oral   SpO2: 96%   Weight: 131 lb (59.4 kg)   Height: 4' 11\" (1.499 m)   PainSc:   0 - No pain       Physical Exam    Hospital Outpatient Visit on 08/12/2021   Component Date Value Ref Range Status    Calcium, ionized 08/12/2021 6.1* 4.5 - 5.6 mg/dL Final    Comment: (NOTE)  Performed At: [address] [id number]  DIANA Segovia Hunter 97 OT:[id number]     Hospital Outpatient Visit on 07/27/2021   Component Date Value Ref Range Status    WBC 07/27/2021 7.6  4.6 - 13.2 K/uL Final    RBC 07/27/2021 4.14* 4.20 - 5.30 M/uL Final    HGB 07/27/2021 12.9  12.0 - 16.0 g/dL Final    HCT 07/27/2021 39.7  35.0 - 45.0 % Final    MCV 07/27/2021 95.9  74.0 - 97.0 FL Final    MCH 07/27/2021 31.2  24.0 - 34.0 PG Final    MCHC 07/27/2021 32.5  31.0 - 37.0 g/dL Final    RDW 07/27/2021 12.7  11.6 - 14.5 % Final    PLATELET 00/87/9236 242  135 - 420 K/uL Final    MPV 07/27/2021 10.5  9.2 - 11.8 FL Final    NEUTROPHILS 07/27/2021 64  40 - 73 % Final    LYMPHOCYTES 07/27/2021 22  21 - 52 % Final    MONOCYTES 07/27/2021 10  3 - 10 % Final    EOSINOPHILS 07/27/2021 3  0 - 5 % Final    BASOPHILS 07/27/2021 2  0 - 2 % Final    ABS. NEUTROPHILS 07/27/2021 4.8  1.8 - 8.0 K/UL Final    ABS. LYMPHOCYTES 07/27/2021 1.7  0.9 - 3.6 K/UL Final    ABS. MONOCYTES 07/27/2021 0.8  0.05 - 1.2 K/UL Final    ABS. EOSINOPHILS 07/27/2021 0.2  0.0 - 0.4 K/UL Final    ABS.  BASOPHILS 07/27/2021 0.1  0.0 - 0.1 K/UL Final    DF 07/27/2021 AUTOMATED    Final    C-Reactive protein 07/27/2021 <0.3  0 - 0.3 mg/dL Final    Sed rate, automated 07/27/2021 38* 0 - 30 mm/hr Final    LIPID PROFILE 07/27/2021        Final    Cholesterol, total 07/27/2021 271* <200 MG/DL Final    Triglyceride 07/27/2021 144  <150 MG/DL Final    Comment: The drugs N-acetylcysteine (NAC) and  Metamiszole have been found to cause falsely  low results in this chemical assay. Please  be sure to submit blood samples obtained  BEFORE administration of either of these  drugs to assure correct results.  HDL Cholesterol 07/27/2021 63* 40 - 60 MG/DL Final    LDL, calculated 07/27/2021 179.2* 0 - 100 MG/DL Final    VLDL, calculated 07/27/2021 28.8  MG/DL Final    CHOL/HDL Ratio 07/27/2021 4.3  0 - 5.0   Final    Sodium 07/27/2021 138  136 - 145 mmol/L Final    Potassium 07/27/2021 4.3  3.5 - 5.5 mmol/L Final    Chloride 07/27/2021 105  100 - 111 mmol/L Final    CO2 07/27/2021 28  21 - 32 mmol/L Final    Anion gap 07/27/2021 5  3.0 - 18 mmol/L Final    Glucose 07/27/2021 80  74 - 99 mg/dL Final    SPECIMEN LIPEMIC    BUN 07/27/2021 16  7.0 - 18 MG/DL Final    Creatinine 07/27/2021 0.75  0.6 - 1.3 MG/DL Final    BUN/Creatinine ratio 07/27/2021 21* 12 - 20   Final    GFR est AA 07/27/2021 >60  >60 ml/min/1.73m2 Final    GFR est non-AA 07/27/2021 >60  >60 ml/min/1.73m2 Final    Comment: (NOTE)  Estimated GFR is calculated using the Modification of Diet in Renal   Disease (MDRD) Study equation, reported for both  Americans   (GFRAA) and non- Americans (GFRNA), and normalized to 1.73m2   body surface area. The physician must decide which value applies to   the patient. The MDRD study equation should only be used in   individuals age 25 or older. It has not been validated for the   following: pregnant women, patients with serious comorbid conditions,   or on certain medications, or persons with extremes of body size,   muscle mass, or nutritional status.       Calcium 07/27/2021 11.3* 8.5 - 10.1 MG/DL Final    Bilirubin, total 07/27/2021 0.5  0.2 - 1.0 MG/DL Final    ALT (SGPT) 07/27/2021 19  13 - 56 U/L Final    AST (SGOT) 07/27/2021 21  10 - 38 U/L Final    Alk. phosphatase 07/27/2021 71  45 - 117 U/L Final    Protein, total 07/27/2021 7.3  6.4 - 8.2 g/dL Final    Albumin 07/27/2021 4.0  3.4 - 5.0 g/dL Final    Globulin 07/27/2021 3.3  2.0 - 4.0 g/dL Final    A-G Ratio 07/27/2021 1.2  0.8 - 1.7   Final    HSV 2 Ab IgG, type spec. 07/27/2021 2.52* 0.00 - 0.90 index Final    Comment: (NOTE)                                  Negative        <0.91                                  Equivocal 0.91 - 1.09                                  Positive        >1.09  Note: Negative indicates no antibodies detected to  HSV-2. Equivocal may suggest early infection. If  clinically appropriate, retest at later date. Positive  indicates antibodies detected to HSV-2. Performed At: [address] [id number]  Bhavna Peraza MD CD:[id number]      HSV I/II Ab, IgM 07/27/2021 <0.91  0.00 - 0.90 Ratio Final    Comment: (NOTE)                                  Negative        <0.91                                  Equivocal 0.91 - 1.09                                  Positive        >1.09  Performed At: [address] [id number]  Bhavna Peraza MD PH:[id number]      HSV-2 IgG Supplemental Test 07/27/2021 Positive* Negative   Final    Comment: (NOTE)  HSV-2 IgG          HSV-2 IgG  Type Specific      Confirmation      Interpretation  -------------------------------------------------------  Positive/Equivocal   Positive    Indicates the presence                                  of detectable IgG                                  antibodies to HSV-2.  -------------------------------------------------------  Positive/Equivocal   Negative    Unable to confirm the                                  presence of IgG                                  antibodies to HSV-2.                                   Recommend retesting                                  in 2-4 weeks.  -------------------------------------------------------  Performed At: [facility]  [facility], [address] [place] [id number]  Sanchez Lepe MD IL:[id number]         Results for orders placed or performed during the hospital encounter of 08/12/21   CALCIUM, IONIZED   Result Value Ref Range    Calcium, ionized 6.1 (H) 4.5 - 5.6 mg/dL       Patient Care Team:  Patient Care Team:  Ge Brown NP as PCP - General (Nurse Practitioner)  Ge Brown NP as PCP - [facility] Provider      Assessment / Plan:      ICD-10-CM ICD-9-CM    1. Hypercalcemia  E83.52 275.42 CALCIUM, IONIZED      CALCIUM, IONIZED   2. 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 I asked the patient if she  had any questions and answered her  questions. The patient stated that she understands the treatment plan and agrees with the treatment plan    This document was created with a voice activated dictation system and may contain transcription errors.

## 2021-08-12 NOTE — PROGRESS NOTES
Mauro Henley presents today for   Chief Complaint   Patient presents with   Arsalan Phipps 105     results       Is someone accompanying this pt? no    Is the patient using any DME equipment during [address]? no    Depression Screening:  3 most recent PHQ Screens 8/12/2021   Little interest or pleasure in doing things Not at all   Feeling down, depressed, irritable, or hopeless Not at all   Total Score PHQ 2 0       Learning Assessment:  Learning Assessment 5/11/2015   PRIMARY LEARNER Patient   HIGHEST LEVEL OF EDUCATION - PRIMARY LEARNER  DID NOT GRADUATE HIGH SCHOOL   BARRIERS PRIMARY LEARNER NONE   CO-LEARNER CAREGIVER No   PRIMARY LANGUAGE ENGLISH   LEARNER PREFERENCE PRIMARY READING   ANSWERED BY patient   RELATIONSHIP SELF       Abuse Screening:  Abuse Screening Questionnaire 8/12/2021   Do you ever feel afraid of your partner? N   Are you in a relationship with someone who physically or mentally threatens you? N   Is it safe for you to go home? Y       Fall Risk  Fall Risk Assessment, last 12 mths 8/12/2021   Able to walk? Yes   Fall in past 12 months? 1   Do you feel unsteady? 0   Are you worried about falling 0   Is TUG test greater than 12 seconds? 0   Is the gait abnormal? 0   Number of falls in past 12 months 2   Fall with injury? 0       Health Maintenance reviewed and discussed and ordered per Provider. Health Maintenance Due   Topic Date Due    DTaP/Tdap/Td series (1 - Tdap) Never done    Shingrix Vaccine Age 50> (1 of 2) Never done    Colorectal Cancer Screening Combo  08/21/2021   . Coordination of Care:  1. Have you been to the ER, urgent care clinic since your last visit? Hospitalized since your last visit? no    2. Have you seen or consulted any other health care providers outside of the [address] [place] [address] since your last visit? Include any pap smears or colon screening.  no

## 2021-08-13 LAB — CA-I SERPL ISE-MCNC: 6.1 MG/DL (ref 4.5–5.6)

## 2021-09-03 DIAGNOSIS — E83.52 HYPERCALCEMIA: Primary | ICD-10-CM

## 2021-09-14 RX ORDER — MONTELUKAST SODIUM 10 MG/1
TABLET ORAL
Qty: 90 TABLET | Refills: 3 | Status: SHIPPED | OUTPATIENT
Start: 2021-09-14 | End: 2022-06-21 | Stop reason: SDUPTHER

## 2021-10-11 ENCOUNTER — OFFICE VISIT (OUTPATIENT)
Dept: ORTHOPEDIC SURGERY | Age: 66
End: 2021-10-11
Payer: MEDICARE

## 2021-10-11 VITALS
HEART RATE: 84 BPM | HEIGHT: 59 IN | TEMPERATURE: 97.5 F | WEIGHT: 130 LBS | BODY MASS INDEX: 26.21 KG/M2 | OXYGEN SATURATION: 98 %

## 2021-10-11 DIAGNOSIS — M48.062 SPINAL STENOSIS OF LUMBAR REGION WITH NEUROGENIC CLAUDICATION: Primary | ICD-10-CM

## 2021-10-11 PROCEDURE — 1090F PRES/ABSN URINE INCON ASSESS: CPT | Performed by: PHYSICAL MEDICINE & REHABILITATION

## 2021-10-11 PROCEDURE — G8399 PT W/DXA RESULTS DOCUMENT: HCPCS | Performed by: PHYSICAL MEDICINE & REHABILITATION

## 2021-10-11 PROCEDURE — 99213 OFFICE O/P EST LOW 20 MIN: CPT | Performed by: PHYSICAL MEDICINE & REHABILITATION

## 2021-10-11 PROCEDURE — 1101F PT FALLS ASSESS-DOCD LE1/YR: CPT | Performed by: PHYSICAL MEDICINE & REHABILITATION

## 2021-10-11 PROCEDURE — G8536 NO DOC ELDER MAL SCRN: HCPCS | Performed by: PHYSICAL MEDICINE & REHABILITATION

## 2021-10-11 PROCEDURE — G8432 DEP SCR NOT DOC, RNG: HCPCS | Performed by: PHYSICAL MEDICINE & REHABILITATION

## 2021-10-11 PROCEDURE — 3017F COLORECTAL CA SCREEN DOC REV: CPT | Performed by: PHYSICAL MEDICINE & REHABILITATION

## 2021-10-11 PROCEDURE — G9899 SCRN MAM PERF RSLTS DOC: HCPCS | Performed by: PHYSICAL MEDICINE & REHABILITATION

## 2021-10-11 PROCEDURE — G8427 DOCREV CUR MEDS BY ELIG CLIN: HCPCS | Performed by: PHYSICAL MEDICINE & REHABILITATION

## 2021-10-11 PROCEDURE — G8419 CALC BMI OUT NRM PARAM NOF/U: HCPCS | Performed by: PHYSICAL MEDICINE & REHABILITATION

## 2021-10-11 PROCEDURE — G8756 NO BP MEASURE DOC: HCPCS | Performed by: PHYSICAL MEDICINE & REHABILITATION

## 2021-10-11 PROCEDURE — 20552 NJX 1/MLT TRIGGER POINT 1/2: CPT | Performed by: PHYSICAL MEDICINE & REHABILITATION

## 2021-10-11 RX ORDER — FLUOXETINE 10 MG/1
10 CAPSULE ORAL DAILY
COMMUNITY
Start: 2021-09-29 | End: 2022-05-17 | Stop reason: SDUPTHER

## 2021-10-11 RX ORDER — BETAMETHASONE SODIUM PHOSPHATE AND BETAMETHASONE ACETATE 3; 3 MG/ML; MG/ML
12 INJECTION, SUSPENSION INTRA-ARTICULAR; INTRALESIONAL; INTRAMUSCULAR; SOFT TISSUE ONCE
Status: COMPLETED | OUTPATIENT
Start: 2021-10-11 | End: 2021-10-11

## 2021-10-11 RX ORDER — METHOCARBAMOL 500 MG/1
500 TABLET, FILM COATED ORAL
COMMUNITY
Start: 2021-09-07 | End: 2022-02-10 | Stop reason: SDUPTHER

## 2021-10-11 RX ORDER — LIDOCAINE HYDROCHLORIDE 20 MG/ML
0.5 INJECTION, SOLUTION INFILTRATION; PERINEURAL ONCE
Status: COMPLETED | OUTPATIENT
Start: 2021-10-11 | End: 2021-10-11

## 2021-10-11 RX ORDER — BUPIVACAINE HYDROCHLORIDE 2.5 MG/ML
0.5 INJECTION, SOLUTION INFILTRATION; PERINEURAL ONCE
Status: COMPLETED | OUTPATIENT
Start: 2021-10-11 | End: 2021-10-11

## 2021-10-11 RX ADMIN — BUPIVACAINE HYDROCHLORIDE 1.25 MG: 2.5 INJECTION, SOLUTION INFILTRATION; PERINEURAL at 15:54

## 2021-10-11 RX ADMIN — LIDOCAINE HYDROCHLORIDE 10 MG: 20 INJECTION, SOLUTION INFILTRATION; PERINEURAL at 15:54

## 2021-10-11 RX ADMIN — BETAMETHASONE SODIUM PHOSPHATE AND BETAMETHASONE ACETATE 12 MG: 3; 3 INJECTION, SUSPENSION INTRA-ARTICULAR; INTRALESIONAL; INTRAMUSCULAR; SOFT TISSUE at 15:53

## 2021-10-11 NOTE — LETTER
10/11/2021    Patient: Tamika Lieberman   YOB: 1955   Date of Visit: 10/11/2021     Levi Rader NP  [address] [place] [address] 36    Dear Levi Rader NP,      Thank you for referring Ms. Tamika Lieberman to [facility] for evaluation. My notes for this consultation are attached. If you have questions, please do not hesitate to call me. I look forward to following your patient along with you.       Sincerely,    Ernst Marie MD

## 2021-10-11 NOTE — PROGRESS NOTES
[address], [address]  [place], [address] [place] [address] Street  Phone: [phone]  Fax: [phone]        Orlando Martin  : 1955  PCP: Deny Denson NP    PROGRESS NOTE      ASSESSMENT AND PLAN    Diagnoses and all orders for this visit:    1. Spinal stenosis of lumbar region with neurogenic claudication  -     INJECT TRIGGER POINT, 1 OR 2  -     lidocaine (XYLOCAINE) 20 mg/mL (2 %) injection 10 mg  -     betamethasone (CELESTONE) injection 12 mg  -     bupivacaine HCl (MARCAINE) 0.25 % (2.5 mg/mL) injection 1.25 mg        1. 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 She is managing but has limited walking tolerance. 2. B/L iliolumbar TPI in office today. 3. Continue Gabapentin  4. Advised to continue HEP as tolerated. 5. Discussed new MRI, last MRI . Discussed GRETCHEN/surgery. Patient will let me know if she wishes to proceed. HISTORY OF PRESENT ILLNESS      Judy Galvez is a 74 y.o. female presents for follow up of back pain. LV given B/L iliolumbar TPI, given Gabapentin 300 mg TID    She reports that her pain has improved. She notes a 6 hour standing tolerance but a short walking tolerance. She received great benefit from the iliolumbar TPI. Patient has been taking Gabapentin once a day for the last month, takes more if necessary. . She is compliant with her HEP. Denies neurogenic bowel or bladder changes. No recent prednisone or infections.     Pain Assessment  10/11/2021   Location of Pain Back   Pain Location Comment -   Location Modifiers -   Severity of Pain 1   Quality of Pain Dull   Quality of Pain Comment -   Duration of Pain -   Frequency of Pain Several days a week   Aggravating Factors Other (Comment)   Aggravating Factors Comment depends on how i turn   Limiting Behavior Some   Relieving Factors Other (Comment)   Relieving Factors Comment don't do things that make me hurt, back injection Result of Injury -         Investigations:  2015 L MRI severe stenosis L4/L5/S1    Treatments:  PT HEP (5K deductible)  Beneficial meds: Gabapentin  Failed medications: Topamax - vertigo, Lyrica - dry eyes, imbalance,   Spinal injections: Yes GRETCHEN L4-5 2016 by me, GRETCHEN L5-S1 2017  Spinal surgery- Yes. C3-4-5-6-7 lami fusion Dr. Claudia Saravia 2/2018    Pt lives alone. Works as .  Has a $5000 deductible with her Medicare plan    PHYSICAL EXAMINATION    Visit Vitals  Pulse 84   Temp 97.5 °F (36.4 °C) (Skin)   Ht 4' 11\" (1.499 m)   Wt 130 lb (59 kg)   SpO2 98% Comment: RA   BMI 26.26 kg/m²       Good ROM lumbar spine  LE strength intact  SLR negative  Tender B/L iliolumbar      [facility]  OFFICE PROCEDURE PROGRESS NOTE      PROCEDURE: In the office today after informed consent using aseptic technique, the patient was injected with a total of 2 cc of 6 mg/cc Celestone, 0.5 cc each of Lidocaine and Marcaine into her B/L iliolumbar trigger point. Chart reviewed for the following:   IDr. Elena, have reviewed the History, Physical and updated the Allergic reactions for Hettie Nim. Local measures (ice/heat) and medications have not alleviated the symptoms. TIME OUT performed immediately prior to start of procedure:   Dr. Elena MONGE, have performed the following reviews on Hettie Nim prior to the start of the procedure:       Patient denies any recent fevers, chills, antibiotics, recent cortisone injections, or infections.         * Patient was identified by name and date of birth   * Agreement on procedure being performed was verified  * Risks and Benefits explained to the patient  * Procedure site verified and marked as necessary  * Patient was positioned for comfort  * Consent was signed and verified     Time: 3:15 PM    Date of procedure: 10/11/2021    Procedure performed by:  Umer Josue MD    Provider assisted by: None    Patient assisted by: Self    How tolerated by patient: Pt tolerated the procedure well with no complications. Ms. Jemma Parker may have a reminder for a \"due or due soon\" health maintenance. I have asked that she contact her primary care provider for follow-up on this health maintenance. This note was created using Dragon transcription software, unintended errors may be present.       Written by Haley Jernigan, as dictated by Carmina Ruth MD.

## 2021-10-11 NOTE — PROGRESS NOTES
Kristi Valle presents today for   Chief Complaint   Patient presents with    Back Pain       Is someone accompanying this pt? no    Is the patient using any DME equipment during OV? no    Depression Screening:  3 most recent PHQ Screens 8/12/2021   Little interest or pleasure in doing things Not at all   Feeling down, depressed, irritable, or hopeless Not at all   Total Score PHQ 2 0       Learning Assessment:  Learning Assessment 5/11/2015   PRIMARY LEARNER Patient   HIGHEST LEVEL OF EDUCATION - PRIMARY LEARNER  DID NOT GRADUATE HIGH SCHOOL   BARRIERS PRIMARY LEARNER NONE   CO-LEARNER CAREGIVER No   PRIMARY LANGUAGE ENGLISH   LEARNER PREFERENCE PRIMARY READING   ANSWERED BY patient   RELATIONSHIP SELF       Abuse Screening:  Abuse Screening Questionnaire 8/12/2021   Do you ever feel afraid of your partner? N   Are you in a relationship with someone who physically or mentally threatens you? N   Is it safe for you to go home? Y       Fall Risk  Fall Risk Assessment, last 12 mths 8/12/2021   Able to walk? Yes   Fall in past 12 months? 1   Do you feel unsteady? 0   Are you worried about falling 0   Is TUG test greater than 12 seconds? 0   Is the gait abnormal? 0   Number of falls in past 12 months 2   Fall with injury? 0       Coordination of Care:  1. Have you been to the ER, urgent care clinic since your last visit? no  Hospitalized since your last visit? no    2. Have you seen or consulted any other health care providers outside of the [address] [place] [address] since your last visit? Yes, psychiatry, pcp Include any pap smears or colon screening.  no PROGRESS NOTE    [id number]  DATE: 3/16/2021  SURGEON: Dr. April Goyal: Sowmya Chapman MD    HD: # 12    ASSESSMENT    Patient Active Problem List   Diagnosis    Migraine    [facility]piate dependence ([facility]    Chronic pain    Obesity    Patient overweight    Lumbar radicular pain    Lumbar disc disease    Closed nondisplaced fracture of fifth metatarsal bone of right foot    Intra-abdominal abscess ([facility] [address]     S/p 3/5/21 ex lap, abd wash out, antrectomy with BII reconstruction, MILTON drain placement x2  S/p 3/11/21 ex lap, abd washout and midline fascial closure with retention sutures     [facility]    1. Neuro- Analgesia multimodal pain therapy, wean from IV as able   2. CV- continue to monitor BP/HR, heath Hgb  3. Resp- continue to encourage incentive spirometry and deep breathing every hour while awake per RT and RN  4. GI-advance to gen diet, cont bowel regimen, heath bowel function   5. Wound care: Daily dressing changes, pack with plane packing, dry dressing to midline, strip MILTON drain and record output  6. Renal-voiding independently, heath strict I/Os  7. Msk- continue to encourage ambulation/activity, PT/OT eval and Tx   8. ID- cont zosyn day , heath WBC, heath for fevers   9. Ppx- Lovenox for DVT ppx, GI ppx protonix BID, Endo BG checks   10. Dispo- pending toleration of diet     SUBJECTIVE  Patient seen and examined at bedside, no overnight events. Patient reports stable mild abdominal pain, controlled on MMT. +flatus. Tolerating FLD, has an appetite. Denies CP, S OB, N/V, C/F.  Afebrile, T-max 36.9    MILTON drain 300ml, SS fluid in bulb x24hr    OBJECTIVE  VITALS: Temp: Temp: 98.2 °F (36.8 °C)Temp  Av °F (36.7 °C)  Min: 97 °F (36.1 °C)  Max: 98.4 °F (52.6 °C) BP Systolic (36GLI), FZ , Min:138 , JGA:152   Diastolic (05JMD), GCJ:670, Min:95, Max:107   Pulse Pulse  Av.3  Min: 96  Max: 109 Resp Resp  Avg: 17.6  Min: 14  Max: 21 Pulse ox SpO2  Av.3 %  Min: 92 %  Max: 96 %  GENERAL: alert, no distress  NEURO: Follows commands, no focal findings  HEENT: Normocephalic, EOMI  LUNGS: Equal chest rise and fall, nonlabored breathing on 2L NC  HEART: Mild tachycardia  ABDOMEN: soft, appropriately-tender, mild distention, surgical dressing dry and intact, no rebound, no guarding or peritoneal signs present, left-sided MILTON drain with serosanguineous output in bulb  EXTERMITY: no cyanosis, clubbing or edema    I/O last 3 completed shifts:  In: -   Out: 300 [Drains:300]    Drain/tube output: In: -   Out: 300 [Drains:300]    LAB:  CBC:   Recent Labs     21  0606 03/15/21  0630 03/16/21  0638   WBC 15.9* 11.3 12.2*   HGB 10.2* 10.4* 11.2*   HCT 33.8* 34.1* 36.0*   MCV 91.8 90.2 88.9    460* 502*     BMP:   Recent Labs     21  0606 03/15/21  0630 03/16/21  0638   * 136 135   K 3.5* 3.6* 4.0   CL 98 100 100   CO2 25 28 26   BUN 9 6 4*   CREATININE 0.48* 0.48* 0.62   GLUCOSE 54* 85 93     COAGS: No results for input(s): APTT, PROT, INR in the last 72 hours. RADIOLOGY:    Leighann Lambert DO  3/12/21, 12:34 PM      Attending Note      I have reviewed the above [facility] note(s) and I either performed the key elements of the medical history and physical exam or was present with the trauma resident when the key elements of the medical history and physical exam were performed. I have discussed the findings, established the care plan and recommendations with the trauma team.  States walking around unit. Pain still present but improving a bit. Advance diet.     Jez Holley MD  3/16/2021  2:30 PM

## 2021-10-13 ENCOUNTER — TELEPHONE (OUTPATIENT)
Dept: ORTHOPEDIC SURGERY | Age: 66
End: 2021-10-13

## 2021-10-13 DIAGNOSIS — M54.50 LUMBAR PAIN: ICD-10-CM

## 2021-10-13 DIAGNOSIS — M48.062 SPINAL STENOSIS OF LUMBAR REGION WITH NEUROGENIC CLAUDICATION: ICD-10-CM

## 2021-10-13 DIAGNOSIS — M48.062 SPINAL STENOSIS OF LUMBAR REGION WITH NEUROGENIC CLAUDICATION: Primary | ICD-10-CM

## 2021-10-13 NOTE — TELEPHONE ENCOUNTER
Patient called for Coty Jang. Patient said that she checked with her [facility] EDIL plan and they are approving her mri. Patient is asking if Coty Jang can order the mri of her Lumbar Spine. Patient said she would like to have the mri done at [facility]. Patient tel. [phone].

## 2021-10-13 NOTE — TELEPHONE ENCOUNTER
I called and spoke to the pt. She was informed that the order has been placed for the MRI. She will be contacted by the scheduling dept in a couple of days about the MRI. The pt verbalized understanding and has no questions or concerns at this time.

## 2021-10-20 ENCOUNTER — TRANSCRIBE ORDER (OUTPATIENT)
Dept: SCHEDULING | Age: 66
End: 2021-10-20

## 2021-10-20 DIAGNOSIS — H90.0 CONDUCTIVE HEARING LOSS, BILATERAL: Primary | ICD-10-CM

## 2021-10-29 NOTE — TELEPHONE ENCOUNTER
Requested Prescriptions     Pending Prescriptions Disp Refills    valACYclovir (Valtrex) 500 mg tablet 60 Tablet 3     Sig: Take 1 Tablet by mouth two (2) times a day.

## 2021-11-03 RX ORDER — VALACYCLOVIR HYDROCHLORIDE 500 MG/1
500 TABLET, FILM COATED ORAL 2 TIMES DAILY
Qty: 60 TABLET | Refills: 3 | Status: SHIPPED | OUTPATIENT
Start: 2021-11-03 | End: 2022-01-17 | Stop reason: ALTCHOICE

## 2021-12-21 DIAGNOSIS — M48.062 SPINAL STENOSIS OF LUMBAR REGION WITH NEUROGENIC CLAUDICATION: ICD-10-CM

## 2021-12-21 DIAGNOSIS — Z98.1 HISTORY OF FUSION OF CERVICAL SPINE: ICD-10-CM

## 2021-12-21 RX ORDER — GABAPENTIN 300 MG/1
300 CAPSULE ORAL
Qty: 90 CAPSULE | Refills: 0 | Status: SHIPPED | OUTPATIENT
Start: 2021-12-21 | End: 2021-12-21 | Stop reason: SDUPTHER

## 2021-12-21 NOTE — TELEPHONE ENCOUNTER
Last visit 10/11/2021 MD eBulah Lucas   Next appointment 01/07/2022 MD Beulah Lucas   Previous refill encounter(s)   07/11/2021 Neurontin #270 with 1 refill - pt reported taking 1 cap nightly as of 10/11/2021. No access to      Requested Prescriptions     Pending Prescriptions Disp Refills    gabapentin (NEURONTIN) 300 mg capsule 90 Capsule 0     Sig: Take 1 Capsule by mouth nightly. Max Daily Amount: 300 mg.

## 2021-12-21 NOTE — TELEPHONE ENCOUNTER
NP  Juab,          The transmission to pharmacy failed. Please resend.      E-Prescribing Status: Transmission to pharmacy failed (12/21/2021  4:04 PM EST)

## 2021-12-22 RX ORDER — GABAPENTIN 300 MG/1
300 CAPSULE ORAL
Qty: 90 CAPSULE | Refills: 0 | Status: SHIPPED | OUTPATIENT
Start: 2021-12-22 | End: 2022-01-17 | Stop reason: SDUPTHER

## 2021-12-27 ENCOUNTER — HOSPITAL ENCOUNTER (OUTPATIENT)
Dept: MRI IMAGING | Age: 66
Discharge: HOME | End: 2021-12-27
Attending: NURSE PRACTITIONER
Payer: MEDICARE

## 2021-12-27 PROCEDURE — 72148 MRI LUMBAR SPINE W/O DYE: CPT

## 2022-01-17 ENCOUNTER — OFFICE VISIT (OUTPATIENT)
Dept: ORTHOPEDIC SURGERY | Age: 67
End: 2022-01-17
Payer: MEDICARE

## 2022-01-17 VITALS
BODY MASS INDEX: 27.01 KG/M2 | TEMPERATURE: 97.2 F | WEIGHT: 134 LBS | HEART RATE: 73 BPM | RESPIRATION RATE: 18 BRPM | OXYGEN SATURATION: 96 % | HEIGHT: 59 IN

## 2022-01-17 DIAGNOSIS — M47.816 LUMBAR FACET ARTHROPATHY: ICD-10-CM

## 2022-01-17 DIAGNOSIS — Z98.1 HISTORY OF FUSION OF CERVICAL SPINE: ICD-10-CM

## 2022-01-17 DIAGNOSIS — M48.062 SPINAL STENOSIS OF LUMBAR REGION WITH NEUROGENIC CLAUDICATION: Primary | ICD-10-CM

## 2022-01-17 PROBLEM — M47.12 CERVICAL SPONDYLOSIS WITH MYELOPATHY: Status: RESOLVED | Noted: 2018-02-28 | Resolved: 2022-01-17

## 2022-01-17 PROBLEM — M48.8X2 OSSIFICATION OF POSTERIOR LONGITUDINAL LIGAMENT IN CERVICAL REGION (HCC): Status: RESOLVED | Noted: 2018-01-08 | Resolved: 2022-01-17

## 2022-01-17 PROBLEM — M48.02 CERVICAL SPINAL STENOSIS: Status: RESOLVED | Noted: 2018-01-08 | Resolved: 2022-01-17

## 2022-01-17 PROBLEM — M48.02 CERVICAL STENOSIS OF SPINE: Status: RESOLVED | Noted: 2018-02-28 | Resolved: 2022-01-17

## 2022-01-17 PROCEDURE — 1101F PT FALLS ASSESS-DOCD LE1/YR: CPT | Performed by: PHYSICAL MEDICINE & REHABILITATION

## 2022-01-17 PROCEDURE — G8432 DEP SCR NOT DOC, RNG: HCPCS | Performed by: PHYSICAL MEDICINE & REHABILITATION

## 2022-01-17 PROCEDURE — G8419 CALC BMI OUT NRM PARAM NOF/U: HCPCS | Performed by: PHYSICAL MEDICINE & REHABILITATION

## 2022-01-17 PROCEDURE — G8756 NO BP MEASURE DOC: HCPCS | Performed by: PHYSICAL MEDICINE & REHABILITATION

## 2022-01-17 PROCEDURE — G8427 DOCREV CUR MEDS BY ELIG CLIN: HCPCS | Performed by: PHYSICAL MEDICINE & REHABILITATION

## 2022-01-17 PROCEDURE — G8536 NO DOC ELDER MAL SCRN: HCPCS | Performed by: PHYSICAL MEDICINE & REHABILITATION

## 2022-01-17 PROCEDURE — 1090F PRES/ABSN URINE INCON ASSESS: CPT | Performed by: PHYSICAL MEDICINE & REHABILITATION

## 2022-01-17 PROCEDURE — G9899 SCRN MAM PERF RSLTS DOC: HCPCS | Performed by: PHYSICAL MEDICINE & REHABILITATION

## 2022-01-17 PROCEDURE — 3017F COLORECTAL CA SCREEN DOC REV: CPT | Performed by: PHYSICAL MEDICINE & REHABILITATION

## 2022-01-17 PROCEDURE — 99214 OFFICE O/P EST MOD 30 MIN: CPT | Performed by: PHYSICAL MEDICINE & REHABILITATION

## 2022-01-17 PROCEDURE — G8399 PT W/DXA RESULTS DOCUMENT: HCPCS | Performed by: PHYSICAL MEDICINE & REHABILITATION

## 2022-01-17 RX ORDER — GABAPENTIN 300 MG/1
300 CAPSULE ORAL
Qty: 270 CAPSULE | Refills: 1 | Status: SHIPPED | OUTPATIENT
Start: 2022-01-17 | End: 2022-01-25 | Stop reason: DRUGHIGH

## 2022-01-17 NOTE — PROGRESS NOTES
[address]  [address], [address]  [place], [facility] [address]  Phone: [phone]  Fax: [phone]        Nina Corbin  : 1955  PCP: Ge Olson NP    PROGRESS NOTE      ASSESSMENT AND PLAN    Diagnoses and all orders for this visit:    1. Spinal stenosis of lumbar region with neurogenic claudication, severe L4/5 by MRI '15  -     gabapentin (NEURONTIN) 300 mg capsule; Take 1 Capsule by mouth nightly. Max Daily Amount: 300 mg.    2. Lumbar facet arthropathy    3. History of fusion of cervical spine  -     gabapentin (NEURONTIN) 300 mg capsule; Take 1 Capsule by mouth nightly. Max Daily Amount: 300 mg.        1. 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 Clinically managing w/ HEP and Gabapentin. 2. Avoid repetitive bending, lifting, and twisting  3. RF Gabapentin  4. Continue Aleve PRN  5. Given information on lumbar stenosis and indications for sx  6. Given information on GRETCHEN. Discussed injection as a possible treatment option if pain worsens. Follow-up and Dispositions    · Return for 6-9 months for Gabapentin refill. HISTORY OF PRESENT ILLNESS      Heaven Paz is a 74 y.o. female presents for follow up of back pain. LV given trigger point B/L iliolumbar. She reports that her back pain is manageable. She states that the pain intermittently radiates down her right lateral thigh. Her pain is exacerbated with twisting and lifting. Her standing tolerance is unaffected by her pain. She takes Gabapentin 300 mg BID and Aleve BID PRN with benefit. Denies side effects. She also took a Tramadol that was prescribed by her dentist for pulled teeth, and it relieved her back pain. Tells me that her neck as not felt this good in years. Please w/her neck sx. Concerned about needing back sx. Denies red flags including persistent fevers, chills, weight changes, neurogenic bowel or bladder symptoms.       Pain Assessment  1/17/2022   Location of Pain Back   Pain Location Comment -   Location Modifiers -   Severity of Pain 2   Quality of Pain Throbbing; Other (Comment)   Quality of Pain Comment bilateral leg pain   Duration of Pain -   Frequency of Pain Intermittent   Aggravating Factors Other (Comment)   Aggravating Factors Comment everything   Limiting Behavior Some   Relieving Factors NSAID;Rest   Relieving Factors Comment -   Result of Injury -           Investigations:  L MRI 12/2021: moderate to severe stenosis L4-5, scoliosis   2015 L MRI severe stenosis L4/L5/S1     Treatments:  PT HEP (5K deductible)  Beneficial meds: Gabapentin, Aleve  Failed medications: Topamax - vertigo, Lyrica - dry eyes, imbalance,   Spinal injections: Yes GRETCHEN L4-5 2016 by me, GRETCHEN L5-S1 2017  Spinal surgery- Yes. C3-4-5-6-7 lami fusion Dr. Sylvia Alonso 2/2018     Pt lives alone.  Works as .  Has a $5000 deductible with her Medicare plan    PHYSICAL EXAMINATION    Visit Vitals  Pulse 73   Temp 97.2 °F (36.2 °C)   Resp 18   Ht 4' 11.25\" (1.505 m)   Wt 134 lb (60.8 kg)   SpO2 96%   BMI 26.84 kg/m²     LE strength intact  SLR negative              Written by Cony Knowles, as dictated by Zayda Turner MD.

## 2022-01-17 NOTE — PATIENT INSTRUCTIONS
Lumbar Spinal Stenosis: Care Instructions  Your Care Instructions     Stenosis in the spine is a narrowing of the canal that is around the spinal cord and nerve roots in your back. It can happen as part of aging. Sometimes bone and other tissue grow into this canal and press on the nerves that branch out from the spinal cord. This can cause pain, numbness, and weakness. When it happens in the lower part of your back, it is called lumbar spinal stenosis. It can cause problems in the legs, feet, and rear end (buttocks). You may be able to get relief from the symptoms of spinal stenosis by taking pain medicine. Your doctor may suggest physical therapy and exercises to keep your spine strong and flexible. Some people try steroid shots to reduce swelling. If pain and numbness in your legs are still so bad that you cannot do your normal activities, you may need surgery. Follow-up care is a key part of your treatment and safety. Be sure to make and go to all appointments, and call your doctor if you are having problems. It's also a good idea to know your test results and keep a list of the medicines you take. How can you care for yourself at home? · Take an over-the-counter pain medicine. Nonsteroidal anti-inflammatory drugs (NSAIDs) such as ibuprofen or naproxen seem to work best. But if you can't take NSAIDs, you can try acetaminophen. Be safe with medicines. Read and follow all instructions on the label. · Do not take two or more pain medicines at the same time unless the doctor told you to. Many pain medicines have acetaminophen, which is Tylenol. Too much acetaminophen (Tylenol) can be harmful. · Stay at a healthy weight. Being overweight puts extra strain on your spine. · Change positions often when you sit or stand. This can ease pain. It may also reduce pressure on the spinal cord and its nerves. · Avoid doing things that make your symptoms worse.  Walking downhill and standing for a long time may cause pain.  · Stretch and strengthen your back muscles as your doctor or physical therapist recommends. If your doctor says it is okay to do them, these exercises may help. ? Lie on your back with your knees bent. Gently pull one bent knee to your chest. Put that foot back on the floor, and then pull the other knee to your chest.  ? Do pelvic tilts. Lie on your back with your knees bent. Tighten your stomach muscles. Pull your belly button (navel) in and up toward your ribs. You should feel like your back is pressing to the floor and your hips and pelvis are slightly lifting off the floor. Hold for 6 seconds while breathing smoothly. ? Stand with your back flat against a wall. Slowly slide down until your knees are slightly bent. Hold for 10 seconds, then slide back up the wall. · Remove or change anything in your house that may cause you to fall. Keep walkways clear of clutter, electrical cords, and throw rugs. When should you call for help? Call 911 anytime you think you may need emergency care. For example, call if:    · You are unable to move a leg at all. Call your doctor now or seek immediate medical care if:    · You have new or worse symptoms in your legs, belly, or buttocks. Symptoms may include:  ? Numbness or tingling. ? Weakness. ? Pain.     · You lose bladder or bowel control. Watch closely for changes in your health, and be sure to contact your doctor if:    · You have a fever, lose weight, or don't feel well.     · You are not getting better as expected. Where can you learn more? Go to http://www.gray.com/  Enter X327 in the search box to learn more about \"Lumbar Spinal Stenosis: Care Instructions. \"  Current as of: July 1, 2021               Content Version: 13.0  © 2925-7488 [employer]. Care instructions adapted under license by [employer] (which disclaims liability or warranty for this information).  If you have questions about a medical condition or this instruction, always ask your healthcare professional. [address] any warranty or liability for your use of this information. Learning About Lumbar Epidural Steroid Injections  What is a lumbar epidural steroid injection? A lumbar epidural injection is a shot into the epidural space--the area in your back around the spinal cord. The shot may help reduce pain, tingling, or numbness in your back, buttock, or leg. The shot may have a steroid to reduce pain and swelling and a local anesthetic to numb nerves. How is a lumbar epidural steroid injection done? The doctor may use an imaging test before or during your injection. This can be an MRI, a CT scan, or an X-ray. These tests can show where your nerve problems are. After finding the right spot, the doctor may inject a numbing medicine into the skin where you will get the steroid injection. Then he or she puts the needle for the steroid into the numbed area. You may feel some pressure. You could feel some stinging or burning during the injection. How long does an epidural steroid injection take? It takes about 10 to 15 minutes to get this injection. You will probably go home about 20 to 30 minutes after you get it. What can you expect after a lumbar epidural steroid injection? If your injection had local anesthetic and a steroid, your legs may feel heavy or numb right after. You will probably be able to walk. But you may need to be extra careful. Take care not to lose your balance, and be sure to follow your doctor's instructions. If your injection contained local anesthetic, you may feel better right away. But this pain relief will last only a few hours. Your pain will probably return. This is because the steroids have not started working yet. Before the steroids start to work, your back may be sore for a few days. These injections don't always work. When they do, it takes 1 to 5 days.  This pain relief can last for several days to a few months or longer. You may want to do less than normal for a few days. But you may also be able to return to your daily routine. Some people are dizzy or feel sick to their stomach after getting this injection. These symptoms usually don't last very long. If your pain is better, you may be able to keep doing your normal activities or physical therapy. But try not to overdo it, even if your back pain has improved a lot. If your pain is only a little better or if it comes back, your doctor may recommend another injection in a few weeks. If your pain has not changed, talk to your doctor about other treatment choices. Follow-up care is a key part of your treatment and safety. Be sure to make and go to all appointments, and call your doctor if you are having problems. It's also a good idea to know your test results and keep a list of the medicines you take. Where can you learn more? Go to http://www.gray.com/  Enter H162 in the search box to learn more about \"Learning About Lumbar Epidural Steroid Injections. \"  Current as of: July 1, 2021               Content Version: 13.0  © 1413-5714 [employer]. Care instructions adapted under license by [facility] (which disclaims liability or warranty for this information). If you have questions about a medical condition or this instruction, always ask your healthcare professional. [address] any warranty or liability for your use of this information.

## 2022-01-17 NOTE — LETTER
1/17/2022    Patient: Stuart Méndez   YOB: 1955   Date of Visit: 1/17/2022     Greg Munroe NP  [address] [place] [address] [address]    Dear Greg Munroe NP,      Thank you for referring Ms. Stuart Méndez to [facility] for evaluation. My notes for this consultation are attached. If you have questions, please do not hesitate to call me. I look forward to following your patient along with you.       Sincerely,    Leigh Abbott MD

## 2022-01-25 ENCOUNTER — TELEPHONE (OUTPATIENT)
Dept: ORTHOPEDIC SURGERY | Age: 67
End: 2022-01-25

## 2022-01-25 DIAGNOSIS — M48.062 SPINAL STENOSIS OF LUMBAR REGION WITH NEUROGENIC CLAUDICATION: Primary | ICD-10-CM

## 2022-01-25 RX ORDER — GABAPENTIN 300 MG/1
300 CAPSULE ORAL 3 TIMES DAILY
Qty: 270 CAPSULE | Refills: 1 | Status: SHIPPED | OUTPATIENT
Start: 2022-01-25 | End: 2022-08-04 | Stop reason: SDUPTHER

## 2022-01-25 NOTE — TELEPHONE ENCOUNTER
Pt was contacted and notified that a new prescription has been sent in to her mail order pharmacy. The pt was identified using 2 pt identifiers. The pt verbalized understanding and has no questions or concerns at this time. She will contact the office if there are any issues.

## 2022-01-25 NOTE — TELEPHONE ENCOUNTER
Patient called stating she was advised her gabapentin will be changing to taking three times a day and will be getting a six month supply, however her prescription is for once a day. Patient can be reached at [phone].

## 2022-01-25 NOTE — TELEPHONE ENCOUNTER
Dr. Amado Ruvalcaba, can you review? Pt states her gabapentin was increased to tid and you gave her a rx for #270 with a  RF which would be for tid, but your note states she takes it bid and the sig states take 1 nightly. The  shows she filled the new rx, but the pharmacy only gave her #90 for a 90 day supply. If you want her to take it tid then we can send in a new rx with the corrected sig. Just let me know and I can do it.

## 2022-02-10 ENCOUNTER — OFFICE VISIT (OUTPATIENT)
Dept: FAMILY MEDICINE CLINIC | Age: 67
End: 2022-02-10
Payer: MEDICARE

## 2022-02-10 VITALS
DIASTOLIC BLOOD PRESSURE: 74 MMHG | RESPIRATION RATE: 16 BRPM | SYSTOLIC BLOOD PRESSURE: 121 MMHG | TEMPERATURE: 98 F | BODY MASS INDEX: 26.81 KG/M2 | OXYGEN SATURATION: 95 % | HEIGHT: 59 IN | HEART RATE: 80 BPM | WEIGHT: 133 LBS

## 2022-02-10 DIAGNOSIS — Z00.00 MEDICARE ANNUAL WELLNESS VISIT, SUBSEQUENT: Primary | ICD-10-CM

## 2022-02-10 DIAGNOSIS — E78.5 HYPERLIPIDEMIA WITH TARGET LDL LESS THAN 100: ICD-10-CM

## 2022-02-10 DIAGNOSIS — M54.9 CHRONIC BACK PAIN, UNSPECIFIED BACK LOCATION, UNSPECIFIED BACK PAIN LATERALITY: ICD-10-CM

## 2022-02-10 DIAGNOSIS — I10 PRIMARY HYPERTENSION: ICD-10-CM

## 2022-02-10 DIAGNOSIS — G89.29 CHRONIC BACK PAIN, UNSPECIFIED BACK LOCATION, UNSPECIFIED BACK PAIN LATERALITY: ICD-10-CM

## 2022-02-10 DIAGNOSIS — J44.9 COPD, MILD (HCC): ICD-10-CM

## 2022-02-10 DIAGNOSIS — E55.9 VITAMIN D DEFICIENCY: ICD-10-CM

## 2022-02-10 PROCEDURE — G8536 NO DOC ELDER MAL SCRN: HCPCS | Performed by: NURSE PRACTITIONER

## 2022-02-10 PROCEDURE — G9899 SCRN MAM PERF RSLTS DOC: HCPCS | Performed by: NURSE PRACTITIONER

## 2022-02-10 PROCEDURE — G8754 DIAS BP LESS 90: HCPCS | Performed by: NURSE PRACTITIONER

## 2022-02-10 PROCEDURE — G8427 DOCREV CUR MEDS BY ELIG CLIN: HCPCS | Performed by: NURSE PRACTITIONER

## 2022-02-10 PROCEDURE — 1101F PT FALLS ASSESS-DOCD LE1/YR: CPT | Performed by: NURSE PRACTITIONER

## 2022-02-10 PROCEDURE — 3017F COLORECTAL CA SCREEN DOC REV: CPT | Performed by: NURSE PRACTITIONER

## 2022-02-10 PROCEDURE — G8752 SYS BP LESS 140: HCPCS | Performed by: NURSE PRACTITIONER

## 2022-02-10 PROCEDURE — G0439 PPPS, SUBSEQ VISIT: HCPCS | Performed by: NURSE PRACTITIONER

## 2022-02-10 PROCEDURE — G8432 DEP SCR NOT DOC, RNG: HCPCS | Performed by: NURSE PRACTITIONER

## 2022-02-10 PROCEDURE — G8399 PT W/DXA RESULTS DOCUMENT: HCPCS | Performed by: NURSE PRACTITIONER

## 2022-02-10 PROCEDURE — G8419 CALC BMI OUT NRM PARAM NOF/U: HCPCS | Performed by: NURSE PRACTITIONER

## 2022-02-10 RX ORDER — MENTHOL
1000 GEL (GRAM) TOPICAL DAILY
COMMUNITY

## 2022-02-10 RX ORDER — METHOCARBAMOL 500 MG/1
500 TABLET, FILM COATED ORAL 3 TIMES DAILY
Qty: 90 TABLET | Refills: 2 | Status: SHIPPED | OUTPATIENT
Start: 2022-02-10 | End: 2022-02-10

## 2022-02-10 RX ORDER — METHOCARBAMOL 500 MG/1
500 TABLET, FILM COATED ORAL 3 TIMES DAILY
Qty: 270 TABLET | Refills: 1 | Status: SHIPPED | OUTPATIENT
Start: 2022-02-10

## 2022-02-10 RX ORDER — ALBUTEROL SULFATE 90 UG/1
1 AEROSOL, METERED RESPIRATORY (INHALATION)
Qty: 18 G | Refills: 2 | Status: SHIPPED | OUTPATIENT
Start: 2022-02-10 | End: 2022-02-17 | Stop reason: SDUPTHER

## 2022-02-10 NOTE — PATIENT INSTRUCTIONS

## 2022-02-10 NOTE — PROGRESS NOTES
This is the Subsequent Medicare Annual Wellness Exam, performed 12 months or more after the Initial AWV or the last Subsequent AWV    I have reviewed the patient's medical history in detail and updated the computerized patient record. Assessment/Plan   Education and counseling provided:  Are appropriate based on today's review and evaluation    1. Medicare annual wellness visit, subsequent       Depression Risk Factor Screening     3 most recent PHQ Screens 2/10/2022   Little interest or pleasure in doing things Not at all   Feeling down, depressed, irritable, or hopeless Not at all   Total Score PHQ 2 0   Trouble falling or staying asleep, or sleeping too much Not at all   Feeling tired or having little energy Not at all   Poor appetite, weight loss, or overeating Not at all   Feeling bad about yourself - or that you are a failure or have let yourself or your family down Not at all   Trouble concentrating on things such as school, work, reading, or watching TV Not at all   Moving or speaking so slowly that other people could have noticed; or the opposite being so fidgety that others notice Not at all   Thoughts of being better off dead, or hurting yourself in some way Not at all   PHQ 9 Score 0   How difficult have these problems made it for you to do your work, take care of your home and get along with others Not difficult at all       Alcohol & Drug Abuse Risk Screen    Do you average more than 1 drink per night or more than 7 drinks a week:  No    On any one occasion in the past three months have you have had more than 3 drinks containing alcohol:  No          Functional Ability and Level of Safety    Hearing: Hearing is good. The patient wears hearing aids. Activities of Daily Living: The home contains: no safety equipment. Patient does total self care      Ambulation: with no difficulty     Fall Risk:  Fall Risk Assessment, last 12 mths 2/10/2022   Able to walk?  Yes   Fall in past 12 months? 0 Do you feel unsteady? 0   Are you worried about falling 0   Is TUG test greater than 12 seconds? -   Is the gait abnormal? -   Number of falls in past 12 months -   Fall with injury?  -      Abuse Screen:  Patient is not abused       Cognitive Screening    Has your family/caregiver stated any concerns about your memory: no     Cognitive Screening: Normal - Clock Drawing Test    Health Maintenance Due     Health Maintenance Due   Topic Date Due    DTaP/Tdap/Td series (1 - Tdap) Never done    Shingrix Vaccine Age 49> (1 of 2) Never done       Patient Care Team   Patient Care Team:  Becky Pompa NP as PCP - General (Nurse Practitioner)  Beck[facility] Pom[facility] NP [facility]CP - [facility]    History     Patient Active Problem List   Diagnosis Code    HTN (hypertension) I10    Asthma J45.909    Back pain, chronic M54.9, G89.29    Sinus congestion R09.81    Dermatitis L30.9    Vitamin D deficiency E55.9    Hyperlipidemia with target LDL less than 100 E78.5    COPD, mild (HCC) J44.9    Use of opiates for therapeutic purposes Z79.891    Neck pain M54.2    Allergic rhinitis J30.9    GERD (gastroesophageal reflux disease) K21.9    Spinal stenosis of lumbar region with neurogenic claudication M48.062     Past Medical History:   Diagnosis Date    Allergic rhinitis     Asthma 8/25/2012    +PFT for mild obstructive disease/COPD    Back pain, chronic 8/25/2012    Dr. Maame Perry referred to Dr. Sergio Sims    Cervical spinal stenosis 1/8/2018    Cervical spondylosis with myelopathy 2/28/2018    Chronic obstructive pulmonary disease ([facility] [address]     GERD (gastroesophageal reflux disease)     H/O mammogram 10/03/2017    No evidence of malignancy    Hard of hearing     chronic ruptured TM    HTN (hypertension) 8/25/2012    Hx of screening mammography 09/06/2016    EWL, normal, routine recs    Hyperlipidemia     Nausea & vomiting     Ossification of posterior longitudinal ligament in cervical region ([address] 2018    S/P cervical spinal fusion 3/2/2018    Sinus congestion 2012    Spinal stenosis of lumbar region with radiculopathy 2015    Dr. Matt Leon      Past Surgical History:   Procedure Laterality Date    HX  SECTION      HX ORTHOPAEDIC  2018    cervical surgery(Dr. Livia Cuenca)    HX TUBAL LIGATION       Current Outpatient Medications   Medication Sig Dispense Refill    gabapentin (NEURONTIN) 300 mg capsule Take 1 Capsule by mouth three (3) times daily. Max Daily Amount: 900 mg. 270 Capsule 1    ezetimibe (ZETIA) 10 mg tablet TAKE 1 TABLET BY MOUTH  DAILY 90 Tablet 1    methocarbamoL (ROBAXIN) 500 mg tablet Take 500 mg by mouth daily as needed.  FLUoxetine (PROzac) 10 mg capsule Take 10 mg by mouth daily.  losartan-hydroCHLOROthiazide (HYZAAR) 50-12.5 mg per tablet TAKE 1 TABLET BY MOUTH  DAILY FOR HIGH BLOOD  PRESSURE 90 Tablet 1    montelukast (SINGULAIR) 10 mg tablet TAKE 1 TABLET BY MOUTH  DAILY FOR INFLAMMATION OF  THE NOSE DUE TO AN ALLERGY 90 Tablet 3    fluticasone propionate (Flonase Allergy Relief) 50 mcg/actuation nasal spray 2 Sprays by Both Nostrils route daily.  naproxen sodium (Aleve) 220 mg cap Take 1 Capsule by mouth daily.  vit B Cmplx 3-FA-Vit C-Biotin (NEPHRO MIK RX) 1- mg-mg-mcg tablet Take 1 Tablet by mouth daily.  aspirin delayed-release 81 mg tablet Take 81 mg by mouth. 3 x Weekly      acetaminophen (TYLENOL EXTRA STRENGTH) 500 mg tablet Take 1,000 mg by mouth every eight (8) hours as needed for Pain.  Cholecalciferol, Vitamin D3, (VITAMIN D3) 2,000 unit cap capsule Take 2,000 Units by mouth daily. 30 Cap 11     Allergies   Allergen Reactions    Lisinopril Cough    Lyrica [Pregabalin] Other (comments)     Medication causes dry eyes, unstable balance, and lack of concentration.      Mold Extracts Runny Nose    Pollen Extracts Itching    Statins-Hmg-Coa Reductase Inhibitors Myalgia    Topamax [Topiramate] Vertigo Family History   Problem Relation Age of Onset    Heart Disease Mother     Hypertension Mother     Cancer Mother     Coronary Art Dis Mother 67    Heart Surgery Mother 68    Heart Disease Father     Hypertension Father     Stroke Father     Heart Surgery Father 46    Lung Disease Father     Heart Disease Brother     Heart Disease Brother      Social History     Tobacco Use    Smoking status: Former Smoker     Packs/day: 1.00     Types: Cigarettes     Quit date: 3/26/2020     Years since quittin.8    Smokeless tobacco: Never Used   Substance Use Topics    Alcohol use: No     Comment: h/o alcohol abuse in remission         Radha Pantoja

## 2022-02-10 NOTE — PROGRESS NOTES
Alvaro Herrera is a 74 y.o. female presenting today for Hypertension (follow-up) and Annual Wellness Visit  . Chief Complaint   Patient presents with    Hypertension     follow-up    Annual Wellness Visit       HPI:  Alvaro Herrera presents to the office today for routine follow-up care. She carries a medical diagnosis for hypertension, GERD, asthma, COPD, hyperlipidemia and vitamin D deficiency. She is complaining of lumbar back pain. He notes this pain is chronic she denies any radiculopathy to the lower extremities  Hypertension-prescribed losartan-hydrochlorothiazide daily. Her BP is 121/74 and  denies any chest pain or palpitation. She is negative for any dizziness or headache. Hyperlipidemia-patient notes she has hyperlipidemia but does not take a statin. She reports the statin caused myalgia. Patient is managed by orthopedics for her history of spinal stenosis of the lumbar region and neurogenic claudication, severe L4-L5. Her orthopedics is Dr. Jasmina Ponce. History of COPD but has not had an inhaler in 5 years. She was previously prescribed Advair. She notes dyspnea with exertion. ROS    ROS:  History obtained from the patient intake forms which are reviewed with the patient  · General: negative for - chills, fever, weight changes or malaise  · HEENT: no sore throat, nasal congestion, vision problems or ear problems  · Respiratory: dyspnea  · Cardiovascular: no chest pain, palpitations, or dyspnea on exertion  · Gastrointestinal: no abdominal pain, N/V, change in bowel habits, or black or bloody stools  · Musculoskeletal:bilateral leg cramps and neck spasm  · Neurological: no numbness, tingling, headache or dizziness  · Endo:  No polyuria or polydipsia. · : no hematuria, dysuria, frequency, hesitancy, or nocturia.     · Psychological:sad, anxiety    Allergies   Allergen Reactions    Lisinopril Cough    Lyrica [Pregabalin] Other (comments)     Medication causes dry eyes, unstable balance, and lack of concentration.      Mold Extracts Runny Nose    Pollen Extracts Itching    Statins-Hmg-Coa Reductase Inhibitors Myalgia    Topamax [Topiramate] Vertigo       PHQ Screening   3 most recent PHQ Screens 2/10/2022   Little interest or pleasure in doing things Not at all   Feeling down, depressed, irritable, or hopeless Not at all   Total Score PHQ 2 0   Trouble falling or staying asleep, or sleeping too much Not at all   Feeling tired or having little energy Not at all   Poor appetite, weight loss, or overeating Not at all   Feeling bad about yourself - or that you are a failure or have let yourself or your family down Not at all   Trouble concentrating on things such as school, work, reading, or watching TV Not at all   Moving or speaking so slowly that other people could have noticed; or the opposite being so fidgety that others notice Not at all   Thoughts of being better off dead, or hurting yourself in some way Not at all   PHQ 9 Score 0   How difficult have these problems made it for you to do your work, take care of your home and get along with others Not difficult at all       History  Past Medical History:   Diagnosis Date    Allergic rhinitis     Asthma 8/25/2012    +PFT for mild obstructive disease/COPD    Back pain, chronic 8/25/2012    Dr. Jagjit Lester referred to Dr. Saima Quintana    Cervical spinal stenosis 1/8/2018    Cervical spondylosis with myelopathy 2/28/2018    Chronic obstructive pulmonary disease ([facility] [address]     GERD (gastroesophageal reflux disease)     H/O mammogram 10/03/2017    No evidence of malignancy    Hard of hearing     chronic ruptured TM    HTN (hypertension) 8/25/2012    Hx of screening mammography 09/06/2016    EWL, normal, routine recs    Hyperlipidemia     Nausea & vomiting     Ossification of posterior longitudinal ligament in cervical region ([address] 1/8/2018    S/P cervical spinal fusion 3/2/2018    Sinus congestion 8/25/2012    Spinal stenosis of lumbar region with radiculopathy 2015    Dr. Grover Del Valle       Past Surgical History:   Procedure Laterality Date    HX  SECTION      HX ORTHOPAEDIC  2018    cervical surgery(Dr. Viola Vogt)    HX TUBAL LIGATION         Social History     Socioeconomic History    Marital status: SINGLE     Spouse name: Not on file    Number of children: Not on file    Years of education: Not on file    Highest education level: Not on file   Occupational History    Occupation: hairdresser   Tobacco Use    Smoking status: Former Smoker     Packs/day: 1.00     Types: Cigarettes     Quit date: 3/26/2020     Years since quittin.9    Smokeless tobacco: Never Used   Substance and Sexual Activity    Alcohol use: No     Comment: h/o alcohol abuse in remission    Drug use: No    Sexual activity: Never   Other Topics Concern     Service No    Blood Transfusions No    Caffeine Concern Yes    Occupational Exposure No    Hobby Hazards No    Sleep Concern No    Stress Concern No    Weight Concern No    Special Diet No    Back Care No    Exercise Yes     Comment: walking    Bike Helmet No    Seat Belt Yes    Self-Exams No   Social History Narrative    Not on file     Social Determinants of Health     Financial Resource Strain:     Difficulty of Paying Living Expenses: Not on file   Food Insecurity:     Worried About Running Out of Food in the Last Year: Not on file    Isaac of Food in the Last Year: Not on file   Transportation Needs:     Lack of Transportation (Medical): Not on file    Lack of Transportation (Non-Medical):  Not on file   Physical Activity:     Days of Exercise per Week: Not on file    Minutes of Exercise per Session: Not on file   Stress:     Feeling of Stress : Not on file   Social Connections:     Frequency of Communication with Friends and Family: Not on file    Frequency of Social Gatherings with Friends and Family: Not on file    Attends Islam Services: Not on file   [place] Active Member of Clubs or Organizations: Not on file    Attends Club or Organization Meetings: Not on file    Marital Status: Not on file   Intimate Partner Violence:     Fear of Current or Ex-Partner: Not on file    Emotionally Abused: Not on file    Physically Abused: Not on file    Sexually Abused: Not on file   Housing Stability:     Unable to Pay for Housing in the Last Year: Not on file    Number of [place] in the Last Year: Not on file    Unstable Housing in the Last Year: Not on file       Current Outpatient Medications   Medication Sig Dispense Refill    vitamin e (E [facility]EM[facility]) 1,000 unit capsule Take 1,000 Units by mouth daily.  methocarbamoL (ROBAXIN) 500 mg tablet Take 1 Tablet by mouth three (3) times daily. 270 Tablet 1    gabapentin (NEURONTIN) 300 mg capsule Take 1 Capsule by mouth three (3) times daily. Max Daily Amount: 900 mg. 270 Capsule 1    ezetimibe (ZETIA) 10 mg tablet TAKE 1 TABLET BY MOUTH  DAILY 90 Tablet 1    FLUoxetine (PROzac) 10 mg capsule Take 10 mg by mouth daily.  losartan-hydroCHLOROthiazide (HYZAAR) 50-12.5 mg per tablet TAKE 1 TABLET BY MOUTH  DAILY FOR HIGH BLOOD  PRESSURE 90 Tablet 1    montelukast (SINGULAIR) 10 mg tablet TAKE 1 TABLET BY MOUTH  DAILY FOR INFLAMMATION OF  THE NOSE DUE TO AN ALLERGY 90 Tablet 3    fluticasone propionate (Flonase Allergy Relief) 50 mcg/actuation nasal spray 2 Sprays by Both Nostrils route daily.  naproxen sodium (Aleve) 220 mg cap Take 1 Capsule by mouth daily.  vit B Cmplx 3-FA-Vit C-Biotin (NEPHRO MIK RX) 1- mg-mg-mcg tablet Take 1 Tablet by mouth daily.  aspirin delayed-release 81 mg tablet Take 81 mg by mouth. 3 x Weekly      acetaminophen (TYLENOL EXTRA STRENGTH) 500 mg tablet Take 1,000 mg by mouth every eight (8) hours as needed for Pain.  Cholecalciferol, Vitamin D3, (VITAMIN D3) 2,000 unit cap capsule Take 2,000 Units by mouth daily.  30 Cap 11    tiotropium (SPIRIVA) 18 mcg inhalation capsule Take 1 Capsule by inhalation daily. 30 Capsule 2    albuterol (PROVENTIL HFA, VENTOLIN HFA, PROAIR HFA) 90 mcg/actuation inhaler Take 1 Puff by inhalation every four (4) hours as needed for Wheezing. 18 g 2         Vitals:    02/10/22 1353   BP: 121/74   Pulse: 80   Resp: 16   Temp: 98 °F (36.7 °C)   TempSrc: Oral   SpO2: 95%   Weight: 133 lb (60.3 kg)   Height: 4' 11\" (1.499 m)   PainSc:   0 - No pain       Physical Exam  Vitals and nursing note reviewed. Constitutional:       Appearance: Normal appearance. HENT:      Head: Normocephalic. Cardiovascular:      Rate and Rhythm: Normal rate and regular rhythm. Pulses: Normal pulses. Heart sounds: Normal heart sounds. Pulmonary:      Effort: Pulmonary effort is normal.      Breath sounds: Normal breath sounds. Abdominal:      General: Bowel sounds are normal.      Palpations: Abdomen is soft. Skin:     General: Skin is warm and dry. Neurological:      General: No focal deficit present. Mental Status: She is alert. No visits with results within 3 Month(s) from this visit. Latest known visit with results is:   Hospital Outpatient Visit on 08/12/2021   Component Date Value Ref Range Status    Calcium, ionized 08/12/2021 6.1* 4.5 - 5.6 mg/dL Final    Comment: (NOTE)  Performed At: [facility] & [address] [id number]  Chivo Timmons MD NA:[id number]         No results found for any visits on 02/10/22. Patient Care Team:  Patient Care Team:  Mirta Paulino NP as PCP - General (Nurse Practitioner)  Mirta Paulino NP as PCP - Marty Mantilla Provider      Assessment / Plan:      ICD-10-CM ICD-9-CM    1. Medicare annual wellness visit, subsequent  Z00.00 V70.0    2. Chronic back pain, unspecified back location, unspecified back pain laterality  M54.9 724.5 methocarbamoL (ROBAXIN) 500 mg tablet    G89.29 338.29 DISCONTINUED: methocarbamoL (ROBAXIN) 500 mg tablet   3.  COPD, mild ([address] J44.9 496 DISCONTINUED: tiotropium (SPIRIVA) 18 mcg inhalation capsule      DISCONTINUED: albuterol (PROVENTIL HFA, VENTOLIN HFA, PROAIR HFA) 90 mcg/actuation inhaler      DISCONTINUED: tiotropium (SPIRIVA) 18 mcg inhalation capsule   4. Primary hypertension  Q54 897.4 METABOLIC PANEL, COMPREHENSIVE      CBC WITH AUTOMATED DIFF   5. Vitamin D deficiency  E55.9 268.9    6. Hyperlipidemia with target LDL less than 100  E78.5 272.4 LIPID PANEL     Fasting labs  Sed rate and C-reactive protein ordered  Medications ordered  Referral to neuropsychologist for ADD work-up        I asked the patient if she  had any questions and answered her  questions. The patient stated that she understands the treatment plan and agrees with the treatment plan    This document was created with a voice activated dictation system and may contain transcription errors.

## 2022-02-10 NOTE — PROGRESS NOTES
Aldograce Mahajan presents today for   Chief Complaint   Patient presents with    Hypertension     follow-up    Annual Wellness Visit       Is someone accompanying this pt? no    Is the patient using any DME equipment during OV? no    Depression Screening:  3 most recent PHQ Screens 2/10/2022   Little interest or pleasure in doing things Not at all   Feeling down, depressed, irritable, or hopeless Not at all   Total Score PHQ 2 0   Trouble falling or staying asleep, or sleeping too much Not at all   Feeling tired or having little energy Not at all   Poor appetite, weight loss, or overeating Not at all   Feeling bad about yourself - or that you are a failure or have let yourself or your family down Not at all   Trouble concentrating on things such as school, work, reading, or watching TV Not at all   Moving or speaking so slowly that other people could have noticed; or the opposite being so fidgety that others notice Not at all   Thoughts of being better off dead, or hurting yourself in some way Not at all   PHQ 9 Score 0   How difficult have these problems made it for you to do your work, take care of your home and get along with others Not difficult at all       Learning Assessment:  Learning Assessment 5/11/2015   PRIMARY LEARNER Patient   HIGHEST LEVEL OF EDUCATION - PRIMARY LEARNER  DID NOT [address] PRIMARY LEARNER NONE   CO-LEARNER CAREGIVER No   PRIMARY LANGUAGE ENGLISH   LEARNER PREFERENCE PRIMARY READING   ANSWERED BY patient   RELATIONSHIP SELF       Abuse Screening:  Abuse Screening Questionnaire 8/12/2021   Do you ever feel afraid of your partner? N   Are you in a relationship with someone who physically or mentally threatens you? N   Is it safe for you to go home? Y       Fall Risk  Fall Risk Assessment, last 12 mths 2/10/2022   Able to walk? Yes   Fall in past 12 months? 1   Do you feel unsteady? 0   Are you worried about falling 0   Is TUG test greater than 12 seconds?  0   Is the gait abnormal? 0   Number of falls in past 12 months 2   Fall with injury? 0       Health Maintenance reviewed and discussed and ordered per Provider. Health Maintenance Due   Topic Date Due    DTaP/Tdap/Td series (1 - Tdap) Never done    Shingrix Vaccine Age 50> (1 of 2) Never done   . Coordination of Care:  1. Have you been to the ER, urgent care clinic since your last visit? Hospitalized since your last visit? no    2. Have you seen or consulted any other health care providers outside of the [address] [place] [address] since your last visit? Include any pap smears or colon screening.  no

## 2022-02-17 DIAGNOSIS — J44.9 COPD, MILD (HCC): ICD-10-CM

## 2022-02-21 RX ORDER — ALBUTEROL SULFATE 90 UG/1
1 AEROSOL, METERED RESPIRATORY (INHALATION)
Qty: 18 G | Refills: 2 | Status: SHIPPED | OUTPATIENT
Start: 2022-02-21 | End: 2022-02-23 | Stop reason: SDUPTHER

## 2022-02-23 DIAGNOSIS — J44.9 COPD, MILD (HCC): ICD-10-CM

## 2022-02-27 RX ORDER — ALBUTEROL SULFATE 90 UG/1
1 AEROSOL, METERED RESPIRATORY (INHALATION)
Qty: 18 G | Refills: 2 | Status: SHIPPED | OUTPATIENT
Start: 2022-02-27 | End: 2022-03-01 | Stop reason: SDUPTHER

## 2022-02-28 ENCOUNTER — TELEPHONE (OUTPATIENT)
Dept: FAMILY MEDICINE CLINIC | Age: 67
End: 2022-02-28

## 2022-03-01 DIAGNOSIS — J44.9 COPD, MILD (HCC): ICD-10-CM

## 2022-03-07 RX ORDER — ALBUTEROL SULFATE 90 UG/1
1 AEROSOL, METERED RESPIRATORY (INHALATION)
Qty: 18 G | Refills: 2 | Status: SHIPPED | OUTPATIENT
Start: 2022-03-07 | End: 2022-06-28 | Stop reason: SDUPTHER

## 2022-03-18 PROBLEM — M54.2 NECK PAIN: Status: ACTIVE | Noted: 2018-05-21

## 2022-03-19 PROBLEM — Z79.891 USE OF OPIATES FOR THERAPEUTIC PURPOSES: Status: ACTIVE | Noted: 2018-03-14

## 2022-05-12 DIAGNOSIS — J44.9 COPD, MILD (HCC): ICD-10-CM

## 2022-05-12 NOTE — TELEPHONE ENCOUNTER
Requested Prescriptions     Pending Prescriptions Disp Refills    tiotropium (SPIRIVA) 18 mcg inhalation capsule 30 Capsule 2     Sig: Take 1 Capsule by inhalation daily.

## 2022-05-17 NOTE — TELEPHONE ENCOUNTER
Requested Prescriptions     Pending Prescriptions Disp Refills    FLUoxetine (PROzac) 10 mg capsule       Sig: Take 1 Capsule by mouth daily.

## 2022-05-18 RX ORDER — FLUOXETINE 10 MG/1
10 CAPSULE ORAL DAILY
Qty: 90 CAPSULE | Refills: 0 | Status: SHIPPED | OUTPATIENT
Start: 2022-05-18 | End: 2022-06-21 | Stop reason: SDUPTHER

## 2022-06-02 DIAGNOSIS — J44.9 COPD, MILD (HCC): ICD-10-CM

## 2022-06-02 NOTE — TELEPHONE ENCOUNTER
Requested Prescriptions     Pending Prescriptions Disp Refills    FLUoxetine (PROzac) 10 mg capsule 90 Capsule 0     Sig: Take 1 Capsule by mouth daily.  tiotropium (SPIRIVA) 18 mcg inhalation capsule 30 Capsule 2     Sig: Take 1 Capsule by inhalation daily.

## 2022-06-03 RX ORDER — FLUOXETINE 10 MG/1
10 CAPSULE ORAL DAILY
Qty: 90 CAPSULE | Refills: 0 | OUTPATIENT
Start: 2022-06-03

## 2022-06-07 DIAGNOSIS — J44.9 COPD, MILD (HCC): ICD-10-CM

## 2022-06-07 RX ORDER — FLUOXETINE 10 MG/1
10 CAPSULE ORAL DAILY
Qty: 90 CAPSULE | Refills: 0 | OUTPATIENT
Start: 2022-06-07

## 2022-06-21 ENCOUNTER — OFFICE VISIT (OUTPATIENT)
Dept: FAMILY MEDICINE CLINIC | Age: 67
End: 2022-06-21
Payer: MEDICARE

## 2022-06-21 ENCOUNTER — HOSPITAL ENCOUNTER (OUTPATIENT)
Dept: LAB | Age: 67
Discharge: HOME | End: 2022-06-21
Payer: MEDICARE

## 2022-06-21 VITALS
SYSTOLIC BLOOD PRESSURE: 135 MMHG | WEIGHT: 127 LBS | RESPIRATION RATE: 16 BRPM | HEIGHT: 59 IN | HEART RATE: 67 BPM | BODY MASS INDEX: 25.6 KG/M2 | DIASTOLIC BLOOD PRESSURE: 81 MMHG | TEMPERATURE: 98.5 F

## 2022-06-21 DIAGNOSIS — J44.9 COPD, MILD (HCC): ICD-10-CM

## 2022-06-21 DIAGNOSIS — I10 PRIMARY HYPERTENSION: ICD-10-CM

## 2022-06-21 DIAGNOSIS — I10 ESSENTIAL HYPERTENSION: ICD-10-CM

## 2022-06-21 DIAGNOSIS — Z12.11 SCREENING FOR COLON CANCER: ICD-10-CM

## 2022-06-21 DIAGNOSIS — F32.A ANXIETY AND DEPRESSION: ICD-10-CM

## 2022-06-21 DIAGNOSIS — M81.0 AGE-RELATED OSTEOPOROSIS WITHOUT CURRENT PATHOLOGICAL FRACTURE: ICD-10-CM

## 2022-06-21 DIAGNOSIS — E78.00 PURE HYPERCHOLESTEROLEMIA: ICD-10-CM

## 2022-06-21 DIAGNOSIS — F41.9 ANXIETY AND DEPRESSION: ICD-10-CM

## 2022-06-21 DIAGNOSIS — N30.01 ACUTE CYSTITIS WITH HEMATURIA: Primary | ICD-10-CM

## 2022-06-21 DIAGNOSIS — N30.01 ACUTE CYSTITIS WITH HEMATURIA: ICD-10-CM

## 2022-06-21 LAB
BILIRUB UR QL STRIP: NEGATIVE
GLUCOSE UR-MCNC: NEGATIVE MG/DL
KETONES P FAST UR STRIP-MCNC: NEGATIVE MG/DL
PH UR STRIP: 6 [PH] (ref 4.6–8)
PROT UR QL STRIP: NEGATIVE
SP GR UR STRIP: 1.01 (ref 1–1.03)
UA UROBILINOGEN AMB POC: NORMAL (ref 0.2–1)
URINALYSIS CLARITY POC: NORMAL
URINALYSIS COLOR POC: YELLOW
URINE BLOOD POC: NORMAL
URINE LEUKOCYTES POC: NORMAL
URINE NITRITES POC: NEGATIVE

## 2022-06-21 PROCEDURE — 81003 URINALYSIS AUTO W/O SCOPE: CPT | Performed by: STUDENT IN AN ORGANIZED HEALTH CARE EDUCATION/TRAINING PROGRAM

## 2022-06-21 PROCEDURE — 1123F ACP DISCUSS/DSCN MKR DOCD: CPT | Performed by: STUDENT IN AN ORGANIZED HEALTH CARE EDUCATION/TRAINING PROGRAM

## 2022-06-21 PROCEDURE — G8427 DOCREV CUR MEDS BY ELIG CLIN: HCPCS | Performed by: STUDENT IN AN ORGANIZED HEALTH CARE EDUCATION/TRAINING PROGRAM

## 2022-06-21 PROCEDURE — G8417 CALC BMI ABV UP PARAM F/U: HCPCS | Performed by: STUDENT IN AN ORGANIZED HEALTH CARE EDUCATION/TRAINING PROGRAM

## 2022-06-21 PROCEDURE — 99214 OFFICE O/P EST MOD 30 MIN: CPT | Performed by: STUDENT IN AN ORGANIZED HEALTH CARE EDUCATION/TRAINING PROGRAM

## 2022-06-21 PROCEDURE — 87186 SC STD MICRODIL/AGAR DIL: CPT

## 2022-06-21 PROCEDURE — G8754 DIAS BP LESS 90: HCPCS | Performed by: STUDENT IN AN ORGANIZED HEALTH CARE EDUCATION/TRAINING PROGRAM

## 2022-06-21 PROCEDURE — 87077 CULTURE AEROBIC IDENTIFY: CPT

## 2022-06-21 PROCEDURE — G8432 DEP SCR NOT DOC, RNG: HCPCS | Performed by: STUDENT IN AN ORGANIZED HEALTH CARE EDUCATION/TRAINING PROGRAM

## 2022-06-21 PROCEDURE — G8752 SYS BP LESS 140: HCPCS | Performed by: STUDENT IN AN ORGANIZED HEALTH CARE EDUCATION/TRAINING PROGRAM

## 2022-06-21 PROCEDURE — G8536 NO DOC ELDER MAL SCRN: HCPCS | Performed by: STUDENT IN AN ORGANIZED HEALTH CARE EDUCATION/TRAINING PROGRAM

## 2022-06-21 PROCEDURE — 1090F PRES/ABSN URINE INCON ASSESS: CPT | Performed by: STUDENT IN AN ORGANIZED HEALTH CARE EDUCATION/TRAINING PROGRAM

## 2022-06-21 PROCEDURE — 1101F PT FALLS ASSESS-DOCD LE1/YR: CPT | Performed by: STUDENT IN AN ORGANIZED HEALTH CARE EDUCATION/TRAINING PROGRAM

## 2022-06-21 PROCEDURE — G9899 SCRN MAM PERF RSLTS DOC: HCPCS | Performed by: STUDENT IN AN ORGANIZED HEALTH CARE EDUCATION/TRAINING PROGRAM

## 2022-06-21 PROCEDURE — 3017F COLORECTAL CA SCREEN DOC REV: CPT | Performed by: STUDENT IN AN ORGANIZED HEALTH CARE EDUCATION/TRAINING PROGRAM

## 2022-06-21 PROCEDURE — 87086 URINE CULTURE/COLONY COUNT: CPT

## 2022-06-21 RX ORDER — MONTELUKAST SODIUM 10 MG/1
TABLET ORAL
Qty: 90 TABLET | Refills: 3 | Status: SHIPPED | OUTPATIENT
Start: 2022-06-21

## 2022-06-21 RX ORDER — FLUOXETINE 10 MG/1
10 CAPSULE ORAL DAILY
Qty: 90 CAPSULE | Refills: 2 | Status: SHIPPED | OUTPATIENT
Start: 2022-06-21 | End: 2022-10-18 | Stop reason: SDUPTHER

## 2022-06-21 RX ORDER — NITROFURANTOIN 25; 75 MG/1; MG/1
100 CAPSULE ORAL 2 TIMES DAILY
Qty: 10 CAPSULE | Refills: 0 | Status: CANCELLED | OUTPATIENT
Start: 2022-06-21 | End: 2022-06-26

## 2022-06-21 RX ORDER — LOSARTAN POTASSIUM AND HYDROCHLOROTHIAZIDE 12.5; 5 MG/1; MG/1
1 TABLET ORAL DAILY
Qty: 90 TABLET | Refills: 3 | Status: SHIPPED | OUTPATIENT
Start: 2022-06-21

## 2022-06-21 RX ORDER — NITROFURANTOIN 25; 75 MG/1; MG/1
100 CAPSULE ORAL 2 TIMES DAILY
Qty: 10 CAPSULE | Refills: 0 | Status: SHIPPED | OUTPATIENT
Start: 2022-06-21 | End: 2022-06-26

## 2022-06-21 NOTE — PROGRESS NOTES
Dann Rojo is a 74 y.o. female presenting today for Establish Care, Bladder Infection, COPD, and Medication Evaluation  . Chief Complaint   Patient presents with   Qi Colindres Establish Care    Bladder Infection    COPD    Medication Evaluation       HPI:  Dann Rojo presents to the office today to establish care. Patient has a past medical history significant for HTN, GERD, asthma, COPD, HLD and vitamin D deficiency. HLD: Patient is on losartan-HCTZ daily. Denies any chest pain or palpitations. BP is controlled. HLD: Patient has hyperlipidemia but does not take any statins - reports myalgias. Panel in 7/21 showed , HDL 63. She is on Zetia which she is tolerating. Patient is managed by orthopedics for her history of spinal stenosis of the lumbar region and neurogenic claudication, severe L4-L5. Her orthopedics is Dr. Laith Amaral. COPD: Patient smoked for 50 yrs - 1 ppd. She stopped smoking 2 years ago. Symptoms significantly improved with Spiriva. Osteoporosis: DEXA scan in 2/20 showed osteoporosis T score -2.5 at left distal radius. Patient has not undergone any treatment. Review of Systems   Constitutional: Negative for chills, diaphoresis, fever, malaise/fatigue and weight loss. HENT: Negative for congestion, ear discharge, ear pain, hearing loss, nosebleeds, sinus pain, sore throat and tinnitus. Eyes: Negative for blurred vision, double vision and photophobia. Respiratory: Positive for cough. Negative for sputum production, shortness of breath, wheezing and stridor. Cardiovascular: Negative for chest pain, palpitations, orthopnea, claudication and leg swelling. Gastrointestinal: Positive for abdominal pain. Negative for constipation, diarrhea, heartburn, nausea and vomiting. Genitourinary: Positive for dysuria, frequency and urgency. Negative for flank pain and hematuria. Musculoskeletal: Negative for back pain, joint pain, myalgias and neck pain.    Skin: Negative for rash.   Neurological: Negative for tingling, tremors, sensory change, speech change, focal weakness, seizures, weakness and headaches. Psychiatric/Behavioral: Negative for depression. The patient is not nervous/anxious. All other systems reviewed and are negative. Allergies   Allergen Reactions    Lisinopril Cough    Lyrica [Pregabalin] Other (comments)     Medication causes dry eyes, unstable balance, and lack of concentration.      Mold Extracts Runny Nose    Pollen Extracts Itching    Statins-Hmg-Coa Reductase Inhibitors Myalgia    Topamax [Topiramate] Vertigo       PHQ Screening   3 most recent PHQ Screens 2/10/2022   Little interest or pleasure in doing things Not at all   Feeling down, depressed, irritable, or hopeless Not at all   Total Score PHQ 2 0   Trouble falling or staying asleep, or sleeping too much Not at all   Feeling tired or having little energy Not at all   Poor appetite, weight loss, or overeating Not at all   Feeling bad about yourself - or that you are a failure or have let yourself or your family down Not at all   Trouble concentrating on things such as school, work, reading, or watching TV Not at all   Moving or speaking so slowly that other people could have noticed; or the opposite being so fidgety that others notice Not at all   Thoughts of being better off dead, or hurting yourself in some way Not at all   PHQ 9 Score 0   How difficult have these problems made it for you to do your work, take care of your home and get along with others Not difficult at all       History  Past Medical History:   Diagnosis Date    Allergic rhinitis     Asthma 8/25/2012    +PFT for mild obstructive disease/COPD    Back pain, chronic 8/25/2012    Dr. Joshua Dodson referred to Dr. Asif Mathis    Cervical spinal stenosis 1/8/2018    Cervical spondylosis with myelopathy 2/28/2018    Chronic obstructive pulmonary disease ([facility] [address]     GERD (gastroesophageal reflux disease)     H/O mammogram 10/03/2017 No evidence of malignancy    Hard of hearing     chronic ruptured TM    HTN (hypertension) 2012    Hx of screening mammography 2016    E[facility], normal, routine recs    Hyperlipidemia     Nausea & vomiting     Ossification of posterior longitudinal ligament in cervical region ([address] 2018    S/P cervical spinal fusion 3/2/2018    Sinus congestion 2012    Spinal stenosis of lumbar region with radiculopathy 2015    Dr. Sonam Medrano       Past Surgical History:   Procedure Laterality Date    HX  SECTION      HX ORTHOPAEDIC  2018    cervical surgery(Dr. Eran Hernández)    HX TUBAL LIGATION         Social History     Socioeconomic History    Marital status: SINGLE     Spouse name: Not on file    Number of children: Not on file    Years of education: Not on file    Highest education level: Not on file   Occupational History    Occupation: [employer]   Tobacco Use    Smoking status: Former Smoker     Packs/day: 1.00     Types: Cigarettes     Quit date: 3/26/2020     Years since quittin.2    Smokeless tobacco: Never Used   Substance and Sexual Activity    Alcohol use: No     Comment: h/o alcohol abuse in remission    Drug use: No    Sexual activity: Never   Other Topics Concern     Service No    Blood Transfusions No    Caffeine Concern Yes    Occupational Exposure No    Hobby Hazards No    Sleep Concern No    Stress Concern No    Weight Concern No    Special Diet No    Back Care No    Exercise Yes     Comment: walking    Bike Helmet No    Seat Belt Yes    Self-Exams No   Social History Narrative    Not on file     Social Determinants of Health     Financial Resource Strain:     Difficulty of Paying Living Expenses: Not on file   Food Insecurity:     Worried About Running Out of Food in the Last Year: Not on file    Isaac of Food in the Last Year: Not on file   Transportation Needs:     Lack of Transportation (Medical):  Not on file    Lack of Transportation (Non-Medical): Not on file   Physical Activity:     Days of Exercise per Week: Not on file    Minutes of Exercise per Session: Not on file   Stress:     Feeling of Stress : Not on file   Social Connections:     Frequency of Communication with Friends and Family: Not on file    Frequency of Social Gatherings with Friends and Family: Not on file    Attends Judaism Services: Not on file    Active Member of [address] [place] [address] or Organizations: Not on file    Attends Club or Organization Meetings: Not on file    Marital Status: Not on file   Intimate Partner Violence:     Fear of Current or Ex-Partner: Not on file    Emotionally Abused: Not on file    Physically Abused: Not on file    Sexually Abused: Not on file   Housing Stability:     Unable to Pay for Housing in the Last Year: Not on file    Number of [place] in the Last Year: Not on file    Unstable Housing in the Last Year: Not on file       Current Outpatient Medications   Medication Sig Dispense Refill    tiotropium (SPIRIVA) 18 mcg inhalation capsule Take 1 Capsule by inhalation daily. 30 Capsule 2    losartan-hydroCHLOROthiazide (HYZAAR) 50-12.5 mg per tablet Take 1 Tablet by mouth daily. FOR HIGH BLOOD PRESSURE 90 Tablet 3    montelukast (SINGULAIR) 10 mg tablet TAKE 1 TABLET BY MOUTH  DAILY FOR INFLAMMATION OF  THE NOSE DUE TO AN ALLERGY 90 Tablet 3    FLUoxetine (PROzac) 10 mg capsule Take 1 Capsule by mouth daily. 90 Capsule 2    nitrofurantoin, macrocrystal-monohydrate, (Macrobid) 100 mg capsule Take 1 Capsule by mouth two (2) times a day for 5 days. 10 Capsule 0    albuterol (PROVENTIL HFA, VENTOLIN HFA, PROAIR HFA) 90 mcg/actuation inhaler Take 1 Puff by inhalation every four (4) hours as needed for Wheezing. 18 g 2    vitamin e (E GEMS) 1,000 unit capsule Take 1,000 Units by mouth daily.  methocarbamoL (ROBAXIN) 500 mg tablet Take 1 Tablet by mouth three (3) times daily.  270 Tablet 1    gabapentin (NEURONTIN) 300 mg capsule Take 1 Capsule by mouth three (3) times daily. Max Daily Amount: 900 mg. 270 Capsule 1    ezetimibe (ZETIA) 10 mg tablet TAKE 1 TABLET BY MOUTH  DAILY 90 Tablet 1    fluticasone propionate (Flonase Allergy Relief) 50 mcg/actuation nasal spray 2 Sprays by Both Nostrils route daily.  naproxen sodium (Aleve) 220 mg cap Take 1 Capsule by mouth daily.  vit B Cmplx 3-FA-Vit C-Biotin (NEPHRO MIK RX) 1- mg-mg-mcg tablet Take 1 Tablet by mouth daily.  aspirin delayed-release 81 mg tablet Take 81 mg by mouth. 3 x Weekly      acetaminophen (TYLENOL EXTRA STRENGTH) 500 mg tablet Take 1,000 mg by mouth every eight (8) hours as needed for Pain.  Cholecalciferol, Vitamin D3, (VITAMIN D3) 2,000 unit cap capsule Take 2,000 Units by mouth daily. 30 Cap 11         Vitals:    06/21/22 1335   BP: 135/81   Pulse: 67   Resp: 16   Temp: 98.5 °F (36.9 °C)   TempSrc: Temporal   Weight: 127 lb (57.6 kg)   Height: 4' 11\" (1.499 m)   PainSc:   3       Physical Exam  Vitals and nursing note reviewed. Constitutional:       General: She is not in acute distress. Appearance: Normal appearance. She is not ill-appearing, toxic-appearing or diaphoretic. HENT:      Head: Normocephalic and atraumatic. Eyes:      General: No scleral icterus. Extraocular Movements: Extraocular movements intact. Conjunctiva/sclera: Conjunctivae normal.      Pupils: Pupils are equal, round, and reactive to light. Neck:      Comments: Old well healed surgical scar at back of neck  Cardiovascular:      Rate and Rhythm: Normal rate and regular rhythm. Pulses: Normal pulses. Heart sounds: No murmur heard. Pulmonary:      Effort: Pulmonary effort is normal. No respiratory distress. Breath sounds: Normal breath sounds. No wheezing or rales. Abdominal:      General: Bowel sounds are normal. There is no distension. Palpations: Abdomen is soft. Tenderness: There is no abdominal tenderness. There is no guarding. Musculoskeletal:         General: Normal range of motion. Cervical back: Normal range of motion. Right lower leg: No edema. Left lower leg: No edema. Skin:     General: Skin is warm and dry. Coloration: Skin is not jaundiced or pale. Neurological:      General: No focal deficit present. Mental Status: She is alert and oriented to person, place, and time. Mental status is at baseline. Cranial Nerves: No cranial nerve deficit. Motor: No weakness. Gait: Gait normal.   Psychiatric:         Mood and Affect: Mood normal.         Thought Content:  Thought content normal.         Judgment: Judgment normal.         Office Visit on 06/21/2022   Component Date Value Ref Range Status    Color (UA POC) 06/21/2022 Yellow   Final    Clarity (UA POC) 06/21/2022 Cloudy   Final    Glucose (UA POC) 06/21/2022 Negative  Negative Final    Bilirubin (UA POC) 06/21/2022 Negative  Negative Final    Ketones (UA POC) 06/21/2022 Negative  Negative Final    Specific gravity (UA POC) 06/21/2022 1.015  1.001 - 1.035 Final    Blood (UA POC) 06/21/2022 1+  Negative Final    pH (UA POC) 06/21/2022 6.0  4.6 - 8.0 Final    Protein (UA POC) 06/21/2022 Negative  Negative Final    Urobilinogen (UA POC) 06/21/2022 0.2 mg/dL  0.2 - 1 Final    Nitrites (UA POC) 06/21/2022 Negative  Negative Final    Leukocyte esterase (UA POC) 06/21/2022 1+  Negative Final       Results for orders placed or performed in visit on 06/21/22   AMB POC URINALYSIS DIP STICK AUTO W/ MICRO   Result Value Ref Range    Color (UA POC) Yellow     Clarity (UA POC) Cloudy     Glucose (UA POC) Negative Negative    Bilirubin (UA POC) Negative Negative    Ketones (UA POC) Negative Negative    Specific gravity (UA POC) 1.015 1.001 - 1.035    Blood (UA POC) 1+ Negative    pH (UA POC) 6.0 4.6 - 8.0    Protein (UA POC) Negative Negative    Urobilinogen (UA POC) 0.2 mg/dL 0.2 - 1    Nitrites (UA POC) Negative Negative    Leukocyte esterase (UA POC) 1+ Negative       Patient Care Team:  Patient Care Team:  Clarence Price MD as PCP - General (Internal Medicine Physician)  Hilton Ahumada, NP as PCP - [facility] Provider      Assessment / Plan:      ICD-10-CM ICD-9-CM    1. Acute cystitis with hematuria  N30.01 595.0 AMB POC URINALYSIS DIP STICK AUTO W/ MICRO      CULTURE, URINE      nitrofurantoin, macrocrystal-monohydrate, (Macrobid) 100 mg capsule   2. COPD, mild (HCC)  J44.9 496 tiotropium (SPIRIVA) 18 mcg inhalation capsule      montelukast (SINGULAIR) 10 mg tablet   3. Essential hypertension  I10 401.9 losartan-hydroCHLOROthiazide (HYZAAR) 50-12.5 mg per tablet   4. Screening for colon cancer  Z12.11 V76.51 COLOGUARD TEST (FECAL DNA COLORECTAL CANCER SCREENING)   5. Age-related osteoporosis without current pathological fracture  M81.0 733.01    6. Primary hypertension  I10 401.9    7. Pure hypercholesterolemia  E78.00 272.0    8. Anxiety and depression  F41.9 300.00 FLUoxetine (PROzac) 10 mg capsule    F32. A 311        HTN: Well-controlled on losartan HCTZ. HLD: Continue ezetimibe. Patient is intolerant to statins. Repeat lipid panel due. Anxiety: Controlled on Prozac 20 mg daily. COPD: Controlled on albuterol and Spiriva. UTI: UA consistent with acute infection. Will prescribe Macrobid. Send urine for culture. Lumbar spinal stenosis: Patient is on gabapentin. She is following with Dr. Maria Eugenia Traore. Osteoporosis: Will start on Prolia infusion if approved. Check Vit D    Next visit: labs    Follow-up and Dispositions    · Return in about 4 months (around 10/21/2022) for Routine F/u. I asked the patient if she  had any questions and answered her  questions. The patient stated that she understands the treatment plan and agrees with the treatment plan    This document was created with a voice activated dictation system and may contain transcription errors.

## 2022-06-21 NOTE — TELEPHONE ENCOUNTER
Requested Prescriptions     Pending Prescriptions Disp Refills    tiotropium (SPIRIVA) 18 mcg inhalation capsule 30 Capsule 2     Sig: Take 1 Capsule by inhalation daily.  nitrofurantoin, macrocrystal-monohydrate, (Macrobid) 100 mg capsule 10 Capsule 0     Sig: Take 1 Capsule by mouth two (2) times a day for 5 days. Bryanna Early   NEED TO BE 90 DAY SUPPLY PLEASE

## 2022-06-22 DIAGNOSIS — E78.5 HYPERLIPIDEMIA WITH TARGET LDL LESS THAN 100: ICD-10-CM

## 2022-06-22 RX ORDER — EZETIMIBE 10 MG/1
10 TABLET ORAL DAILY
Qty: 90 TABLET | Refills: 3 | Status: SHIPPED | OUTPATIENT
Start: 2022-06-22

## 2022-06-24 LAB
BACTERIA SPEC CULT: ABNORMAL
CC UR VC: ABNORMAL
SERVICE CMNT-IMP: ABNORMAL

## 2022-06-27 LAB — COLOGUARD TEST, EXTERNAL: NEGATIVE

## 2022-06-28 DIAGNOSIS — J44.9 COPD, MILD (HCC): ICD-10-CM

## 2022-06-28 RX ORDER — ALBUTEROL SULFATE 90 UG/1
1 AEROSOL, METERED RESPIRATORY (INHALATION)
Qty: 18 G | Refills: 2 | Status: SHIPPED | OUTPATIENT
Start: 2022-06-28

## 2022-07-19 ENCOUNTER — TELEPHONE (OUTPATIENT)
Dept: FAMILY MEDICINE CLINIC | Age: 67
End: 2022-07-19

## 2022-07-19 DIAGNOSIS — E78.5 HYPERLIPIDEMIA WITH TARGET LDL LESS THAN 100: Primary | ICD-10-CM

## 2022-07-19 DIAGNOSIS — I10 PRIMARY HYPERTENSION: ICD-10-CM

## 2022-07-19 DIAGNOSIS — M81.0 AGE-RELATED OSTEOPOROSIS WITHOUT CURRENT PATHOLOGICAL FRACTURE: ICD-10-CM

## 2022-07-19 NOTE — TELEPHONE ENCOUNTER
Pt was given lab results. States understanding with no further questions. Pt has concerns of bowel movements please advise.

## 2022-07-20 NOTE — TELEPHONE ENCOUNTER
Spoke to patient over the phone. States she had some food from outside after which she developed loose BM which is now improving. Advised to keep hydrated. Patient is due for labs - advised to get them done prior to next appt.

## 2022-07-26 ENCOUNTER — TRANSCRIBE ORDER (OUTPATIENT)
Dept: SCHEDULING | Age: 67
End: 2022-07-26

## 2022-07-26 DIAGNOSIS — Z12.31 VISIT FOR SCREENING MAMMOGRAM: Primary | ICD-10-CM

## 2022-08-04 DIAGNOSIS — M48.062 SPINAL STENOSIS OF LUMBAR REGION WITH NEUROGENIC CLAUDICATION: ICD-10-CM

## 2022-08-04 RX ORDER — GABAPENTIN 300 MG/1
300 CAPSULE ORAL 3 TIMES DAILY
Qty: 270 CAPSULE | Refills: 0 | Status: SHIPPED | OUTPATIENT
Start: 2022-08-04 | End: 2022-10-11

## 2022-08-04 NOTE — TELEPHONE ENCOUNTER
A  was obtained. The last fill date for the gabapentin was 05/04/22 for 90 days. This was the last refill from the January prescription. She has scheduled a follow up for the end of September. Please review.

## 2022-08-04 NOTE — TELEPHONE ENCOUNTER
Last Visit: 1/17/22 with MD Ashwin Lea  Next Appointment: 9/22/22 with MD Ashwin Lea  Previous Refill Encounter(s): 1/25/22 #270 with 1 refill    Requested Prescriptions     Pending Prescriptions Disp Refills    gabapentin (NEURONTIN) 300 mg capsule 270 Capsule 1     Sig: Take 1 Capsule by mouth three (3) times daily. Max Daily Amount: 900 mg. For Jamshid Sorto in place:   Recommendation Provided To:    Intervention Detail: New Rx: 1, reason: Patient Preference  Gap Closed?:   Intervention Accepted By:   Time Spent (min): 5

## 2022-08-09 NOTE — ROUTINE PROCESS
Mobility Intervention:       [] Pt dangled at edge of bed    [] Pt assisted OOB to bedside commode    [] Pt assisted OOB to chair    [] Pt ambulated to bathroom    [x] Patient was ambulated in room/hallway    Assistive Device Utilized:       [x] Rolling walker   [] Crutches   [] Straight Cane   [] Knee immobilizer   [] IV pole    After Mobilization:     [] Patient left in no apparent distress sitting up in chair  [] Patient left in no apparent distress in bed  [] Call bell left within reach  [] SCDs on & machine turned on  [] Ice applied  [x] RN notified  [x] Caregiver present  [] Bed alarm activated    Reason patient not mobilized:      [] Patient refused   [] Nausea/vomiting   [] Low blood pressure   [] Drowsy/lethargic    Pain Rating:     [x] 0  [] 1  Assistive Device:        [] 2  [] 3  [] 4  [] 5  [] 6  Assistive Device:        [] 7  [] 8  [] 9  [] 10    Comments: Patient and spouse have spoken with [facility] and are contemplating surgery. Scheduling is being decided. He feels comfortable. No pain. Left nephrostomy tube is draining van urine. Right nephrostomy tube is draining dark-colored urine which is intermittent according to spouse. Urine is very light pink on CBI. Urine culture shows bacterial organism which is being treated with vancomycin. .  Still anemic although his hemoglobin increased to 7.4 and another transfusion is underway. I would recommend that patient continue medical therapy with bladder irrigations for 24 to 48 hours and then plan for discharge and/or transfer to [facility] for definitive surgery.

## 2022-09-22 ENCOUNTER — OFFICE VISIT (OUTPATIENT)
Dept: ORTHOPEDIC SURGERY | Age: 67
End: 2022-09-22
Payer: MEDICARE

## 2022-09-22 VITALS
HEIGHT: 59 IN | OXYGEN SATURATION: 97 % | TEMPERATURE: 97 F | WEIGHT: 127 LBS | BODY MASS INDEX: 25.6 KG/M2 | HEART RATE: 87 BPM

## 2022-09-22 DIAGNOSIS — M25.512 CHRONIC PAIN OF BOTH SHOULDERS: ICD-10-CM

## 2022-09-22 DIAGNOSIS — Z98.1 HISTORY OF FUSION OF CERVICAL SPINE: ICD-10-CM

## 2022-09-22 DIAGNOSIS — M25.511 CHRONIC PAIN OF BOTH SHOULDERS: ICD-10-CM

## 2022-09-22 DIAGNOSIS — M48.062 SPINAL STENOSIS OF LUMBAR REGION WITH NEUROGENIC CLAUDICATION: Primary | ICD-10-CM

## 2022-09-22 DIAGNOSIS — G89.29 CHRONIC PAIN OF BOTH SHOULDERS: ICD-10-CM

## 2022-09-22 PROCEDURE — G8427 DOCREV CUR MEDS BY ELIG CLIN: HCPCS | Performed by: PHYSICAL MEDICINE & REHABILITATION

## 2022-09-22 PROCEDURE — G8536 NO DOC ELDER MAL SCRN: HCPCS | Performed by: PHYSICAL MEDICINE & REHABILITATION

## 2022-09-22 PROCEDURE — G8417 CALC BMI ABV UP PARAM F/U: HCPCS | Performed by: PHYSICAL MEDICINE & REHABILITATION

## 2022-09-22 PROCEDURE — G8399 PT W/DXA RESULTS DOCUMENT: HCPCS | Performed by: PHYSICAL MEDICINE & REHABILITATION

## 2022-09-22 PROCEDURE — 3017F COLORECTAL CA SCREEN DOC REV: CPT | Performed by: PHYSICAL MEDICINE & REHABILITATION

## 2022-09-22 PROCEDURE — 1090F PRES/ABSN URINE INCON ASSESS: CPT | Performed by: PHYSICAL MEDICINE & REHABILITATION

## 2022-09-22 PROCEDURE — 1101F PT FALLS ASSESS-DOCD LE1/YR: CPT | Performed by: PHYSICAL MEDICINE & REHABILITATION

## 2022-09-22 PROCEDURE — G8432 DEP SCR NOT DOC, RNG: HCPCS | Performed by: PHYSICAL MEDICINE & REHABILITATION

## 2022-09-22 PROCEDURE — G9899 SCRN MAM PERF RSLTS DOC: HCPCS | Performed by: PHYSICAL MEDICINE & REHABILITATION

## 2022-09-22 PROCEDURE — 99212 OFFICE O/P EST SF 10 MIN: CPT | Performed by: PHYSICAL MEDICINE & REHABILITATION

## 2022-09-22 PROCEDURE — 1123F ACP DISCUSS/DSCN MKR DOCD: CPT | Performed by: PHYSICAL MEDICINE & REHABILITATION

## 2022-09-22 PROCEDURE — G8756 NO BP MEASURE DOC: HCPCS | Performed by: PHYSICAL MEDICINE & REHABILITATION

## 2022-09-22 NOTE — H&P (VIEW-ONLY)
[address]  [address]  [facility], [address]  Phone: [phone]  Fax: [phone]        Nina Corbin  : 1955  PCP: Julio Aguirre MD    PROGRESS NOTE      ASSESSMENT AND PLAN    Diagnoses and all orders for this visit:    1. Spinal stenosis of lumbar region with neurogenic claudication  -     SCHEDULE SURGERY    2. History of fusion of cervical spine  -     REFERRAL TO PHYSICAL THERAPY    3. 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 Will refer to PT, will need follow-up with orthopedics if pain persists. Regarding her stenosis, she feels its time for another injection, lumbar GRETCHEN. Shiela Wiggins Risks, benefits, alternatives, and limitations of GRETCHEN discussed with patient. Patient wishes to proceed. Schedule GRETCHEN L5-S1  Continue Gabapentin  Referral to Physical Therapy  Will monitor for recurrent cervical radiculomyelopathy    Follow-up and Dispositions    Return in about 6 weeks (around 11/3/2022) for after Injections. HISTORY OF PRESENT ILLNESS      Heaven Paz is a 69 y.o. female presents for follow up of shoulder. Pt reports B/L shoulder pain, worsening over the past year. R > L. Her pain is exacerbated with overhead lifting and rotation. Pt notes numbness and tingling in her right hand. She has some weakness RUE. She also complains of constant low back pain. Denies sciatic pain. She has some trouble walking in the morning, which subsides as the day progresses. Pt takes Gabapentin 600 mg BID with benefit. Papito side effects.      Pain Assessment  2022   Location of Pain Shoulder   Pain Location Comment -   Location Modifiers -   Severity of Pain 7   Quality of Pain Throbbing   Quality of Pain Comment -   Duration of Pain Persistent   Frequency of Pain Constant   Aggravating Factors Other (Comment)   Aggravating Factors Comment working with hand   Limiting Behavior Yes   Relieving Factors Other (Comment)   Relieving Factors Comment muscle rub   Result of Injury No         Investigations:  L MRI 12/2021: moderate to severe stenosis L4-5, scoliosis   2015 L MRI severe stenosis L4/L5/S1     Treatments:  PT HEP (5K deductible)  Beneficial meds: Gabapentin, Aleve  Failed medications: Topamax - vertigo, Lyrica - dry eyes, imbalance,   Spinal injections: Yes GRETCHEN L4-5 2016 by me, GRETCHEN L5-S1 2017  Spinal surgery- Yes. C3-4-5-6-7 lami fusion Dr. Epi Gilbert 2/2018     Pt lives alone. Works as .   Has a $5000 deductible with her Medicare plan       PHYSICAL EXAMINATION    Visit Vitals  Pulse 87   Temp 97 °F (36.1 °C) (Temporal)   Ht 4' 11\" (1.499 m)   Wt 127 lb (57.6 kg)   SpO2 97%   BMI 25.65 kg/m²       Pain and limitation right IR  Abduction limited on the right 120  TTP B/L upper traps  UE strength intact except intrinsics 4/5  Pain inhibited right deltoid  DTRs 2+ brisk  Positive Tinel's right wrist and elbow  Negative Hernandez's  Tender midline L5-S1  LE strength intact  SLR negative              Written by Arthur Ayala, as dictated by Theresa Ortiz MD.

## 2022-09-22 NOTE — PROGRESS NOTES
Kristi Valle presents today for   Chief Complaint   Patient presents with    Shoulder Pain       Is someone accompanying this pt? no    Is the patient using any DME equipment during OV? no    Depression Screening:  3 most recent PHQ Screens 2/10/2022   Little interest or pleasure in doing things Not at all   Feeling down, depressed, irritable, or hopeless Not at all   Total Score PHQ 2 0   Trouble falling or staying asleep, or sleeping too much Not at all   Feeling tired or having little energy Not at all   Poor appetite, weight loss, or overeating Not at all   Feeling bad about yourself - or that you are a failure or have let yourself or your family down Not at all   Trouble concentrating on things such as school, work, reading, or watching TV Not at all   Moving or speaking so slowly that other people could have noticed; or the opposite being so fidgety that others notice Not at all   Thoughts of being better off dead, or hurting yourself in some way Not at all   PHQ 9 Score 0   How difficult have these problems made it for you to do your work, take care of your home and get along with others Not difficult at all       Learning Assessment:  Learning Assessment 5/11/2015   PRIMARY LEARNER Patient   HIGHEST LEVEL OF EDUCATION - PRIMARY LEARNER  DID NOT [address] PRIMARY LEARNER NONE   CO-LEARNER CAREGIVER No   PRIMARY LANGUAGE ENGLISH   LEARNER PREFERENCE PRIMARY READING   ANSWERED BY patient   RELATIONSHIP SELF       Abuse Screening:  Abuse Screening Questionnaire 8/12/2021   Do you ever feel afraid of your partner? N   Are you in a relationship with someone who physically or mentally threatens you? N   Is it safe for you to go home? Y       Fall Risk  Fall Risk Assessment, last 12 mths 6/21/2022   Able to walk? Yes   Fall in past 12 months? 1   Do you feel unsteady? 1   Are you worried about falling 1   Is TUG test greater than 12 seconds?  -   Is the gait abnormal? 1   Number of falls in past 12 months 2   Fall with injury? 0       OPIOID RISK TOOL  No flowsheet data found. Coordination of Care:  1. Have you been to the ER, urgent care clinic since your last visit? no  Hospitalized since your last visit? no    2. Have you seen or consulted any other health care providers outside of the [address] [place] [address] since your last visit? no Include any pap smears or colon screening.  no

## 2022-09-22 NOTE — PROGRESS NOTES
[address]  [address], [address]  [place], [address] [place] [address] Street  Phone: [phone]  Fax: [phone]        Zina Jarrett  : 1955  PCP: Tex Dickerson MD    PROGRESS NOTE      ASSESSMENT AND PLAN    Diagnoses and all orders for this visit:    1. Spinal stenosis of lumbar region with neurogenic claudication  -     SCHEDULE SURGERY    2. History of fusion of cervical spine  -     REFERRAL TO PHYSICAL THERAPY    3. 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 Will refer to PT, will need follow-up with orthopedics if pain persists. Regarding her stenosis, she feels its time for another injection, lumbar GRETCHEN. Karel Romero Risks, benefits, alternatives, and limitations of GRETCHEN discussed with patient. Patient wishes to proceed. Schedule GRETCHEN L5-S1  Continue Gabapentin  Referral to Physical Therapy  Will monitor for recurrent cervical radiculomyelopathy    Follow-up and Dispositions    Return in about 6 weeks (around 11/3/2022) for after Injections. HISTORY OF PRESENT ILLNESS      Tamika Lieberman is a 69 y.o. female presents for follow up of shoulder. Pt reports B/L shoulder pain, worsening over the past year. R > L. Her pain is exacerbated with overhead lifting and rotation. Pt notes numbness and tingling in her right hand. She has some weakness RUE. She also complains of constant low back pain. Denies sciatic pain. She has some trouble walking in the morning, which subsides as the day progresses. Pt takes Gabapentin 600 mg BID with benefit. Papito side effects.      Pain Assessment  2022   Location of Pain Shoulder   Pain Location Comment -   Location Modifiers -   Severity of Pain 7   Quality of Pain Throbbing   Quality of Pain Comment -   Duration of Pain Persistent   Frequency of Pain Constant   Aggravating Factors Other (Comment)   Aggravating Factors Comment working with hand   Limiting Behavior Yes   Relieving Factors Other (Comment)   Relieving Factors Comment muscle rub   Result of Injury No         Investigations:  L MRI 12/2021: moderate to severe stenosis L4-5, scoliosis   2015 L MRI severe stenosis L4/L5/S1     Treatments:  PT HEP (5K deductible)  Beneficial meds: Gabapentin, Aleve  Failed medications: Topamax - vertigo, Lyrica - dry eyes, imbalance,   Spinal injections: Yes GRETCHEN L4-5 2016 by me, GRETCHEN L5-S1 2017  Spinal surgery- Yes. C3-4-5-6-7 lami fusion Dr. Arnav Resendiz 2/2018     Pt lives alone. Works as .   Has a $5000 deductible with her Medicare plan       PHYSICAL EXAMINATION    Visit Vitals  Pulse 87   Temp 97 °F (36.1 °C) (Temporal)   Ht 4' 11\" (1.499 m)   Wt 127 lb (57.6 kg)   SpO2 97%   BMI 25.65 kg/m²       Pain and limitation right IR  Abduction limited on the right 120  TTP B/L upper traps  UE strength intact except intrinsics 4/5  Pain inhibited right deltoid  DTRs 2+ brisk  Positive Tinel's right wrist and elbow  Negative Hernandez's  Tender midline L5-S1  LE strength intact  SLR negative              Written by Nimo Stanford, as dictated by Michele Simmons MD.

## 2022-10-03 ENCOUNTER — HOSPITAL ENCOUNTER (OUTPATIENT)
Dept: LAB | Age: 67
Discharge: HOME | End: 2022-10-03
Payer: MEDICARE

## 2022-10-03 DIAGNOSIS — M81.0 AGE-RELATED OSTEOPOROSIS WITHOUT CURRENT PATHOLOGICAL FRACTURE: ICD-10-CM

## 2022-10-03 DIAGNOSIS — E78.5 HYPERLIPIDEMIA WITH TARGET LDL LESS THAN 100: ICD-10-CM

## 2022-10-03 DIAGNOSIS — I10 PRIMARY HYPERTENSION: ICD-10-CM

## 2022-10-03 LAB
25(OH)D3 SERPL-MCNC: 40.5 NG/ML (ref 30–100)
ALBUMIN SERPL-MCNC: 3.8 G/DL (ref 3.4–5)
ALBUMIN/GLOB SERPL: 0.9 {RATIO} (ref 0.8–1.7)
ALP SERPL-CCNC: 69 U/L (ref 45–117)
ALT SERPL-CCNC: 20 U/L (ref 13–56)
ANION GAP SERPL CALC-SCNC: 4 MMOL/L (ref 3–18)
AST SERPL-CCNC: 19 U/L (ref 10–38)
BASOPHILS # BLD: 0.1 K/UL (ref 0–0.1)
BASOPHILS NFR BLD: 2 % (ref 0–2)
BILIRUB SERPL-MCNC: 0.5 MG/DL (ref 0.2–1)
BUN SERPL-MCNC: 19 MG/DL (ref 7–18)
BUN/CREAT SERPL: 21 (ref 12–20)
CALCIUM SERPL-MCNC: 11.5 MG/DL (ref 8.5–10.1)
CHLORIDE SERPL-SCNC: 103 MMOL/L (ref 100–111)
CHOLEST SERPL-MCNC: 257 MG/DL
CO2 SERPL-SCNC: 28 MMOL/L (ref 21–32)
CREAT SERPL-MCNC: 0.92 MG/DL (ref 0.6–1.3)
DIFFERENTIAL METHOD BLD: ABNORMAL
EOSINOPHIL # BLD: 0.1 K/UL (ref 0–0.4)
EOSINOPHIL NFR BLD: 2 % (ref 0–5)
ERYTHROCYTE [DISTWIDTH] IN BLOOD BY AUTOMATED COUNT: 13.7 % (ref 11.6–14.5)
GLOBULIN SER CALC-MCNC: 4.1 G/DL (ref 2–4)
GLUCOSE SERPL-MCNC: 102 MG/DL (ref 74–99)
HCT VFR BLD AUTO: 39 % (ref 35–45)
HDLC SERPL-MCNC: 64 MG/DL (ref 40–60)
HDLC SERPL: 4 {RATIO} (ref 0–5)
HGB BLD-MCNC: 12.7 G/DL (ref 12–16)
IMM GRANULOCYTES # BLD AUTO: 0 K/UL (ref 0–0.04)
IMM GRANULOCYTES NFR BLD AUTO: 0 % (ref 0–0.5)
LDLC SERPL CALC-MCNC: 169.2 MG/DL (ref 0–100)
LIPID PROFILE,FLP: ABNORMAL
LYMPHOCYTES # BLD: 1.6 K/UL (ref 0.9–3.6)
LYMPHOCYTES NFR BLD: 30 % (ref 21–52)
MCH RBC QN AUTO: 30.2 PG (ref 24–34)
MCHC RBC AUTO-ENTMCNC: 32.6 G/DL (ref 31–37)
MCV RBC AUTO: 92.6 FL (ref 78–100)
MONOCYTES # BLD: 0.8 K/UL (ref 0.05–1.2)
MONOCYTES NFR BLD: 15 % (ref 3–10)
NEUTS SEG # BLD: 2.7 K/UL (ref 1.8–8)
NEUTS SEG NFR BLD: 51 % (ref 40–73)
NRBC # BLD: 0 K/UL (ref 0–0.01)
NRBC BLD-RTO: 0 PER 100 WBC
PLATELET # BLD AUTO: 348 K/UL (ref 135–420)
PMV BLD AUTO: 9.6 FL (ref 9.2–11.8)
POTASSIUM SERPL-SCNC: 4.2 MMOL/L (ref 3.5–5.5)
PROT SERPL-MCNC: 7.9 G/DL (ref 6.4–8.2)
RBC # BLD AUTO: 4.21 M/UL (ref 4.2–5.3)
SODIUM SERPL-SCNC: 135 MMOL/L (ref 136–145)
TRIGL SERPL-MCNC: 119 MG/DL (ref ?–150)
VLDLC SERPL CALC-MCNC: 23.8 MG/DL
WBC # BLD AUTO: 5.3 K/UL (ref 4.6–13.2)

## 2022-10-03 PROCEDURE — 82306 VITAMIN D 25 HYDROXY: CPT

## 2022-10-03 PROCEDURE — 36415 COLL VENOUS BLD VENIPUNCTURE: CPT

## 2022-10-03 PROCEDURE — 85025 COMPLETE CBC W/AUTO DIFF WBC: CPT

## 2022-10-03 PROCEDURE — 80053 COMPREHEN METABOLIC PANEL: CPT

## 2022-10-03 PROCEDURE — 80061 LIPID PANEL: CPT

## 2022-10-11 ENCOUNTER — TELEPHONE (OUTPATIENT)
Dept: ORTHOPEDIC SURGERY | Age: 67
End: 2022-10-11

## 2022-10-11 ENCOUNTER — APPOINTMENT (OUTPATIENT)
Dept: GENERAL RADIOLOGY | Age: 67
End: 2022-10-11
Attending: PHYSICAL MEDICINE & REHABILITATION
Payer: MEDICARE

## 2022-10-11 ENCOUNTER — HOSPITAL ENCOUNTER (OUTPATIENT)
Age: 67
Setting detail: OUTPATIENT SURGERY
Discharge: HOME | End: 2022-10-11
Attending: PHYSICAL MEDICINE & REHABILITATION | Admitting: PHYSICAL MEDICINE & REHABILITATION
Payer: MEDICARE

## 2022-10-11 VITALS
DIASTOLIC BLOOD PRESSURE: 68 MMHG | RESPIRATION RATE: 16 BRPM | TEMPERATURE: 98.3 F | SYSTOLIC BLOOD PRESSURE: 114 MMHG | HEART RATE: 61 BPM | OXYGEN SATURATION: 96 %

## 2022-10-11 DIAGNOSIS — M25.511 CHRONIC PAIN OF BOTH SHOULDERS: Primary | ICD-10-CM

## 2022-10-11 DIAGNOSIS — G89.29 CHRONIC PAIN OF BOTH SHOULDERS: Primary | ICD-10-CM

## 2022-10-11 DIAGNOSIS — M25.512 CHRONIC PAIN OF BOTH SHOULDERS: Primary | ICD-10-CM

## 2022-10-11 DIAGNOSIS — M48.062 SPINAL STENOSIS OF LUMBAR REGION WITH NEUROGENIC CLAUDICATION: Primary | ICD-10-CM

## 2022-10-11 PROCEDURE — 74011000636 HC RX REV CODE- 636: Performed by: PHYSICAL MEDICINE & REHABILITATION

## 2022-10-11 PROCEDURE — 74011250637 HC RX REV CODE- 250/637: Performed by: PHYSICAL MEDICINE & REHABILITATION

## 2022-10-11 PROCEDURE — 76010000009 HC PAIN MGT 0 TO 30 MIN PROC: Performed by: PHYSICAL MEDICINE & REHABILITATION

## 2022-10-11 PROCEDURE — 74011000250 HC RX REV CODE- 250: Performed by: PHYSICAL MEDICINE & REHABILITATION

## 2022-10-11 PROCEDURE — 77030014124 HC TY EPDRL BBMI -A: Performed by: PHYSICAL MEDICINE & REHABILITATION

## 2022-10-11 PROCEDURE — 62323 NJX INTERLAMINAR LMBR/SAC: CPT | Performed by: PHYSICAL MEDICINE & REHABILITATION

## 2022-10-11 PROCEDURE — 74011250636 HC RX REV CODE- 250/636: Performed by: PHYSICAL MEDICINE & REHABILITATION

## 2022-10-11 PROCEDURE — 2709999900 HC NON-CHARGEABLE SUPPLY: Performed by: PHYSICAL MEDICINE & REHABILITATION

## 2022-10-11 RX ORDER — DIAZEPAM 5 MG/1
5-20 TABLET ORAL ONCE
Status: COMPLETED | OUTPATIENT
Start: 2022-10-11 | End: 2022-10-11

## 2022-10-11 RX ORDER — LIDOCAINE HYDROCHLORIDE 10 MG/ML
INJECTION, SOLUTION EPIDURAL; INFILTRATION; INTRACAUDAL; PERINEURAL AS NEEDED
Status: DISCONTINUED | OUTPATIENT
Start: 2022-10-11 | End: 2022-10-11 | Stop reason: HOSPADM

## 2022-10-11 RX ORDER — GABAPENTIN 600 MG/1
600 TABLET ORAL 3 TIMES DAILY
Qty: 270 TABLET | Refills: 1 | Status: SHIPPED | OUTPATIENT
Start: 2022-10-11

## 2022-10-11 RX ORDER — DEXAMETHASONE SODIUM PHOSPHATE 100 MG/10ML
INJECTION INTRAMUSCULAR; INTRAVENOUS AS NEEDED
Status: DISCONTINUED | OUTPATIENT
Start: 2022-10-11 | End: 2022-10-11 | Stop reason: HOSPADM

## 2022-10-11 RX ADMIN — DIAZEPAM 10 MG: 5 TABLET ORAL at 09:44

## 2022-10-11 NOTE — PERIOP NOTES
Patient verbalized understanding of discharge instructions and verbally consented to [facility] Patient Consent. Signature pad not working.

## 2022-10-11 NOTE — INTERVAL H&P NOTE
Update History & Physical    The Patient's History and Physical of September 22, 2022 was reviewed. There was no change. The surgical site was confirmed by the patient and me. Plan:  The risk, benefits, expected outcome, and alternative to the recommended procedure have been discussed with the patient. Patient understands and wants to proceed with the procedure.     Electronically signed by Kendal Thurston MD on 10/11/2022 at 10:27 AM

## 2022-10-11 NOTE — TELEPHONE ENCOUNTER
Received an email from Dr. Jamila García that Ms. Lanny Yoder was having a hard time reaching the office. I called and left a voicemail for Ms. Chino to return my call and see if I can help. Will also advise using [employer] feature whenever there are phone issues.

## 2022-10-11 NOTE — PROCEDURES
Intralaminar Epidural Steroid Procedure Note        Patient Name   Enedelia Youngblood  Date of Procedure: October 11, 2022  Preoperative Diagnosis: Lumbar spinal stenosis  Postoperative Diagnosis: Same  Location [facility], [address]      Procedure:  Epidural Steroid Injection    Consent:  Informed consent was obtained prior to the procedure. In addition to the potential risks associated with the procedure itself, the patient was informed both verbally and in writing of the potential side effects of the use of glucocorticoid. The patient appeared to comprehend the informed consent and desired to have the procedure performed. Procedure in Detail:  The patient was taken to the procedure suite and placed in the prone position on the operating table on appropriate padding. The posterior lumbar region was prepped and draped in the usual sterile fashion. Intraoperative fluoroscopy was used to localize the L5-S1 interspace. The skin was infiltrated with 1% lidocaine. An 18-gauge standard spinal Tuohy needle was advanced into the epidural space at L5-S1 under fluoroscopic guidance using the loss of resistance technique. No cerebrospinal fluid was seen throughout the procedure. Yes  A small amount of Isovue was injected into the epidural space, confirming appropriated needle placement on fluoroscopy. No vascular uptake was identified. Next, 2ml of 1% Lidocaine and 10mg of preservative free Dexamethasone were injected via the Tuohy needle. The needle was removed from the patient. The patient tolerated the procedure well and was discharged home with designated  and care instructions. Patient reported meghan-procedural pain on Visual Analog Scale:  pre-6; post-0.       Signed By: Tung Kendrick MD                      October 11, 2022

## 2022-10-18 ENCOUNTER — OFFICE VISIT (OUTPATIENT)
Dept: FAMILY MEDICINE CLINIC | Age: 67
End: 2022-10-18
Payer: MEDICARE

## 2022-10-18 VITALS
SYSTOLIC BLOOD PRESSURE: 108 MMHG | WEIGHT: 127 LBS | BODY MASS INDEX: 25.6 KG/M2 | DIASTOLIC BLOOD PRESSURE: 69 MMHG | OXYGEN SATURATION: 94 % | HEIGHT: 59 IN | RESPIRATION RATE: 15 BRPM | HEART RATE: 72 BPM

## 2022-10-18 DIAGNOSIS — F32.A ANXIETY AND DEPRESSION: ICD-10-CM

## 2022-10-18 DIAGNOSIS — I10 ESSENTIAL HYPERTENSION: ICD-10-CM

## 2022-10-18 DIAGNOSIS — F41.9 ANXIETY AND DEPRESSION: ICD-10-CM

## 2022-10-18 DIAGNOSIS — Z23 NEEDS FLU SHOT: ICD-10-CM

## 2022-10-18 DIAGNOSIS — E83.52 HYPERCALCEMIA: Primary | ICD-10-CM

## 2022-10-18 DIAGNOSIS — M81.0 AGE-RELATED OSTEOPOROSIS WITHOUT CURRENT PATHOLOGICAL FRACTURE: ICD-10-CM

## 2022-10-18 RX ORDER — FLUOXETINE 10 MG/1
10 CAPSULE ORAL DAILY
Qty: 90 CAPSULE | Refills: 2 | Status: SHIPPED | OUTPATIENT
Start: 2022-10-18

## 2022-10-18 NOTE — PROGRESS NOTES
Heaven Paz is a 69 y.o. female presenting today for Follow Up Chronic Condition  . Chief Complaint   Patient presents with    Follow Up Chronic Condition       HPI:  Heaven Paz presents to the office today for follow up. Patient has a past medical history significant for HTN, GERD, asthma, COPD, HLD and vitamin D deficiency. HLD: Patient is on losartan-HCTZ daily. Denies any chest pain or palpitations. BP is controlled. HLD: Patient has hyperlipidemia but does not take any statins - reports myalgias. Lipid panel in 10/22 showed , HDL 64, triglyceride 119. She is on Zetia which she is tolerating. Patient is managed by orthopedics for her history of spinal stenosis of the lumbar region and neurogenic claudication, severe L4-L5. Her orthopedics is Dr. Jamila García. She recently underwent an epidural steroid injection. COPD: Patient smoked for 50 yrs - 1 ppd. She stopped smoking 2 years ago. Symptoms significantly improved with Spiriva. Osteoporosis: DEXA scan in 2/20 showed osteoporosis T score -2.5 at left distal radius. Patient has not undergone any treatment. Vitamin D level was normal.    Anxiety: Stable on Prozac. Hypercalcemia: Noted to have critically elevated calcium and BMP. Recent level was 11.5. Review of Systems   Constitutional:  Negative for chills, diaphoresis, fever, malaise/fatigue and weight loss. HENT:  Negative for congestion, ear discharge, ear pain, hearing loss, nosebleeds, sinus pain, sore throat and tinnitus. Eyes:  Negative for blurred vision, double vision and photophobia. Respiratory:  Positive for cough. Negative for sputum production, shortness of breath, wheezing and stridor. Cardiovascular:  Negative for chest pain, palpitations, orthopnea, claudication and leg swelling. Gastrointestinal:  Positive for abdominal pain. Negative for constipation, diarrhea, heartburn, nausea and vomiting.    Genitourinary:  Positive for dysuria, frequency and urgency. Negative for flank pain and hematuria. Musculoskeletal:  Negative for back pain, joint pain, myalgias and neck pain. Skin:  Negative for rash. Neurological:  Negative for tingling, tremors, sensory change, speech change, focal weakness, seizures, weakness and headaches. Psychiatric/Behavioral:  Negative for depression. The patient is not nervous/anxious. All other systems reviewed and are negative. Allergies   Allergen Reactions    Lisinopril Cough    Lyrica [Pregabalin] Other (comments)     Medication causes dry eyes, unstable balance, and lack of concentration.      Mold Extracts Runny Nose    Pollen Extracts Itching    Statins-Hmg-Coa Reductase Inhibitors Myalgia    Topamax [Topiramate] Vertigo       PHQ Screening   3 most recent PHQ Screens 10/18/2022   Little interest or pleasure in doing things Not at all   Feeling down, depressed, irritable, or hopeless Not at all   Total Score PHQ 2 0   Trouble falling or staying asleep, or sleeping too much -   Feeling tired or having little energy -   Poor appetite, weight loss, or overeating -   Feeling bad about yourself - or that you are a failure or have let yourself or your family down -   Trouble concentrating on things such as school, work, reading, or watching TV -   Moving or speaking so slowly that other people could have noticed; or the opposite being so fidgety that others notice -   Thoughts of being better off dead, or hurting yourself in some way -   PHQ 9 Score -   How difficult have these problems made it for you to do your work, take care of your home and get along with others -       History  Past Medical History:   Diagnosis Date    Allergic rhinitis     Asthma 8/25/2012    +PFT for mild obstructive disease/COPD    Back pain, chronic 8/25/2012    Dr. Francia Akers referred to Dr. Maribell Rashid    Cervical spinal stenosis 1/8/2018    Cervical spondylosis with myelopathy 2/28/2018    Chronic obstructive pulmonary disease ([facility] [address]     GERD (gastroesophageal reflux disease)     H/O mammogram 10/03/2017    No evidence of malignancy    Hard of hearing     chronic ruptured TM    HTN (hypertension) 2012    Hx of screening mammography 2016    EWL, normal, routine recs    Hyperlipidemia     Nausea & vomiting     Ossification of posterior longitudinal ligament in cervical region ([address] 2018    S/P cervical spinal fusion 3/2/2018    Sinus congestion 2012    Spinal stenosis of lumbar region with radiculopathy 2015    Dr. Jennifer Garrett       Past Surgical History:   Procedure Laterality Date    HX  SECTION      HX ORTHOPAEDIC  2018    cervical surgery(Dr. Imelda Velarde)    HX TUBAL LIGATION         Social History     Socioeconomic History    Marital status: SINGLE     Spouse name: Not on file    Number of children: Not on file    Years of education: Not on file    Highest education level: Not on file   Occupational History    Occupation: [employer]   Tobacco Use    Smoking status: Former     Packs/day: 1.00     Types: Cigarettes     Quit date: 3/26/2020     Years since quittin.5    Smokeless tobacco: Never   Substance and Sexual Activity    Alcohol use: No     Comment: h/o alcohol abuse in remission    Drug use: No    Sexual activity: Never   Other Topics Concern     Service No    Blood Transfusions No    Caffeine Concern Yes    Occupational Exposure No    Hobby Hazards No    Sleep Concern No    Stress Concern No    Weight Concern No    Special Diet No    Back Care No    Exercise Yes     Comment: walking    Bike Helmet No    Seat Belt Yes    Self-Exams No   Social History Narrative    Not on file     Social Determinants of Health     Financial Resource Strain: Not on file   Food Insecurity: Not on file   Transportation Needs: Not on file   Physical Activity: Not on file   Stress: Not on file   Social Connections: Not on file   Intimate Partner Violence: Not on file   Housing Stability: Not on file       Current Outpatient Medications   Medication Sig Dispense Refill    FLUoxetine (PROzac) 10 mg capsule Take 1 Capsule by mouth daily. 90 Capsule 2    gabapentin (NEURONTIN) 600 mg tablet Take 1 Tablet by mouth three (3) times daily. Max Daily Amount: 1,800 mg. 270 Tablet 1    albuterol (PROVENTIL HFA, VENTOLIN HFA, PROAIR HFA) 90 mcg/actuation inhaler Take 1 Puff by inhalation every four (4) hours as needed for Wheezing. 18 g 2    ezetimibe (ZETIA) 10 mg tablet Take 1 Tablet by mouth daily. 90 Tablet 3    losartan-hydroCHLOROthiazide (HYZAAR) 50-12.5 mg per tablet Take 1 Tablet by mouth daily. FOR HIGH BLOOD PRESSURE 90 Tablet 3    montelukast (SINGULAIR) 10 mg tablet TAKE 1 TABLET BY MOUTH  DAILY FOR INFLAMMATION OF  THE NOSE DUE TO AN ALLERGY 90 Tablet 3    vitamin e (E GEMS) 1,000 unit capsule Take 1,000 Units by mouth daily. fluticasone propionate (FLONASE) 50 mcg/actuation nasal spray 2 Sprays by Both Nostrils route daily. acetaminophen (TYLENOL) 500 mg tablet Take 1,000 mg by mouth every eight (8) hours as needed for Pain. Cholecalciferol, Vitamin D3, (VITAMIN D3) 2,000 unit cap capsule Take 2,000 Units by mouth daily. 30 Cap 11    methocarbamoL (ROBAXIN) 500 mg tablet Take 1 Tablet by mouth three (3) times daily. 270 Tablet 1         Vitals:    10/18/22 1358   BP: 108/69   Pulse: 72   Resp: 15   SpO2: 94%   Weight: 127 lb (57.6 kg)   Height: 4' 11\" (1.499 m)   PainSc:   4   PainLoc: Shoulder       Physical Exam  Vitals and nursing note reviewed. Constitutional:       General: She is not in acute distress. Appearance: Normal appearance. She is not ill-appearing, toxic-appearing or diaphoretic. HENT:      Head: Normocephalic and atraumatic. Eyes:      General: No scleral icterus. Extraocular Movements: Extraocular movements intact. Conjunctiva/sclera: Conjunctivae normal.      Pupils: Pupils are equal, round, and reactive to light. Neck:      Comments:  Old well healed surgical scar at back of neck  Cardiovascular:      Rate and Rhythm: Normal rate and regular rhythm. Pulses: Normal pulses. Heart sounds: No murmur heard. Pulmonary:      Effort: Pulmonary effort is normal. No respiratory distress. Breath sounds: Normal breath sounds. No wheezing or rales. Abdominal:      General: Bowel sounds are normal. There is no distension. Palpations: Abdomen is soft. Tenderness: no abdominal tenderness There is no guarding. Musculoskeletal:         General: Normal range of motion. Cervical back: Normal range of motion. Right lower leg: No edema. Left lower leg: No edema. Skin:     General: Skin is warm and dry. Coloration: Skin is not jaundiced or pale. Neurological:      General: No focal deficit present. Mental Status: She is alert and oriented to person, place, and time. Mental status is at baseline. Cranial Nerves: No cranial nerve deficit. Motor: No weakness. Gait: Gait normal.   Psychiatric:         Mood and Affect: Mood normal.         Thought Content:  Thought content normal.         Judgment: Judgment normal.       Hospital Outpatient Visit on 10/03/2022   Component Date Value Ref Range Status    WBC 10/03/2022 5.3  4.6 - 13.2 K/uL Final    RBC 10/03/2022 4.21  4.20 - 5.30 M/uL Final    HGB 10/03/2022 12.7  12.0 - 16.0 g/dL Final    HCT 10/03/2022 39.0  35.0 - 45.0 % Final    MCV 10/03/2022 92.6  78.0 - 100.0 FL Final    MCH 10/03/2022 30.2  24.0 - 34.0 PG Final    MCHC 10/03/2022 32.6  31.0 - 37.0 g/dL Final    RDW 10/03/2022 13.7  11.6 - 14.5 % Final    PLATELET 06/17/6115 875  135 - 420 K/uL Final    MPV 10/03/2022 9.6  9.2 - 11.8 FL Final    NRBC 10/03/2022 0.0  0  WBC Final    ABSOLUTE NRBC 10/03/2022 0.00  0.00 - 0.01 K/uL Final    NEUTROPHILS 10/03/2022 51  40 - 73 % Final    LYMPHOCYTES 10/03/2022 30  21 - 52 % Final    MONOCYTES 10/03/2022 15 (A)  3 - 10 % Final    EOSINOPHILS 10/03/2022 2  0 - 5 % Final    BASOPHILS 10/03/2022 2  0 - 2 % Final    IMMATURE GRANULOCYTES 10/03/2022 0  0.0 - 0.5 % Final    ABS. NEUTROPHILS 10/03/2022 2.7  1.8 - 8.0 K/UL Final    ABS. LYMPHOCYTES 10/03/2022 1.6  0.9 - 3.6 K/UL Final    ABS. MONOCYTES 10/03/2022 0.8  0.05 - 1.2 K/UL Final    ABS. EOSINOPHILS 10/03/2022 0.1  0.0 - 0.4 K/UL Final    ABS. BASOPHILS 10/03/2022 0.1  0.0 - 0.1 K/UL Final    ABS. IMM. GRANS. 10/03/2022 0.0  0.00 - 0.04 K/UL Final    DF 10/03/2022 AUTOMATED    Final    Sodium 10/03/2022 135 (A)  136 - 145 mmol/L Final    Potassium 10/03/2022 4.2  3.5 - 5.5 mmol/L Final    Chloride 10/03/2022 103  100 - 111 mmol/L Final    CO2 10/03/2022 28  21 - 32 mmol/L Final    Anion gap 10/03/2022 4  3.0 - 18 mmol/L Final    Glucose 10/03/2022 102 (A)  74 - 99 mg/dL Final    BUN 10/03/2022 19 (A)  7.0 - 18 MG/DL Final    Creatinine 10/03/2022 0.92  0.6 - 1.3 MG/DL Final    BUN/Creatinine ratio 10/03/2022 21 (A)  12 - 20   Final    eGFR 10/03/2022 >60  >60 ml/min/1.73m2 Final    Comment:      Pediatric calculator link: [employer]Show.at. org/professionals/kdoqi/gfr_calculatorped       Effective Oct 3, 2022       These results are not intended for use in patients <25years of age. eGFR results are calculated without a race factor using  the 2021 CKD-EPI equation. Careful clinical correlation is recommended, particularly when comparing to results calculated using previous equations. The CKD-EPI equation is less accurate in patients with extremes of muscle mass, extra-renal metabolism of creatinine, excessive creatine ingestion, or following therapy that affects renal tubular secretion. Calcium 10/03/2022 11.5 (A)  8.5 - 10.1 MG/DL Final    Bilirubin, total 10/03/2022 0.5  0.2 - 1.0 MG/DL Final    ALT (SGPT) 10/03/2022 20  13 - 56 U/L Final    AST (SGOT) 10/03/2022 19  10 - 38 U/L Final    Alk.  phosphatase 10/03/2022 69  45 - 117 U/L Final    Protein, total 10/03/2022 7.9  6.4 - 8.2 g/dL Final    Albumin 10/03/2022 3.8  3.4 - 5.0 g/dL Final    Globulin 10/03/2022 4.1 (A)  2.0 - 4.0 g/dL Final    A-G Ratio 10/03/2022 0.9  0.8 - 1.7   Final    LIPID PROFILE 10/03/2022        Final    Cholesterol, total 10/03/2022 257 (A)  <200 MG/DL Final    Triglyceride 10/03/2022 119  <150 MG/DL Final    Comment: The drugs N-acetylcysteine (NAC) and  Metamiszole have been found to cause falsely  low results in this chemical assay. Please  be sure to submit blood samples obtained  BEFORE administration of either of these  drugs to assure correct results. HDL Cholesterol 10/03/2022 64 (A)  40 - 60 MG/DL Final    LDL, calculated 10/03/2022 169.2 (A)  0 - 100 MG/DL Final    VLDL, calculated 10/03/2022 23.8  MG/DL Final    CHOL/HDL Ratio 10/03/2022 4.0  0 - 5.0   Final    Vitamin D 25-Hydroxy 10/03/2022 40.5  30 - 100 ng/mL Final    Comment: (NOTE)  Deficiency               <20 ng/mL  Insufficiency          20-30 ng/mL  Sufficient             ng/mL  Possible toxicity       >100 ng/mL    The Method used is Siemens Advia Centaur currently standardized to a   Center of Disease Control and Prevention (CDC) certified reference   [address]. Samples containing fluorescein dye can produce falsely   elevated values when tested with the ADVIA Centaur Vitamin D Assay. It is recommended that results in the toxic range, >100 ng/mL, be   retested 72 hours post fluorescein exposure. Abstract on 07/21/2022   Component Date Value Ref Range Status    Cologuard Test, External 06/27/2022 Negative   Final       No results found for any visits on 10/18/22. Patient Care Team:  Patient Care Team:  Wojciech Jackson MD as PCP - General (Internal Medicine Physician)  Wojciech Jackson MD as PCP - [facility] Provider      Assessment / Plan:      ICD-10-CM ICD-9-CM    1. Hypercalcemia  T16.76 515.50 METABOLIC PANEL, BASIC      CALCIUM, IONIZED      PTH INTACT      2. Anxiety and depression  F41.9 300.00 FLUoxetine (PROzac) 10 mg capsule    F32. A 311       3.  Needs flu shot  Z23 V04.81 INFLUENZA, FLUAD, (AGE 65 Y+), IM, PF, 0.5 ML      4. Essential hypertension  I10 401.9       5. Age-related osteoporosis without current pathological fracture  M81.0 733.01         Hypercalcemia: Will stop vitamin D supplements and HCTZ. Check repeat calcium level along with PTH. HTN: BP well controlled. Discontinue HCTZ due to hypercalcemia. Continue losartan. HLD: Continue ezetimibe. Patient is intolerant to statins. Anxiety: Controlled on Prozac. COPD: Controlled on albuterol and Spiriva. Lumbar spinal stenosis: Patient is on gabapentin. She is following with Dr. Lizet Rodriguez. Reports significant improvement with the steroid injection. Osteoporosis: Discussed treatment options with the patient. Patient states that she will think about it and let me know if she wants to be treated. Mammogram is pending. She has received both doses of the PCV vaccine. Follow-up and Dispositions    Return in about 4 months (around 2/18/2023). I asked the patient if she  had any questions and answered her  questions. The patient stated that she understands the treatment plan and agrees with the treatment plan    This document was created with a voice activated dictation system and may contain transcription errors.

## 2022-10-20 ENCOUNTER — OFFICE VISIT (OUTPATIENT)
Dept: ORTHOPEDIC SURGERY | Age: 67
End: 2022-10-20
Payer: MEDICARE

## 2022-10-20 VITALS
HEART RATE: 91 BPM | OXYGEN SATURATION: 99 % | BODY MASS INDEX: 25.6 KG/M2 | WEIGHT: 127 LBS | RESPIRATION RATE: 16 BRPM | TEMPERATURE: 98.8 F | HEIGHT: 59 IN

## 2022-10-20 DIAGNOSIS — M25.511 CHRONIC RIGHT SHOULDER PAIN: ICD-10-CM

## 2022-10-20 DIAGNOSIS — M75.101 ROTATOR CUFF SYNDROME OF RIGHT SHOULDER: Primary | ICD-10-CM

## 2022-10-20 DIAGNOSIS — G89.29 CHRONIC RIGHT SHOULDER PAIN: ICD-10-CM

## 2022-10-20 PROCEDURE — 20610 DRAIN/INJ JOINT/BURSA W/O US: CPT | Performed by: ORTHOPAEDIC SURGERY

## 2022-10-20 PROCEDURE — G8417 CALC BMI ABV UP PARAM F/U: HCPCS | Performed by: ORTHOPAEDIC SURGERY

## 2022-10-20 PROCEDURE — 1090F PRES/ABSN URINE INCON ASSESS: CPT | Performed by: ORTHOPAEDIC SURGERY

## 2022-10-20 PROCEDURE — G9899 SCRN MAM PERF RSLTS DOC: HCPCS | Performed by: ORTHOPAEDIC SURGERY

## 2022-10-20 PROCEDURE — 99214 OFFICE O/P EST MOD 30 MIN: CPT | Performed by: ORTHOPAEDIC SURGERY

## 2022-10-20 PROCEDURE — G8427 DOCREV CUR MEDS BY ELIG CLIN: HCPCS | Performed by: ORTHOPAEDIC SURGERY

## 2022-10-20 PROCEDURE — G8536 NO DOC ELDER MAL SCRN: HCPCS | Performed by: ORTHOPAEDIC SURGERY

## 2022-10-20 PROCEDURE — G8399 PT W/DXA RESULTS DOCUMENT: HCPCS | Performed by: ORTHOPAEDIC SURGERY

## 2022-10-20 PROCEDURE — 1101F PT FALLS ASSESS-DOCD LE1/YR: CPT | Performed by: ORTHOPAEDIC SURGERY

## 2022-10-20 PROCEDURE — 3017F COLORECTAL CA SCREEN DOC REV: CPT | Performed by: ORTHOPAEDIC SURGERY

## 2022-10-20 PROCEDURE — G8756 NO BP MEASURE DOC: HCPCS | Performed by: ORTHOPAEDIC SURGERY

## 2022-10-20 PROCEDURE — 73030 X-RAY EXAM OF SHOULDER: CPT | Performed by: ORTHOPAEDIC SURGERY

## 2022-10-20 PROCEDURE — 1123F ACP DISCUSS/DSCN MKR DOCD: CPT | Performed by: ORTHOPAEDIC SURGERY

## 2022-10-20 PROCEDURE — G8432 DEP SCR NOT DOC, RNG: HCPCS | Performed by: ORTHOPAEDIC SURGERY

## 2022-10-20 RX ORDER — TRIAMCINOLONE ACETONIDE 40 MG/ML
40 INJECTION, SUSPENSION INTRA-ARTICULAR; INTRAMUSCULAR ONCE
Status: COMPLETED | OUTPATIENT
Start: 2022-10-20 | End: 2022-10-20

## 2022-10-20 RX ADMIN — TRIAMCINOLONE ACETONIDE 40 MG: 40 INJECTION, SUSPENSION INTRA-ARTICULAR; INTRAMUSCULAR at 11:03

## 2022-10-20 NOTE — PROGRESS NOTES
Patient: Rocael Birch                MRN: [id number]       SSN: [id number]  YOB: 1955        AGE: 69 y.o. SEX: female  Body mass index is 25.65 kg/m². PCP: Lalit Mills MD  10/20/22    CHIEF COMPLAINT: Right shoulder pain     HPI: Rocael Birch is a 69 y.o. female patient who complains of right shoulder pain for the past 1 year but says it is gotten worse in the past few months. The pain is worse with overhead use. She also reports pain at night and pain with lying on the right shoulder. She has a history of a multilevel cervical fusion. She says this pain is different. She takes Tylenol for the pain which has helped some. She has not had any other treatment of the right shoulder.     Past Medical History:   Diagnosis Date    Allergic rhinitis     Asthma 8/25/2012    +PFT for mild obstructive disease/COPD    Back pain, chronic 8/25/2012    Dr. Dafne Cuevas referred to Dr. Patti Olson    Cervical spinal stenosis 1/8/2018    Cervical spondylosis with myelopathy 2/28/2018    Chronic obstructive pulmonary disease (HCC)     GERD (gastroesophageal reflux disease)     H/O mammogram 10/03/2017    No evidence of malignancy    Hard of hearing     chronic ruptured TM    HTN (hypertension) 8/25/2012    Hx of screening mammography 09/06/2016    EWL, normal, routine recs    Hyperlipidemia     Nausea & vomiting     Ossification of posterior longitudinal ligament in cervical region ([facility] [address] 1/8/2018    S/P cervical spinal fusion 3/2/2018    Sinus congestion 8/25/2012    Spinal stenosis of lumbar region with radiculopathy 9/2015    Dr. Twila Will       Family History   Problem Relation Age of Onset    Heart Disease Mother     Hypertension Mother     Cancer Mother     Coronary Art Dis Mother 67    Heart Surgery Mother 68    Heart Disease Father     Hypertension Father     Stroke Father     Heart Surgery Father 46    Lung Disease Father     Heart Disease Brother     Heart Disease Brother        Current Outpatient Medications   Medication Sig Dispense Refill    FLUoxetine (PROzac) 10 mg capsule Take 1 Capsule by mouth daily. 90 Capsule 2    gabapentin (NEURONTIN) 600 mg tablet Take 1 Tablet by mouth three (3) times daily. Max Daily Amount: 1,800 mg. 270 Tablet 1    albuterol (PROVENTIL HFA, VENTOLIN HFA, PROAIR HFA) 90 mcg/actuation inhaler Take 1 Puff by inhalation every four (4) hours as needed for Wheezing. 18 g 2    ezetimibe (ZETIA) 10 mg tablet Take 1 Tablet by mouth daily. 90 Tablet 3    losartan-hydroCHLOROthiazide (HYZAAR) 50-12.5 mg per tablet Take 1 Tablet by mouth daily. FOR HIGH BLOOD PRESSURE 90 Tablet 3    montelukast (SINGULAIR) 10 mg tablet TAKE 1 TABLET BY MOUTH  DAILY FOR INFLAMMATION OF  THE NOSE DUE TO AN ALLERGY 90 Tablet 3    vitamin e (E GEMS) 1,000 unit capsule Take 1,000 Units by mouth daily. methocarbamoL (ROBAXIN) 500 mg tablet Take 1 Tablet by mouth three (3) times daily. 270 Tablet 1    fluticasone propionate (FLONASE) 50 mcg/actuation nasal spray 2 Sprays by Both Nostrils route daily. acetaminophen (TYLENOL) 500 mg tablet Take 1,000 mg by mouth every eight (8) hours as needed for Pain. Cholecalciferol, Vitamin D3, (VITAMIN D3) 2,000 unit cap capsule Take 2,000 Units by mouth daily. 30 Cap 11     Current Facility-Administered Medications   Medication Dose Route Frequency Provider Last Rate Last Admin    triamcinolone acetonide (KENALOG-40) 40 mg/mL injection 40 mg  40 mg Intra artICUlar ONCE Efrain Mc MD           Allergies   Allergen Reactions    Lisinopril Cough    Lyrica [Pregabalin] Other (comments)     Medication causes dry eyes, unstable balance, and lack of concentration.      Mold Extracts Runny Nose    Pollen Extracts Itching    Statins-Hmg-Coa Reductase Inhibitors Myalgia    Topamax [Topiramate] Vertigo       Past Surgical History:   Procedure Laterality Date    HX  SECTION      HX ORTHOPAEDIC  2018    cervical surgery(Dr. Aneudy De Anda)    [address] TUBAL LIGATION         Social History     Socioeconomic History    Marital status: SINGLE     Spouse name: Not on file    Number of children: Not on file    Years of education: Not on file    Highest education level: Not on file   Occupational History    Occupation: Prediculousesser   Tobacco Use    Smoking status: Former     Packs/day: 1.00     Types: Cigarettes     Quit date: 3/26/2020     Years since quittin.5    Smokeless tobacco: Never   Substance and Sexual Activity    Alcohol use: No     Comment: h/o alcohol abuse in remission    Drug use: No    Sexual activity: Never   Other Topics Concern     Service No    Blood Transfusions No    Caffeine Concern Yes    Occupational Exposure No    Hobby Hazards No    Sleep Concern No    Stress Concern No    Weight Concern No    Special Diet No    Back Care No    Exercise Yes     Comment: walking    Bike Helmet No    Seat Belt Yes    Self-Exams No   Social History Narrative    Not on file     Social Determinants of Health     Financial Resource Strain: Not on file   Food Insecurity: Not on file   Transportation Needs: Not on file   Physical Activity: Not on file   Stress: Not on file   Social Connections: Not on file   Intimate Partner Violence: Not on file   Housing Stability: Not on file       REVIEW OF SYSTEMS:    14 point review of systems on the intake form is negative except as noted in the HPI    PHYSICAL EXAMINATION:  Visit Vitals  Pulse 91   Temp 98.8 °F (37.1 °C) (Temporal)   Resp 16   Ht 4' 11\" (1.499 m)   Wt 127 lb (57.6 kg)   SpO2 99%   BMI 25.65 kg/m²     Body mass index is 25.65 kg/m². GENERAL: Alert and oriented x3, in no acute distress, well-developed, well-nourished. HEENT: Normocephalic, atraumatic.     Shoulder Examination     R   L  ROM   FF  Full   Full  ER  Full   Full   IR  Full   Full  Rotator Cuff Pain   Supra  +   -   Infra  +   -   Subscap -   -  Crepitus  -   -  Effusion  -   -  Warmth  -   -   Erythema  -   -  Instability  -   -  AC Joint TTP  -   -  Clavicle   Deformity -   -   TTP  -   -  Proximal Humerus   Deformity -   -   TTP  -   -  Deltoid Strength 5   5  Biceps Strength 5   5  Biceps Deformity -   -  Biceps Groove Pain -   -  Impingement Sign +   -       IMAGING:  Imaging read by myself and interpreted as follows:  X-rays 4 views of the right shoulder were taken in the office today which do not show any acute or chronic bony abnormalities, no significant arthritis. ASSESSMENT & PLAN  Diagnosis: Right shoulder rotator cuff pain    Patient has right shoulder rotator cuff pain that is symptomatic. We discussed the treatment options for this today at length. I do not recommend any surgery at this time. I also do not recommend any MRIs at this time. I will try to treat this conservatively. I recommended a home exercise program along with a corticosteroid injection which she was agreeable to. Follow-up as needed. Prescription medication management discussed. [facility]  OFFICE PROCEDURE PROGRESS NOTE        Chart reviewed for the following:   Aziza Orantes MD, have reviewed the History, Physical and updated the Allergic reactions for Patrisha [place] performed immediately prior to start of procedure:   I, Bishop Patel MD, have performed the following reviews on Adela Chan prior to the start of the procedure:            * Patient was identified by name and date of birth   * Agreement on procedure being performed was verified  * Risks and Benefits explained to the patient  * Procedure site verified and marked as necessary  * Patient was positioned for comfort  * Consent was signed and verified    Time: 11:04 AM    Location: Right shoulder joint intra-articular injection  [facility]    Kenalog 40mg & 3cc Lidocaine    Date of procedure: 10/20/2022    Procedure performed by:  Bishop Patel MD    Provider assisted by:  In Office MA    Patient assisted by: self    How tolerated by patient: tolerated the procedure well with no complications    Post Procedural Pain Scale: 0 - No Hurt    Comments: none      Electronically signed by: Charlene Robin MD    Note: This note was completed using voice recognition software.   Any typographical/name errors or mistakes are unintentional.

## 2022-11-11 NOTE — TELEPHONE ENCOUNTER
Requested Prescriptions     Pending Prescriptions Disp Refills    valACYclovir (Valtrex) 500 mg tablet 60 Tablet 3     Sig: Take 1 Tablet by mouth two (2) times a day.

## 2022-11-17 ENCOUNTER — OFFICE VISIT (OUTPATIENT)
Dept: ORTHOPEDIC SURGERY | Age: 67
End: 2022-11-17
Payer: MEDICARE

## 2022-11-17 VITALS
HEIGHT: 61 IN | TEMPERATURE: 98.2 F | WEIGHT: 128 LBS | BODY MASS INDEX: 24.17 KG/M2 | HEART RATE: 83 BPM | OXYGEN SATURATION: 96 %

## 2022-11-17 DIAGNOSIS — M48.062 SPINAL STENOSIS OF LUMBAR REGION WITH NEUROGENIC CLAUDICATION: Primary | ICD-10-CM

## 2022-11-17 PROCEDURE — 3017F COLORECTAL CA SCREEN DOC REV: CPT | Performed by: PHYSICAL MEDICINE & REHABILITATION

## 2022-11-17 PROCEDURE — 99213 OFFICE O/P EST LOW 20 MIN: CPT | Performed by: PHYSICAL MEDICINE & REHABILITATION

## 2022-11-17 PROCEDURE — G8427 DOCREV CUR MEDS BY ELIG CLIN: HCPCS | Performed by: PHYSICAL MEDICINE & REHABILITATION

## 2022-11-17 PROCEDURE — G8432 DEP SCR NOT DOC, RNG: HCPCS | Performed by: PHYSICAL MEDICINE & REHABILITATION

## 2022-11-17 PROCEDURE — G8536 NO DOC ELDER MAL SCRN: HCPCS | Performed by: PHYSICAL MEDICINE & REHABILITATION

## 2022-11-17 PROCEDURE — 1090F PRES/ABSN URINE INCON ASSESS: CPT | Performed by: PHYSICAL MEDICINE & REHABILITATION

## 2022-11-17 PROCEDURE — G8399 PT W/DXA RESULTS DOCUMENT: HCPCS | Performed by: PHYSICAL MEDICINE & REHABILITATION

## 2022-11-17 PROCEDURE — 1123F ACP DISCUSS/DSCN MKR DOCD: CPT | Performed by: PHYSICAL MEDICINE & REHABILITATION

## 2022-11-17 PROCEDURE — 1101F PT FALLS ASSESS-DOCD LE1/YR: CPT | Performed by: PHYSICAL MEDICINE & REHABILITATION

## 2022-11-17 PROCEDURE — G9899 SCRN MAM PERF RSLTS DOC: HCPCS | Performed by: PHYSICAL MEDICINE & REHABILITATION

## 2022-11-17 PROCEDURE — G8756 NO BP MEASURE DOC: HCPCS | Performed by: PHYSICAL MEDICINE & REHABILITATION

## 2022-11-17 PROCEDURE — G8420 CALC BMI NORM PARAMETERS: HCPCS | Performed by: PHYSICAL MEDICINE & REHABILITATION

## 2022-11-17 RX ORDER — METHOCARBAMOL 500 MG/1
500 TABLET, FILM COATED ORAL 3 TIMES DAILY
Qty: 270 TABLET | Refills: 0 | Status: SHIPPED | OUTPATIENT
Start: 2022-11-17

## 2022-11-17 RX ORDER — METHOCARBAMOL 500 MG/1
500 TABLET, FILM COATED ORAL 3 TIMES DAILY
Qty: 270 TABLET | Refills: 0 | Status: SHIPPED | OUTPATIENT
Start: 2022-11-17 | End: 2022-11-17 | Stop reason: SDUPTHER

## 2022-11-17 NOTE — PROGRESS NOTES
[address]  [address], [address]  [place], [address] [place] [address] Street  Phone: [phone]  Fax: [phone]        Ana Allison  : 1955  PCP: Lexi Mills MD    PROGRESS NOTE      ASSESSMENT AND PLAN    Diagnoses and all orders for this visit:    1. Spinal stenosis of lumbar region with neurogenic claudication  -     methocarbamoL (ROBAXIN) 500 mg tablet; Take 1 Tablet by mouth three (3) times daily. Stuart Méndez is a 69 y.o. female with lumbar stenosis. She had dramatic benefit with GRETCHEN. She is now walking her dog half a mile. I encouraged her to continue stretching and stay active. May try to taper gabapentin to 600 mg nightly. Advised to continue HEP as tolerated. Continue activity as tolerated  Patient may take Tylenol as needed. Refilled methocarbamol, takes as needed. Follow-up and Dispositions    Return in about 3 months (around 2023) for medication management. HISTORY OF PRESENT ILLNESS      Stuart Méndez is a 69 y.o. female presents for follow up of back pain. LV referred to physical therapy. Pt received L5-S1 GRETCHEN 10/2022 with 98% benefit. Denies side effects. Her low back pain has improved since her injection. She is able to get up out of the chair without pain. She walks her dog about a half mile daily for exercise. Denies sciatic pain. Denies numbness or tingling BLE. She has intermittent cramping in her BLE. She notes great benefit with CBD gummies. She also takes Gabapentin 600 mg BID and Robaxin as needed with benefit. Pt received right shoulder injection from Dr. Joseline Phan, which helped. She is going to see her son in [facility] for Thanksgiving.     Pain Assessment  2022   Location of Pain Back   Pain Location Comment -   Location Modifiers (No Data)   Severity of Pain 0   Quality of Pain Dull   Quality of Pain Comment -   Duration of Pain Persistent   Frequency of Pain Intermittent   Aggravating Factors Other (Comment)   Aggravating Factors Comment being on feet too long   Limiting Behavior Yes   Relieving Factors Other (Comment)   Relieving Factors Comment epidural injection, gabapentin, tylenol, CBD gummies   Result of Injury No         Investigations:  L MRI 12/2021: moderate to severe stenosis L4-5, scoliosis   2015 L MRI severe stenosis L4/L5/S1     Treatments:  PT HEP (5K deductible)  Beneficial meds: Gabapentin, Aleve. CBD gummies  Failed medications: Topamax - vertigo, Lyrica - dry eyes, imbalance,   Spinal injections: L5-S1 GRETCHEN 10/2022 with 98% benefit. GRETCHEN L4-5 2016 by me, GRETCHEN L5-S1 2017  Spinal surgery- Yes. C3-4-5-6-7 lami fusion Dr. Saima Carranza 2/2018     Pt lives alone. Works as . Has a $5000 deductible with her Medicare plan    PHYSICAL EXAMINATION    Visit Vitals  Pulse 83   Temp 98.2 °F (36.8 °C) (Temporal)   Ht 5' 0.5\" (1.537 m)   Wt 128 lb (58.1 kg)   SpO2 96% Comment: RA   BMI 24.59 kg/m²     Spry middle-aged lady in no acute distress   LE strength intact  SLR negative  Ambulatory without assistive device, nonantalgic gait.                 Written by Kiah Zurita, as dictated by Santiago Jj MD.

## 2022-11-17 NOTE — PROGRESS NOTES
Shon Truong presents today for   Chief Complaint   Patient presents with    Back Pain     Lower         Is someone accompanying this pt? no    Is the patient using any DME equipment during OV? no    Depression Screening:  3 most recent PHQ Screens 10/18/2022   Little interest or pleasure in doing things Not at all   Feeling down, depressed, irritable, or hopeless Not at all   Total Score PHQ 2 0   Trouble falling or staying asleep, or sleeping too much -   Feeling tired or having little energy -   Poor appetite, weight loss, or overeating -   Feeling bad about yourself - or that you are a failure or have let yourself or your family down -   Trouble concentrating on things such as school, work, reading, or watching TV -   Moving or speaking so slowly that other people could have noticed; or the opposite being so fidgety that others notice -   Thoughts of being better off dead, or hurting yourself in some way -   PHQ 9 Score -   How difficult have these problems made it for you to do your work, take care of your home and get along with others -       Learning Assessment:  Learning Assessment 5/11/2015   PRIMARY LEARNER Patient   HIGHEST LEVEL OF EDUCATION - PRIMARY LEARNER  DID NOT GRADUATE HIGH SCHOOL   BARRIERS PRIMARY LEARNER NONE   CO-LEARNER CAREGIVER No   PRIMARY LANGUAGE ENGLISH   LEARNER PREFERENCE PRIMARY READING   ANSWERED BY patient   RELATIONSHIP SELF       Abuse Screening:  Abuse Screening Questionnaire 8/12/2021   Do you ever feel afraid of your partner? N   Are you in a relationship with someone who physically or mentally threatens you? N   Is it safe for you to go home? Y       Fall Risk  Fall Risk Assessment, last 12 mths 6/21/2022   Able to walk? Yes   Fall in past 12 months? 1   Do you feel unsteady? 1   Are you worried about falling 1   Is TUG test greater than 12 seconds? -   Is the gait abnormal? 1   Number of falls in past 12 months 2   Fall with injury? 0       Coordination of Care:  1.  Have you been to the ER, urgent care clinic since your last visit? no  Hospitalized since your last visit? no    2. Have you seen or consulted any other health care providers outside of the [address] [place] [address] since your last visit? no Include any pap smears or colon screening.  no

## 2022-11-17 NOTE — LETTER
11/17/2022    Patient: Augustus Vasquez   YOB: 1955   Date of Visit: 11/17/2022     Deysi Alfaro MD  [address] [place] [address]    Dear Deysi Alfaro MD,      Thank you for referring Ms. Augustus Vasquez to [address] [place] [address] for evaluation. My notes for this consultation are attached. If you have questions, please do not hesitate to call me. I look forward to following your patient along with you.       Sincerely,    Ethan Doan MD

## 2022-11-18 RX ORDER — VALACYCLOVIR HYDROCHLORIDE 500 MG/1
500 TABLET, FILM COATED ORAL 2 TIMES DAILY
Qty: 60 TABLET | Refills: 3 | OUTPATIENT
Start: 2022-11-18

## 2022-11-18 RX ORDER — VALACYCLOVIR HYDROCHLORIDE 500 MG/1
500 TABLET, FILM COATED ORAL 2 TIMES DAILY
Qty: 60 TABLET | Refills: 3 | Status: SHIPPED | OUTPATIENT
Start: 2022-11-18

## 2022-11-30 DIAGNOSIS — J44.9 COPD, MILD (HCC): ICD-10-CM

## 2022-12-01 RX ORDER — ALBUTEROL SULFATE 90 UG/1
AEROSOL, METERED RESPIRATORY (INHALATION)
Qty: 20.1 G | Refills: 3 | Status: SHIPPED | OUTPATIENT
Start: 2022-12-01

## 2022-12-16 ENCOUNTER — OFFICE VISIT (OUTPATIENT)
Dept: ORTHOPEDIC SURGERY | Age: 67
End: 2022-12-16
Payer: MEDICARE

## 2022-12-16 DIAGNOSIS — M75.101 NONTRAUMATIC TEAR OF RIGHT ROTATOR CUFF, UNSPECIFIED TEAR EXTENT: Primary | ICD-10-CM

## 2022-12-16 RX ORDER — MELOXICAM 15 MG/1
15 TABLET ORAL DAILY
Qty: 90 TABLET | Refills: 2 | Status: SHIPPED | OUTPATIENT
Start: 2022-12-16

## 2022-12-16 NOTE — PROGRESS NOTES
Patient: Franky Montiel                MRN: [id number]       SSN: [id number]  YOB: 1955        AGE: 69 y.o. SEX: female  There is no height or weight on file to calculate BMI. PCP: Anant Segovia MD  12/16/22    Chief Complaint: Right shoulder follow-up    HPI: Franky Montiel is a 69 y.o. female patient who returns to the office today for her right shoulder. At her last visit she received a corticosteroid injection which she said gave her several weeks of pain relief. Unfortunate for the past month she says she has been having significant pain in her shoulder. She has been bedridden because of the pain. She has been unable to work because of the pain. The pain is mostly in the shoulder and radiates down her arm. She also has occasional neck pain and headaches which is likely from her cervical spine fusion.     Past Medical History:   Diagnosis Date    Allergic rhinitis     Asthma 8/25/2012    +PFT for mild obstructive disease/COPD    Back pain, chronic 8/25/2012    Dr. Lucius Mc referred to Dr. Amanda Linn    Cervical spinal stenosis 1/8/2018    Cervical spondylosis with myelopathy 2/28/2018    Chronic obstructive pulmonary disease (HCC)     GERD (gastroesophageal reflux disease)     H/O mammogram 10/03/2017    No evidence of malignancy    Hard of hearing     chronic ruptured TM    HTN (hypertension) 8/25/2012    Hx of screening mammography 09/06/2016    EWL, normal, routine recs    Hyperlipidemia     Nausea & vomiting     Ossification of posterior longitudinal ligament in cervical region ([address] 1/8/2018    S/P cervical spinal fusion 3/2/2018    Sinus congestion 8/25/2012    Spinal stenosis of lumbar region with radiculopathy 9/2015    Dr. Tor Temple       Family History   Problem Relation Age of Onset    Heart Disease Mother     Hypertension Mother     Cancer Mother     Coronary Art Dis Mother 67    Heart Surgery Mother 68    Heart Disease Father     Hypertension Father     Stroke Father     Heart Surgery Father 46    Lung Disease Father     Heart Disease Brother     Heart Disease Brother        Current Outpatient Medications   Medication Sig Dispense Refill    meloxicam (Mobic) 15 mg tablet Take 1 Tablet by mouth daily. 90 Tablet 2    albuterol (PROVENTIL HFA, VENTOLIN HFA, PROAIR HFA) 90 mcg/actuation inhaler USE 1 INHALATION BY MOUTH  EVERY 4 HOURS AS NEEDED FOR WHEEZING 20.1 g 3    valACYclovir (Valtrex) 500 mg tablet Take 1 Tablet by mouth two (2) times a day. 60 Tablet 3    methocarbamoL (ROBAXIN) 500 mg tablet Take 1 Tablet by mouth three (3) times daily. 270 Tablet 0    FLUoxetine (PROzac) 10 mg capsule Take 1 Capsule by mouth daily. 90 Capsule 2    gabapentin (NEURONTIN) 600 mg tablet Take 1 Tablet by mouth three (3) times daily. Max Daily Amount: 1,800 mg. 270 Tablet 1    ezetimibe (ZETIA) 10 mg tablet Take 1 Tablet by mouth daily. 90 Tablet 3    losartan-hydroCHLOROthiazide (HYZAAR) 50-12.5 mg per tablet Take 1 Tablet by mouth daily. FOR HIGH BLOOD PRESSURE 90 Tablet 3    montelukast (SINGULAIR) 10 mg tablet TAKE 1 TABLET BY MOUTH  DAILY FOR INFLAMMATION OF  THE NOSE DUE TO AN ALLERGY 90 Tablet 3    vitamin e (E GEMS) 1,000 unit capsule Take 1,000 Units by mouth daily. fluticasone propionate (FLONASE) 50 mcg/actuation nasal spray 2 Sprays by Both Nostrils route daily. acetaminophen (TYLENOL) 500 mg tablet Take 1,000 mg by mouth every eight (8) hours as needed for Pain. Cholecalciferol, Vitamin D3, (VITAMIN D3) 2,000 unit cap capsule Take 2,000 Units by mouth daily. 30 Cap 11       Allergies   Allergen Reactions    Lisinopril Cough    Lyrica [Pregabalin] Other (comments)     Medication causes dry eyes, unstable balance, and lack of concentration.      Mold Extracts Runny Nose    Pollen Extracts Itching    Statins-Hmg-Coa Reductase Inhibitors Myalgia    Topamax [Topiramate] Vertigo       Past Surgical History:   Procedure Laterality Date    HX  SECTION      HX ORTHOPAEDIC  2018    cervical surgery(Dr. Christen Clark)    HX TUBAL LIGATION         Social History     Socioeconomic History    Marital status: SINGLE     Spouse name: Not on file    Number of children: Not on file    Years of education: Not on file    Highest education level: Not on file   Occupational History    Occupation: hairdresser   Tobacco Use    Smoking status: Former     Packs/day: 1.00     Types: Cigarettes     Quit date: 3/26/2020     Years since quittin.7    Smokeless tobacco: Never   Substance and Sexual Activity    Alcohol use: No     Comment: h/o alcohol abuse in remission    Drug use: No    Sexual activity: Never   Other Topics Concern     Service No    Blood Transfusions No    Caffeine Concern Yes    Occupational Exposure No    Hobby Hazards No    Sleep Concern No    Stress Concern No    Weight Concern No    Special Diet No    Back Care No    Exercise Yes     Comment: walking    Bike Helmet No    Seat Belt Yes    Self-Exams No   Social History Narrative    Not on file     Social Determinants of Health     Financial Resource Strain: Not on file   Food Insecurity: Not on file   Transportation Needs: Not on file   Physical Activity: Not on file   Stress: Not on file   Social Connections: Not on file   Intimate Partner Violence: Not on file   Housing Stability: Not on file       REVIEW OF SYSTEMS:      No changes from previous review of systems unless noted. PHYSICAL EXAMINATION:  There were no vitals taken for this visit. There is no height or weight on file to calculate BMI. GENERAL: Alert and oriented x3, in no acute distress. HEENT: Normocephalic, atraumatic.     Shoulder Examination     R   L  ROM   FF  Full   Full  ER  Full   Full   IR  Full   Full  Rotator Cuff Pain   Supra  +   -   Infra  +   -   Subscap -   -  Crepitus  -   -  Effusion  -   -  Warmth  -   -   Erythema  -   -  Instability  -   -  AC Joint TTP  -   -  Clavicle   Deformity -   -   TTP  -   -  Proximal Humerus   Deformity -   -   TTP  -   -  Deltoid Strength 5   5  Biceps Strength 5   5  Biceps Deformity -   -  Biceps Groove Pain -   -  Impingement Sign -   -       IMAGING:  Imaging read by myself and interpreted as follows:      ASSESSMENT & PLAN  Diagnosis: Right shoulder rotator cuff pain    Ms. Christine Quinones continues to have significant right shoulder rotator cuff pain. She rates it as 8 out of 10 today. She has failed conservative management up to this point therefore I have recommended and ordered an MRI of the right shoulder. I would like to see her back after the MRI is completed. I also prescribed Mobic anti-inflammatory for her shoulder pain in the interim. Prescription medication management discussed with patient. Electronically signed by: Wilber Nicole MD    Note: This note was completed using voice recognition software.   Any typographical/name errors or mistakes are unintentional.

## 2023-01-18 DIAGNOSIS — J44.9 COPD, MILD (HCC): ICD-10-CM

## 2023-01-18 NOTE — TELEPHONE ENCOUNTER
Requested Prescriptions     Pending Prescriptions Disp Refills    tiotropium (SPIRIVA) 18 mcg inhalation capsule 30 Capsule 2     Sig: Take 1 Capsule by inhalation daily.

## 2023-01-19 RX ORDER — VALACYCLOVIR HYDROCHLORIDE 500 MG/1
TABLET, FILM COATED ORAL
Qty: 180 TABLET | Refills: 3 | Status: SHIPPED | OUTPATIENT
Start: 2023-01-19

## 2023-01-25 ENCOUNTER — HOSPITAL ENCOUNTER (OUTPATIENT)
Dept: MRI IMAGING | Age: 68
Discharge: HOME | End: 2023-01-25
Attending: ORTHOPAEDIC SURGERY
Payer: MEDICARE

## 2023-01-25 DIAGNOSIS — M75.101 NONTRAUMATIC TEAR OF RIGHT ROTATOR CUFF, UNSPECIFIED TEAR EXTENT: ICD-10-CM

## 2023-01-25 PROCEDURE — 73221 MRI JOINT UPR EXTREM W/O DYE: CPT

## 2023-01-31 DIAGNOSIS — Z12.31 VISIT FOR SCREENING MAMMOGRAM: Primary | ICD-10-CM

## 2023-02-05 DIAGNOSIS — Z12.31 VISIT FOR SCREENING MAMMOGRAM: Primary | ICD-10-CM

## 2023-02-21 ENCOUNTER — OFFICE VISIT (OUTPATIENT)
Facility: CLINIC | Age: 68
End: 2023-02-21
Payer: MEDICARE

## 2023-02-21 VITALS
RESPIRATION RATE: 16 BRPM | WEIGHT: 128 LBS | BODY MASS INDEX: 25.13 KG/M2 | OXYGEN SATURATION: 95 % | HEIGHT: 60 IN | SYSTOLIC BLOOD PRESSURE: 106 MMHG | HEART RATE: 82 BPM | DIASTOLIC BLOOD PRESSURE: 69 MMHG

## 2023-02-21 DIAGNOSIS — G89.29 CHRONIC RIGHT SHOULDER PAIN: ICD-10-CM

## 2023-02-21 DIAGNOSIS — M81.0 AGE-RELATED OSTEOPOROSIS WITHOUT CURRENT PATHOLOGICAL FRACTURE: ICD-10-CM

## 2023-02-21 DIAGNOSIS — E78.5 HYPERLIPIDEMIA, UNSPECIFIED HYPERLIPIDEMIA TYPE: ICD-10-CM

## 2023-02-21 DIAGNOSIS — F32.A ANXIETY AND DEPRESSION: ICD-10-CM

## 2023-02-21 DIAGNOSIS — J44.9 CHRONIC OBSTRUCTIVE PULMONARY DISEASE, UNSPECIFIED COPD TYPE (HCC): ICD-10-CM

## 2023-02-21 DIAGNOSIS — Z13.820 ENCOUNTER FOR SCREENING FOR OSTEOPOROSIS: ICD-10-CM

## 2023-02-21 DIAGNOSIS — E83.52 HYPERCALCEMIA: ICD-10-CM

## 2023-02-21 DIAGNOSIS — M25.511 CHRONIC RIGHT SHOULDER PAIN: ICD-10-CM

## 2023-02-21 DIAGNOSIS — F41.9 ANXIETY AND DEPRESSION: ICD-10-CM

## 2023-02-21 DIAGNOSIS — I10 ESSENTIAL (PRIMARY) HYPERTENSION: Primary | ICD-10-CM

## 2023-02-21 PROCEDURE — 1123F ACP DISCUSS/DSCN MKR DOCD: CPT | Performed by: STUDENT IN AN ORGANIZED HEALTH CARE EDUCATION/TRAINING PROGRAM

## 2023-02-21 PROCEDURE — 3078F DIAST BP <80 MM HG: CPT | Performed by: STUDENT IN AN ORGANIZED HEALTH CARE EDUCATION/TRAINING PROGRAM

## 2023-02-21 PROCEDURE — 99214 OFFICE O/P EST MOD 30 MIN: CPT | Performed by: STUDENT IN AN ORGANIZED HEALTH CARE EDUCATION/TRAINING PROGRAM

## 2023-02-21 PROCEDURE — 3074F SYST BP LT 130 MM HG: CPT | Performed by: STUDENT IN AN ORGANIZED HEALTH CARE EDUCATION/TRAINING PROGRAM

## 2023-02-21 RX ORDER — EZETIMIBE 10 MG/1
10 TABLET ORAL DAILY
Qty: 90 TABLET | Refills: 1 | Status: SHIPPED | OUTPATIENT
Start: 2023-02-21

## 2023-02-21 RX ORDER — ALBUTEROL SULFATE 90 UG/1
AEROSOL, METERED RESPIRATORY (INHALATION)
Qty: 18 G | Refills: 3 | Status: SHIPPED | OUTPATIENT
Start: 2023-02-21

## 2023-02-21 RX ORDER — MELOXICAM 15 MG/1
15 TABLET ORAL DAILY
Qty: 30 TABLET | Refills: 2 | Status: SHIPPED | OUTPATIENT
Start: 2023-02-21

## 2023-02-21 RX ORDER — GABAPENTIN 600 MG/1
600 TABLET ORAL 3 TIMES DAILY
Qty: 90 TABLET | Status: CANCELLED | OUTPATIENT
Start: 2023-02-21

## 2023-02-21 RX ORDER — MONTELUKAST SODIUM 10 MG/1
TABLET ORAL
Qty: 90 TABLET | Refills: 1 | Status: SHIPPED | OUTPATIENT
Start: 2023-02-21

## 2023-02-21 RX ORDER — LOSARTAN POTASSIUM 50 MG/1
50 TABLET ORAL DAILY
Qty: 90 TABLET | Refills: 1 | Status: SHIPPED | OUTPATIENT
Start: 2023-02-21

## 2023-02-21 RX ORDER — TIOTROPIUM BROMIDE 18 UG/1
1 CAPSULE ORAL; RESPIRATORY (INHALATION) DAILY PRN
COMMUNITY
Start: 2023-02-20 | End: 2023-02-21 | Stop reason: SDUPTHER

## 2023-02-21 RX ORDER — LOSARTAN POTASSIUM AND HYDROCHLOROTHIAZIDE 12.5; 5 MG/1; MG/1
1 TABLET ORAL DAILY
Qty: 30 TABLET | Status: CANCELLED | OUTPATIENT
Start: 2023-02-21

## 2023-02-21 RX ORDER — TIOTROPIUM BROMIDE 18 UG/1
18 CAPSULE ORAL; RESPIRATORY (INHALATION) DAILY
Qty: 30 CAPSULE | Refills: 3 | Status: SHIPPED | OUTPATIENT
Start: 2023-02-21

## 2023-02-21 RX ORDER — FLUOXETINE HYDROCHLORIDE 20 MG/1
20 CAPSULE ORAL DAILY
Qty: 90 CAPSULE | Refills: 1 | Status: SHIPPED | OUTPATIENT
Start: 2023-02-21

## 2023-02-21 ASSESSMENT — ENCOUNTER SYMPTOMS
BACK PAIN: 0
BLOOD IN STOOL: 0
CHEST TIGHTNESS: 0
DIARRHEA: 0
RHINORRHEA: 0
VOMITING: 0
WHEEZING: 0
SHORTNESS OF BREATH: 0
ABDOMINAL PAIN: 0
NAUSEA: 0
COUGH: 0

## 2023-02-21 NOTE — PROGRESS NOTES
Ronnie Trevizo is a 69 y.o. female presenting today for Follow-up Chronic Condition (Denies any concerns today. Recently had MRI. Is having some tiredness in the afternoon. )  . Chief Complaint   Patient presents with    Follow-up Chronic Condition     Denies any concerns today. Recently had MRI. Is having some tiredness in the afternoon. HPI:  Ronnie Trevizo presents to the office today for follow up. Patient has a past medical history significant for HTN, GERD, asthma, COPD, HLD and vitamin D deficiency. HLD: Patient is on losartan-HCTZ daily. Denies any chest pain or palpitations. BP is controlled. HLD: Patient has hyperlipidemia but does not take any statins - reports myalgias. Lipid panel in 10/22 showed , HDL 64, triglyceride 119. She is on Zetia which she is tolerating. Patient is managed by orthopedics for her history of spinal stenosis of the lumbar region and neurogenic claudication, severe L4-L5. Her orthopedics is Dr. Romulo Moreno. She recently underwent an epidural steroid injection. COPD: Patient smoked for 50 yrs - 1 ppd. She stopped smoking 2 years ago. Symptoms significantly improved with Spiriva. Osteoporosis: DEXA scan in 2/20 showed osteoporosis T score -2.5 at left distal radius. Patient has not undergone any treatment. Vitamin D level was normal.  Patient has been hesitant to get treated for it. Right shoulder pain: Patient is following with Dr. Hannah Sher -MRI showed supraspinatus and infraspinatus tendinopathy with low-grade partial tearing and acromioclavicular degenerative changes. She was prescribed Mobic with significant improvement. Anxiety: Currently on fluoxetine 10 mg daily. Reports increasing anxiety recently. Hypercalcemia: Noted to have elevated calcium. Kasi Easley Recent level was 11.5. Review of Systems   Constitutional:  Negative for activity change, appetite change, chills, diaphoresis, fatigue, fever and unexpected weight change.   HENT:  Negative for congestion, nosebleeds, postnasal drip and rhinorrhea.    Respiratory:  Negative for cough, chest tightness, shortness of breath and wheezing.    Cardiovascular:  Negative for chest pain and leg swelling.   Gastrointestinal:  Negative for abdominal pain, blood in stool, diarrhea, nausea and vomiting.   Endocrine: Negative for polydipsia and polyphagia.   Genitourinary:  Negative for decreased urine volume, dysuria, frequency and pelvic pain.   Musculoskeletal:  Positive for arthralgias. Negative for back pain, myalgias and neck pain.   Neurological:  Negative for dizziness, tremors, seizures, syncope, speech difficulty, weakness, numbness and headaches.   Psychiatric/Behavioral:  Negative for agitation and confusion. The patient is nervous/anxious.    All other systems reviewed and are negative.      Allergies   Allergen Reactions    Lisinopril Cough    Molds & Smuts      Other reaction(s): Runny Nose    Pollen Extract Itching    Pregabalin Other (See Comments)     Medication causes dry eyes, unstable balance, and lack of concentration.     Statins Myalgia    Topiramate Dizziness or Vertigo       PHQ Screening   No flowsheet data found.    History  Past Medical History:   Diagnosis Date    Allergic rhinitis     Asthma 8/25/2012    +PFT for mild obstructive disease/COPD    Back pain, chronic 8/25/2012    Dr. Aldana referred to Dr. YUN Baker    Cervical spinal stenosis 1/8/2018    Cervical spondylosis with myelopathy 2/28/2018    Chronic obstructive pulmonary disease (HCC)     GERD (gastroesophageal reflux disease)     H/O mammogram 10/03/2017    No evidence of malignancy    Hard of hearing     chronic ruptured TM    HTN (hypertension) 8/25/2012    Hx of screening mammography 09/06/2016    EWL, normal, routine recs    Hyperlipidemia     Nausea & vomiting     Ossification of posterior longitudinal ligament in cervical region (HCC) 1/8/2018    S/P cervical spinal fusion 3/2/2018    Sinus  congestion 2012    Spinal stenosis of lumbar region with radiculopathy 2015    Dr. Amy Chaparro       Past Surgical History:   Procedure Laterality Date    [address]  2018    cervical surgery(Dr. Gretchen Orellana)    TUBAL LIGATION         Social History     Socioeconomic History    Marital status: Single     Spouse name: Not on file    Number of children: Not on file    Years of education: Not on file    Highest education level: Not on file   Occupational History    Not on file   Tobacco Use    Smoking status: Former     Packs/day: 1.00     Types: Cigarettes     Quit date: 3/26/2020     Years since quittin.9    Smokeless tobacco: Never   Substance and Sexual Activity    Alcohol use: No    Drug use: No    Sexual activity: Not on file   Other Topics Concern    Not on file   Social History Narrative    Not on file     Social Determinants of Health     Financial Resource Strain: Not on file   Food Insecurity: Not on file   Transportation Needs: Not on file   Physical Activity: Not on file   Stress: Not on file   Social Connections: Not on file   Intimate Partner Violence: Not on file   Housing Stability: Not on file       Current Outpatient Medications   Medication Sig Dispense Refill    SPIRIVA HANDIHALER 18 MCG inhalation capsule Inhale 1 capsule into the lungs daily as needed      acetaminophen (TYLENOL) 500 MG tablet Take 1,000 mg by mouth every 8 hours as needed      albuterol sulfate HFA (PROVENTIL;VENTOLIN;PROAIR) 108 (90 Base) MCG/ACT inhaler USE 1 INHALATION BY MOUTH  EVERY 4 HOURS AS NEEDED FOR WHEEZING      ezetimibe (ZETIA) 10 MG tablet Take 10 mg by mouth daily      FLUoxetine (PROZAC) 10 MG capsule Take 10 mg by mouth daily      fluticasone (FLONASE) 50 MCG/ACT nasal spray 2 sprays by Nasal route daily      gabapentin (NEURONTIN) 600 MG tablet Take 600 mg by mouth 3 times daily.       losartan-hydroCHLOROthiazide (HYZAAR) 50-12.5 MG per tablet Take 1 tablet by mouth daily      meloxicam (MOBIC) 15 MG tablet Take 15 mg by mouth daily      methocarbamol (ROBAXIN) 500 MG tablet Take 500 mg by mouth 3 times daily      montelukast (SINGULAIR) 10 MG tablet TAKE 1 TABLET BY MOUTH  DAILY FOR INFLAMMATION OF  THE NOSE DUE TO AN ALLERGY      valACYclovir (VALTREX) 500 MG tablet Take 500 mg by mouth 2 times daily      vitamin E 1000 units capsule Take 1,000 Units by mouth daily      Cholecalciferol 50 MCG (2000 UT) CAPS Take 2,000 Units by mouth daily (Patient not taking: Reported on 2/21/2023)       No current facility-administered medications for this visit. /69 (Site: Right Upper Arm, Position: Sitting, Cuff Size: Small Adult) Comment: . Pulse 82   Resp 16   Ht 5' (1.524 m)   Wt 128 lb (58.1 kg)   SpO2 95%   BMI 25.00 kg/m²      Physical Exam  Vitals and nursing note reviewed. Constitutional:       General: She is not in acute distress. Appearance: Normal appearance. She is not ill-appearing, toxic-appearing or diaphoretic. HENT:      Head: Normocephalic and atraumatic. Eyes:      General: No scleral icterus. Extraocular Movements: Extraocular movements intact. Conjunctiva/sclera: Conjunctivae normal.      Pupils: Pupils are equal, round, and reactive to light. Cardiovascular:      Rate and Rhythm: Normal rate and regular rhythm. Pulses: Normal pulses. Heart sounds: Normal heart sounds. No murmur heard. Pulmonary:      Effort: Pulmonary effort is normal. No respiratory distress. Breath sounds: Normal breath sounds. No wheezing or rales. Musculoskeletal:         General: Normal range of motion. Cervical back: Normal range of motion and neck supple. Right lower leg: No edema. Left lower leg: No edema. Skin:     General: Skin is warm and dry. Neurological:      General: No focal deficit present. Mental Status: She is alert and oriented to person, place, and time. Mental status is at baseline.       Cranial Nerves: No cranial nerve deficit. Motor: No weakness. Gait: Gait normal.   Psychiatric:         Mood and Affect: Mood normal.         Behavior: Behavior normal.         Thought Content: Thought content normal.         Judgment: Judgment normal.        No visits with results within 3 Month(s) from this visit. Latest known visit with results is:   Orders Only on 10/03/2022   Component Date Value Ref Range Status    Vit D, 25-Hydroxy 10/03/2022 40.5  30 - 100 ng/mL Final    Comment: (NOTE)  Deficiency               <20 ng/mL  Insufficiency          20-30 ng/mL  Sufficient             ng/mL  Possible toxicity       >100 ng/mL    The Method used is Siemens Advia Centaur currently standardized to a   Center of Disease Control and Prevention (CDC) certified reference   [facility]. Samples containing fluorescein dye can produce falsely   elevated values when tested with the ADVIA Centaur Vitamin D Assay. It is recommended that results in the toxic range, >100 ng/mL, be   retested 72 hours post fluorescein exposure. LIPID PANEL 10/03/2022        Final    Cholesterol, Total 10/03/2022 257 (A)  <200 MG/DL Final    Triglycerides 10/03/2022 119  <150 MG/DL Final    Comment: The drugs N-acetylcysteine (NAC) and  Metamiszole have been found to cause falsely  low results in this chemical assay. Please  be sure to submit blood samples obtained  BEFORE administration of either of these  drugs to assure correct results.       HDL 10/03/2022 64 (A)  40 - 60 MG/DL Final    LDL Calculated 10/03/2022 169.2 (A)  0 - 100 MG/DL Final    VLDL Cholesterol Calculated 10/03/2022 23.8  MG/DL Final    Chol/HDL Ratio 10/03/2022 4.0  0 - 5.0   Final    WBC 10/03/2022 5.3  4.6 - 13.2 K/uL Final    RBC 10/03/2022 4.21  4.20 - 5.30 M/uL Final    Hemoglobin 10/03/2022 12.7  12.0 - 16.0 g/dL Final    Hematocrit 10/03/2022 39.0  35.0 - 45.0 % Final    MCV 10/03/2022 92.6  78.0 - 100.0 FL Final    MCH 10/03/2022 30.2  24.0 - 34.0 PG Final    MCHC 10/03/2022 32.6  31.0 - 37.0 g/dL Final    RDW 10/03/2022 13.7  11.6 - 14.5 % Final    Platelets 41/83/6685 348  135 - 420 K/uL Final    MPV 10/03/2022 9.6  9.2 - 11.8 FL Final    Nucleated RBCs 10/03/2022 0.0  0  WBC Final    NRBC Absolute 10/03/2022 0.00  0.00 - 0.01 K/uL Final    Neutrophils % 10/03/2022 51  40 - 73 % Final    Lymphocytes % 10/03/2022 30  21 - 52 % Final    Monocytes % 10/03/2022 15 (A)  3 - 10 % Final    Eosinophils % 10/03/2022 2  0 - 5 % Final    Basophils % 10/03/2022 2  0 - 2 % Final    Immature Granulocytes 10/03/2022 0  0.0 - 0.5 % Final    Neutrophils Absolute 10/03/2022 2.7  1.8 - 8.0 K/UL Final    Lymphocytes Absolute 10/03/2022 1.6  0.9 - 3.6 K/UL Final    Monocytes Absolute 10/03/2022 0.8  0.05 - 1.2 K/UL Final    Eosinophils Absolute 10/03/2022 0.1  0.0 - 0.4 K/UL Final    Basophils Absolute 10/03/2022 0.1  0.0 - 0.1 K/UL Final    Granulocyte Absolute Count 10/03/2022 0.0  0.00 - 0.04 K/UL Final    Differential Type 10/03/2022 AUTOMATED    Final    Sodium 10/03/2022 135 (A)  136 - 145 mmol/L Final    Potassium 10/03/2022 4.2  3.5 - 5.5 mmol/L Final    Chloride 10/03/2022 103  100 - 111 mmol/L Final    CO2 10/03/2022 28  21 - 32 mmol/L Final    Anion Gap 10/03/2022 4  3.0 - 18 mmol/L Final    Glucose 10/03/2022 102 (A)  74 - 99 mg/dL Final    BUN 10/03/2022 19 (A)  7.0 - 18 MG/DL Final    Creatinine 10/03/2022 0.92  0.6 - 1.3 MG/DL Final    Bun/Cre Ratio 10/03/2022 21 (A)  12 - 20   Final    ESTIMATED GLOMERULAR FILTRATION RA* 10/03/2022 >60  >60 ml/min/1.73m2 Final    Comment:      Pediatric calculator link: Cande.[employer]. org/professionals/kdoqi/gfr_calculatorped       Effective Oct 3, 2022       These results are not intended for use in patients <25years of age. eGFR results are calculated without a race factor using  the 2021 CKD-EPI equation.  Careful clinical correlation is recommended, particularly when comparing to results calculated using previous equations. The CKD-EPI equation is less accurate in patients with extremes of muscle mass, extra-renal metabolism of creatinine, excessive creatine ingestion, or following therapy that affects renal tubular secretion. Calcium 10/03/2022 11.5 (A)  8.5 - 10.1 MG/DL Final    Total Bilirubin 10/03/2022 0.5  0.2 - 1.0 MG/DL Final    ALT 10/03/2022 20  13 - 56 U/L Final    AST 10/03/2022 19  10 - 38 U/L Final    Alkaline Phosphatase 10/03/2022 69  45 - 117 U/L Final    Total Protein 10/03/2022 7.9  6.4 - 8.2 g/dL Final    Albumin 10/03/2022 3.8  3.4 - 5.0 g/dL Final    Globulin 10/03/2022 4.1 (A)  2.0 - 4.0 g/dL Final    Albumin/Globulin Ratio 10/03/2022 0.9  0.8 - 1.7   Final       No results found for any visits on 02/21/23. Patient Care Team:  Patient Care Team:  Jose Luis Vick MD as PCP - Leopold Schmitt, MD as PCP - [facility]      Assessment / Plan:     Diagnosis Orders   1. Essential (primary) hypertension        2. Chronic obstructive pulmonary disease, unspecified COPD type ([facility] [address]        3. Encounter for screening for osteoporosis        4. Hypercalcemia        5. Age-related osteoporosis without current pathological fracture        6. Chronic right shoulder pain        7. Hyperlipidemia, unspecified hyperlipidemia type        8. Anxiety and depression          Hypercalcemia: Vitamin D supplements were discontinued. Patient was taking HCTZ-this is also been discontinued. Recheck calcium, PTH and ionized calcium. HTN: BP well controlled. Discontinue HCTZ due to hypercalcemia. Continue losartan. HLD: Continue ezetimibe. Patient is intolerant to statins. Anxiety: Increase Prozac. COPD: Controlled on albuterol and Spiriva. Lumbar spinal stenosis: Patient is on gabapentin. She is following with Dr. Jeni Warren. Reports significant improvement with the steroid injection. Osteoporosis: Discussed treatment options with the patient.   She wants to hold off treatments for now but is agreeable to get a repeat DEXA scan. Ordered. Return in about 3 months (around 5/21/2023) for Medicare Wellness. I asked the patient if she  had any questions and answered her  questions. The patient stated that she understands the treatment plan and agrees with the treatment plan    This document was created with a voice activated dictation system and may contain transcription errors.

## 2023-02-28 ENCOUNTER — OFFICE VISIT (OUTPATIENT)
Age: 68
End: 2023-02-28

## 2023-02-28 VITALS — BODY MASS INDEX: 25.13 KG/M2 | HEIGHT: 60 IN | RESPIRATION RATE: 18 BRPM | WEIGHT: 128 LBS

## 2023-02-28 DIAGNOSIS — M75.51 BURSITIS OF RIGHT SHOULDER: Primary | ICD-10-CM

## 2023-02-28 NOTE — PROGRESS NOTES
[facility] AMBULATORY OFFICE NOTE    Patient: Jonna Perdue                MRN: [id number]       SSN: [id number]  YOB: 1955        AGE: 69 y.o. SEX: female  Body mass index is 25 kg/m². PCP: Dima Castro MD  02/28/23    Chief Complaint: Right shoulder follow up     HPI: Jonna Perdue is a 69 y.o. female patient who returns to the office today for her right shoulder. After her last visit she underwent an MRI of the right shoulder. She was also started on Mobic which she says is greatly helped her pain and she has almost no pain throughout her body today including her right shoulder.     Past Medical History:   Diagnosis Date    Allergic rhinitis     Asthma 8/25/2012    +PFT for mild obstructive disease/COPD    Back pain, chronic 8/25/2012    Dr. Christin Polanco referred to Dr. Jonathan Zazueta    Cervical spinal stenosis 1/8/2018    Cervical spondylosis with myelopathy 2/28/2018    Chronic obstructive pulmonary disease (HCC)     GERD (gastroesophageal reflux disease)     H/O mammogram 10/03/2017    No evidence of malignancy    Hard of hearing     chronic ruptured TM    HTN (hypertension) 8/25/2012    Hx of screening mammography 09/06/2016    EWL, normal, routine recs    Hyperlipidemia     Nausea & vomiting     Ossification of posterior longitudinal ligament in cervical region ([address] 1/8/2018    S/P cervical spinal fusion 3/2/2018    Sinus congestion 8/25/2012    Spinal stenosis of lumbar region with radiculopathy 9/2015    Dr. Mirza Hahn       Family History   Problem Relation Age of Onset    Hypertension Father     Stroke Father     Heart Surgery Father 46    Heart Disease Brother     Lung Disease Father     Heart Disease Brother     Heart Disease Mother     Hypertension Mother     Cancer Mother     Coronary Art Dis Mother 67    Heart Surgery Mother 68    Heart Disease Father        Current Outpatient Medications   Medication Sig Dispense Refill    ezetimibe (Teresa Glance) 10 MG tablet Take 1 tablet by mouth daily 90 tablet 1    albuterol sulfate HFA (PROVENTIL;VENTOLIN;PROAIR) 108 (90 Base) MCG/ACT inhaler USE 1 INHALATION BY MOUTH  EVERY 4 HOURS AS NEEDED FOR WHEEZING 18 g 3    FLUoxetine (PROZAC) 20 MG capsule Take 1 capsule by mouth daily 90 capsule 1    meloxicam (MOBIC) 15 MG tablet Take 1 tablet by mouth daily 30 tablet 2    montelukast (SINGULAIR) 10 MG tablet TAKE 1 TABLET BY MOUTH  DAILY FOR INFLAMMATION OF  THE NOSE DUE TO AN ALLERGY 90 tablet 1    SPIRIVA HANDIHALER 18 MCG inhalation capsule Inhale 1 capsule into the lungs daily 30 capsule 3    losartan (COZAAR) 50 MG tablet Take 1 tablet by mouth daily 90 tablet 1    acetaminophen (TYLENOL) 500 MG tablet Take 1,000 mg by mouth every 8 hours as needed      Cholecalciferol 50 MCG (2000 UT) CAPS Take 2,000 Units by mouth daily (Patient not taking: Reported on 2023)      fluticasone (FLONASE) 50 MCG/ACT nasal spray 2 sprays by Nasal route daily      gabapentin (NEURONTIN) 600 MG tablet Take 600 mg by mouth 3 times daily. losartan-hydroCHLOROthiazide (HYZAAR) 50-12.5 MG per tablet Take 1 tablet by mouth daily      methocarbamol (ROBAXIN) 500 MG tablet Take 500 mg by mouth 3 times daily      valACYclovir (VALTREX) 500 MG tablet Take 500 mg by mouth 2 times daily      vitamin E 1000 units capsule Take 1,000 Units by mouth daily       No current facility-administered medications for this visit. Allergies   Allergen Reactions    Lisinopril Cough    Molds & Smuts      Other reaction(s): Runny Nose    Pollen Extract Itching    Pregabalin Other (See Comments)     Medication causes dry eyes, unstable balance, and lack of concentration.      Statins Myalgia    Topiramate Dizziness or Vertigo       Past Surgical History:   Procedure Laterality Date     SECTION      ORTHOPEDIC SURGERY  2018    cervical surgery(Dr. Deepika Bethea)    TUBAL LIGATION         Social History     Socioeconomic History    Marital status: Single Spouse name: Not on file    Number of children: Not on file    Years of education: Not on file    Highest education level: Not on file   Occupational History    Not on file   Tobacco Use    Smoking status: Former     Packs/day: 1.00     Types: Cigarettes     Quit date: 3/26/2020     Years since quittin.9    Smokeless tobacco: Never   Substance and Sexual Activity    Alcohol use: No    Drug use: No    Sexual activity: Not on file   Other Topics Concern    Not on file   Social History Narrative    Not on file     Social Determinants of Health     Financial Resource Strain: Not on file   Food Insecurity: Not on file   Transportation Needs: Not on file   Physical Activity: Not on file   Stress: Not on file   Social Connections: Not on file   Intimate Partner Violence: Not on file   Housing Stability: Not on file       REVIEW OF SYSTEMS:      No changes from previous review of systems unless noted. PHYSICAL EXAMINATION:  Resp 18   Ht 5' (1.524 m)   Wt 128 lb (58.1 kg)   BMI 25.00 kg/m²   Body mass index is 25 kg/m². GENERAL: Alert and oriented x3, in no acute distress. HEENT: Normocephalic, atraumatic. Shoulder Examination     R   L  ROM   FF  Full   Full  ER  Full   Full   IR  Full   Full  Rotator Cuff Pain   Supra  -   -   Infra  -   -   Subscap -   -  Crepitus  -   -  Effusion  -   -  Warmth  -   -   Erythema  -   -  Instability  -   -  AC Joint TTP  -   -  Clavicle   Deformity -   -   TTP  -   -  Proximal Humerus   Deformity -   -   TTP  -   -  Deltoid Strength 5   5  Biceps Strength 5   5  Biceps Deformity -   -  Biceps Groove Pain -   -  Impingement Sign -   -       IMAGING:  Imaging read by myself and interpreted as follows:  MRI of the right shoulder was reviewed in the office today. The MRI does not show any full-thickness rotator cuff tearing. ASSESSMENT & PLAN  Diagnosis: Right shoulder bursitis, resolved    Ms. Luba Talamantes has right shoulder bursitis that appears to resolved with Mobic.   At this point I recommended that she taper off of the medication to see if her pain remains resolved. I do not see anything on her MRI that I would recommend surgery for at this point. I am happy she is gotten better. I will see her back as needed should her pain return. Prescription medication management discussed with patient. Electronically signed by: Emile Matos MD     Note: This note was completed using voice recognition software.   Any typographical/name errors or mistakes are unintentional.

## 2023-03-31 ENCOUNTER — TELEPHONE (OUTPATIENT)
Age: 68
End: 2023-03-31

## 2023-03-31 NOTE — TELEPHONE ENCOUNTER
Contacted pt. Schedule appt. For medication refill she stated she was unavailable to schedule because she was not home and would give the office a call back to schedule appt.

## 2023-04-24 DIAGNOSIS — M25.511 CHRONIC RIGHT SHOULDER PAIN: ICD-10-CM

## 2023-04-24 DIAGNOSIS — G89.29 CHRONIC RIGHT SHOULDER PAIN: ICD-10-CM

## 2023-04-25 RX ORDER — MELOXICAM 15 MG/1
15 TABLET ORAL DAILY
Qty: 90 TABLET | Refills: 3 | Status: SHIPPED | OUTPATIENT
Start: 2023-04-25

## 2023-05-08 ENCOUNTER — HOSPITAL ENCOUNTER (OUTPATIENT)
Facility: HOSPITAL | Age: 68
Discharge: HOME | End: 2023-05-11
Payer: MEDICARE

## 2023-05-08 ENCOUNTER — APPOINTMENT (OUTPATIENT)
Facility: HOSPITAL | Age: 68
End: 2023-05-08
Payer: MEDICARE

## 2023-05-08 DIAGNOSIS — Z12.31 VISIT FOR SCREENING MAMMOGRAM: ICD-10-CM

## 2023-05-08 PROCEDURE — 77063 BREAST TOMOSYNTHESIS BI: CPT

## 2023-05-18 DIAGNOSIS — E78.5 HYPERLIPIDEMIA, UNSPECIFIED HYPERLIPIDEMIA TYPE: ICD-10-CM

## 2023-05-18 DIAGNOSIS — I10 ESSENTIAL (PRIMARY) HYPERTENSION: ICD-10-CM

## 2023-05-18 DIAGNOSIS — J44.9 CHRONIC OBSTRUCTIVE PULMONARY DISEASE, UNSPECIFIED COPD TYPE (HCC): ICD-10-CM

## 2023-05-19 RX ORDER — LOSARTAN POTASSIUM 50 MG/1
50 TABLET ORAL DAILY
Qty: 90 TABLET | Refills: 3 | Status: SHIPPED | OUTPATIENT
Start: 2023-05-19

## 2023-05-19 RX ORDER — EZETIMIBE 10 MG/1
10 TABLET ORAL DAILY
Qty: 90 TABLET | Refills: 3 | Status: SHIPPED | OUTPATIENT
Start: 2023-05-19

## 2023-05-19 RX ORDER — MONTELUKAST SODIUM 10 MG/1
TABLET ORAL
Qty: 90 TABLET | Refills: 3 | Status: SHIPPED | OUTPATIENT
Start: 2023-05-19

## 2023-06-20 DIAGNOSIS — F41.9 ANXIETY AND DEPRESSION: ICD-10-CM

## 2023-06-20 DIAGNOSIS — F32.A ANXIETY AND DEPRESSION: ICD-10-CM

## 2023-06-21 RX ORDER — FLUOXETINE HYDROCHLORIDE 20 MG/1
20 CAPSULE ORAL DAILY
Qty: 90 CAPSULE | Refills: 3 | Status: SHIPPED | OUTPATIENT
Start: 2023-06-21

## 2023-07-07 DIAGNOSIS — M54.50 CHRONIC BILATERAL LOW BACK PAIN, UNSPECIFIED WHETHER SCIATICA PRESENT: ICD-10-CM

## 2023-07-07 DIAGNOSIS — M48.062 SPINAL STENOSIS, LUMBAR REGION WITH NEUROGENIC CLAUDICATION: Primary | ICD-10-CM

## 2023-07-07 DIAGNOSIS — G89.29 CHRONIC BILATERAL LOW BACK PAIN, UNSPECIFIED WHETHER SCIATICA PRESENT: ICD-10-CM

## 2023-07-07 DIAGNOSIS — M47.816 SPONDYLOSIS WITHOUT MYELOPATHY OR RADICULOPATHY, LUMBAR REGION: ICD-10-CM

## 2023-07-07 RX ORDER — GABAPENTIN 600 MG/1
600 TABLET ORAL 3 TIMES DAILY
Qty: 270 TABLET | Refills: 1 | Status: CANCELLED | OUTPATIENT
Start: 2023-07-07 | End: 2024-01-03

## 2023-07-07 NOTE — TELEPHONE ENCOUNTER
Requested Prescriptions     Pending Prescriptions Disp Refills    gabapentin (NEURONTIN) 600 MG tablet 270 tablet 1     Sig: Take 1 tablet by mouth 3 times daily for 180 days. Max Daily Amount: 1,800 mg      Patient requests:    Last Rx: on 10/11/22 Qty 270 w/1 refill  Last ovx: 11/17/22  Next ovx: None    Patient never made a 3 month f/u appointment.

## 2023-07-07 NOTE — TELEPHONE ENCOUNTER
Patient is requesting a refill of Gabapentin. Patient uses the [facility] delivery pharmacy. , and can be reached at [phone].

## 2023-07-10 NOTE — TELEPHONE ENCOUNTER
This is a controlled substance. We can not fill it since we have not seen her in 6 months. Needs appt, shilo w/Justyna brambila. Once we have her scheduled, can rx enough to get to appt.

## 2023-07-11 NOTE — TELEPHONE ENCOUNTER
Have pt schedule an appt and once it is scheduled then we can give her enough gabapentin to get her to the appt.     Ok for [address] with NP

## 2023-07-12 DIAGNOSIS — M47.816 SPONDYLOSIS WITHOUT MYELOPATHY OR RADICULOPATHY, LUMBAR REGION: ICD-10-CM

## 2023-07-12 DIAGNOSIS — M54.50 CHRONIC BILATERAL LOW BACK PAIN, UNSPECIFIED WHETHER SCIATICA PRESENT: ICD-10-CM

## 2023-07-12 DIAGNOSIS — G89.29 CHRONIC BILATERAL LOW BACK PAIN, UNSPECIFIED WHETHER SCIATICA PRESENT: ICD-10-CM

## 2023-07-12 DIAGNOSIS — M48.062 SPINAL STENOSIS, LUMBAR REGION WITH NEUROGENIC CLAUDICATION: Primary | ICD-10-CM

## 2023-07-12 NOTE — TELEPHONE ENCOUNTER
I am giving #24 to get to her appt. See if she wants this sent to [facility] on [address]

## 2023-07-13 DIAGNOSIS — J44.9 CHRONIC OBSTRUCTIVE PULMONARY DISEASE, UNSPECIFIED COPD TYPE (HCC): ICD-10-CM

## 2023-07-13 RX ORDER — ALBUTEROL SULFATE 90 UG/1
AEROSOL, METERED RESPIRATORY (INHALATION)
Qty: 20.1 G | Refills: 3 | Status: SHIPPED | OUTPATIENT
Start: 2023-07-13

## 2023-07-13 RX ORDER — GABAPENTIN 600 MG/1
600 TABLET ORAL 3 TIMES DAILY
Qty: 24 TABLET | Refills: 0 | OUTPATIENT
Start: 2023-07-13 | End: 2023-07-21

## 2023-07-13 NOTE — TELEPHONE ENCOUNTER
Patient identified 2 patient identifiers. She wants her prescription to go through Mail Order because it's free.

## 2023-07-19 ENCOUNTER — OFFICE VISIT (OUTPATIENT)
Age: 68
End: 2023-07-19
Payer: MEDICARE

## 2023-07-19 VITALS
WEIGHT: 127.6 LBS | RESPIRATION RATE: 18 BRPM | HEIGHT: 60 IN | OXYGEN SATURATION: 96 % | TEMPERATURE: 97.3 F | BODY MASS INDEX: 25.05 KG/M2 | HEART RATE: 80 BPM

## 2023-07-19 DIAGNOSIS — M47.816 SPONDYLOSIS WITHOUT MYELOPATHY OR RADICULOPATHY, LUMBAR REGION: ICD-10-CM

## 2023-07-19 DIAGNOSIS — M48.062 SPINAL STENOSIS, LUMBAR REGION WITH NEUROGENIC CLAUDICATION: Primary | ICD-10-CM

## 2023-07-19 PROCEDURE — 1123F ACP DISCUSS/DSCN MKR DOCD: CPT | Performed by: NURSE PRACTITIONER

## 2023-07-19 PROCEDURE — 99213 OFFICE O/P EST LOW 20 MIN: CPT | Performed by: NURSE PRACTITIONER

## 2023-07-19 RX ORDER — GABAPENTIN 600 MG/1
600 TABLET ORAL 3 TIMES DAILY
Qty: 270 TABLET | Refills: 1 | Status: SHIPPED | OUTPATIENT
Start: 2023-07-19 | End: 2024-01-15

## 2023-07-19 RX ORDER — GABAPENTIN 600 MG/1
600 TABLET ORAL 3 TIMES DAILY
Qty: 90 TABLET | Refills: 5 | Status: SHIPPED | OUTPATIENT
Start: 2023-07-19 | End: 2023-07-19 | Stop reason: SDUPTHER

## 2023-07-19 NOTE — PROGRESS NOTES
Shaunna Rivera presents today for   Chief Complaint   Patient presents with    Pain    Back Problem    Back Pain    Lower Back Pain       Is someone accompanying this pt? no    Is the patient using any DME equipment during OV? no    Depression Screening:  No flowsheet data found. Learning Assessment:  No flowsheet data found. Abuse Screening:  No flowsheet data found. Fall Risk  No flowsheet data found. OPIOID RISK TOOL  No flowsheet data found. Coordination of Care:  1. Have you been to the ER, urgent care clinic since your last visit? no  Hospitalized since your last visit? no    2. Have you seen or consulted any other health care providers outside of the [address] [place] [address] since your last visit? no Include any pap smears or colon screening.  no

## 2023-07-19 NOTE — PROGRESS NOTES
Chief complaint   Chief Complaint   Patient presents with    Pain    Back Problem    Back Pain    Lower Back Pain       History of Present Illness:  Doretha Vargas is a  69 y.o.  female with who comes in today for follow-up of her chronic low back pain that radiates down her left lower extremity to her ankle. She states as long as she takes the gabapentin 603 times a day she does very well. Occasionally she misses that middle dose. She states if she misses the nighttime dose she will actually wake up in the middle of the night in pain. She did try tramadol to just at bedtime but finds she needs it at least 2-3 times a day. She is no longer taking Robaxin as it just made her sleepy and really did not help. She does have moderately severe stenosis at L4-5. She has also had a C3-C7 posterior fusion in 2018 with Dr. Ever Johns and states her neck is doing okay. Physical Exam: The patient is a 20-year-old female well-developed well-nourished who is alert and oriented with a normal mood and affect. She has a full weightbearing nonantalgic gait. She not using assist device. She has 4 out of 5 strength bilateral lower extremities. Negative straight leg raise. She has pain with hyperextension lumbar spine. Assessment and Plan: This is a patient who has lumbar spinal stenosis. I have refilled her gabapentin. She does well on it without side effects. We will DC the Robaxin. We will see her back in 6 months sooner if needed. Librado Garay was seen today for pain, back problem, back pain and lower back pain. Diagnoses and all orders for this visit:    Spinal stenosis, lumbar region with neurogenic claudication  -     Discontinue: gabapentin (NEURONTIN) 600 MG tablet; Take 1 tablet by mouth 3 times daily for 180 days. Max Daily Amount: 1,800 mg  -     gabapentin (NEURONTIN) 600 MG tablet; Take 1 tablet by mouth 3 times daily for 180 days.  Max Daily Amount: 1,800 mg    Spondylosis without myelopathy or

## 2023-08-02 RX ORDER — GABAPENTIN 600 MG/1
600 TABLET ORAL 3 TIMES DAILY
Qty: 24 TABLET | Refills: 0 | Status: SHIPPED | OUTPATIENT
Start: 2023-08-02 | End: 2023-08-10

## 2023-08-21 ENCOUNTER — HOSPITAL ENCOUNTER (OUTPATIENT)
Facility: HOSPITAL | Age: 68
Setting detail: SPECIMEN
Discharge: HOME | End: 2023-08-24
Payer: MEDICARE

## 2023-08-21 DIAGNOSIS — R30.0 DYSURIA: ICD-10-CM

## 2023-08-21 DIAGNOSIS — R30.0 DYSURIA: Primary | ICD-10-CM

## 2023-08-21 LAB
ALBUMIN SERPL-MCNC: 3.7 G/DL (ref 3.4–5)
ALBUMIN/GLOB SERPL: 0.9 (ref 0.8–1.7)
ALP SERPL-CCNC: 78 U/L (ref 45–117)
ALT SERPL-CCNC: 16 U/L (ref 13–56)
ANION GAP SERPL CALC-SCNC: 8 MMOL/L (ref 3–18)
APPEARANCE UR: ABNORMAL
AST SERPL-CCNC: 16 U/L (ref 10–38)
BACTERIA URNS QL MICRO: ABNORMAL /HPF
BILIRUB SERPL-MCNC: 0.8 MG/DL (ref 0.2–1)
BILIRUB UR QL: NEGATIVE
BUN SERPL-MCNC: 24 MG/DL (ref 7–18)
BUN/CREAT SERPL: 24 (ref 12–20)
CALCIUM SERPL-MCNC: 10.5 MG/DL (ref 8.5–10.1)
CHLORIDE SERPL-SCNC: 103 MMOL/L (ref 100–111)
CO2 SERPL-SCNC: 24 MMOL/L (ref 21–32)
COLOR UR: YELLOW
CREAT SERPL-MCNC: 0.99 MG/DL (ref 0.6–1.3)
EPITH CASTS URNS QL MICRO: ABNORMAL /LPF (ref 0–5)
GLOBULIN SER CALC-MCNC: 3.9 G/DL (ref 2–4)
GLUCOSE SERPL-MCNC: 89 MG/DL (ref 74–99)
GLUCOSE UR STRIP.AUTO-MCNC: NEGATIVE MG/DL
HGB UR QL STRIP: ABNORMAL
KETONES UR QL STRIP.AUTO: NEGATIVE MG/DL
LEUKOCYTE ESTERASE UR QL STRIP.AUTO: ABNORMAL
NITRITE UR QL STRIP.AUTO: POSITIVE
PH UR STRIP: 5.5 (ref 5–8)
POTASSIUM SERPL-SCNC: 4.7 MMOL/L (ref 3.5–5.5)
PROT SERPL-MCNC: 7.6 G/DL (ref 6.4–8.2)
PROT UR STRIP-MCNC: 30 MG/DL
RBC #/AREA URNS HPF: ABNORMAL /HPF (ref 0–5)
SODIUM SERPL-SCNC: 135 MMOL/L (ref 136–145)
SP GR UR REFRACTOMETRY: 1.02 (ref 1–1.03)
UROBILINOGEN UR QL STRIP.AUTO: 0.2 EU/DL (ref 0.2–1)
WBC URNS QL MICRO: ABNORMAL /HPF (ref 0–4)

## 2023-08-21 PROCEDURE — 81001 URINALYSIS AUTO W/SCOPE: CPT

## 2023-08-21 PROCEDURE — 83970 ASSAY OF PARATHORMONE: CPT

## 2023-08-21 PROCEDURE — 87086 URINE CULTURE/COLONY COUNT: CPT

## 2023-08-21 PROCEDURE — 80053 COMPREHEN METABOLIC PANEL: CPT

## 2023-08-21 PROCEDURE — 87186 SC STD MICRODIL/AGAR DIL: CPT

## 2023-08-21 PROCEDURE — 82330 ASSAY OF CALCIUM: CPT

## 2023-08-21 PROCEDURE — 36415 COLL VENOUS BLD VENIPUNCTURE: CPT

## 2023-08-21 PROCEDURE — 87077 CULTURE AEROBIC IDENTIFY: CPT

## 2023-08-22 ENCOUNTER — TELEPHONE (OUTPATIENT)
Facility: CLINIC | Age: 68
End: 2023-08-22

## 2023-08-23 DIAGNOSIS — N30.01 ACUTE CYSTITIS WITH HEMATURIA: Primary | ICD-10-CM

## 2023-08-23 LAB
CA-I SERPL ISE-MCNC: 6.1 MG/DL (ref 4.5–5.6)
CALCIUM SERPL-MCNC: 10.6 MG/DL (ref 8.5–10.1)
PTH-INTACT SERPL-MCNC: 176.8 PG/ML (ref 18.4–88)

## 2023-08-23 RX ORDER — NITROFURANTOIN 25; 75 MG/1; MG/1
100 CAPSULE ORAL 2 TIMES DAILY
Qty: 10 CAPSULE | Refills: 0 | Status: SHIPPED | OUTPATIENT
Start: 2023-08-23 | End: 2023-08-28

## 2023-08-23 NOTE — TELEPHONE ENCOUNTER
Please call. Prescription written for Macrobid for the urinary tract infection. Let her know that if the culture shows that we need to change the antibiotic she will be called back.

## 2023-08-24 LAB
BACTERIA SPEC CULT: ABNORMAL
CC UR VC: ABNORMAL
SERVICE CMNT-IMP: ABNORMAL

## 2023-09-01 ENCOUNTER — OFFICE VISIT (OUTPATIENT)
Facility: CLINIC | Age: 68
End: 2023-09-01
Payer: MEDICARE

## 2023-09-01 VITALS
OXYGEN SATURATION: 95 % | TEMPERATURE: 98.6 F | SYSTOLIC BLOOD PRESSURE: 158 MMHG | HEART RATE: 76 BPM | DIASTOLIC BLOOD PRESSURE: 84 MMHG | WEIGHT: 124 LBS | HEIGHT: 60 IN | BODY MASS INDEX: 24.35 KG/M2 | RESPIRATION RATE: 20 BRPM

## 2023-09-01 DIAGNOSIS — E78.5 HYPERLIPIDEMIA, UNSPECIFIED HYPERLIPIDEMIA TYPE: ICD-10-CM

## 2023-09-01 DIAGNOSIS — N30.00 ACUTE CYSTITIS WITHOUT HEMATURIA: ICD-10-CM

## 2023-09-01 DIAGNOSIS — F41.9 ANXIETY DISORDER, UNSPECIFIED TYPE: ICD-10-CM

## 2023-09-01 DIAGNOSIS — J44.9 CHRONIC OBSTRUCTIVE PULMONARY DISEASE, UNSPECIFIED COPD TYPE (HCC): ICD-10-CM

## 2023-09-01 DIAGNOSIS — E83.52 HYPERCALCEMIA: ICD-10-CM

## 2023-09-01 DIAGNOSIS — I10 ESSENTIAL (PRIMARY) HYPERTENSION: ICD-10-CM

## 2023-09-01 DIAGNOSIS — Z00.00 MEDICARE ANNUAL WELLNESS VISIT, SUBSEQUENT: Primary | ICD-10-CM

## 2023-09-01 DIAGNOSIS — M81.0 AGE-RELATED OSTEOPOROSIS WITHOUT CURRENT PATHOLOGICAL FRACTURE: ICD-10-CM

## 2023-09-01 LAB
BILIRUBIN, URINE, POC: NEGATIVE
BLOOD URINE, POC: ABNORMAL
GLUCOSE URINE, POC: NEGATIVE
KETONES, URINE, POC: NEGATIVE
LEUKOCYTE ESTERASE, URINE, POC: ABNORMAL
NITRITE, URINE, POC: NEGATIVE
PH, URINE, POC: 7 (ref 4.6–8)
PROTEIN,URINE, POC: NEGATIVE
SPECIFIC GRAVITY, URINE, POC: 1.01 (ref 1–1.03)
URINALYSIS CLARITY, POC: ABNORMAL
URINALYSIS COLOR, POC: ABNORMAL
UROBILINOGEN, POC: ABNORMAL

## 2023-09-01 PROCEDURE — 1123F ACP DISCUSS/DSCN MKR DOCD: CPT | Performed by: STUDENT IN AN ORGANIZED HEALTH CARE EDUCATION/TRAINING PROGRAM

## 2023-09-01 PROCEDURE — 3077F SYST BP >= 140 MM HG: CPT | Performed by: STUDENT IN AN ORGANIZED HEALTH CARE EDUCATION/TRAINING PROGRAM

## 2023-09-01 PROCEDURE — 99214 OFFICE O/P EST MOD 30 MIN: CPT | Performed by: STUDENT IN AN ORGANIZED HEALTH CARE EDUCATION/TRAINING PROGRAM

## 2023-09-01 PROCEDURE — 3079F DIAST BP 80-89 MM HG: CPT | Performed by: STUDENT IN AN ORGANIZED HEALTH CARE EDUCATION/TRAINING PROGRAM

## 2023-09-01 PROCEDURE — G0439 PPPS, SUBSEQ VISIT: HCPCS | Performed by: STUDENT IN AN ORGANIZED HEALTH CARE EDUCATION/TRAINING PROGRAM

## 2023-09-01 PROCEDURE — 81001 URINALYSIS AUTO W/SCOPE: CPT | Performed by: STUDENT IN AN ORGANIZED HEALTH CARE EDUCATION/TRAINING PROGRAM

## 2023-09-01 RX ORDER — LOSARTAN POTASSIUM 100 MG/1
100 TABLET ORAL DAILY
Qty: 90 TABLET | Refills: 1 | Status: SHIPPED | OUTPATIENT
Start: 2023-09-01

## 2023-09-01 RX ORDER — CEFDINIR 300 MG/1
300 CAPSULE ORAL 2 TIMES DAILY
Qty: 14 CAPSULE | Refills: 0 | Status: SHIPPED | OUTPATIENT
Start: 2023-09-01 | End: 2023-09-08

## 2023-09-01 SDOH — ECONOMIC STABILITY: FOOD INSECURITY: WITHIN THE PAST 12 MONTHS, THE FOOD YOU BOUGHT JUST DIDN'T LAST AND YOU DIDN'T HAVE MONEY TO GET MORE.: NEVER TRUE

## 2023-09-01 SDOH — ECONOMIC STABILITY: HOUSING INSECURITY
IN THE LAST 12 MONTHS, WAS THERE A TIME WHEN YOU DID NOT HAVE A STEADY PLACE TO SLEEP OR SLEPT IN A SHELTER (INCLUDING NOW)?: NO

## 2023-09-01 SDOH — ECONOMIC STABILITY: INCOME INSECURITY: HOW HARD IS IT FOR YOU TO PAY FOR THE VERY BASICS LIKE FOOD, HOUSING, MEDICAL CARE, AND HEATING?: NOT HARD AT ALL

## 2023-09-01 SDOH — ECONOMIC STABILITY: FOOD INSECURITY: WITHIN THE PAST 12 MONTHS, YOU WORRIED THAT YOUR FOOD WOULD RUN OUT BEFORE YOU GOT MONEY TO BUY MORE.: NEVER TRUE

## 2023-09-01 ASSESSMENT — ENCOUNTER SYMPTOMS
SHORTNESS OF BREATH: 0
NAUSEA: 0
VOMITING: 0
ABDOMINAL PAIN: 0
BACK PAIN: 0
BLOOD IN STOOL: 0
COUGH: 0
RHINORRHEA: 0
CHEST TIGHTNESS: 0
WHEEZING: 0
DIARRHEA: 0

## 2023-09-01 ASSESSMENT — PATIENT HEALTH QUESTIONNAIRE - PHQ9
9. THOUGHTS THAT YOU WOULD BE BETTER OFF DEAD, OR OF HURTING YOURSELF: 0
SUM OF ALL RESPONSES TO PHQ QUESTIONS 1-9: 0
3. TROUBLE FALLING OR STAYING ASLEEP: 0
4. FEELING TIRED OR HAVING LITTLE ENERGY: 0
2. FEELING DOWN, DEPRESSED OR HOPELESS: 0
SUM OF ALL RESPONSES TO PHQ QUESTIONS 1-9: 0
6. FEELING BAD ABOUT YOURSELF - OR THAT YOU ARE A FAILURE OR HAVE LET YOURSELF OR YOUR FAMILY DOWN: 0
1. LITTLE INTEREST OR PLEASURE IN DOING THINGS: 0
7. TROUBLE CONCENTRATING ON THINGS, SUCH AS READING THE NEWSPAPER OR WATCHING TELEVISION: 0
5. POOR APPETITE OR OVEREATING: 0
SUM OF ALL RESPONSES TO PHQ9 QUESTIONS 1 & 2: 0
SUM OF ALL RESPONSES TO PHQ QUESTIONS 1-9: 0
8. MOVING OR SPEAKING SO SLOWLY THAT OTHER PEOPLE COULD HAVE NOTICED. OR THE OPPOSITE, BEING SO FIGETY OR RESTLESS THAT YOU HAVE BEEN MOVING AROUND A LOT MORE THAN USUAL: 0
10. IF YOU CHECKED OFF ANY PROBLEMS, HOW DIFFICULT HAVE THESE PROBLEMS MADE IT FOR YOU TO DO YOUR WORK, TAKE CARE OF THINGS AT HOME, OR GET ALONG WITH OTHER PEOPLE: 0
SUM OF ALL RESPONSES TO PHQ QUESTIONS 1-9: 0

## 2023-09-01 ASSESSMENT — ANXIETY QUESTIONNAIRES
1. FEELING NERVOUS, ANXIOUS, OR ON EDGE: 0
6. BECOMING EASILY ANNOYED OR IRRITABLE: 0
7. FEELING AFRAID AS IF SOMETHING AWFUL MIGHT HAPPEN: 0
3. WORRYING TOO MUCH ABOUT DIFFERENT THINGS: 0
4. TROUBLE RELAXING: 0
IF YOU CHECKED OFF ANY PROBLEMS ON THIS QUESTIONNAIRE, HOW DIFFICULT HAVE THESE PROBLEMS MADE IT FOR YOU TO DO YOUR WORK, TAKE CARE OF THINGS AT HOME, OR GET ALONG WITH OTHER PEOPLE: NOT DIFFICULT AT ALL
5. BEING SO RESTLESS THAT IT IS HARD TO SIT STILL: 0
2. NOT BEING ABLE TO STOP OR CONTROL WORRYING: 0
GAD7 TOTAL SCORE: 0

## 2023-09-01 NOTE — PROGRESS NOTES
Erin Perez is a 69 y.o. female presenting today for Medicare AWV  . Chief Complaint   Patient presents with    Medicare AWV       HPI:  Erin Perez presents to the office today for follow up. Patient has a past medical history significant for HTN, GERD, asthma, COPD, HLD and vitamin D deficiency. Scented last week with urinary symptoms. She was prescribed Macrobid. Urine culture grew greater than 100 K Klebsiella Monier with intermediate sensitivity to Macrobid. She continues to complain of dysuria. UA POC today shows 3+ leukocytes. Denies any fever, chills or flank pain. HLD: Patient is on losartan. HCTZ was discontinued due to hypercalcemia. Denies any chest pain or palpitations. BP is now elevated. HLD: Patient has hyperlipidemia but does not take any statins - reports myalgias. Lipid panel in 10/22 showed , HDL 64, triglyceride 119. She is on Zetia which she is tolerating. Patient is managed by orthopedics for her history of spinal stenosis of the lumbar region and neurogenic claudication, severe L4-L5. Her orthopedics is Dr. Dariusz Nichols. She recently underwent an epidural steroid injection. Currently on gabapentin-reports pain is well managed. COPD: Patient smoked for 50 yrs - 1 ppd. She stopped smoking 3 years ago. Symptoms significantly improved with Spiriva. Osteoporosis: DEXA scan in 2/20 showed osteoporosis T score -2.5 at left distal radius. Patient has not undergone any treatment. Vitamin D level was normal.  Patient has been hesitant to get treated for it. Right shoulder pain: Patient is following with Dr. Marlene Harley -MRI showed supraspinatus and infraspinatus tendinopathy with low-grade partial tearing and acromioclavicular degenerative changes. She was prescribed Mobic with significant improvement. Anxiety: Currently on fluoxetine 20 mg daily. Ports symptoms are better since increasing the dose. Hypercalcemia: Noted to have elevated calcium.   PTH

## 2023-09-01 NOTE — PATIENT INSTRUCTIONS
history including lifestyle, illnesses that may run in your family, and various assessments and screenings as appropriate. After reviewing your medical record and screening and assessments performed today your provider may have ordered immunizations, labs, imaging, and/or referrals for you. A list of these orders (if applicable) as well as your Preventive Care list are included within your After Visit Summary for your review. Other Preventive Recommendations:    A preventive eye exam performed by an eye specialist is recommended every 1-2 years to screen for glaucoma; cataracts, macular degeneration, and other eye disorders. A preventive dental visit is recommended every 6 months. Try to get at least 150 minutes of exercise per week or 10,000 steps per day on a pedometer . Order or download the FREE \"Exercise & Physical Activity: Your Everyday Guide\" from [employer]. Call 2-446.551.5638 or search [employer] online. You need 3475-4524 mg of calcium and 0142-5751 IU of vitamin D per day. It is possible to meet your calcium requirement with diet alone, but a vitamin D supplement is usually necessary to meet this goal.  When exposed to the sun, use a sunscreen that protects against both UVA and UVB radiation with an SPF of 30 or greater. Reapply every 2 to 3 hours or after sweating, drying off with a towel, or swimming. Always wear a seat belt when traveling in a car. Always wear a helmet when riding a bicycle or motorcycle.

## 2023-10-10 ENCOUNTER — HOSPITAL ENCOUNTER (OUTPATIENT)
Facility: HOSPITAL | Age: 68
Discharge: HOME | End: 2023-10-13
Attending: INTERNAL MEDICINE
Payer: MEDICARE

## 2023-10-10 DIAGNOSIS — E55.9 VITAMIN D DEFICIENCY, UNSPECIFIED: ICD-10-CM

## 2023-10-10 DIAGNOSIS — I10 ESSENTIAL (PRIMARY) HYPERTENSION: ICD-10-CM

## 2023-10-10 DIAGNOSIS — E21.3 HYPERPARATHYROIDISM, UNSPECIFIED (HCC): ICD-10-CM

## 2023-10-10 DIAGNOSIS — E83.52 HYPERCALCEMIA: ICD-10-CM

## 2023-10-10 DIAGNOSIS — E78.2 MIXED HYPERLIPIDEMIA: ICD-10-CM

## 2023-10-10 DIAGNOSIS — J45.909 ACUTE ASTHMA: ICD-10-CM

## 2023-10-10 PROCEDURE — A9500 TC99M SESTAMIBI: HCPCS | Performed by: INTERNAL MEDICINE

## 2023-10-10 PROCEDURE — 3430000000 HC RX DIAGNOSTIC RADIOPHARMACEUTICAL: Performed by: INTERNAL MEDICINE

## 2023-10-10 RX ORDER — TETRAKIS(2-METHOXYISOBUTYLISOCYANIDE)COPPER(I) TETRAFLUOROBORATE 1 MG/ML
27.5 INJECTION, POWDER, LYOPHILIZED, FOR SOLUTION INTRAVENOUS
Status: COMPLETED | OUTPATIENT
Start: 2023-10-10 | End: 2023-10-10

## 2023-10-10 RX ORDER — TETRAKIS(2-METHOXYISOBUTYLISOCYANIDE)COPPER(I) TETRAFLUOROBORATE 1 MG/ML
27.5 INJECTION, POWDER, LYOPHILIZED, FOR SOLUTION INTRAVENOUS
OUTPATIENT
Start: 2023-10-10

## 2023-10-10 RX ADMIN — TECHNETIUM TC-99M SESTAMIBI 27.5 MILLICURIE: 1 INJECTION INTRAVENOUS at 11:45

## 2023-12-12 ENCOUNTER — TELEPHONE (OUTPATIENT)
Facility: CLINIC | Age: 68
End: 2023-12-12

## 2023-12-12 NOTE — TELEPHONE ENCOUNTER
TC-spoke with pt.  Pt verbalizes with understanding to mail further details to [facility] Patient Assistance Program.

## 2024-01-02 ENCOUNTER — HOSPITAL ENCOUNTER (OUTPATIENT)
Facility: HOSPITAL | Age: 69
Setting detail: SPECIMEN
Discharge: HOME | End: 2024-01-05
Payer: MEDICARE

## 2024-01-02 ENCOUNTER — OFFICE VISIT (OUTPATIENT)
Facility: CLINIC | Age: 69
End: 2024-01-02
Payer: MEDICARE

## 2024-01-02 VITALS
WEIGHT: 121 LBS | OXYGEN SATURATION: 96 % | SYSTOLIC BLOOD PRESSURE: 140 MMHG | DIASTOLIC BLOOD PRESSURE: 78 MMHG | HEIGHT: 60 IN | HEART RATE: 65 BPM | RESPIRATION RATE: 16 BRPM | TEMPERATURE: 98.1 F | BODY MASS INDEX: 23.75 KG/M2

## 2024-01-02 DIAGNOSIS — J44.9 CHRONIC OBSTRUCTIVE PULMONARY DISEASE, UNSPECIFIED COPD TYPE (HCC): Primary | ICD-10-CM

## 2024-01-02 DIAGNOSIS — E83.52 HYPERCALCEMIA: ICD-10-CM

## 2024-01-02 DIAGNOSIS — F41.9 ANXIETY DISORDER, UNSPECIFIED TYPE: ICD-10-CM

## 2024-01-02 DIAGNOSIS — E78.5 HYPERLIPIDEMIA, UNSPECIFIED HYPERLIPIDEMIA TYPE: ICD-10-CM

## 2024-01-02 DIAGNOSIS — I10 ESSENTIAL (PRIMARY) HYPERTENSION: ICD-10-CM

## 2024-01-02 DIAGNOSIS — Z11.59 ENCOUNTER FOR HEPATITIS C SCREENING TEST FOR LOW RISK PATIENT: ICD-10-CM

## 2024-01-02 LAB
ALBUMIN SERPL-MCNC: 3.9 G/DL (ref 3.4–5)
ALBUMIN/GLOB SERPL: 1 (ref 0.8–1.7)
ALP SERPL-CCNC: 87 U/L (ref 45–117)
ALT SERPL-CCNC: 14 U/L (ref 13–56)
ANION GAP SERPL CALC-SCNC: 6 MMOL/L (ref 3–18)
AST SERPL-CCNC: 18 U/L (ref 10–38)
BASOPHILS # BLD: 0.1 K/UL (ref 0–0.1)
BASOPHILS NFR BLD: 1 % (ref 0–2)
BILIRUB SERPL-MCNC: 0.3 MG/DL (ref 0.2–1)
BUN SERPL-MCNC: 10 MG/DL (ref 7–18)
BUN/CREAT SERPL: 11 (ref 12–20)
CALCIUM SERPL-MCNC: 9.4 MG/DL (ref 8.5–10.1)
CALCIUM SERPL-MCNC: 9.8 MG/DL (ref 8.5–10.1)
CHLORIDE SERPL-SCNC: 105 MMOL/L (ref 100–111)
CHOLEST SERPL-MCNC: 280 MG/DL
CO2 SERPL-SCNC: 27 MMOL/L (ref 21–32)
CREAT SERPL-MCNC: 0.89 MG/DL (ref 0.6–1.3)
DIFFERENTIAL METHOD BLD: ABNORMAL
EOSINOPHIL # BLD: 0.1 K/UL (ref 0–0.4)
EOSINOPHIL NFR BLD: 1 % (ref 0–5)
ERYTHROCYTE [DISTWIDTH] IN BLOOD BY AUTOMATED COUNT: 12.5 % (ref 11.6–14.5)
GLOBULIN SER CALC-MCNC: 3.9 G/DL (ref 2–4)
GLUCOSE SERPL-MCNC: 85 MG/DL (ref 74–99)
HCT VFR BLD AUTO: 39.3 % (ref 35–45)
HDLC SERPL-MCNC: 70 MG/DL (ref 40–60)
HDLC SERPL: 4 (ref 0–5)
HGB BLD-MCNC: 12.9 G/DL (ref 12–16)
IMM GRANULOCYTES # BLD AUTO: 0 K/UL (ref 0–0.04)
IMM GRANULOCYTES NFR BLD AUTO: 0 % (ref 0–0.5)
LDLC SERPL CALC-MCNC: 183.6 MG/DL (ref 0–100)
LIPID PANEL: ABNORMAL
LYMPHOCYTES # BLD: 1.7 K/UL (ref 0.9–3.6)
LYMPHOCYTES NFR BLD: 21 % (ref 21–52)
MCH RBC QN AUTO: 31.8 PG (ref 24–34)
MCHC RBC AUTO-ENTMCNC: 32.8 G/DL (ref 31–37)
MCV RBC AUTO: 96.8 FL (ref 78–100)
MONOCYTES # BLD: 0.9 K/UL (ref 0.05–1.2)
MONOCYTES NFR BLD: 11 % (ref 3–10)
NEUTS SEG # BLD: 5.2 K/UL (ref 1.8–8)
NEUTS SEG NFR BLD: 65 % (ref 40–73)
NRBC # BLD: 0 K/UL (ref 0–0.01)
NRBC BLD-RTO: 0 PER 100 WBC
PLATELET # BLD AUTO: 389 K/UL (ref 135–420)
PMV BLD AUTO: 9.6 FL (ref 9.2–11.8)
POTASSIUM SERPL-SCNC: 4.2 MMOL/L (ref 3.5–5.5)
PROT SERPL-MCNC: 7.8 G/DL (ref 6.4–8.2)
PTH-INTACT SERPL-MCNC: 110.3 PG/ML (ref 18.4–88)
RBC # BLD AUTO: 4.06 M/UL (ref 4.2–5.3)
SODIUM SERPL-SCNC: 138 MMOL/L (ref 136–145)
TRIGL SERPL-MCNC: 132 MG/DL
VLDLC SERPL CALC-MCNC: 26.4 MG/DL
WBC # BLD AUTO: 8 K/UL (ref 4.6–13.2)

## 2024-01-02 PROCEDURE — 1123F ACP DISCUSS/DSCN MKR DOCD: CPT | Performed by: STUDENT IN AN ORGANIZED HEALTH CARE EDUCATION/TRAINING PROGRAM

## 2024-01-02 PROCEDURE — 99214 OFFICE O/P EST MOD 30 MIN: CPT | Performed by: STUDENT IN AN ORGANIZED HEALTH CARE EDUCATION/TRAINING PROGRAM

## 2024-01-02 PROCEDURE — 80053 COMPREHEN METABOLIC PANEL: CPT

## 2024-01-02 PROCEDURE — 36415 COLL VENOUS BLD VENIPUNCTURE: CPT

## 2024-01-02 PROCEDURE — 83970 ASSAY OF PARATHORMONE: CPT

## 2024-01-02 PROCEDURE — 80061 LIPID PANEL: CPT

## 2024-01-02 PROCEDURE — 85025 COMPLETE CBC W/AUTO DIFF WBC: CPT

## 2024-01-02 PROCEDURE — 86803 HEPATITIS C AB TEST: CPT

## 2024-01-02 PROCEDURE — 3077F SYST BP >= 140 MM HG: CPT | Performed by: STUDENT IN AN ORGANIZED HEALTH CARE EDUCATION/TRAINING PROGRAM

## 2024-01-02 PROCEDURE — 3078F DIAST BP <80 MM HG: CPT | Performed by: STUDENT IN AN ORGANIZED HEALTH CARE EDUCATION/TRAINING PROGRAM

## 2024-01-02 RX ORDER — LOSARTAN POTASSIUM 100 MG/1
100 TABLET ORAL DAILY
Qty: 90 TABLET | Refills: 1 | Status: SHIPPED | OUTPATIENT
Start: 2024-01-02

## 2024-01-02 RX ORDER — CINACALCET 30 MG/1
30 TABLET, FILM COATED ORAL 2 TIMES DAILY
COMMUNITY

## 2024-01-02 SDOH — ECONOMIC STABILITY: INCOME INSECURITY: HOW HARD IS IT FOR YOU TO PAY FOR THE VERY BASICS LIKE FOOD, HOUSING, MEDICAL CARE, AND HEATING?: NOT HARD AT ALL

## 2024-01-02 SDOH — ECONOMIC STABILITY: FOOD INSECURITY: WITHIN THE PAST 12 MONTHS, YOU WORRIED THAT YOUR FOOD WOULD RUN OUT BEFORE YOU GOT MONEY TO BUY MORE.: NEVER TRUE

## 2024-01-02 SDOH — ECONOMIC STABILITY: FOOD INSECURITY: WITHIN THE PAST 12 MONTHS, THE FOOD YOU BOUGHT JUST DIDN'T LAST AND YOU DIDN'T HAVE MONEY TO GET MORE.: NEVER TRUE

## 2024-01-02 ASSESSMENT — ENCOUNTER SYMPTOMS
NAUSEA: 0
SHORTNESS OF BREATH: 0
COUGH: 0
DIARRHEA: 0
WHEEZING: 0
RHINORRHEA: 0
CHEST TIGHTNESS: 0
BACK PAIN: 0
ABDOMINAL PAIN: 0
BLOOD IN STOOL: 0
VOMITING: 0

## 2024-01-02 ASSESSMENT — PATIENT HEALTH QUESTIONNAIRE - PHQ9
4. FEELING TIRED OR HAVING LITTLE ENERGY: 0
SUM OF ALL RESPONSES TO PHQ9 QUESTIONS 1 & 2: 0
9. THOUGHTS THAT YOU WOULD BE BETTER OFF DEAD, OR OF HURTING YOURSELF: 0
5. POOR APPETITE OR OVEREATING: 0
7. TROUBLE CONCENTRATING ON THINGS, SUCH AS READING THE NEWSPAPER OR WATCHING TELEVISION: 0
2. FEELING DOWN, DEPRESSED OR HOPELESS: 0
8. MOVING OR SPEAKING SO SLOWLY THAT OTHER PEOPLE COULD HAVE NOTICED. OR THE OPPOSITE, BEING SO FIGETY OR RESTLESS THAT YOU HAVE BEEN MOVING AROUND A LOT MORE THAN USUAL: 0
SUM OF ALL RESPONSES TO PHQ QUESTIONS 1-9: 0
1. LITTLE INTEREST OR PLEASURE IN DOING THINGS: 0
3. TROUBLE FALLING OR STAYING ASLEEP: 0
SUM OF ALL RESPONSES TO PHQ QUESTIONS 1-9: 0
10. IF YOU CHECKED OFF ANY PROBLEMS, HOW DIFFICULT HAVE THESE PROBLEMS MADE IT FOR YOU TO DO YOUR WORK, TAKE CARE OF THINGS AT HOME, OR GET ALONG WITH OTHER PEOPLE: 0
SUM OF ALL RESPONSES TO PHQ QUESTIONS 1-9: 0
6. FEELING BAD ABOUT YOURSELF - OR THAT YOU ARE A FAILURE OR HAVE LET YOURSELF OR YOUR FAMILY DOWN: 0
SUM OF ALL RESPONSES TO PHQ QUESTIONS 1-9: 0

## 2024-01-02 ASSESSMENT — ANXIETY QUESTIONNAIRES
2. NOT BEING ABLE TO STOP OR CONTROL WORRYING: 0
GAD7 TOTAL SCORE: 0
IF YOU CHECKED OFF ANY PROBLEMS ON THIS QUESTIONNAIRE, HOW DIFFICULT HAVE THESE PROBLEMS MADE IT FOR YOU TO DO YOUR WORK, TAKE CARE OF THINGS AT HOME, OR GET ALONG WITH OTHER PEOPLE: NOT DIFFICULT AT ALL
3. WORRYING TOO MUCH ABOUT DIFFERENT THINGS: 0
7. FEELING AFRAID AS IF SOMETHING AWFUL MIGHT HAPPEN: 0
1. FEELING NERVOUS, ANXIOUS, OR ON EDGE: 0
4. TROUBLE RELAXING: 0
6. BECOMING EASILY ANNOYED OR IRRITABLE: 0
5. BEING SO RESTLESS THAT IT IS HARD TO SIT STILL: 0

## 2024-01-02 NOTE — PROGRESS NOTES
Jessica Yarbrough is a 68 y.o. female presenting today for Follow-up  .     Chief Complaint   Patient presents with    Follow-up       HPI:  Jessica Yarbrough presents to the office today for follow up.    Patient has a past medical history significant for HTN, GERD, asthma, COPD, HLD and vitamin D deficiency.     HLD: Patient is on losartan.  HCTZ was discontinued due to hypercalcemia.  Denies any chest pain or palpitations.  BP mildly elevated today but reports it was at goal at home and recent visit at endocrinologist.     HLD: Patient has hyperlipidemia but does not take any statins - reports myalgias.  Lipid panel in 10/22 showed , HDL 64, triglyceride 119.  She is on Zetia which she is tolerating.     Patient is managed by orthopedics for her history of spinal stenosis of the lumbar region and neurogenic claudication, severe L4-L5.  Her orthopedics is Dr. Baker.  She recently underwent an epidural steroid injection.  Currently on gabapentin-reports pain is well managed.     COPD: Patient smoked for 50 yrs - 1 ppd. She stopped smoking 3 years ago.  Symptoms significantly improved with Spiriva.  However, she has not been able to get it recently due to increased co-pay end of the year.  Reports increased phlegm and congestion.     Osteoporosis: DEXA scan in 2/20 showed osteoporosis T score -2.5 at left distal radius. Patient has not undergone any treatment.  Vitamin D level was normal.  Patient has been hesitant to get treated for it.    Right shoulder pain: Patient is following with Dr. 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 She was prescribed Mobic with significant improvement.      Anxiety: Currently on fluoxetine 20 mg daily.  Reports symptoms are better since increasing the dose.     Hypercalcemia: Noted to have elevated calcium.  PTH was 176.8 with calcium 10.6 in 8/23.  Ionized calcium was 6.1. She was referred

## 2024-01-03 LAB
HCV AB SER IA-ACNC: <0.02 INDEX
HCV AB SERPL QL IA: NEGATIVE
HEPATITIS C COMMENT: NORMAL

## 2024-01-10 ENCOUNTER — TELEPHONE (OUTPATIENT)
Facility: CLINIC | Age: 69
End: 2024-01-10

## 2024-03-01 ENCOUNTER — HOSPITAL ENCOUNTER (OUTPATIENT)
Facility: HOSPITAL | Age: 69
Setting detail: SPECIMEN
End: 2024-03-01
Payer: MEDICARE

## 2024-03-01 ENCOUNTER — OFFICE VISIT (OUTPATIENT)
Facility: CLINIC | Age: 69
End: 2024-03-01
Payer: MEDICARE

## 2024-03-01 VITALS
DIASTOLIC BLOOD PRESSURE: 75 MMHG | HEIGHT: 60 IN | SYSTOLIC BLOOD PRESSURE: 139 MMHG | WEIGHT: 118 LBS | HEART RATE: 67 BPM | BODY MASS INDEX: 23.16 KG/M2 | OXYGEN SATURATION: 96 % | RESPIRATION RATE: 16 BRPM | TEMPERATURE: 98 F

## 2024-03-01 DIAGNOSIS — J44.9 CHRONIC OBSTRUCTIVE PULMONARY DISEASE, UNSPECIFIED COPD TYPE (HCC): ICD-10-CM

## 2024-03-01 DIAGNOSIS — N30.00 ACUTE CYSTITIS WITHOUT HEMATURIA: Primary | ICD-10-CM

## 2024-03-01 DIAGNOSIS — L60.3 BRITTLE NAILS: ICD-10-CM

## 2024-03-01 DIAGNOSIS — M54.50 CHRONIC BILATERAL LOW BACK PAIN, UNSPECIFIED WHETHER SCIATICA PRESENT: ICD-10-CM

## 2024-03-01 DIAGNOSIS — E78.5 HYPERLIPIDEMIA, UNSPECIFIED HYPERLIPIDEMIA TYPE: ICD-10-CM

## 2024-03-01 DIAGNOSIS — M47.816 SPONDYLOSIS WITHOUT MYELOPATHY OR RADICULOPATHY, LUMBAR REGION: ICD-10-CM

## 2024-03-01 DIAGNOSIS — M48.062 SPINAL STENOSIS, LUMBAR REGION WITH NEUROGENIC CLAUDICATION: ICD-10-CM

## 2024-03-01 DIAGNOSIS — I10 ESSENTIAL (PRIMARY) HYPERTENSION: ICD-10-CM

## 2024-03-01 DIAGNOSIS — G25.81 RESTLESS LEGS: ICD-10-CM

## 2024-03-01 DIAGNOSIS — F32.A ANXIETY AND DEPRESSION: ICD-10-CM

## 2024-03-01 DIAGNOSIS — F41.9 ANXIETY AND DEPRESSION: ICD-10-CM

## 2024-03-01 DIAGNOSIS — R63.8 OTHER SYMPTOMS AND SIGNS CONCERNING FOOD AND FLUID INTAKE: ICD-10-CM

## 2024-03-01 DIAGNOSIS — E83.52 HYPERCALCEMIA: ICD-10-CM

## 2024-03-01 DIAGNOSIS — G89.29 CHRONIC BILATERAL LOW BACK PAIN, UNSPECIFIED WHETHER SCIATICA PRESENT: ICD-10-CM

## 2024-03-01 DIAGNOSIS — R39.198 DIFFICULTY URINATING: ICD-10-CM

## 2024-03-01 DIAGNOSIS — M62.838 MUSCLE SPASMS OF BOTH LOWER EXTREMITIES: ICD-10-CM

## 2024-03-01 DIAGNOSIS — R53.82 CHRONIC FATIGUE: ICD-10-CM

## 2024-03-01 DIAGNOSIS — M81.0 AGE-RELATED OSTEOPOROSIS WITHOUT CURRENT PATHOLOGICAL FRACTURE: ICD-10-CM

## 2024-03-01 DIAGNOSIS — B00.9 HSV (HERPES SIMPLEX VIRUS) INFECTION: ICD-10-CM

## 2024-03-01 LAB
APPEARANCE UR: CLEAR
BACTERIA URNS QL MICRO: ABNORMAL /HPF
BILIRUB UR QL: NEGATIVE
BILIRUBIN, URINE, POC: NEGATIVE
BLOOD URINE, POC: NEGATIVE
COLOR UR: YELLOW
EPITH CASTS URNS QL MICRO: ABNORMAL /LPF (ref 0–5)
GLUCOSE UR STRIP.AUTO-MCNC: NEGATIVE MG/DL
GLUCOSE URINE, POC: NEGATIVE
HGB UR QL STRIP: NEGATIVE
KETONES UR QL STRIP.AUTO: NEGATIVE MG/DL
KETONES, URINE, POC: NEGATIVE
LEUKOCYTE ESTERASE UR QL STRIP.AUTO: ABNORMAL
LEUKOCYTE ESTERASE, URINE, POC: NORMAL
NITRITE UR QL STRIP.AUTO: NEGATIVE
NITRITE, URINE, POC: NEGATIVE
PH UR STRIP: 6.5 (ref 5–8)
PH, URINE, POC: 6.5 (ref 4.6–8)
PROT UR STRIP-MCNC: NEGATIVE MG/DL
PROTEIN,URINE, POC: NORMAL
RBC #/AREA URNS HPF: ABNORMAL /HPF (ref 0–5)
SP GR UR REFRACTOMETRY: 1.01 (ref 1–1.03)
SPECIFIC GRAVITY, URINE, POC: 1.01 (ref 1–1.03)
URINALYSIS CLARITY, POC: CLEAR
URINALYSIS COLOR, POC: YELLOW
UROBILINOGEN UR QL STRIP.AUTO: 0.2 EU/DL (ref 0.2–1)
UROBILINOGEN, POC: NORMAL
WBC URNS QL MICRO: ABNORMAL /HPF (ref 0–4)

## 2024-03-01 PROCEDURE — 3078F DIAST BP <80 MM HG: CPT | Performed by: STUDENT IN AN ORGANIZED HEALTH CARE EDUCATION/TRAINING PROGRAM

## 2024-03-01 PROCEDURE — 81001 URINALYSIS AUTO W/SCOPE: CPT

## 2024-03-01 PROCEDURE — 99214 OFFICE O/P EST MOD 30 MIN: CPT | Performed by: STUDENT IN AN ORGANIZED HEALTH CARE EDUCATION/TRAINING PROGRAM

## 2024-03-01 PROCEDURE — 1123F ACP DISCUSS/DSCN MKR DOCD: CPT | Performed by: STUDENT IN AN ORGANIZED HEALTH CARE EDUCATION/TRAINING PROGRAM

## 2024-03-01 PROCEDURE — 3075F SYST BP GE 130 - 139MM HG: CPT | Performed by: STUDENT IN AN ORGANIZED HEALTH CARE EDUCATION/TRAINING PROGRAM

## 2024-03-01 PROCEDURE — 81003 URINALYSIS AUTO W/O SCOPE: CPT | Performed by: STUDENT IN AN ORGANIZED HEALTH CARE EDUCATION/TRAINING PROGRAM

## 2024-03-01 RX ORDER — FLUOXETINE HYDROCHLORIDE 40 MG/1
40 CAPSULE ORAL DAILY
Qty: 90 CAPSULE | Refills: 1 | Status: SHIPPED | OUTPATIENT
Start: 2024-03-01

## 2024-03-01 RX ORDER — VALACYCLOVIR HYDROCHLORIDE 1 G/1
TABLET, FILM COATED ORAL
Qty: 30 TABLET | Refills: 2 | Status: SHIPPED | OUTPATIENT
Start: 2024-03-01

## 2024-03-01 RX ORDER — GABAPENTIN 600 MG/1
600 TABLET ORAL 3 TIMES DAILY
Qty: 270 TABLET | Refills: 1 | Status: SHIPPED | OUTPATIENT
Start: 2024-03-01 | End: 2024-08-28

## 2024-03-01 RX ORDER — NITROFURANTOIN 25; 75 MG/1; MG/1
100 CAPSULE ORAL 2 TIMES DAILY
Qty: 10 CAPSULE | Refills: 0 | Status: SHIPPED | OUTPATIENT
Start: 2024-03-01 | End: 2024-03-06

## 2024-03-01 RX ORDER — GABAPENTIN 600 MG/1
600 TABLET ORAL 3 TIMES DAILY
Qty: 24 TABLET | Refills: 0 | Status: CANCELLED | OUTPATIENT
Start: 2024-03-01 | End: 2024-03-09

## 2024-03-01 SDOH — ECONOMIC STABILITY: INCOME INSECURITY: HOW HARD IS IT FOR YOU TO PAY FOR THE VERY BASICS LIKE FOOD, HOUSING, MEDICAL CARE, AND HEATING?: NOT HARD AT ALL

## 2024-03-01 SDOH — ECONOMIC STABILITY: FOOD INSECURITY: WITHIN THE PAST 12 MONTHS, YOU WORRIED THAT YOUR FOOD WOULD RUN OUT BEFORE YOU GOT MONEY TO BUY MORE.: NEVER TRUE

## 2024-03-01 SDOH — ECONOMIC STABILITY: FOOD INSECURITY: WITHIN THE PAST 12 MONTHS, THE FOOD YOU BOUGHT JUST DIDN'T LAST AND YOU DIDN'T HAVE MONEY TO GET MORE.: NEVER TRUE

## 2024-03-01 ASSESSMENT — ANXIETY QUESTIONNAIRES
GAD7 TOTAL SCORE: 5
3. WORRYING TOO MUCH ABOUT DIFFERENT THINGS: 0
2. NOT BEING ABLE TO STOP OR CONTROL WORRYING: 0
5. BEING SO RESTLESS THAT IT IS HARD TO SIT STILL: 2
1. FEELING NERVOUS, ANXIOUS, OR ON EDGE: 3
6. BECOMING EASILY ANNOYED OR IRRITABLE: 0
4. TROUBLE RELAXING: 0
IF YOU CHECKED OFF ANY PROBLEMS ON THIS QUESTIONNAIRE, HOW DIFFICULT HAVE THESE PROBLEMS MADE IT FOR YOU TO DO YOUR WORK, TAKE CARE OF THINGS AT HOME, OR GET ALONG WITH OTHER PEOPLE: NOT DIFFICULT AT ALL
7. FEELING AFRAID AS IF SOMETHING AWFUL MIGHT HAPPEN: 0

## 2024-03-01 ASSESSMENT — ENCOUNTER SYMPTOMS
RHINORRHEA: 0
WHEEZING: 0
VOMITING: 0
CHEST TIGHTNESS: 0
BACK PAIN: 1
DIARRHEA: 0
SHORTNESS OF BREATH: 0
NAUSEA: 0
ABDOMINAL PAIN: 0
BLOOD IN STOOL: 0
COUGH: 0

## 2024-03-01 ASSESSMENT — PATIENT HEALTH QUESTIONNAIRE - PHQ9
5. POOR APPETITE OR OVEREATING: 0
4. FEELING TIRED OR HAVING LITTLE ENERGY: 0
SUM OF ALL RESPONSES TO PHQ9 QUESTIONS 1 & 2: 0
1. LITTLE INTEREST OR PLEASURE IN DOING THINGS: 0
3. TROUBLE FALLING OR STAYING ASLEEP: 0
SUM OF ALL RESPONSES TO PHQ QUESTIONS 1-9: 0
2. FEELING DOWN, DEPRESSED OR HOPELESS: 0
6. FEELING BAD ABOUT YOURSELF - OR THAT YOU ARE A FAILURE OR HAVE LET YOURSELF OR YOUR FAMILY DOWN: 0
10. IF YOU CHECKED OFF ANY PROBLEMS, HOW DIFFICULT HAVE THESE PROBLEMS MADE IT FOR YOU TO DO YOUR WORK, TAKE CARE OF THINGS AT HOME, OR GET ALONG WITH OTHER PEOPLE: 0
SUM OF ALL RESPONSES TO PHQ QUESTIONS 1-9: 0
SUM OF ALL RESPONSES TO PHQ QUESTIONS 1-9: 0
7. TROUBLE CONCENTRATING ON THINGS, SUCH AS READING THE NEWSPAPER OR WATCHING TELEVISION: 0
8. MOVING OR SPEAKING SO SLOWLY THAT OTHER PEOPLE COULD HAVE NOTICED. OR THE OPPOSITE, BEING SO FIGETY OR RESTLESS THAT YOU HAVE BEEN MOVING AROUND A LOT MORE THAN USUAL: 0
SUM OF ALL RESPONSES TO PHQ QUESTIONS 1-9: 0
9. THOUGHTS THAT YOU WOULD BE BETTER OFF DEAD, OR OF HURTING YOURSELF: 0

## 2024-03-01 NOTE — PROGRESS NOTES
\"Have you been to the ER, urgent care clinic since your last visit?  Hospitalized since your last visit?\"    NO    “Have you seen or consulted any other health care providers outside of [facility] since your last visit?”    YES - When: approximately 2 months ago.  Where and Why:  .Dr. Garcia 11/22/23  Blood doctor

## 2024-03-01 NOTE — PROGRESS NOTES
Jessica Yarbrough is a 68 y.o. female presenting today for Follow-up  .     Chief Complaint   Patient presents with    Follow-up       HPI:  Jessica Yarbrough presents to the office today for follow up.    Patient has a past medical history significant for HTN, GERD, asthma, COPD, HLD and vitamin D deficiency.     HLD: Patient is on losartan.  HCTZ was discontinued due to hypercalcemia.  Denies any chest pain or palpitations.  BP is controlled.     HLD: Patient has hyperlipidemia but does not take any statins - reports myalgias.  Lipid panel in 1/2024 showed .6, HDL 70, triglycerides 132.  She is on Zetia which she is tolerating.     Patient is managed by orthopedics for her history of spinal stenosis of the lumbar region and neurogenic claudication, severe L4-L5.  Her orthopedics is Dr. Baker.  She recently underwent an epidural steroid injection.  Currently on gabapentin-reports pain is well managed.     COPD: Patient smoked for 50 yrs - 1 ppd. She stopped smoking 3 years ago.  Symptoms significantly improved with Spiriva.  She is now on breast tree with significant improvement     Osteoporosis: DEXA scan in 2/20 showed osteoporosis T score -2.5 at left distal radius. Patient has not undergone any treatment.  Vitamin D level was normal.  Patient has been hesitant to get treated for it.    Right shoulder pain: Patient is following with Dr. 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 She was prescribed Mobic with significant improvement.      Anxiety: Currently on fluoxetine 20 mg daily.  Reports increased anxiety.     Hypercalcemia: Noted to have elevated calcium.  Patient is following with endocrinology.     Today-patient is complaining of bodyaches and muscle cramps-in her hands, lower fingers and flanks.  She has not been taking anything for this.  She is complaining of difficulty urinating, fatigue.  She has also noted

## 2024-03-05 DIAGNOSIS — J44.9 CHRONIC OBSTRUCTIVE PULMONARY DISEASE, UNSPECIFIED COPD TYPE (HCC): ICD-10-CM

## 2024-03-06 RX ORDER — BUDESONIDE, GLYCOPYRROLATE, AND FORMOTEROL FUMARATE 160; 9; 4.8 UG/1; UG/1; UG/1
AEROSOL, METERED RESPIRATORY (INHALATION)
Qty: 32.1 G | Refills: 3 | Status: SHIPPED | OUTPATIENT
Start: 2024-03-06

## 2024-04-18 ENCOUNTER — TELEPHONE (OUTPATIENT)
Dept: PHARMACY | Facility: CLINIC | Age: 69
End: 2024-04-18

## 2024-04-18 NOTE — TELEPHONE ENCOUNTER
Telephone  Intervention Detail: Adherence Monitorin  Intervention Accepted By: Patient/Caregiver: 1  Gap Closed?: No   Time Spent (min): 20

## 2024-05-07 ENCOUNTER — HOSPITAL ENCOUNTER (OUTPATIENT)
Facility: HOSPITAL | Age: 69
Setting detail: SPECIMEN
Discharge: HOME | End: 2024-05-10
Payer: MEDICARE

## 2024-05-07 DIAGNOSIS — R63.8 OTHER SYMPTOMS AND SIGNS CONCERNING FOOD AND FLUID INTAKE: ICD-10-CM

## 2024-05-07 DIAGNOSIS — M62.838 MUSCLE SPASMS OF BOTH LOWER EXTREMITIES: ICD-10-CM

## 2024-05-07 DIAGNOSIS — L60.3 BRITTLE NAILS: ICD-10-CM

## 2024-05-07 DIAGNOSIS — G25.81 RESTLESS LEGS: ICD-10-CM

## 2024-05-07 DIAGNOSIS — R53.82 CHRONIC FATIGUE: ICD-10-CM

## 2024-05-07 LAB
ALBUMIN SERPL-MCNC: 3.9 G/DL (ref 3.4–5)
ANION GAP SERPL CALC-SCNC: 5 MMOL/L (ref 3–18)
BUN SERPL-MCNC: 15 MG/DL (ref 7–18)
BUN/CREAT SERPL: 15 (ref 12–20)
CALCIUM SERPL-MCNC: 9.7 MG/DL (ref 8.5–10.1)
CHLORIDE SERPL-SCNC: 104 MMOL/L (ref 100–111)
CO2 SERPL-SCNC: 26 MMOL/L (ref 21–32)
CREAT SERPL-MCNC: 0.98 MG/DL (ref 0.6–1.3)
FERRITIN SERPL-MCNC: 68 NG/ML (ref 8–388)
FOLATE SERPL-MCNC: 7.6 NG/ML (ref 3.1–17.5)
GLUCOSE SERPL-MCNC: 90 MG/DL (ref 74–99)
IRON SATN MFR SERPL: 23 % (ref 20–50)
IRON SERPL-MCNC: 74 UG/DL (ref 50–175)
MAGNESIUM SERPL-MCNC: 2 MG/DL (ref 1.6–2.6)
PHOSPHATE SERPL-MCNC: 3.6 MG/DL (ref 2.5–4.9)
POTASSIUM SERPL-SCNC: 4.4 MMOL/L (ref 3.5–5.5)
SODIUM SERPL-SCNC: 135 MMOL/L (ref 136–145)
TIBC SERPL-MCNC: 320 UG/DL (ref 250–450)
TSH SERPL DL<=0.05 MIU/L-ACNC: 2.3 UIU/ML (ref 0.36–3.74)
VIT B12 SERPL-MCNC: 352 PG/ML (ref 211–911)

## 2024-05-07 PROCEDURE — 83735 ASSAY OF MAGNESIUM: CPT

## 2024-05-07 PROCEDURE — 82607 VITAMIN B-12: CPT

## 2024-05-07 PROCEDURE — 83540 ASSAY OF IRON: CPT

## 2024-05-07 PROCEDURE — 80069 RENAL FUNCTION PANEL: CPT

## 2024-05-07 PROCEDURE — 82746 ASSAY OF FOLIC ACID SERUM: CPT

## 2024-05-07 PROCEDURE — 82728 ASSAY OF FERRITIN: CPT

## 2024-05-07 PROCEDURE — 36415 COLL VENOUS BLD VENIPUNCTURE: CPT

## 2024-05-07 PROCEDURE — 83550 IRON BINDING TEST: CPT

## 2024-05-07 PROCEDURE — 84443 ASSAY THYROID STIM HORMONE: CPT

## 2024-05-15 ENCOUNTER — TELEPHONE (OUTPATIENT)
Facility: CLINIC | Age: 69
End: 2024-05-15

## 2024-05-15 NOTE — TELEPHONE ENCOUNTER
TC was made to pt and pt was made aware that pt can pre answer her AWV questions on mychart.pt states \" that she can't

## 2024-05-16 ENCOUNTER — OFFICE VISIT (OUTPATIENT)
Facility: CLINIC | Age: 69
End: 2024-05-16
Payer: MEDICARE

## 2024-05-16 ENCOUNTER — HOSPITAL ENCOUNTER (OUTPATIENT)
Facility: HOSPITAL | Age: 69
Setting detail: SPECIMEN
Discharge: HOME | End: 2024-05-16
Payer: MEDICARE

## 2024-05-16 VITALS
WEIGHT: 119 LBS | SYSTOLIC BLOOD PRESSURE: 128 MMHG | HEIGHT: 60 IN | DIASTOLIC BLOOD PRESSURE: 75 MMHG | HEART RATE: 77 BPM | BODY MASS INDEX: 23.36 KG/M2 | RESPIRATION RATE: 16 BRPM | TEMPERATURE: 98.2 F | OXYGEN SATURATION: 94 %

## 2024-05-16 DIAGNOSIS — J44.9 CHRONIC OBSTRUCTIVE PULMONARY DISEASE, UNSPECIFIED COPD TYPE (HCC): ICD-10-CM

## 2024-05-16 DIAGNOSIS — R25.2 MUSCLE CRAMP: ICD-10-CM

## 2024-05-16 DIAGNOSIS — N30.00 ACUTE CYSTITIS WITHOUT HEMATURIA: ICD-10-CM

## 2024-05-16 DIAGNOSIS — E61.1 IRON DEFICIENCY: ICD-10-CM

## 2024-05-16 DIAGNOSIS — E78.00 PURE HYPERCHOLESTEROLEMIA, UNSPECIFIED: ICD-10-CM

## 2024-05-16 DIAGNOSIS — G25.81 RESTLESS LEGS: ICD-10-CM

## 2024-05-16 DIAGNOSIS — E21.3 HYPERPARATHYROIDISM, UNSPECIFIED (HCC): ICD-10-CM

## 2024-05-16 DIAGNOSIS — M81.0 AGE-RELATED OSTEOPOROSIS WITHOUT CURRENT PATHOLOGICAL FRACTURE: ICD-10-CM

## 2024-05-16 DIAGNOSIS — F32.A ANXIETY AND DEPRESSION: ICD-10-CM

## 2024-05-16 DIAGNOSIS — E83.52 HYPERCALCEMIA: ICD-10-CM

## 2024-05-16 DIAGNOSIS — H91.93 HEARING DIFFICULTY OF BOTH EARS: ICD-10-CM

## 2024-05-16 DIAGNOSIS — I10 ESSENTIAL (PRIMARY) HYPERTENSION: ICD-10-CM

## 2024-05-16 DIAGNOSIS — K59.00 CONSTIPATION, UNSPECIFIED CONSTIPATION TYPE: ICD-10-CM

## 2024-05-16 DIAGNOSIS — R30.0 DYSURIA: ICD-10-CM

## 2024-05-16 DIAGNOSIS — F41.9 ANXIETY AND DEPRESSION: ICD-10-CM

## 2024-05-16 DIAGNOSIS — L60.3 BRITTLE NAILS: ICD-10-CM

## 2024-05-16 DIAGNOSIS — Z00.00 MEDICARE ANNUAL WELLNESS VISIT, SUBSEQUENT: Primary | ICD-10-CM

## 2024-05-16 LAB
BILIRUBIN, URINE, POC: NEGATIVE
BLOOD URINE, POC: NORMAL
GLUCOSE URINE, POC: NEGATIVE
KETONES, URINE, POC: NEGATIVE
LEUKOCYTE ESTERASE, URINE, POC: NORMAL
NITRITE, URINE, POC: NEGATIVE
PH, URINE, POC: 5.5 (ref 4.6–8)
PROTEIN,URINE, POC: NORMAL
SPECIFIC GRAVITY, URINE, POC: 1.02 (ref 1–1.03)
URINALYSIS CLARITY, POC: CLEAR
URINALYSIS COLOR, POC: YELLOW
UROBILINOGEN, POC: NORMAL

## 2024-05-16 PROCEDURE — 87088 URINE BACTERIA CULTURE: CPT

## 2024-05-16 PROCEDURE — 87186 SC STD MICRODIL/AGAR DIL: CPT

## 2024-05-16 PROCEDURE — G0439 PPPS, SUBSEQ VISIT: HCPCS | Performed by: STUDENT IN AN ORGANIZED HEALTH CARE EDUCATION/TRAINING PROGRAM

## 2024-05-16 PROCEDURE — 1123F ACP DISCUSS/DSCN MKR DOCD: CPT | Performed by: STUDENT IN AN ORGANIZED HEALTH CARE EDUCATION/TRAINING PROGRAM

## 2024-05-16 PROCEDURE — 99214 OFFICE O/P EST MOD 30 MIN: CPT | Performed by: STUDENT IN AN ORGANIZED HEALTH CARE EDUCATION/TRAINING PROGRAM

## 2024-05-16 PROCEDURE — 3074F SYST BP LT 130 MM HG: CPT | Performed by: STUDENT IN AN ORGANIZED HEALTH CARE EDUCATION/TRAINING PROGRAM

## 2024-05-16 PROCEDURE — 87086 URINE CULTURE/COLONY COUNT: CPT

## 2024-05-16 PROCEDURE — 3078F DIAST BP <80 MM HG: CPT | Performed by: STUDENT IN AN ORGANIZED HEALTH CARE EDUCATION/TRAINING PROGRAM

## 2024-05-16 PROCEDURE — 81003 URINALYSIS AUTO W/O SCOPE: CPT | Performed by: STUDENT IN AN ORGANIZED HEALTH CARE EDUCATION/TRAINING PROGRAM

## 2024-05-16 RX ORDER — SENNA AND DOCUSATE SODIUM 50; 8.6 MG/1; MG/1
2 TABLET, FILM COATED ORAL DAILY
Qty: 30 TABLET | Refills: 1 | Status: SHIPPED | OUTPATIENT
Start: 2024-05-16

## 2024-05-16 RX ORDER — FERROUS SULFATE 325(65) MG
325 TABLET ORAL
Qty: 90 TABLET | Refills: 1 | Status: SHIPPED | OUTPATIENT
Start: 2024-05-16

## 2024-05-16 RX ORDER — MAGNESIUM GLUCONATE 27 MG(500)
500 TABLET ORAL 2 TIMES DAILY
COMMUNITY

## 2024-05-16 RX ORDER — TIZANIDINE 2 MG/1
2 TABLET ORAL NIGHTLY PRN
Qty: 30 TABLET | Refills: 2 | Status: SHIPPED | OUTPATIENT
Start: 2024-05-16

## 2024-05-16 RX ORDER — NITROFURANTOIN 25; 75 MG/1; MG/1
100 CAPSULE ORAL 2 TIMES DAILY
Qty: 10 CAPSULE | Refills: 0 | Status: SHIPPED | OUTPATIENT
Start: 2024-05-16 | End: 2024-05-21

## 2024-05-16 RX ORDER — BUDESONIDE, GLYCOPYRROLATE, AND FORMOTEROL FUMARATE 160; 9; 4.8 UG/1; UG/1; UG/1
AEROSOL, METERED RESPIRATORY (INHALATION)
Qty: 32.1 G | Refills: 3 | Status: SHIPPED | OUTPATIENT
Start: 2024-05-16

## 2024-05-16 RX ORDER — LOSARTAN POTASSIUM 100 MG/1
100 TABLET ORAL DAILY
Qty: 90 TABLET | Refills: 1 | Status: SHIPPED | OUTPATIENT
Start: 2024-05-16

## 2024-05-16 ASSESSMENT — ENCOUNTER SYMPTOMS
VOMITING: 0
ABDOMINAL PAIN: 0
BACK PAIN: 1
RHINORRHEA: 0
COUGH: 0
BLOOD IN STOOL: 0
SHORTNESS OF BREATH: 0
CHEST TIGHTNESS: 0
WHEEZING: 0

## 2024-05-16 ASSESSMENT — PATIENT HEALTH QUESTIONNAIRE - PHQ9
8. MOVING OR SPEAKING SO SLOWLY THAT OTHER PEOPLE COULD HAVE NOTICED. OR THE OPPOSITE, BEING SO FIGETY OR RESTLESS THAT YOU HAVE BEEN MOVING AROUND A LOT MORE THAN USUAL: NOT AT ALL
10. IF YOU CHECKED OFF ANY PROBLEMS, HOW DIFFICULT HAVE THESE PROBLEMS MADE IT FOR YOU TO DO YOUR WORK, TAKE CARE OF THINGS AT HOME, OR GET ALONG WITH OTHER PEOPLE: NOT DIFFICULT AT ALL
SUM OF ALL RESPONSES TO PHQ QUESTIONS 1-9: 0
1. LITTLE INTEREST OR PLEASURE IN DOING THINGS: NOT AT ALL
3. TROUBLE FALLING OR STAYING ASLEEP: NOT AT ALL
6. FEELING BAD ABOUT YOURSELF - OR THAT YOU ARE A FAILURE OR HAVE LET YOURSELF OR YOUR FAMILY DOWN: NOT AT ALL
9. THOUGHTS THAT YOU WOULD BE BETTER OFF DEAD, OR OF HURTING YOURSELF: NOT AT ALL
5. POOR APPETITE OR OVEREATING: NOT AT ALL
SUM OF ALL RESPONSES TO PHQ QUESTIONS 1-9: 0
2. FEELING DOWN, DEPRESSED OR HOPELESS: NOT AT ALL
7. TROUBLE CONCENTRATING ON THINGS, SUCH AS READING THE NEWSPAPER OR WATCHING TELEVISION: NOT AT ALL
SUM OF ALL RESPONSES TO PHQ QUESTIONS 1-9: 0
SUM OF ALL RESPONSES TO PHQ QUESTIONS 1-9: 0
SUM OF ALL RESPONSES TO PHQ9 QUESTIONS 1 & 2: 0
4. FEELING TIRED OR HAVING LITTLE ENERGY: NOT AT ALL

## 2024-05-16 ASSESSMENT — LIFESTYLE VARIABLES
HOW OFTEN DO YOU HAVE A DRINK CONTAINING ALCOHOL: NEVER
HOW MANY STANDARD DRINKS CONTAINING ALCOHOL DO YOU HAVE ON A TYPICAL DAY: PATIENT DOES NOT DRINK

## 2024-05-16 NOTE — PATIENT INSTRUCTIONS
screenings as appropriate.    After reviewing your medical record and screening and assessments performed today your provider may have ordered immunizations, labs, imaging, and/or referrals for you.  A list of these orders (if applicable) as well as your Preventive Care list are included within your After Visit Summary for your review.    Other Preventive Recommendations:    A preventive eye exam performed by an eye specialist is recommended every 1-2 years to screen for glaucoma; cataracts, macular degeneration, and other eye disorders.  A preventive dental visit is recommended every 6 months.  Try to get at least 150 minutes of exercise per week or 10,000 steps per day on a pedometer .  Order or download the FREE \"Exercise & Physical Activity: Your Everyday Guide\" from [employer]. Call 1-794.332.8255 or search [employer] online.  You need 6799-1065 mg of calcium and 3366-0087 IU of vitamin D per day. It is possible to meet your calcium requirement with diet alone, but a vitamin D supplement is usually necessary to meet this goal.  When exposed to the sun, use a sunscreen that protects against both UVA and UVB radiation with an SPF of 30 or greater. Reapply every 2 to 3 hours or after sweating, drying off with a towel, or swimming.  Always wear a seat belt when traveling in a car. Always wear a helmet when riding a bicycle or motorcycle.

## 2024-05-16 NOTE — PROGRESS NOTES
\"Have you been to the ER, urgent care clinic since your last visit?  Hospitalized since your last visit?\"    NO    “Have you seen or consulted any other health care providers outside of [facility] since your last visit?”    NO            Click Here for Release of Records Request
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
## 2024-05-16 NOTE — PROGRESS NOTES
Medicare Annual Wellness Visit    Jessica Yarbrough is here for Medicare AWV    Assessment & Plan   Medicare annual wellness visit, subsequent      Recommendations for Preventive Services Due: see orders and patient instructions/AVS.  Recommended screening schedule for the next 5-10 years is provided to the patient in written form: see Patient Instructions/AVS.     Return in about 4 months (around 9/16/2024).     Subjective       Patient's complete Health Risk Assessment and screening values have been reviewed and are found in Flowsheets. The following problems were reviewed today and where indicated follow up appointments were made and/or referrals ordered.    Positive Risk Factor Screenings with Interventions:    Fall Risk:  Do you feel unsteady or are you worried about falling? : (!) yes  2 or more falls in past year?: no  Fall with injury in past year?: no     Interventions:    Reviewed medications, home hazards, visual acuity, and co-morbidities that can increase risk for falls                 Activity, Diet, and Weight:  On average, how many days per week do you engage in moderate to strenuous exercise (like a brisk walk)?: 0 days  On average, how many minutes do you engage in exercise at this level?: 0 min    Do you eat balanced/healthy meals regularly?: (!) No    Body mass index is 23.24 kg/m².      Inactivity Interventions:  Patient can hold on exercise and diet.         Hearing Screen:  Do you or your family notice any trouble with your hearing that hasn't been managed with hearing aids?: (!) Yes (hearing aid)    Interventions:  Referred to ENT    Vision Screen:  Do you have difficulty driving, watching TV, or doing any of your daily activities because of your eyesight?: No  Have you had an eye exam within the past year?: (!) No  No results found.    Interventions:   Patient encouraged to make appointment with their eye specialist    Safety:  Do you have non-slip mats or non-slip surfaces or shower bars or grab

## 2024-05-19 LAB
BACTERIA SPEC CULT: ABNORMAL
CC UR VC: ABNORMAL
SERVICE CMNT-IMP: ABNORMAL

## 2024-05-23 DIAGNOSIS — E78.5 HYPERLIPIDEMIA, UNSPECIFIED HYPERLIPIDEMIA TYPE: ICD-10-CM

## 2024-05-23 DIAGNOSIS — F41.9 ANXIETY AND DEPRESSION: ICD-10-CM

## 2024-05-23 DIAGNOSIS — J44.9 CHRONIC OBSTRUCTIVE PULMONARY DISEASE, UNSPECIFIED COPD TYPE (HCC): ICD-10-CM

## 2024-05-23 DIAGNOSIS — F32.A ANXIETY AND DEPRESSION: ICD-10-CM

## 2024-05-28 RX ORDER — EZETIMIBE 10 MG/1
10 TABLET ORAL DAILY
Qty: 90 TABLET | Refills: 3 | Status: SHIPPED | OUTPATIENT
Start: 2024-05-28

## 2024-05-28 RX ORDER — MONTELUKAST SODIUM 10 MG/1
TABLET ORAL
Qty: 90 TABLET | Refills: 3 | Status: SHIPPED | OUTPATIENT
Start: 2024-05-28

## 2024-05-28 RX ORDER — ALBUTEROL SULFATE 90 UG/1
AEROSOL, METERED RESPIRATORY (INHALATION)
Qty: 20.1 G | Refills: 3 | Status: SHIPPED | OUTPATIENT
Start: 2024-05-28

## 2024-05-28 RX ORDER — FLUOXETINE HYDROCHLORIDE 20 MG/1
20 CAPSULE ORAL DAILY
Qty: 90 CAPSULE | Refills: 3 | OUTPATIENT
Start: 2024-05-28

## 2024-09-03 ENCOUNTER — TELEPHONE (OUTPATIENT)
Dept: PHARMACY | Facility: CLINIC | Age: 69
End: 2024-09-03

## 2024-09-03 NOTE — TELEPHONE ENCOUNTER
[facility] CLINICAL PHARMACY: ADHERENCE REVIEW  Identified care gap per [facility] fills with [facility]: ACE/ARB adherence    Medicare Advantage - MRMA  ACO  Per insurer report, LIS-0 - co-pays are based on tiers and patient is subject to coverage gap.    ASSESSMENT    ACE/ARB ADHERENCE    Insurance Records claims through  24  (Prior Year PDC = 100% - PASSED ; YTD PDC = 77%; Potential Fail Date: 09.10.24):   LOSARTAN POT  MG last filled on 24 for 90 day supply. Next refill due: 24    Prescribed si tablet/capsule daily    Per Insurer Portal: last filled on 24 for 90 day supply.     Per [facility]: last picked up on 24 for 90 day supply. Billed through [facility]. 0 refills remaining. Next Visit: 24    BP Readings from Last 3 Encounters:   24 128/75   24 139/75   24 (!) 140/78     Estimated Creatinine Clearance: 38 mL/min (based on SCr of 0.98 mg/dL).  Lab Results   Component Value Date    CREATININE 0.98 2024     Lab Results   Component Value Date    K 4.4 2024       PLAN  The following are interventions that have been identified:   Patient needs future refills for LOSARTAN POT  MG. Next Visit: 24  Patient eligible for 100 day supply    Outreach:  Patient:  Hello Musiccharlette message sent to patient.   With [facility], you can receive a 100-day (three month) supply of prescription medications for the same copay as a 30-day (one month) supply. Please request 100-day prescriptions from your provider at your next office visit.    Last Visit: 24   Next Visit: 24    Natalia Lamar CPhT  Population Health    [facility]   [phone] option 1     For Pharmacy Admin Tracking Only    Program: [facility]  CPA in place:  No  Gap Closed?: Yes   Time Spent (min): 20

## 2024-09-04 ENCOUNTER — TELEPHONE (OUTPATIENT)
Facility: CLINIC | Age: 69
End: 2024-09-04

## 2024-09-04 DIAGNOSIS — E61.1 IRON DEFICIENCY: Primary | ICD-10-CM

## 2024-09-04 DIAGNOSIS — E83.52 HYPERCALCEMIA: ICD-10-CM

## 2024-09-12 ENCOUNTER — TELEPHONE (OUTPATIENT)
Facility: CLINIC | Age: 69
End: 2024-09-12

## 2024-09-13 ENCOUNTER — HOSPITAL ENCOUNTER (OUTPATIENT)
Facility: HOSPITAL | Age: 69
Discharge: HOME | End: 2024-09-16

## 2024-09-13 LAB — LABCORP SPECIMEN COLLECTION: NORMAL

## 2024-09-13 PROCEDURE — 99001 SPECIMEN HANDLING PT-LAB: CPT

## 2024-09-14 LAB
ALBUMIN SERPL-MCNC: 4.4 G/DL (ref 3.8–4.8)
ALP SERPL-CCNC: 77 IU/L (ref 44–121)
ALT SERPL-CCNC: 9 IU/L (ref 0–32)
AST SERPL-CCNC: 18 IU/L (ref 0–40)
BASOPHILS # BLD AUTO: 0.1 X10E3/UL (ref 0–0.2)
BASOPHILS NFR BLD AUTO: 2 %
BILIRUB SERPL-MCNC: 0.2 MG/DL (ref 0–1.2)
BUN SERPL-MCNC: 13 MG/DL (ref 8–27)
BUN/CREAT SERPL: 16 (ref 12–28)
CALCIUM SERPL-MCNC: 9.6 MG/DL (ref 8.7–10.3)
CHLORIDE SERPL-SCNC: 104 MMOL/L (ref 96–106)
CO2 SERPL-SCNC: 20 MMOL/L (ref 20–29)
CREAT SERPL-MCNC: 0.8 MG/DL (ref 0.57–1)
EGFRCR SERPLBLD CKD-EPI 2021: 79 ML/MIN/1.73
EOSINOPHIL # BLD AUTO: 0.1 X10E3/UL (ref 0–0.4)
EOSINOPHIL NFR BLD AUTO: 1 %
ERYTHROCYTE [DISTWIDTH] IN BLOOD BY AUTOMATED COUNT: 12.8 % (ref 11.7–15.4)
FERRITIN SERPL-MCNC: 109 NG/ML (ref 15–150)
GLOBULIN SER CALC-MCNC: 2.9 G/DL (ref 1.5–4.5)
GLUCOSE SERPL-MCNC: 84 MG/DL (ref 70–99)
HCT VFR BLD AUTO: 38.3 % (ref 34–46.6)
HGB BLD-MCNC: 12.9 G/DL (ref 11.1–15.9)
IMM GRANULOCYTES # BLD AUTO: 0 X10E3/UL (ref 0–0.1)
IMM GRANULOCYTES NFR BLD AUTO: 0 %
LYMPHOCYTES # BLD AUTO: 1.8 X10E3/UL (ref 0.7–3.1)
LYMPHOCYTES NFR BLD AUTO: 27 %
MCH RBC QN AUTO: 31.7 PG (ref 26.6–33)
MCHC RBC AUTO-ENTMCNC: 33.7 G/DL (ref 31.5–35.7)
MCV RBC AUTO: 94 FL (ref 79–97)
MONOCYTES # BLD AUTO: 0.8 X10E3/UL (ref 0.1–0.9)
MONOCYTES NFR BLD AUTO: 12 %
NEUTROPHILS # BLD AUTO: 3.9 X10E3/UL (ref 1.4–7)
NEUTROPHILS NFR BLD AUTO: 58 %
PLATELET # BLD AUTO: 365 X10E3/UL (ref 150–450)
POTASSIUM SERPL-SCNC: 4.8 MMOL/L (ref 3.5–5.2)
PROT SERPL-MCNC: 7.3 G/DL (ref 6–8.5)
RBC # BLD AUTO: 4.07 X10E6/UL (ref 3.77–5.28)
SODIUM SERPL-SCNC: 141 MMOL/L (ref 134–144)
SPECIMEN STATUS REPORT: NORMAL
WBC # BLD AUTO: 6.7 X10E3/UL (ref 3.4–10.8)

## 2024-09-16 LAB — PTH-INTACT SERPL-MCNC: 66 PG/ML (ref 15–65)

## 2024-09-26 ENCOUNTER — OFFICE VISIT (OUTPATIENT)
Facility: CLINIC | Age: 69
End: 2024-09-26

## 2024-09-26 VITALS
DIASTOLIC BLOOD PRESSURE: 80 MMHG | HEIGHT: 60 IN | OXYGEN SATURATION: 96 % | SYSTOLIC BLOOD PRESSURE: 138 MMHG | HEART RATE: 57 BPM | BODY MASS INDEX: 22.97 KG/M2 | WEIGHT: 117 LBS | TEMPERATURE: 97.7 F | RESPIRATION RATE: 16 BRPM

## 2024-09-26 DIAGNOSIS — J44.9 CHRONIC OBSTRUCTIVE PULMONARY DISEASE, UNSPECIFIED COPD TYPE (HCC): ICD-10-CM

## 2024-09-26 DIAGNOSIS — Z23 ENCOUNTER FOR IMMUNIZATION: ICD-10-CM

## 2024-09-26 DIAGNOSIS — M81.0 AGE-RELATED OSTEOPOROSIS WITHOUT CURRENT PATHOLOGICAL FRACTURE: ICD-10-CM

## 2024-09-26 DIAGNOSIS — E61.1 IRON DEFICIENCY: ICD-10-CM

## 2024-09-26 DIAGNOSIS — F32.A ANXIETY AND DEPRESSION: ICD-10-CM

## 2024-09-26 DIAGNOSIS — I10 ESSENTIAL (PRIMARY) HYPERTENSION: Primary | ICD-10-CM

## 2024-09-26 DIAGNOSIS — E78.00 PURE HYPERCHOLESTEROLEMIA, UNSPECIFIED: ICD-10-CM

## 2024-09-26 DIAGNOSIS — M48.062 SPINAL STENOSIS, LUMBAR REGION WITH NEUROGENIC CLAUDICATION: ICD-10-CM

## 2024-09-26 DIAGNOSIS — F41.9 ANXIETY AND DEPRESSION: ICD-10-CM

## 2024-09-26 DIAGNOSIS — E21.3 HYPERPARATHYROIDISM, UNSPECIFIED (HCC): ICD-10-CM

## 2024-09-26 DIAGNOSIS — E83.52 HYPERCALCEMIA: ICD-10-CM

## 2024-09-26 RX ORDER — LOSARTAN POTASSIUM 100 MG/1
100 TABLET ORAL DAILY
Qty: 90 TABLET | Refills: 2 | Status: SHIPPED | OUTPATIENT
Start: 2024-09-26

## 2024-09-26 RX ORDER — FLUOXETINE 40 MG/1
40 CAPSULE ORAL DAILY
Qty: 90 CAPSULE | Refills: 2 | Status: SHIPPED | OUTPATIENT
Start: 2024-09-26

## 2024-09-26 SDOH — ECONOMIC STABILITY: INCOME INSECURITY: HOW HARD IS IT FOR YOU TO PAY FOR THE VERY BASICS LIKE FOOD, HOUSING, MEDICAL CARE, AND HEATING?: NOT HARD AT ALL

## 2024-09-26 SDOH — ECONOMIC STABILITY: FOOD INSECURITY: WITHIN THE PAST 12 MONTHS, YOU WORRIED THAT YOUR FOOD WOULD RUN OUT BEFORE YOU GOT MONEY TO BUY MORE.: NEVER TRUE

## 2024-09-26 SDOH — ECONOMIC STABILITY: FOOD INSECURITY: WITHIN THE PAST 12 MONTHS, THE FOOD YOU BOUGHT JUST DIDN'T LAST AND YOU DIDN'T HAVE MONEY TO GET MORE.: NEVER TRUE

## 2024-09-26 ASSESSMENT — ANXIETY QUESTIONNAIRES
4. TROUBLE RELAXING: NOT AT ALL
IF YOU CHECKED OFF ANY PROBLEMS ON THIS QUESTIONNAIRE, HOW DIFFICULT HAVE THESE PROBLEMS MADE IT FOR YOU TO DO YOUR WORK, TAKE CARE OF THINGS AT HOME, OR GET ALONG WITH OTHER PEOPLE: NOT DIFFICULT AT ALL
7. FEELING AFRAID AS IF SOMETHING AWFUL MIGHT HAPPEN: NOT AT ALL
1. FEELING NERVOUS, ANXIOUS, OR ON EDGE: NEARLY EVERY DAY
6. BECOMING EASILY ANNOYED OR IRRITABLE: NOT AT ALL
GAD7 TOTAL SCORE: 3
2. NOT BEING ABLE TO STOP OR CONTROL WORRYING: NOT AT ALL
5. BEING SO RESTLESS THAT IT IS HARD TO SIT STILL: NOT AT ALL
3. WORRYING TOO MUCH ABOUT DIFFERENT THINGS: NOT AT ALL

## 2024-09-26 ASSESSMENT — ENCOUNTER SYMPTOMS
NAUSEA: 0
BLOOD IN STOOL: 0
DIARRHEA: 0
RHINORRHEA: 0
SHORTNESS OF BREATH: 0
COUGH: 0
BACK PAIN: 1
WHEEZING: 0
VOMITING: 0
ABDOMINAL PAIN: 0
CHEST TIGHTNESS: 0

## 2024-09-26 ASSESSMENT — PATIENT HEALTH QUESTIONNAIRE - PHQ9
1. LITTLE INTEREST OR PLEASURE IN DOING THINGS: NOT AT ALL
2. FEELING DOWN, DEPRESSED OR HOPELESS: NOT AT ALL
SUM OF ALL RESPONSES TO PHQ QUESTIONS 1-9: 0
5. POOR APPETITE OR OVEREATING: NOT AT ALL
SUM OF ALL RESPONSES TO PHQ9 QUESTIONS 1 & 2: 0
8. MOVING OR SPEAKING SO SLOWLY THAT OTHER PEOPLE COULD HAVE NOTICED. OR THE OPPOSITE, BEING SO FIGETY OR RESTLESS THAT YOU HAVE BEEN MOVING AROUND A LOT MORE THAN USUAL: NOT AT ALL
3. TROUBLE FALLING OR STAYING ASLEEP: NOT AT ALL
6. FEELING BAD ABOUT YOURSELF - OR THAT YOU ARE A FAILURE OR HAVE LET YOURSELF OR YOUR FAMILY DOWN: NOT AT ALL
SUM OF ALL RESPONSES TO PHQ QUESTIONS 1-9: 0
7. TROUBLE CONCENTRATING ON THINGS, SUCH AS READING THE NEWSPAPER OR WATCHING TELEVISION: NOT AT ALL
9. THOUGHTS THAT YOU WOULD BE BETTER OFF DEAD, OR OF HURTING YOURSELF: NOT AT ALL
4. FEELING TIRED OR HAVING LITTLE ENERGY: NOT AT ALL
SUM OF ALL RESPONSES TO PHQ QUESTIONS 1-9: 0
SUM OF ALL RESPONSES TO PHQ QUESTIONS 1-9: 0
10. IF YOU CHECKED OFF ANY PROBLEMS, HOW DIFFICULT HAVE THESE PROBLEMS MADE IT FOR YOU TO DO YOUR WORK, TAKE CARE OF THINGS AT HOME, OR GET ALONG WITH OTHER PEOPLE: NOT DIFFICULT AT ALL

## 2025-03-24 DIAGNOSIS — J44.9 CHRONIC OBSTRUCTIVE PULMONARY DISEASE, UNSPECIFIED COPD TYPE (HCC): ICD-10-CM

## 2025-03-26 ENCOUNTER — OFFICE VISIT (OUTPATIENT)
Facility: CLINIC | Age: 70
End: 2025-03-26
Payer: MEDICARE

## 2025-03-26 VITALS
DIASTOLIC BLOOD PRESSURE: 82 MMHG | HEART RATE: 75 BPM | SYSTOLIC BLOOD PRESSURE: 144 MMHG | OXYGEN SATURATION: 94 % | HEIGHT: 60 IN | WEIGHT: 122.8 LBS | BODY MASS INDEX: 24.11 KG/M2

## 2025-03-26 DIAGNOSIS — E21.3 HYPERPARATHYROIDISM, UNSPECIFIED: ICD-10-CM

## 2025-03-26 DIAGNOSIS — E61.1 IRON DEFICIENCY: ICD-10-CM

## 2025-03-26 DIAGNOSIS — M47.816 SPONDYLOSIS WITHOUT MYELOPATHY OR RADICULOPATHY, LUMBAR REGION: ICD-10-CM

## 2025-03-26 DIAGNOSIS — G25.81 RESTLESS LEGS: ICD-10-CM

## 2025-03-26 DIAGNOSIS — F41.9 ANXIETY AND DEPRESSION: ICD-10-CM

## 2025-03-26 DIAGNOSIS — E78.00 PURE HYPERCHOLESTEROLEMIA, UNSPECIFIED: ICD-10-CM

## 2025-03-26 DIAGNOSIS — Z12.31 BREAST CANCER SCREENING BY MAMMOGRAM: ICD-10-CM

## 2025-03-26 DIAGNOSIS — M48.062 SPINAL STENOSIS, LUMBAR REGION WITH NEUROGENIC CLAUDICATION: ICD-10-CM

## 2025-03-26 DIAGNOSIS — E83.52 HYPERCALCEMIA: ICD-10-CM

## 2025-03-26 DIAGNOSIS — R25.2 MUSCLE CRAMP: ICD-10-CM

## 2025-03-26 DIAGNOSIS — Z00.00 MEDICARE ANNUAL WELLNESS VISIT, SUBSEQUENT: Primary | ICD-10-CM

## 2025-03-26 DIAGNOSIS — M81.0 AGE-RELATED OSTEOPOROSIS WITHOUT CURRENT PATHOLOGICAL FRACTURE: ICD-10-CM

## 2025-03-26 DIAGNOSIS — M62.838 MUSCLE SPASMS OF BOTH LOWER EXTREMITIES: ICD-10-CM

## 2025-03-26 DIAGNOSIS — F32.A ANXIETY AND DEPRESSION: ICD-10-CM

## 2025-03-26 DIAGNOSIS — I10 ESSENTIAL (PRIMARY) HYPERTENSION: ICD-10-CM

## 2025-03-26 DIAGNOSIS — J44.9 CHRONIC OBSTRUCTIVE PULMONARY DISEASE, UNSPECIFIED COPD TYPE (HCC): ICD-10-CM

## 2025-03-26 PROCEDURE — 1123F ACP DISCUSS/DSCN MKR DOCD: CPT | Performed by: STUDENT IN AN ORGANIZED HEALTH CARE EDUCATION/TRAINING PROGRAM

## 2025-03-26 PROCEDURE — 3079F DIAST BP 80-89 MM HG: CPT | Performed by: STUDENT IN AN ORGANIZED HEALTH CARE EDUCATION/TRAINING PROGRAM

## 2025-03-26 PROCEDURE — 3077F SYST BP >= 140 MM HG: CPT | Performed by: STUDENT IN AN ORGANIZED HEALTH CARE EDUCATION/TRAINING PROGRAM

## 2025-03-26 PROCEDURE — G2211 COMPLEX E/M VISIT ADD ON: HCPCS | Performed by: STUDENT IN AN ORGANIZED HEALTH CARE EDUCATION/TRAINING PROGRAM

## 2025-03-26 PROCEDURE — 99214 OFFICE O/P EST MOD 30 MIN: CPT | Performed by: STUDENT IN AN ORGANIZED HEALTH CARE EDUCATION/TRAINING PROGRAM

## 2025-03-26 PROCEDURE — G0439 PPPS, SUBSEQ VISIT: HCPCS | Performed by: STUDENT IN AN ORGANIZED HEALTH CARE EDUCATION/TRAINING PROGRAM

## 2025-03-26 PROCEDURE — 1126F AMNT PAIN NOTED NONE PRSNT: CPT | Performed by: STUDENT IN AN ORGANIZED HEALTH CARE EDUCATION/TRAINING PROGRAM

## 2025-03-26 RX ORDER — MONTELUKAST SODIUM 10 MG/1
TABLET ORAL
Qty: 90 TABLET | Refills: 3 | OUTPATIENT
Start: 2025-03-26

## 2025-03-26 RX ORDER — GABAPENTIN 600 MG/1
600 TABLET ORAL 3 TIMES DAILY
Qty: 270 TABLET | Refills: 1 | Status: SHIPPED | OUTPATIENT
Start: 2025-03-26 | End: 2025-09-22

## 2025-03-26 RX ORDER — TIZANIDINE 2 MG/1
2 TABLET ORAL NIGHTLY PRN
Qty: 30 TABLET | Refills: 2 | Status: SHIPPED | OUTPATIENT
Start: 2025-03-26

## 2025-03-26 RX ORDER — FLUOXETINE HYDROCHLORIDE 40 MG/1
40 CAPSULE ORAL DAILY
Qty: 90 CAPSULE | Refills: 2 | Status: SHIPPED | OUTPATIENT
Start: 2025-03-26

## 2025-03-26 RX ORDER — ALBUTEROL SULFATE 90 UG/1
INHALANT RESPIRATORY (INHALATION)
Qty: 20.1 G | Refills: 3 | Status: SHIPPED | OUTPATIENT
Start: 2025-03-26

## 2025-03-26 RX ORDER — LOSARTAN POTASSIUM 100 MG/1
100 TABLET ORAL DAILY
Qty: 90 TABLET | Refills: 2 | Status: SHIPPED | OUTPATIENT
Start: 2025-03-26

## 2025-03-26 RX ORDER — MONTELUKAST SODIUM 10 MG/1
TABLET ORAL
Qty: 90 TABLET | Refills: 3 | Status: SHIPPED | OUTPATIENT
Start: 2025-03-26

## 2025-03-26 RX ORDER — TIOTROPIUM BROMIDE 18 UG/1
18 CAPSULE ORAL; RESPIRATORY (INHALATION) DAILY
Qty: 30 CAPSULE | Refills: 3 | Status: SHIPPED | OUTPATIENT
Start: 2025-03-26

## 2025-03-26 RX ORDER — CINACALCET 30 MG/1
30 TABLET, FILM COATED ORAL 2 TIMES DAILY
Qty: 30 TABLET | Status: CANCELLED | OUTPATIENT
Start: 2025-03-26

## 2025-03-26 SDOH — ECONOMIC STABILITY: FOOD INSECURITY: WITHIN THE PAST 12 MONTHS, THE FOOD YOU BOUGHT JUST DIDN'T LAST AND YOU DIDN'T HAVE MONEY TO GET MORE.: NEVER TRUE

## 2025-03-26 SDOH — ECONOMIC STABILITY: FOOD INSECURITY: WITHIN THE PAST 12 MONTHS, YOU WORRIED THAT YOUR FOOD WOULD RUN OUT BEFORE YOU GOT MONEY TO BUY MORE.: NEVER TRUE

## 2025-03-26 ASSESSMENT — ENCOUNTER SYMPTOMS
COUGH: 0
BLOOD IN STOOL: 0
CHEST TIGHTNESS: 0
DIARRHEA: 0
BACK PAIN: 1
WHEEZING: 0
RHINORRHEA: 0
ABDOMINAL PAIN: 0
SHORTNESS OF BREATH: 0
VOMITING: 0
NAUSEA: 0

## 2025-03-26 ASSESSMENT — PATIENT HEALTH QUESTIONNAIRE - PHQ9
2. FEELING DOWN, DEPRESSED OR HOPELESS: SEVERAL DAYS
SUM OF ALL RESPONSES TO PHQ QUESTIONS 1-9: 1
1. LITTLE INTEREST OR PLEASURE IN DOING THINGS: NOT AT ALL

## 2025-03-26 ASSESSMENT — LIFESTYLE VARIABLES
HOW MANY STANDARD DRINKS CONTAINING ALCOHOL DO YOU HAVE ON A TYPICAL DAY: PATIENT DOES NOT DRINK
HOW OFTEN DO YOU HAVE A DRINK CONTAINING ALCOHOL: NEVER

## 2025-03-26 NOTE — PROGRESS NOTES
Medicare Annual Wellness Visit    Jessica Yarbrough is here for Follow-up and Medicare AWV    Assessment & Plan   Medicare annual wellness visit, subsequent         Return in about 4 months (around 7/26/2025).     Subjective       Patient's complete Health Risk Assessment and screening values have been reviewed and are found in Flowsheets. The following problems were reviewed today and where indicated follow up appointments were made and/or referrals ordered.    Positive Risk Factor Screenings with Interventions:                 General HRA Questions:  Select all that apply: (!) New or Increased Pain  Interventions - Pain:  Gabapentin and Zanaflex refilled.  Patient states that she had not taken her gabapentin today.      Inactivity:  On average, how many days per week do you engage in moderate to strenuous exercise (like a brisk walk)?: 0 days (!) Abnormal  On average, how many minutes do you engage in exercise at this level?: 0 min  Interventions:  Patient reports she is limited in her activity due to the back pain.  Encouraged walking daily.    Poor Eating Habits/Diet:  Do you eat balanced/healthy meals regularly?: (!) No  Interventions:  See AVS for additional education material      Hearing Screen:  Do you or your family notice any trouble with your hearing that hasn't been managed with hearing aids?: (!) Yes    Interventions:  Patient is having trouble with her hearing aids but states that she cannot afford to follow-up with ENT or primary for new hearing aids at this time.     Safety:  Do you have non-slip mats or non-slip surfaces or shower bars or grab bars in your shower or bathtub?: (!) No  Interventions:  Counseled on safety.     Advanced Directives:  Do you have a Living Will?: (!) No    Intervention:  has NO advanced directive - information provided                      Objective   Vitals:    03/26/25 1411 03/26/25 1441   BP: (!) 152/87 (!) 144/82   BP Site: Right Upper Arm    Patient Position: Sitting    BP

## 2025-03-26 NOTE — PROGRESS NOTES
Jessica Yarbrough is a 69 y.o. female presenting today for Follow-up and Medicare AW  .     Chief Complaint   Patient presents with    Follow-up    Medicare AWV       HPI:  Jessica Yarbrough presents to the office today for follow up.    Patient has a past medical history significant for HTN, GERD, asthma, COPD, HLD and vitamin D deficiency, hyperparathyroidism.     HLD: Patient is on losartan.  HCTZ was discontinued due to hypercalcemia.  Denies any chest pain or palpitations.       HLD: Patient has hyperlipidemia but does not take any statins - reports myalgias.  Lipid panel in 1/2024 showed .6, HDL 70, triglycerides 132.  She is on Zetia which she is tolerating.     Patient is managed by orthopedics for her history of spinal stenosis of the lumbar region and neurogenic claudication, severe L4-L5.  Her orthopedics is Dr. Baker.  She recently underwent an epidural steroid injection.  Currently on gabapentin-reports pain is well managed.     COPD: Patient smoked for 50 yrs - 1 ppd. She stopped smoking over 3 years ago.  Symptoms improved with Spiriva.  She is now on Breztri with significant improvement     Osteoporosis: DEXA scan in 2/20 showed osteoporosis T score -2.5 at left distal radius. Patient has not undergone any treatment.  Vitamin D level was normal.  Declined treatment.    Right shoulder pain: Patient is following with Dr. 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 She was prescribed Mobic with significant improvement.      Anxiety: Patient is taking Prozac.  Patient is more stressed and anxious today.  States that she is having financial issues.     Hypercalcemia: Improved..  Patient was following with endocrinology but has not seen them in a while..  Last PTH was 66 with calcium 9.6.  Repeat labs are pending.     Patient is complaining of bodyaches and muscle cramps-in her hands, lower fingers and flanks.   She

## 2025-03-26 NOTE — PATIENT INSTRUCTIONS
are having a problem with your medicine.     If your doctor recommends aspirin, take the amount directed each day. Make sure you take aspirin and not another kind of pain reliever, such as acetaminophen (Tylenol).   When should you call for help?   Call 911 if you have symptoms of a heart attack. These may include:    Chest pain or pressure, or a strange feeling in the chest.     Sweating.     Shortness of breath.     Pain, pressure, or a strange feeling in the back, neck, jaw, or upper belly or in one or both shoulders or arms.     Lightheadedness or sudden weakness.     A fast or irregular heartbeat.   After you call 911, the  may tell you to chew 1 adult-strength or 2 to 4 low-dose aspirin. Wait for an ambulance. Do not try to drive yourself.  Watch closely for changes in your health, and be sure to contact your doctor if you have any problems.  Where can you learn more?  Go to https://www.[employer].net/patientEd and enter F075 to learn more about \"A Healthy Heart: Care Instructions.\"  Current as of: July 31, 2024  Content Version: 14.4  © 7323-2911 [employer].   Care instructions adapted under license by [employer]. If you have questions about a medical condition or this instruction, always ask your healthcare professional. [employer] disclaims any warranty or liability for your use of this information.    Personalized Preventive Plan for Jessica Yarbrough - 3/26/2025  Medicare offers a range of preventive health benefits. Some of the tests and screenings are paid in full while other may be subject to a deductible, co-insurance, and/or copay.  Some of these benefits include a comprehensive review of your medical history including lifestyle, illnesses that may run in your family, and various assessments and screenings as appropriate.  After reviewing your medical record and screening and assessments performed today your provider may have ordered immunizations, labs, imaging, and/or

## 2025-04-02 ENCOUNTER — HOSPITAL ENCOUNTER (OUTPATIENT)
Facility: HOSPITAL | Age: 70
Setting detail: SPECIMEN
Discharge: HOME | End: 2025-04-05

## 2025-04-02 LAB — LABCORP SPECIMEN COLLECTION: NORMAL

## 2025-04-02 PROCEDURE — 99001 SPECIMEN HANDLING PT-LAB: CPT

## 2025-04-03 LAB
BASOPHILS # BLD AUTO: 0.1 X10E3/UL (ref 0–0.2)
BASOPHILS NFR BLD AUTO: 1 %
BUN SERPL-MCNC: 16 MG/DL (ref 8–27)
BUN/CREAT SERPL: 18 (ref 12–28)
CALCIUM SERPL-MCNC: 8.9 MG/DL (ref 8.7–10.3)
CHLORIDE SERPL-SCNC: 100 MMOL/L (ref 96–106)
CHOLEST SERPL-MCNC: 286 MG/DL (ref 100–199)
CO2 SERPL-SCNC: 22 MMOL/L (ref 20–29)
CREAT SERPL-MCNC: 0.89 MG/DL (ref 0.57–1)
EGFRCR SERPLBLD CKD-EPI 2021: 69 ML/MIN/1.73
EOSINOPHIL # BLD AUTO: 0.1 X10E3/UL (ref 0–0.4)
EOSINOPHIL NFR BLD AUTO: 1 %
ERYTHROCYTE [DISTWIDTH] IN BLOOD BY AUTOMATED COUNT: 13.1 % (ref 11.7–15.4)
GLUCOSE SERPL-MCNC: 79 MG/DL (ref 70–99)
HCT VFR BLD AUTO: 36.3 % (ref 34–46.6)
HDLC SERPL-MCNC: 65 MG/DL
HGB BLD-MCNC: 11.8 G/DL (ref 11.1–15.9)
IMM GRANULOCYTES # BLD AUTO: 0 X10E3/UL (ref 0–0.1)
IMM GRANULOCYTES NFR BLD AUTO: 0 %
LDLC SERPL CALC-MCNC: 203 MG/DL (ref 0–99)
LYMPHOCYTES # BLD AUTO: 1.3 X10E3/UL (ref 0.7–3.1)
LYMPHOCYTES NFR BLD AUTO: 18 %
Lab: ABNORMAL
MCH RBC QN AUTO: 31.7 PG (ref 26.6–33)
MCHC RBC AUTO-ENTMCNC: 32.5 G/DL (ref 31.5–35.7)
MCV RBC AUTO: 98 FL (ref 79–97)
MONOCYTES # BLD AUTO: 0.9 X10E3/UL (ref 0.1–0.9)
MONOCYTES NFR BLD AUTO: 12 %
NEUTROPHILS # BLD AUTO: 5 X10E3/UL (ref 1.4–7)
NEUTROPHILS NFR BLD AUTO: 68 %
PLATELET # BLD AUTO: 357 X10E3/UL (ref 150–450)
POTASSIUM SERPL-SCNC: 4.8 MMOL/L (ref 3.5–5.2)
PTH-INTACT SERPL-MCNC: 54 PG/ML (ref 15–65)
RBC # BLD AUTO: 3.72 X10E6/UL (ref 3.77–5.28)
SODIUM SERPL-SCNC: 138 MMOL/L (ref 134–144)
SPECIMEN STATUS REPORT: NORMAL
TRIGL SERPL-MCNC: 106 MG/DL (ref 0–149)
VLDLC SERPL CALC-MCNC: 18 MG/DL (ref 5–40)
WBC # BLD AUTO: 7.3 X10E3/UL (ref 3.4–10.8)

## 2025-05-07 ENCOUNTER — TELEPHONE (OUTPATIENT)
Facility: CLINIC | Age: 70
End: 2025-05-07

## 2025-05-07 DIAGNOSIS — E21.3 HYPERPARATHYROIDISM, UNSPECIFIED: Primary | ICD-10-CM

## 2025-05-07 DIAGNOSIS — E83.52 HYPERCALCEMIA: ICD-10-CM

## 2025-05-07 RX ORDER — CINACALCET 30 MG/1
30 TABLET, FILM COATED ORAL 2 TIMES DAILY
Qty: 60 TABLET | Refills: 3 | Status: SHIPPED | OUTPATIENT
Start: 2025-05-07

## 2025-05-21 ENCOUNTER — TELEPHONE (OUTPATIENT)
Facility: CLINIC | Age: 70
End: 2025-05-21

## 2025-05-21 NOTE — TELEPHONE ENCOUNTER
Pt stated that she will no longer use [employer] to communicate and is requesting a call back to discuss her anxiety.    Asked pt if we could go ahead and schedule appt but pt stated she will call back to schedule but prefers a call back.

## 2025-07-03 ENCOUNTER — TELEPHONE (OUTPATIENT)
Facility: CLINIC | Age: 70
End: 2025-07-03

## 2025-07-03 DIAGNOSIS — G25.81 RESTLESS LEGS: ICD-10-CM

## 2025-07-03 DIAGNOSIS — R25.2 MUSCLE CRAMP: ICD-10-CM

## 2025-07-03 RX ORDER — TIZANIDINE 2 MG/1
2 TABLET ORAL NIGHTLY PRN
Qty: 30 TABLET | Refills: 2 | Status: SHIPPED | OUTPATIENT
Start: 2025-07-03

## 2025-07-03 NOTE — TELEPHONE ENCOUNTER
Pt called stated she needs a refill on  medication eizanidine 2.mg     Yamileth chopra [facility]

## 2025-07-29 ENCOUNTER — OFFICE VISIT (OUTPATIENT)
Facility: CLINIC | Age: 70
End: 2025-07-29
Payer: MEDICARE

## 2025-07-29 VITALS
HEIGHT: 60 IN | SYSTOLIC BLOOD PRESSURE: 133 MMHG | BODY MASS INDEX: 23.75 KG/M2 | HEART RATE: 82 BPM | OXYGEN SATURATION: 95 % | WEIGHT: 121 LBS | DIASTOLIC BLOOD PRESSURE: 79 MMHG

## 2025-07-29 DIAGNOSIS — E21.3 HYPERPARATHYROIDISM, UNSPECIFIED: ICD-10-CM

## 2025-07-29 DIAGNOSIS — E78.00 PURE HYPERCHOLESTEROLEMIA, UNSPECIFIED: Primary | ICD-10-CM

## 2025-07-29 DIAGNOSIS — F41.9 ANXIETY AND DEPRESSION: ICD-10-CM

## 2025-07-29 DIAGNOSIS — M81.0 AGE-RELATED OSTEOPOROSIS WITHOUT CURRENT PATHOLOGICAL FRACTURE: ICD-10-CM

## 2025-07-29 DIAGNOSIS — E83.52 HYPERCALCEMIA: ICD-10-CM

## 2025-07-29 DIAGNOSIS — H91.93 HEARING DIFFICULTY OF BOTH EARS: ICD-10-CM

## 2025-07-29 DIAGNOSIS — R25.2 MUSCLE CRAMP: ICD-10-CM

## 2025-07-29 DIAGNOSIS — F32.A ANXIETY AND DEPRESSION: ICD-10-CM

## 2025-07-29 DIAGNOSIS — I10 ESSENTIAL (PRIMARY) HYPERTENSION: ICD-10-CM

## 2025-07-29 DIAGNOSIS — G25.81 RESTLESS LEGS: ICD-10-CM

## 2025-07-29 DIAGNOSIS — J44.9 CHRONIC OBSTRUCTIVE PULMONARY DISEASE, UNSPECIFIED COPD TYPE (HCC): ICD-10-CM

## 2025-07-29 DIAGNOSIS — M48.062 SPINAL STENOSIS, LUMBAR REGION WITH NEUROGENIC CLAUDICATION: ICD-10-CM

## 2025-07-29 PROCEDURE — 1126F AMNT PAIN NOTED NONE PRSNT: CPT | Performed by: STUDENT IN AN ORGANIZED HEALTH CARE EDUCATION/TRAINING PROGRAM

## 2025-07-29 PROCEDURE — 99214 OFFICE O/P EST MOD 30 MIN: CPT | Performed by: STUDENT IN AN ORGANIZED HEALTH CARE EDUCATION/TRAINING PROGRAM

## 2025-07-29 PROCEDURE — G2211 COMPLEX E/M VISIT ADD ON: HCPCS | Performed by: STUDENT IN AN ORGANIZED HEALTH CARE EDUCATION/TRAINING PROGRAM

## 2025-07-29 PROCEDURE — 1123F ACP DISCUSS/DSCN MKR DOCD: CPT | Performed by: STUDENT IN AN ORGANIZED HEALTH CARE EDUCATION/TRAINING PROGRAM

## 2025-07-29 PROCEDURE — 1159F MED LIST DOCD IN RCRD: CPT | Performed by: STUDENT IN AN ORGANIZED HEALTH CARE EDUCATION/TRAINING PROGRAM

## 2025-07-29 PROCEDURE — 3075F SYST BP GE 130 - 139MM HG: CPT | Performed by: STUDENT IN AN ORGANIZED HEALTH CARE EDUCATION/TRAINING PROGRAM

## 2025-07-29 PROCEDURE — 3078F DIAST BP <80 MM HG: CPT | Performed by: STUDENT IN AN ORGANIZED HEALTH CARE EDUCATION/TRAINING PROGRAM

## 2025-07-29 RX ORDER — PRAVASTATIN SODIUM 20 MG
10 TABLET ORAL NIGHTLY
Qty: 90 TABLET | Refills: 0 | Status: SHIPPED | OUTPATIENT
Start: 2025-07-29

## 2025-07-29 RX ORDER — TIOTROPIUM BROMIDE 18 UG/1
18 CAPSULE ORAL; RESPIRATORY (INHALATION) DAILY
Qty: 30 CAPSULE | Refills: 3 | Status: SHIPPED | OUTPATIENT
Start: 2025-07-29

## 2025-07-29 RX ORDER — ALBUTEROL SULFATE 90 UG/1
INHALANT RESPIRATORY (INHALATION)
Qty: 20.1 G | Refills: 3 | Status: SHIPPED | OUTPATIENT
Start: 2025-07-29

## 2025-07-29 RX ORDER — BUDESONIDE, GLYCOPYRROLATE, AND FORMOTEROL FUMARATE 160; 9; 4.8 UG/1; UG/1; UG/1
AEROSOL, METERED RESPIRATORY (INHALATION)
Qty: 32.1 G | Refills: 3 | Status: SHIPPED | OUTPATIENT
Start: 2025-07-29

## 2025-07-29 SDOH — ECONOMIC STABILITY: FOOD INSECURITY: WITHIN THE PAST 12 MONTHS, YOU WORRIED THAT YOUR FOOD WOULD RUN OUT BEFORE YOU GOT MONEY TO BUY MORE.: NEVER TRUE

## 2025-07-29 ASSESSMENT — ENCOUNTER SYMPTOMS
CHEST TIGHTNESS: 0
SHORTNESS OF BREATH: 0
RHINORRHEA: 0
VOMITING: 0
DIARRHEA: 0
NAUSEA: 0
BACK PAIN: 1
BLOOD IN STOOL: 0
ABDOMINAL PAIN: 0
COUGH: 0
WHEEZING: 0

## 2025-07-29 ASSESSMENT — PATIENT HEALTH QUESTIONNAIRE - PHQ9
SUM OF ALL RESPONSES TO PHQ QUESTIONS 1-9: 1
SUM OF ALL RESPONSES TO PHQ QUESTIONS 1-9: 1
2. FEELING DOWN, DEPRESSED OR HOPELESS: SEVERAL DAYS
SUM OF ALL RESPONSES TO PHQ QUESTIONS 1-9: 1
SUM OF ALL RESPONSES TO PHQ QUESTIONS 1-9: 1
1. LITTLE INTEREST OR PLEASURE IN DOING THINGS: NOT AT ALL

## 2025-07-29 NOTE — PROGRESS NOTES
Jessica Yarbrough is a 69 y.o. female presenting today for Follow-up  .     Chief Complaint   Patient presents with    Follow-up       HPI:  Jessica Yarbrough presents to the office today for follow up.    Patient has a past medical history significant for HTN, GERD, asthma, COPD, HLD and vitamin D deficiency, hyperparathyroidism.     HLD: Patient is on losartan.  HCTZ was discontinued due to hypercalcemia.  Denies any chest pain or palpitations.  BP is controlled today.     HLD: Patient has hyperlipidemia but does not take any statins - reports myalgias.  Lipid panel in 4/2025 showed , HDL 65, triglycerides 106 with total cholesterol 286.  She is on Zetia which she is tolerating.     Patient is managed by orthopedics for her history of spinal stenosis of the lumbar region and neurogenic claudication, severe L4-L5.  Her orthopedics is Dr. Baker.  She recently underwent an epidural steroid injection.  Currently on gabapentin-reports pain is well managed.  Muscle relaxant also helps.     COPD: Patient smoked for 50 yrs - 1 ppd. She stopped smoking over 3 years ago.  Symptoms improved with Spiriva.  Could not continue taking Breztri due to finances.     Osteoporosis: DEXA scan in 2/20 showed osteoporosis T score -2.5 at left distal radius. Patient has not undergone any treatment.  Vitamin D level was normal.  Declined treatment.    Right shoulder pain: Patient is following with Dr. 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 She was prescribed Mobic with significant improvement.      Anxiety: Patient is taking Prozac.      Hypercalcemia: Improved..  Following with endocrinology.  On Sensipar.  Last PTH was 54.           Review of Systems   Constitutional:  Negative for activity change, appetite change, chills, diaphoresis, fatigue, fever and unexpected weight change.   HENT:  Positive for hearing loss. Negative for congestion,

## (undated) DEVICE — KIT CLN UP BON SECOURS MARYV

## (undated) DEVICE — KIT POS W/ FOAM ARM CRADL SHEARGUARD CHST PD CVR FOR SPNL

## (undated) DEVICE — STOCKING COMPR M L29-31IN REG 19MMHG ANK 8-9IN CALF 12-15IN

## (undated) DEVICE — TRAY CATH OD16FR SIL URIN M STATLOK STBL DEV SURSTP

## (undated) DEVICE — SUTURE MCRYL SZ 3-0 L27IN ABSRB UD L24MM PS-1 3/8 CIR PRIM Y936H

## (undated) DEVICE — STERILE LATEX POWDER-FREE SURGICAL GLOVESWITH NITRILE COATING: Brand: PROTEXIS

## (undated) DEVICE — COLLAR CERV ADJ MED 20X3 IN COTTON FOAM PREMIERPRO REUSE

## (undated) DEVICE — SYR 10ML LUER LOK 1/5ML GRAD --

## (undated) DEVICE — (D)PREP SKN CHLRAPRP APPL 26ML -- CONVERT TO ITEM 371833

## (undated) DEVICE — MEDIA CONTRAST 10ML 200MG/ML 41%

## (undated) DEVICE — REM POLYHESIVE ADULT PATIENT RETURN ELECTRODE: Brand: VALLEYLAB

## (undated) DEVICE — Device: Brand: MEDEX

## (undated) DEVICE — FLEX ADVANTAGE 3000CC: Brand: FLEX ADVANTAGE

## (undated) DEVICE — BANDAGE ADH W0.75XL3IN UNIV WVN FAB NAT GEN USE STRP N ADH

## (undated) DEVICE — SUTURE STRATAFIX SYMMETRIC PDS + SZ 1 L18IN ABSRB VLT L48MM SXPP1A400

## (undated) DEVICE — (D)BNDG ADHESIVE FABRIC 3/4X3 -- DISC BY MFR USE ITEM 357960

## (undated) DEVICE — SUTURE STRATAFIX SYMMETRIC PDS + SZ 2-0 L18IN ABSRB VLT SXPP1A403

## (undated) DEVICE — WAX SURG 2.5GM HEMSTAT BNE BEESWAX PARAFFIN ISO PALMITATE

## (undated) DEVICE — INTENDED FOR TISSUE SEPARATION, AND OTHER PROCEDURES THAT REQUIRE A SHARP SURGICAL BLADE TO PUNCTURE OR CUT.: Brand: BARD-PARKER SAFETY BLADES SIZE 10, STERILE

## (undated) DEVICE — BLADE ASSEMB CLP HAIR FINE --

## (undated) DEVICE — 2.8 DRILL BIT

## (undated) DEVICE — SET EPI 18GA L3.5IN TUOHY NDL W/ 20GA CLS TIP NYL CATH

## (undated) DEVICE — 3.0MM PRECISION NEURO (MATCH HEAD)

## (undated) DEVICE — 3M™ BAIR PAWS FLEX™ WARMING GOWN, STANDARD, 20 PER CASE 81003: Brand: BAIR PAWS™

## (undated) DEVICE — INTENDED FOR TISSUE SEPARATION, AND OTHER PROCEDURES THAT REQUIRE A SHARP SURGICAL BLADE TO PUNCTURE OR CUT.: Brand: BARD-PARKER ® STAINLESS STEEL BLADES

## (undated) DEVICE — (D)SYR 10ML 1/5ML GRAD NSAF -- PKGING CHANGE USE ITEM 338027

## (undated) DEVICE — Device

## (undated) DEVICE — SOLUTION IV 1000ML 0.9% SOD CHL

## (undated) DEVICE — WATERPROOF, BACTERIA PROOF DRESSING WITH ABSORBENT SEE THROUGH PAD: Brand: OPSITE POST-OP VISIBLE 20X10CM CTN 20

## (undated) DEVICE — DRAIN SURG W7MMXL20CM SIL FULL PERF HUBLESS FLAT RADPQ STRP

## (undated) DEVICE — KENDALL SCD EXPRESS SLEEVES, KNEE LENGTH, MEDIUM: Brand: KENDALL SCD